# Patient Record
Sex: MALE | Race: WHITE | NOT HISPANIC OR LATINO | Employment: OTHER | URBAN - METROPOLITAN AREA
[De-identification: names, ages, dates, MRNs, and addresses within clinical notes are randomized per-mention and may not be internally consistent; named-entity substitution may affect disease eponyms.]

---

## 2019-01-01 ENCOUNTER — HOSPITAL ENCOUNTER (OUTPATIENT)
Dept: RADIATION ONCOLOGY | Facility: MEDICAL CENTER | Age: 74
End: 2019-12-31
Attending: RADIOLOGY
Payer: COMMERCIAL

## 2019-01-01 VITALS
HEART RATE: 64 BPM | DIASTOLIC BLOOD PRESSURE: 85 MMHG | TEMPERATURE: 97.4 F | SYSTOLIC BLOOD PRESSURE: 132 MMHG | OXYGEN SATURATION: 97 %

## 2019-01-01 PROCEDURE — 99212 OFFICE O/P EST SF 10 MIN: CPT | Performed by: RADIOLOGY

## 2019-01-01 ASSESSMENT — PAIN SCALES - GENERAL: PAINLEVEL: 6=MODERATE PAIN

## 2019-10-04 ENCOUNTER — HOSPITAL ENCOUNTER (INPATIENT)
Facility: MEDICAL CENTER | Age: 74
LOS: 7 days | DRG: 025 | End: 2019-10-11
Attending: EMERGENCY MEDICINE | Admitting: HOSPITALIST
Payer: COMMERCIAL

## 2019-10-04 ENCOUNTER — HOSPITAL ENCOUNTER (OUTPATIENT)
Dept: RADIOLOGY | Facility: MEDICAL CENTER | Age: 74
End: 2019-10-04

## 2019-10-04 DIAGNOSIS — C34.92 PRIMARY MALIGNANT NEOPLASM OF LEFT LUNG METASTATIC TO OTHER SITE (HCC): ICD-10-CM

## 2019-10-04 DIAGNOSIS — R90.0 INTRACRANIAL MASS: ICD-10-CM

## 2019-10-04 LAB
ALBUMIN SERPL BCP-MCNC: 3.9 G/DL (ref 3.2–4.9)
ALBUMIN/GLOB SERPL: 1.1 G/DL
ALP SERPL-CCNC: 77 U/L (ref 30–99)
ALT SERPL-CCNC: 15 U/L (ref 2–50)
ANION GAP SERPL CALC-SCNC: 11 MMOL/L (ref 0–11.9)
APTT PPP: 31.9 SEC (ref 24.7–36)
AST SERPL-CCNC: 23 U/L (ref 12–45)
BASOPHILS # BLD AUTO: 0.8 % (ref 0–1.8)
BASOPHILS # BLD: 0.06 K/UL (ref 0–0.12)
BILIRUB SERPL-MCNC: 0.8 MG/DL (ref 0.1–1.5)
BUN SERPL-MCNC: 35 MG/DL (ref 8–22)
CALCIUM SERPL-MCNC: 10.7 MG/DL (ref 8.5–10.5)
CHLORIDE SERPL-SCNC: 108 MMOL/L (ref 96–112)
CO2 SERPL-SCNC: 25 MMOL/L (ref 20–33)
CREAT SERPL-MCNC: 1.28 MG/DL (ref 0.5–1.4)
EOSINOPHIL # BLD AUTO: 0.05 K/UL (ref 0–0.51)
EOSINOPHIL NFR BLD: 0.7 % (ref 0–6.9)
ERYTHROCYTE [DISTWIDTH] IN BLOOD BY AUTOMATED COUNT: 50.4 FL (ref 35.9–50)
GLOBULIN SER CALC-MCNC: 3.7 G/DL (ref 1.9–3.5)
GLUCOSE SERPL-MCNC: 77 MG/DL (ref 65–99)
HCT VFR BLD AUTO: 44.9 % (ref 42–52)
HGB BLD-MCNC: 14.9 G/DL (ref 14–18)
IMM GRANULOCYTES # BLD AUTO: 0.02 K/UL (ref 0–0.11)
IMM GRANULOCYTES NFR BLD AUTO: 0.3 % (ref 0–0.9)
INR PPP: 0.98 (ref 0.87–1.13)
LYMPHOCYTES # BLD AUTO: 1.51 K/UL (ref 1–4.8)
LYMPHOCYTES NFR BLD: 20.8 % (ref 22–41)
MAGNESIUM SERPL-MCNC: 2.1 MG/DL (ref 1.5–2.5)
MCH RBC QN AUTO: 31.7 PG (ref 27–33)
MCHC RBC AUTO-ENTMCNC: 33.2 G/DL (ref 33.7–35.3)
MCV RBC AUTO: 95.5 FL (ref 81.4–97.8)
MONOCYTES # BLD AUTO: 0.39 K/UL (ref 0–0.85)
MONOCYTES NFR BLD AUTO: 5.4 % (ref 0–13.4)
NEUTROPHILS # BLD AUTO: 5.22 K/UL (ref 1.82–7.42)
NEUTROPHILS NFR BLD: 72 % (ref 44–72)
NRBC # BLD AUTO: 0 K/UL
NRBC BLD-RTO: 0 /100 WBC
PHOSPHATE SERPL-MCNC: 2.4 MG/DL (ref 2.5–4.5)
PLATELET # BLD AUTO: 321 K/UL (ref 164–446)
PMV BLD AUTO: 10.5 FL (ref 9–12.9)
POTASSIUM SERPL-SCNC: 3.1 MMOL/L (ref 3.6–5.5)
PROT SERPL-MCNC: 7.6 G/DL (ref 6–8.2)
PROTHROMBIN TIME: 13.1 SEC (ref 12–14.6)
RBC # BLD AUTO: 4.7 M/UL (ref 4.7–6.1)
SODIUM SERPL-SCNC: 144 MMOL/L (ref 135–145)
WBC # BLD AUTO: 7.3 K/UL (ref 4.8–10.8)

## 2019-10-04 PROCEDURE — 85610 PROTHROMBIN TIME: CPT

## 2019-10-04 PROCEDURE — 770001 HCHG ROOM/CARE - MED/SURG/GYN PRIV*

## 2019-10-04 PROCEDURE — 80053 COMPREHEN METABOLIC PANEL: CPT

## 2019-10-04 PROCEDURE — 83735 ASSAY OF MAGNESIUM: CPT

## 2019-10-04 PROCEDURE — 99406 BEHAV CHNG SMOKING 3-10 MIN: CPT | Performed by: HOSPITALIST

## 2019-10-04 PROCEDURE — 84100 ASSAY OF PHOSPHORUS: CPT

## 2019-10-04 PROCEDURE — 99223 1ST HOSP IP/OBS HIGH 75: CPT | Performed by: HOSPITALIST

## 2019-10-04 PROCEDURE — 96374 THER/PROPH/DIAG INJ IV PUSH: CPT

## 2019-10-04 PROCEDURE — 700111 HCHG RX REV CODE 636 W/ 250 OVERRIDE (IP): Performed by: EMERGENCY MEDICINE

## 2019-10-04 PROCEDURE — 85025 COMPLETE CBC W/AUTO DIFF WBC: CPT

## 2019-10-04 PROCEDURE — 99285 EMERGENCY DEPT VISIT HI MDM: CPT

## 2019-10-04 PROCEDURE — 85730 THROMBOPLASTIN TIME PARTIAL: CPT

## 2019-10-04 PROCEDURE — 99497 ADVNCD CARE PLAN 30 MIN: CPT | Performed by: HOSPITALIST

## 2019-10-04 RX ORDER — BISACODYL 10 MG
10 SUPPOSITORY, RECTAL RECTAL
Status: DISCONTINUED | OUTPATIENT
Start: 2019-10-04 | End: 2019-10-11 | Stop reason: HOSPADM

## 2019-10-04 RX ORDER — TRAMADOL HYDROCHLORIDE 50 MG/1
25 TABLET ORAL EVERY 6 HOURS PRN
Status: ON HOLD | COMMUNITY
End: 2019-10-10

## 2019-10-04 RX ORDER — AMOXICILLIN 250 MG
2 CAPSULE ORAL 2 TIMES DAILY
Status: DISCONTINUED | OUTPATIENT
Start: 2019-10-04 | End: 2019-10-11 | Stop reason: HOSPADM

## 2019-10-04 RX ORDER — DEXAMETHASONE SODIUM PHOSPHATE 4 MG/ML
10 INJECTION, SOLUTION INTRA-ARTICULAR; INTRALESIONAL; INTRAMUSCULAR; INTRAVENOUS; SOFT TISSUE ONCE
Status: COMPLETED | OUTPATIENT
Start: 2019-10-04 | End: 2019-10-04

## 2019-10-04 RX ORDER — ACETAMINOPHEN 325 MG/1
650 TABLET ORAL EVERY 6 HOURS PRN
Status: DISCONTINUED | OUTPATIENT
Start: 2019-10-04 | End: 2019-10-11 | Stop reason: HOSPADM

## 2019-10-04 RX ORDER — ONDANSETRON 2 MG/ML
4 INJECTION INTRAMUSCULAR; INTRAVENOUS EVERY 4 HOURS PRN
Status: DISCONTINUED | OUTPATIENT
Start: 2019-10-04 | End: 2019-10-06

## 2019-10-04 RX ORDER — ONDANSETRON 4 MG/1
4 TABLET, ORALLY DISINTEGRATING ORAL EVERY 4 HOURS PRN
Status: DISCONTINUED | OUTPATIENT
Start: 2019-10-04 | End: 2019-10-11 | Stop reason: HOSPADM

## 2019-10-04 RX ORDER — POLYETHYLENE GLYCOL 3350 17 G/17G
1 POWDER, FOR SOLUTION ORAL
Status: DISCONTINUED | OUTPATIENT
Start: 2019-10-04 | End: 2019-10-11 | Stop reason: HOSPADM

## 2019-10-04 RX ORDER — NICOTINE 21 MG/24HR
14 PATCH, TRANSDERMAL 24 HOURS TRANSDERMAL
Status: DISCONTINUED | OUTPATIENT
Start: 2019-10-05 | End: 2019-10-11 | Stop reason: HOSPADM

## 2019-10-04 RX ADMIN — DEXAMETHASONE SODIUM PHOSPHATE 10 MG: 4 INJECTION, SOLUTION INTRA-ARTICULAR; INTRALESIONAL; INTRAMUSCULAR; INTRAVENOUS; SOFT TISSUE at 17:00

## 2019-10-04 SDOH — HEALTH STABILITY: MENTAL HEALTH: HOW MANY STANDARD DRINKS CONTAINING ALCOHOL DO YOU HAVE ON A TYPICAL DAY?: 1 OR 2

## 2019-10-04 SDOH — HEALTH STABILITY: MENTAL HEALTH: HOW OFTEN DO YOU HAVE A DRINK CONTAINING ALCOHOL?: MONTHLY OR LESS

## 2019-10-04 ASSESSMENT — ENCOUNTER SYMPTOMS
CONSTIPATION: 0
WEAKNESS: 1
PALPITATIONS: 0
BLURRED VISION: 0
PND: 0
NAUSEA: 0
TREMORS: 1
WEIGHT LOSS: 1
SINUS PAIN: 0
FEVER: 0
DIARRHEA: 0
BRUISES/BLEEDS EASILY: 0
DOUBLE VISION: 0
ABDOMINAL PAIN: 0
HEADACHES: 1
DIZZINESS: 0
SENSORY CHANGE: 1
FALLS: 1
SORE THROAT: 0
POLYDIPSIA: 0
PHOTOPHOBIA: 0
VOMITING: 0
WHEEZING: 0
ORTHOPNEA: 0
TINGLING: 0
HEMOPTYSIS: 0
CHILLS: 0
BLOOD IN STOOL: 0
NECK PAIN: 0
SPUTUM PRODUCTION: 0
SHORTNESS OF BREATH: 0
DIAPHORESIS: 0
HALLUCINATIONS: 0
DEPRESSION: 0
HEARTBURN: 0
BACK PAIN: 1
FLANK PAIN: 0
EYE PAIN: 0
COUGH: 1
CLAUDICATION: 0
MYALGIAS: 0
STRIDOR: 0
FOCAL WEAKNESS: 1

## 2019-10-04 ASSESSMENT — LIFESTYLE VARIABLES
TOTAL SCORE: 0
HAVE PEOPLE ANNOYED YOU BY CRITICIZING YOUR DRINKING: NO
EVER HAD A DRINK FIRST THING IN THE MORNING TO STEADY YOUR NERVES TO GET RID OF A HANGOVER: NO
DOES PATIENT WANT TO STOP DRINKING: NO
CONSUMPTION TOTAL: INCOMPLETE
TOTAL SCORE: 0
DO YOU DRINK ALCOHOL: NO
TOTAL SCORE: 0
HAVE YOU EVER FELT YOU SHOULD CUT DOWN ON YOUR DRINKING: NO
SUBSTANCE_ABUSE: 0
EVER FELT BAD OR GUILTY ABOUT YOUR DRINKING: NO

## 2019-10-04 NOTE — ED NOTES
Med Rec Updated and Complete per Pt at bedside and Home Pharmacy  Allergies Reviewed  No PO ABX last 14 days.    Pt reports he does not remember the names of his medications. [facility] the [facility] not [facility] any medication other than Tramadol in the last 90 days.    Pt denies OTC medication at this time.

## 2019-10-04 NOTE — ED TRIAGE NOTES
.....  Chief Complaint   Patient presents with   • Weakness     BIB [facility] from [facility].  Pt has been weak and falling x 3 weeks.  [facility]   Has left sided weakness.  Falling frequently at home x 3 weeks.  Injured his ribs on the left and it is painful.

## 2019-10-04 NOTE — ED PROVIDER NOTES
ED Provider Note    CHIEF COMPLAINT  No chief complaint on file.  Left-sided weakness with recurrent falls    HPI  Milan Clark is a 74 y.o. male who presents with left-sided weakness.  Patient states over the last 3 weeks has been falling more frequently.  He states he has a history of chronic back pain he felt his weakness on the left side is due to the back pain.  The patient went into the [facility] today with the recurrent falls and had a CT scan that showed an intracranial mass and therefore he was transferred here for further evaluation.  He states he did have a history of lung cancer.  The patient does not have a headache.  He does have chronic back pain.  He has noticed some weakness to his left upper and lower extremity.  He is also had ataxia and falls described above.  The patient is unaware of any injuries from the fall.  He has not any chest pain or change in his breathing patterns.    REVIEW OF SYSTEMS  See HPI for further details. All other systems are negative.     PAST MEDICAL HISTORY  No past medical history on file.    [facility]  [email or web address]    SOCIAL HISTORY  Social History     Socioeconomic History   • Marital status: Not on file     Spouse name: Not on file   • Number of children: Not on file   • Years of education: Not on file   • Highest education level: Not on file   Occupational History   • Not on file   Social Needs   • Financial resource strain: Not on file   • Food insecurity:     Worry: Not on file     Inability: Not on file   • Transportation needs:     Medical: Not on file     Non-medical: Not on file   Tobacco Use   • Smoking status: Not on file   Substance and Sexual Activity   • Alcohol use: Not on file   • Drug use: Not on file   • Sexual activity: Not on file   Lifestyle   • Physical activity:     Days per week: Not on file     Minutes per session: Not on file   • Stress: Not on file   Relationships   • Social connections:     Talks on phone: Not on file     Gets  "together: Not on file     Attends Yazidism service: Not on file     Active member of club or organization: Not on file     Attends meetings of clubs or organizations: Not on file     Relationship status: Not on file   • Intimate partner violence:     Fear of current or ex partner: Not on file     Emotionally abused: Not on file     Physically abused: Not on file     Forced sexual activity: Not on file   Other Topics Concern   • Not on file   Social History Narrative   • Not on file       SURGICAL HISTORY  No past surgical history on file.    CURRENT MEDICATIONS  Home Medications    **Home medications have not yet been reviewed for this encounter**         ALLERGIES  Allergies not on file    PHYSICAL EXAM  VITAL SIGNS: /86   Pulse (!) 56   Temp 36.9 °C (98.4 °F) (Temporal)   Resp 16   Ht 1.803 m (5' 11\")   Wt 63.5 kg (140 lb)   BMI 19.53 kg/m²       Constitutional: Cachectic  HENT: Normocephalic, Atraumatic, Bilateral external ears normal, Oropharynx moist, No oral exudates, Nose normal.   Eyes: PERRLA, EOMI, Conjunctiva normal, No discharge.   Neck: Normal range of motion, No tenderness, Supple, No stridor.   Lymphatic: No lymphadenopathy noted.   Cardiovascular: Normal heart rate, Normal rhythm, No murmurs, No rubs, No gallops.   Thorax & Lungs: Symmetrically diminished throughout, No respiratory distress, No wheezing, No chest tenderness.   Abdomen: Bowel sounds normal, Soft, No tenderness, No masses, No pulsatile masses.   Skin: Warm, Dry, No erythema, No rash.   Back: No tenderness, No CVA tenderness.   Extremities: Intact distal pulses, No edema, No tenderness, No cyanosis, No clubbing.   Neurologic: The patient is alert and oriented x3, he has normal sensory exam, cranial nerves are intact, the patient does have a pronator drift as well as a drift with the left lower extremity.  He also has difficulty with finger-to-nose testing with the left upper extremity.  Otherwise the patient is " neurologically intact.  Psychiatric: Affect normal, Judgment normal, Mood normal.     COURSE & MEDICAL DECISION MAKING  Pertinent Labs & Imaging studies reviewed. (See chart for details)  This is a 74-year-old male who presents as a transfer for an intracranial mass.  A CT scan of the head was performed and it showed a large amount of vasogenic edema around the mass right frontal lobe which are suggestive of metastatic disease the patient also has a small amount of herniation as well as some mass-effect upon the lateral ventricle.    At approximately 3:25 PM I spoke with the neurosurgeon and he feels the patient will be more appropriate managed medically but will evaluate the patient.  He states there is more than one lesion therefore is most likely metastatic.  The patient does have vasogenic edema therefore he will be started on Decadron.  The patient be admitted to the hospitalist in guarded condition.    FINAL IMPRESSION  1.  Intracranial mass suspect metastatic disease  2.  Left-sided weakness secondary intracranial mass  3.  Critical care time 30 minutes    Disposition  The patient will be admitted in guarded condition         Electronically signed by: Matt Heredia, 10/4/2019 3:06 PM

## 2019-10-05 ENCOUNTER — APPOINTMENT (OUTPATIENT)
Dept: RADIOLOGY | Facility: MEDICAL CENTER | Age: 74
DRG: 025 | End: 2019-10-05
Attending: NEUROLOGICAL SURGERY
Payer: COMMERCIAL

## 2019-10-05 ENCOUNTER — ANESTHESIA EVENT (OUTPATIENT)
Dept: SURGERY | Facility: MEDICAL CENTER | Age: 74
DRG: 025 | End: 2019-10-05
Payer: COMMERCIAL

## 2019-10-05 ENCOUNTER — APPOINTMENT (OUTPATIENT)
Dept: RADIOLOGY | Facility: MEDICAL CENTER | Age: 74
DRG: 025 | End: 2019-10-05
Attending: FAMILY MEDICINE
Payer: COMMERCIAL

## 2019-10-05 PROBLEM — R91.8 LUNG MASS: Status: ACTIVE | Noted: 2019-10-05

## 2019-10-05 PROBLEM — Z71.89 ADVANCE CARE PLANNING: Status: ACTIVE | Noted: 2019-10-05

## 2019-10-05 PROBLEM — G93.89 BRAIN MASS: Status: ACTIVE | Noted: 2019-10-05

## 2019-10-05 PROBLEM — Z72.0 TOBACCO ABUSE: Status: ACTIVE | Noted: 2019-10-05

## 2019-10-05 PROBLEM — Z85.118 HISTORY OF LUNG CANCER: Status: ACTIVE | Noted: 2019-10-05

## 2019-10-05 PROBLEM — E87.6 HYPOKALEMIA: Status: ACTIVE | Noted: 2019-10-05

## 2019-10-05 PROBLEM — C34.11 MALIGNANT NEOPLASM OF UPPER LOBE OF RIGHT LUNG (HCC): Status: ACTIVE | Noted: 2019-10-05

## 2019-10-05 PROBLEM — N17.9 AKI (ACUTE KIDNEY INJURY) (HCC): Status: ACTIVE | Noted: 2019-10-05

## 2019-10-05 PROBLEM — E83.39 HYPOPHOSPHATEMIA: Status: ACTIVE | Noted: 2019-10-05

## 2019-10-05 LAB
ALBUMIN SERPL BCP-MCNC: 4 G/DL (ref 3.2–4.9)
ALBUMIN/GLOB SERPL: 1.1 G/DL
ALP SERPL-CCNC: 77 U/L (ref 30–99)
ALT SERPL-CCNC: 15 U/L (ref 2–50)
ANION GAP SERPL CALC-SCNC: 10 MMOL/L (ref 0–11.9)
APPEARANCE UR: CLEAR
AST SERPL-CCNC: 23 U/L (ref 12–45)
BACTERIA #/AREA URNS HPF: NEGATIVE /HPF
BASOPHILS # BLD AUTO: 0.3 % (ref 0–1.8)
BASOPHILS # BLD: 0.02 K/UL (ref 0–0.12)
BILIRUB SERPL-MCNC: 0.8 MG/DL (ref 0.1–1.5)
BILIRUB UR QL STRIP.AUTO: NEGATIVE
BUN SERPL-MCNC: 37 MG/DL (ref 8–22)
CALCIUM SERPL-MCNC: 9.7 MG/DL (ref 8.5–10.5)
CEA SERPL-MCNC: 5.3 NG/ML (ref 0–3)
CFT BLD TEG: 4.9 MIN (ref 5–10)
CHLORIDE SERPL-SCNC: 109 MMOL/L (ref 96–112)
CLOT ANGLE BLD TEG: 70.5 DEGREES (ref 53–72)
CLOT LYSIS 30M P MA LENFR BLD TEG: 0.5 % (ref 0–8)
CO2 SERPL-SCNC: 24 MMOL/L (ref 20–33)
COLOR UR: YELLOW
CREAT SERPL-MCNC: 1.06 MG/DL (ref 0.5–1.4)
CT.EXTRINSIC BLD ROTEM: 1.3 MIN (ref 1–3)
EKG IMPRESSION: NORMAL
EOSINOPHIL # BLD AUTO: 0 K/UL (ref 0–0.51)
EOSINOPHIL NFR BLD: 0 % (ref 0–6.9)
EPI CELLS #/AREA URNS HPF: NEGATIVE /HPF
ERYTHROCYTE [DISTWIDTH] IN BLOOD BY AUTOMATED COUNT: 48.3 FL (ref 35.9–50)
GLOBULIN SER CALC-MCNC: 3.7 G/DL (ref 1.9–3.5)
GLUCOSE SERPL-MCNC: 123 MG/DL (ref 65–99)
GLUCOSE UR STRIP.AUTO-MCNC: NEGATIVE MG/DL
HCT VFR BLD AUTO: 46.5 % (ref 42–52)
HGB BLD-MCNC: 15.2 G/DL (ref 14–18)
HYALINE CASTS #/AREA URNS LPF: ABNORMAL /LPF
IMM GRANULOCYTES # BLD AUTO: 0.02 K/UL (ref 0–0.11)
IMM GRANULOCYTES NFR BLD AUTO: 0.3 % (ref 0–0.9)
KETONES UR STRIP.AUTO-MCNC: 15 MG/DL
LEUKOCYTE ESTERASE UR QL STRIP.AUTO: NEGATIVE
LYMPHOCYTES # BLD AUTO: 0.82 K/UL (ref 1–4.8)
LYMPHOCYTES NFR BLD: 14.1 % (ref 22–41)
MCF BLD TEG: 72.8 MM (ref 50–70)
MCH RBC QN AUTO: 30.3 PG (ref 27–33)
MCHC RBC AUTO-ENTMCNC: 32.7 G/DL (ref 33.7–35.3)
MCV RBC AUTO: 92.8 FL (ref 81.4–97.8)
MICRO URNS: ABNORMAL
MONOCYTES # BLD AUTO: 0.09 K/UL (ref 0–0.85)
MONOCYTES NFR BLD AUTO: 1.5 % (ref 0–13.4)
NEUTROPHILS # BLD AUTO: 4.86 K/UL (ref 1.82–7.42)
NEUTROPHILS NFR BLD: 83.8 % (ref 44–72)
NITRITE UR QL STRIP.AUTO: NEGATIVE
NRBC # BLD AUTO: 0 K/UL
NRBC BLD-RTO: 0 /100 WBC
PA AA BLD-ACNC: 53.7 %
PA ADP BLD-ACNC: 36.6 %
PH UR STRIP.AUTO: 6.5 [PH] (ref 5–8)
PLATELET # BLD AUTO: 326 K/UL (ref 164–446)
PMV BLD AUTO: 10.1 FL (ref 9–12.9)
POTASSIUM SERPL-SCNC: 3.2 MMOL/L (ref 3.6–5.5)
PROT SERPL-MCNC: 7.7 G/DL (ref 6–8.2)
PROT UR QL STRIP: NEGATIVE MG/DL
RBC # BLD AUTO: 5.01 M/UL (ref 4.7–6.1)
RBC # URNS HPF: ABNORMAL /HPF
RBC UR QL AUTO: ABNORMAL
SODIUM SERPL-SCNC: 143 MMOL/L (ref 135–145)
SP GR UR STRIP.AUTO: 1.04
TEG ALGORITHM TGALG: ABNORMAL
UROBILINOGEN UR STRIP.AUTO-MCNC: 0.2 MG/DL
WBC # BLD AUTO: 5.8 K/UL (ref 4.8–10.8)
WBC #/AREA URNS HPF: ABNORMAL /HPF

## 2019-10-05 PROCEDURE — 85025 COMPLETE CBC W/AUTO DIFF WBC: CPT

## 2019-10-05 PROCEDURE — 700105 HCHG RX REV CODE 258: Performed by: HOSPITALIST

## 2019-10-05 PROCEDURE — 99232 SBSQ HOSP IP/OBS MODERATE 35: CPT | Performed by: FAMILY MEDICINE

## 2019-10-05 PROCEDURE — 700111 HCHG RX REV CODE 636 W/ 250 OVERRIDE (IP): Performed by: HOSPITALIST

## 2019-10-05 PROCEDURE — A9576 INJ PROHANCE MULTIPACK: HCPCS | Performed by: FAMILY MEDICINE

## 2019-10-05 PROCEDURE — 700117 HCHG RX CONTRAST REV CODE 255: Performed by: FAMILY MEDICINE

## 2019-10-05 PROCEDURE — 70553 MRI BRAIN STEM W/O & W/DYE: CPT

## 2019-10-05 PROCEDURE — 82378 CARCINOEMBRYONIC ANTIGEN: CPT

## 2019-10-05 PROCEDURE — A9270 NON-COVERED ITEM OR SERVICE: HCPCS | Performed by: HOSPITALIST

## 2019-10-05 PROCEDURE — 700102 HCHG RX REV CODE 250 W/ 637 OVERRIDE(OP): Performed by: INTERNAL MEDICINE

## 2019-10-05 PROCEDURE — 71260 CT THORAX DX C+: CPT

## 2019-10-05 PROCEDURE — 81001 URINALYSIS AUTO W/SCOPE: CPT

## 2019-10-05 PROCEDURE — 700117 HCHG RX CONTRAST REV CODE 255: Performed by: NEUROLOGICAL SURGERY

## 2019-10-05 PROCEDURE — 700101 HCHG RX REV CODE 250: Performed by: NEUROLOGICAL SURGERY

## 2019-10-05 PROCEDURE — 84153 ASSAY OF PSA TOTAL: CPT

## 2019-10-05 PROCEDURE — 51798 US URINE CAPACITY MEASURE: CPT

## 2019-10-05 PROCEDURE — 700105 HCHG RX REV CODE 258: Performed by: FAMILY MEDICINE

## 2019-10-05 PROCEDURE — 85384 FIBRINOGEN ACTIVITY: CPT

## 2019-10-05 PROCEDURE — 700102 HCHG RX REV CODE 250 W/ 637 OVERRIDE(OP): Performed by: FAMILY MEDICINE

## 2019-10-05 PROCEDURE — 93970 EXTREMITY STUDY: CPT | Mod: 26 | Performed by: INTERNAL MEDICINE

## 2019-10-05 PROCEDURE — 36415 COLL VENOUS BLD VENIPUNCTURE: CPT

## 2019-10-05 PROCEDURE — 700111 HCHG RX REV CODE 636 W/ 250 OVERRIDE (IP): Performed by: FAMILY MEDICINE

## 2019-10-05 PROCEDURE — 93010 ELECTROCARDIOGRAM REPORT: CPT | Performed by: INTERNAL MEDICINE

## 2019-10-05 PROCEDURE — 85347 COAGULATION TIME ACTIVATED: CPT

## 2019-10-05 PROCEDURE — A9270 NON-COVERED ITEM OR SERVICE: HCPCS | Performed by: FAMILY MEDICINE

## 2019-10-05 PROCEDURE — 93970 EXTREMITY STUDY: CPT

## 2019-10-05 PROCEDURE — 770001 HCHG ROOM/CARE - MED/SURG/GYN PRIV*

## 2019-10-05 PROCEDURE — 80053 COMPREHEN METABOLIC PANEL: CPT

## 2019-10-05 PROCEDURE — 85576 BLOOD PLATELET AGGREGATION: CPT | Mod: 91

## 2019-10-05 PROCEDURE — 93005 ELECTROCARDIOGRAM TRACING: CPT | Performed by: PHYSICIAN ASSISTANT

## 2019-10-05 PROCEDURE — A9270 NON-COVERED ITEM OR SERVICE: HCPCS | Performed by: INTERNAL MEDICINE

## 2019-10-05 PROCEDURE — 700102 HCHG RX REV CODE 250 W/ 637 OVERRIDE(OP): Performed by: HOSPITALIST

## 2019-10-05 PROCEDURE — 700101 HCHG RX REV CODE 250: Performed by: HOSPITALIST

## 2019-10-05 RX ORDER — SODIUM CHLORIDE AND POTASSIUM CHLORIDE 150; 900 MG/100ML; MG/100ML
INJECTION, SOLUTION INTRAVENOUS CONTINUOUS
Status: DISCONTINUED | OUTPATIENT
Start: 2019-10-05 | End: 2019-10-06

## 2019-10-05 RX ORDER — SODIUM CHLORIDE 9 MG/ML
INJECTION, SOLUTION INTRAVENOUS CONTINUOUS
Status: DISCONTINUED | OUTPATIENT
Start: 2019-10-05 | End: 2019-10-05

## 2019-10-05 RX ORDER — SIMVASTATIN 40 MG
40 TABLET ORAL EVERY EVENING
Status: ON HOLD | COMMUNITY
End: 2019-10-17 | Stop reason: SDUPTHER

## 2019-10-05 RX ORDER — LISINOPRIL 10 MG/1
10 TABLET ORAL DAILY
Status: ON HOLD | COMMUNITY
End: 2019-10-10

## 2019-10-05 RX ORDER — DEXAMETHASONE SODIUM PHOSPHATE 4 MG/ML
4 INJECTION, SOLUTION INTRA-ARTICULAR; INTRALESIONAL; INTRAMUSCULAR; INTRAVENOUS; SOFT TISSUE EVERY 6 HOURS
Status: DISCONTINUED | OUTPATIENT
Start: 2019-10-05 | End: 2019-10-06

## 2019-10-05 RX ORDER — HYDRALAZINE HYDROCHLORIDE 20 MG/ML
10-20 INJECTION INTRAMUSCULAR; INTRAVENOUS EVERY 6 HOURS PRN
Status: DISCONTINUED | OUTPATIENT
Start: 2019-10-05 | End: 2019-10-11 | Stop reason: HOSPADM

## 2019-10-05 RX ORDER — OXYCODONE HYDROCHLORIDE 5 MG/1
5 TABLET ORAL
Status: DISCONTINUED | OUTPATIENT
Start: 2019-10-05 | End: 2019-10-06

## 2019-10-05 RX ORDER — DICLOFENAC SODIUM 75 MG/1
75 TABLET, DELAYED RELEASE ORAL
Status: ON HOLD | COMMUNITY
End: 2019-10-10

## 2019-10-05 RX ORDER — LOSARTAN POTASSIUM 25 MG/1
12.5 TABLET ORAL DAILY
Status: ON HOLD | COMMUNITY
End: 2019-10-21

## 2019-10-05 RX ADMIN — DEXAMETHASONE SODIUM PHOSPHATE 4 MG: 4 INJECTION, SOLUTION INTRA-ARTICULAR; INTRALESIONAL; INTRAMUSCULAR; INTRAVENOUS; SOFT TISSUE at 05:52

## 2019-10-05 RX ADMIN — DIBASIC SODIUM PHOSPHATE, MONOBASIC POTASSIUM PHOSPHATE AND MONOBASIC SODIUM PHOSPHATE 1 TABLET: 852; 155; 130 TABLET ORAL at 17:34

## 2019-10-05 RX ADMIN — SODIUM CHLORIDE 1000 ML: 9 INJECTION, SOLUTION INTRAVENOUS at 05:52

## 2019-10-05 RX ADMIN — SODIUM CHLORIDE 500 MG: 9 INJECTION, SOLUTION INTRAVENOUS at 17:36

## 2019-10-05 RX ADMIN — DEXAMETHASONE SODIUM PHOSPHATE 4 MG: 4 INJECTION, SOLUTION INTRA-ARTICULAR; INTRALESIONAL; INTRAMUSCULAR; INTRAVENOUS; SOFT TISSUE at 17:36

## 2019-10-05 RX ADMIN — DIBASIC SODIUM PHOSPHATE, MONOBASIC POTASSIUM PHOSPHATE AND MONOBASIC SODIUM PHOSPHATE 1 TABLET: 852; 155; 130 TABLET ORAL at 08:15

## 2019-10-05 RX ADMIN — OXYCODONE HYDROCHLORIDE 5 MG: 5 TABLET ORAL at 05:51

## 2019-10-05 RX ADMIN — NICOTINE 14 MG: 14 PATCH TRANSDERMAL at 05:52

## 2019-10-05 RX ADMIN — GADOTERIDOL 10 ML: 279.3 INJECTION, SOLUTION INTRAVENOUS at 09:15

## 2019-10-05 RX ADMIN — ACETAMINOPHEN 650 MG: 325 TABLET, FILM COATED ORAL at 20:58

## 2019-10-05 RX ADMIN — SENNOSIDES, DOCUSATE SODIUM 2 TABLET: 50; 8.6 TABLET, FILM COATED ORAL at 17:36

## 2019-10-05 RX ADMIN — DEXAMETHASONE SODIUM PHOSPHATE 4 MG: 4 INJECTION, SOLUTION INTRA-ARTICULAR; INTRALESIONAL; INTRAMUSCULAR; INTRAVENOUS; SOFT TISSUE at 12:32

## 2019-10-05 RX ADMIN — IOHEXOL 100 ML: 350 INJECTION, SOLUTION INTRAVENOUS at 10:05

## 2019-10-05 RX ADMIN — POTASSIUM CHLORIDE AND SODIUM CHLORIDE: 900; 150 INJECTION, SOLUTION INTRAVENOUS at 23:15

## 2019-10-05 RX ADMIN — DEXAMETHASONE SODIUM PHOSPHATE 4 MG: 4 INJECTION, SOLUTION INTRA-ARTICULAR; INTRALESIONAL; INTRAMUSCULAR; INTRAVENOUS; SOFT TISSUE at 23:12

## 2019-10-05 RX ADMIN — MAGNESIUM HYDROXIDE 30 ML: 400 SUSPENSION ORAL at 17:34

## 2019-10-05 RX ADMIN — POTASSIUM CHLORIDE AND SODIUM CHLORIDE: 900; 150 INJECTION, SOLUTION INTRAVENOUS at 08:15

## 2019-10-05 ASSESSMENT — ENCOUNTER SYMPTOMS
NAUSEA: 0
SEIZURES: 0
COUGH: 0
FLANK PAIN: 0
SORE THROAT: 0
DIZZINESS: 0
PALPITATIONS: 0
DIAPHORESIS: 0
HEADACHES: 0
VOMITING: 0
BLURRED VISION: 0
FOCAL WEAKNESS: 1
HEARTBURN: 0
NERVOUS/ANXIOUS: 0
SHORTNESS OF BREATH: 0
NECK PAIN: 0
SPEECH CHANGE: 0
FEVER: 0
SENSORY CHANGE: 0
BACK PAIN: 0
WHEEZING: 0
ABDOMINAL PAIN: 0
CHILLS: 0
DIARRHEA: 0
WEAKNESS: 0

## 2019-10-05 ASSESSMENT — COGNITIVE AND FUNCTIONAL STATUS - GENERAL
DAILY ACTIVITIY SCORE: 18
CLIMB 3 TO 5 STEPS WITH RAILING: A LITTLE
EATING MEALS: A LITTLE
TOILETING: A LITTLE
MOVING TO AND FROM BED TO CHAIR: A LITTLE
DRESSING REGULAR LOWER BODY CLOTHING: A LITTLE
HELP NEEDED FOR BATHING: A LITTLE
SUGGESTED CMS G CODE MODIFIER DAILY ACTIVITY: CK
PERSONAL GROOMING: A LITTLE
SUGGESTED CMS G CODE MODIFIER MOBILITY: CK
DRESSING REGULAR UPPER BODY CLOTHING: A LITTLE
MOBILITY SCORE: 18
WALKING IN HOSPITAL ROOM: A LITTLE
MOVING FROM LYING ON BACK TO SITTING ON SIDE OF FLAT BED: A LITTLE
TURNING FROM BACK TO SIDE WHILE IN FLAT BAD: A LITTLE
STANDING UP FROM CHAIR USING ARMS: A LITTLE

## 2019-10-05 ASSESSMENT — LIFESTYLE VARIABLES
EVER_SMOKED: YES
TOTAL SCORE: 0
CONSUMPTION TOTAL: NEGATIVE
EVER HAD A DRINK FIRST THING IN THE MORNING TO STEADY YOUR NERVES TO GET RID OF A HANGOVER: NO
DOES PATIENT WANT TO STOP DRINKING: NO
AVERAGE NUMBER OF DAYS PER WEEK YOU HAVE A DRINK CONTAINING ALCOHOL: 0
ALCOHOL_USE: NO
HAVE PEOPLE ANNOYED YOU BY CRITICIZING YOUR DRINKING: NO
TOTAL SCORE: 0
TOTAL SCORE: 0
HOW MANY TIMES IN THE PAST YEAR HAVE YOU HAD 5 OR MORE DRINKS IN A DAY: 0
ON A TYPICAL DAY WHEN YOU DRINK ALCOHOL HOW MANY DRINKS DO YOU HAVE: 0
EVER FELT BAD OR GUILTY ABOUT YOUR DRINKING: NO
HAVE YOU EVER FELT YOU SHOULD CUT DOWN ON YOUR DRINKING: NO

## 2019-10-05 ASSESSMENT — COPD QUESTIONNAIRES
DURING THE PAST 4 WEEKS HOW MUCH DID YOU FEEL SHORT OF BREATH: NONE/LITTLE OF THE TIME
COPD SCREENING SCORE: 4
DO YOU EVER COUGH UP ANY MUCUS OR PHLEGM?: NO/ONLY WITH OCCASIONAL COLDS OR INFECTIONS
HAVE YOU SMOKED AT LEAST 100 CIGARETTES IN YOUR ENTIRE LIFE: YES

## 2019-10-05 NOTE — ASSESSMENT & PLAN NOTE
Status post resection  NSCLC on pathology: Med and Rad Onc consulted  Continue neurochecks, he is status cranial active, follow neuro exam every 4 hours  Continue Decadron and Keppra  Transfer to neurology floor  PT/OT recommends rehab  Rehab accepted

## 2019-10-05 NOTE — PROGRESS NOTES
This RN notified MD of high BP, Dr Smith called back, new orders received for oxycodone 5 mg PRN Q3 for pain, Pt asymptomatic, will continue to monitor

## 2019-10-05 NOTE — PROGRESS NOTES
Pt aaox4. COBURN, L sided weakness 4/5. Denies N/T. Up w/ x1 assist. Voiding via urinal. Reviewed poc with pt & family-verbalized understanding. MRI and CT completed. Surgery scheduled for tomorrow. Call light in reach.

## 2019-10-05 NOTE — ASSESSMENT & PLAN NOTE
History of lung cancer treated with lobectomy 8 years ago  Now has metastatic disease   Onc consulted

## 2019-10-05 NOTE — ED NOTES
Patient awake alert and oriented x 4, Glascow 15, bed in low position, call light within reach, on room air, attached to cardiac monitor, unlabored breathing noted, no cough noted, interacts with staff, interactions noted as appropriate, daughter at bedside, finished dinner, frequent rounding performed, will continue to assess patient.

## 2019-10-05 NOTE — PROGRESS NOTES
2 RN skin check done with RN Nicky, no breakdown noted on bony prominences, fragile skin with healing scars on bilateral upper extremities, generalized bruising, sacrum red and blanching, mepilex in use, extra pillows for repositioning, pt turns self, will continue to assess.

## 2019-10-05 NOTE — PROGRESS NOTES
[facility] Medicine Daily Progress Note    Date of Service  10/5/2019    Chief Complaint  74 y.o. male admitted 10/4/2019 with Brain Mass    Hospital Course  Admitted with brain mass, suspicious for metastases, history of lung cancer on the right, now noted to have left lung mass.    Interval Problem Update  Brain mass - left sided weakness  Lung mass - CT chest noted  ERNST - crea better  Low potassium and phosphorus    Lengthy discussion with patient and family, updates given, plan of care discussed.  Consultants/Specialty  Neurosurgery    Code Status  Full    Disposition  TBD    Review of Systems  Review of Systems   Constitutional: Negative for chills, diaphoresis, fever and malaise/fatigue.   HENT: Negative for hearing loss and sore throat.    Eyes: Negative for blurred vision.   Respiratory: Negative for cough, shortness of breath and wheezing.    Cardiovascular: Negative for chest pain, palpitations and leg swelling.   Gastrointestinal: Negative for abdominal pain, diarrhea, heartburn, nausea and vomiting.   Genitourinary: Negative for dysuria, flank pain and hematuria.   Musculoskeletal: Negative for back pain and neck pain.   Skin: Negative for rash.   Neurological: Positive for focal weakness. Negative for dizziness, sensory change, speech change, seizures, weakness and headaches.   Psychiatric/Behavioral: The patient is not nervous/anxious.         Physical Exam  Temp:  [36.6 °C (97.9 °F)-36.9 °C (98.4 °F)] 36.6 °C (97.9 °F)  Pulse:  [51-68] 55  Resp:  [16-17] 16  BP: (119-172)/(80-91) 172/85  SpO2:  [94 %-98 %] 96 %    Physical Exam   Constitutional: He is oriented to person, place, and time. He appears well-developed and well-nourished.   HENT:   Head: Normocephalic and atraumatic.   Eyes: Pupils are equal, round, and reactive to light. Conjunctivae and EOM are normal.   Neck: No tracheal deviation present. No thyromegaly present.   Cardiovascular: Normal rate and regular rhythm.   Pulmonary/Chest: Effort  normal and breath sounds normal. He exhibits tenderness.   Abdominal: Soft. Bowel sounds are normal. He exhibits no distension. There is no tenderness.   Musculoskeletal: He exhibits no edema.   Lymphadenopathy:     He has no cervical adenopathy.   Neurological: He is alert and oriented to person, place, and time. No cranial nerve deficit.   MMT RUE and RLE 5/5, LUE and LLE 4+/5   Skin: Skin is warm and dry.   Nursing note and vitals reviewed.      Fluids    Intake/Output Summary (Last 24 hours) at 10/5/2019 1240  Last data filed at 10/5/2019 0815  Gross per 24 hour   Intake 240 ml   Output 350 ml   Net -110 ml       Laboratory  Recent Labs     10/04/19  1525 10/05/19  0244   WBC 7.3 5.8   RBC 4.70 5.01   HEMOGLOBIN 14.9 15.2   HEMATOCRIT 44.9 46.5   MCV 95.5 92.8   MCH 31.7 30.3   MCHC 33.2* 32.7*   RDW 50.4* 48.3   PLATELETCT 321 326   MPV 10.5 10.1     Recent Labs     10/04/19  1525 10/05/19  0244   SODIUM 144 143   POTASSIUM 3.1* 3.2*   CHLORIDE 108 109   CO2 25 24   GLUCOSE 77 123*   BUN 35* 37*   CREATININE 1.28 1.06   CALCIUM 10.7* 9.7     Recent Labs     10/04/19  1525   APTT 31.9   INR 0.98               Imaging  MR-BRAIN-WITH & W/O   Final Result      1.  Large right frontal and left cerebellar heterogeneously enhancing intra-axial masses concerning for metastatic disease.   2.  Right to left midline shift measuring 1 cm.   3.  Effacement of the fourth ventricle secondary to the cerebellar lesion without evidence of hydrocephalus.   4.  Mild diffuse cerebral substance loss.   5.  Mild microangiopathic ischemic change versus the myelination gliosis.      CT-CHEST,ABDOMEN,PELVIS WITH   Final Result      1.  There is a spiculated 3.4 cm left upper lobe lung mass extending to the left suprahilar region and in casing the left upper lobe pulmonary arteries with questionable small amount of thrombus in one anterior branch. This finding is most consistent    with malignancy.   2.  There are several peripheral  groundglass nodules in left upper lobe which could be additional areas of malignancy versus post obstructive pneumonitis.   3.  There is otherwise no mediastinal or hilar lymphadenopathy.   4.  There are a few nonspecific subcentimeter hypodense hepatic lesions, too small to characterize.   5.  There is no adenopathy in the abdomen or pelvis.   6.  There is mild intrahepatic biliary prominence of uncertain etiology.   7.  Stomach is not well evaluated as described above.   8.  There is one small area of sclerosis in the left iliac bone which could be a bone island or conceivably metastases although no other bone metastases are evident.      OUTSIDE IMAGES-CT HEAD   Final Result      DX-CHEST-2 VIEWS    (Results Pending)   US-EXTREMITY VENOUS LOWER BILAT    (Results Pending)        Assessment/Plan  * Brain mass- (present on admission)  Assessment & Plan  IV Decadron  Start IV Keppra  Neurochecks  Plan for surgery tomorrow    History of lung cancer- (present on admission)  Assessment & Plan  Probable recurrence    Lung mass- (present on admission)  Assessment & Plan  CT chest results reviewed with Radiology - tumor encasing pulmonary artery more probable than thrombus  Check venous duplex    Hypophosphatemia- (present on admission)  Assessment & Plan  Start Kphos    Hypokalemia- (present on admission)  Assessment & Plan  IV KCl, follow bmp    ERNST (acute kidney injury) (HCC)- (present on admission)  Assessment & Plan  IVF NS, follow bmp  Check PTH and UA        Tobacco abuse- (present on admission)  Assessment & Plan  Nicotine replacement protocol       VTE prophylaxis: SCD

## 2019-10-05 NOTE — PROGRESS NOTES
MRI shows 2 large lesions:  1) Lt cerebellum and 2) Rt Frontal.  Both too large for RT.  Pt scheduled for surgical removal of both metastases tomorrow.

## 2019-10-05 NOTE — PROGRESS NOTES
Neurosurgery Progress Note    Subjective:  Pt not in room, per family went for CT    Exam:  NA    BP  Min: 119/80  Max: 172/85  Pulse  Av.9  Min: 51  Max: 68  Resp  Av.3  Min: 16  Max: 17  Temp  Av.8 °C (98.2 °F)  Min: 36.6 °C (97.9 °F)  Max: 36.9 °C (98.4 °F)  SpO2  Av.4 %  Min: 94 %  Max: 98 %    No data recorded    Recent Labs     10/04/19  1525 10/05/19  0244   WBC 7.3 5.8   RBC 4.70 5.01   HEMOGLOBIN 14.9 15.2   HEMATOCRIT 44.9 46.5   MCV 95.5 92.8   MCH 31.7 30.3   MCHC 33.2* 32.7*   RDW 50.4* 48.3   PLATELETCT 321 326   MPV 10.5 10.1     Recent Labs     10/04/19  1525 10/05/19  0244   SODIUM 144 143   POTASSIUM 3.1* 3.2*   CHLORIDE 108 109   CO2 25 24   GLUCOSE 77 123*   BUN 35* 37*   CREATININE 1.28 1.06   CALCIUM 10.7* 9.7     Recent Labs     10/04/19  1525   APTT 31.9   INR 0.98     Recent Labs     10/05/19  0244   REACTMIN 4.9*   CLOTKINET 1.3   CLOTANGL 70.5   MAXCLOTS 72.8*   KSU13DCA 0.5   PRCINADP 36.6   PRCINAA 53.7       Intake/Output       10/04/19 0700 - 10/05/19 0659 10/05/19 0700 - 10/06/19 0659       Total  Total       Intake    Total Intake -- -- -- -- -- --       Output    Urine  --  350 350  --  -- --    Number of Times Voided -- 1 x 1 x -- -- --    Urine Void (mL) -- 350 350 -- -- --    Total Output -- 350 350 -- -- --       Net I/O     -- -350 -350 -- -- --            Intake/Output Summary (Last 24 hours) at 10/5/2019 1009  Last data filed at 10/5/2019 0420  Gross per 24 hour   Intake --   Output 350 ml   Net -350 ml            • dexamethasone  4 mg Q6HRS   • oxyCODONE immediate-release  5 mg Q3HRS PRN   • hydrALAZINE  10-20 mg Q6HRS PRN   • phosphorus  1 Tab BID   • 0.9 % NaCl with KCl 20 mEq 1,000 mL   Continuous   • senna-docusate  2 Tab BID    And   • polyethylene glycol/lytes  1 Packet QDAY PRN    And   • magnesium hydroxide  30 mL QDAY PRN    And   • bisacodyl  10 mg QDAY PRN   • Respiratory Care per Protocol   Continuous RT    • acetaminophen  650 mg Q6HRS PRN   • ondansetron  4 mg Q4HRS PRN   • ondansetron  4 mg Q4HRS PRN   • nicotine  14 mg Daily-0600    And   • nicotine polacrilex  2 mg Q HOUR PRN       Assessment and Plan:  Hospital day #2  POD #NA  Prophylactic anticoagulation: no         Start date/time: TBD, hold for surgery   Craniotomy for mass resection tentatively scheduled for Monday with Dr Lacey, pending clearance from IM team  NPO Sunday at [facility]   Labs reviewed  Ordered CXR, EKG

## 2019-10-05 NOTE — THERAPY
consult received; pt tentatively on surgery schedule for craniotomy Monday am if ok with IM per chart review; will hold PT eval until post to assist with dc planing; pt is mobilizing with nursing per chart

## 2019-10-05 NOTE — H&P
Hospital Medicine History & Physical Note    Date of Service  10/4/2019    Primary Care Physician  No primary care provider on file.    Consultants  Neurosurgery    Code Status  Full    Chief Complaint  Left-sided weakness    History of Presenting Illness  74 y.o. male who presented 10/4/2019 with history of lung cancer status post lobectomy and radiation 5 years ago comes in complaining of confusion, blank stares, left sided weakness, left-sided numbness, unstable gait, left-sided facial droop.  Patient states for the past 2 months the symptoms started to occur and have been progressive.  Before this he was his normal self and strength.  He started to notice tremors back in September.  He started having low appetite and nausea with weight loss.  Patient felt twice at home and one time in his head which prompted come to the emergency room.  He went to the [facility] he had a CT scan that found a mass in his brain.  Upon arrival to the emergency room patient vital signs were within normal limits.    Review of Systems  Review of Systems   Constitutional: Positive for malaise/fatigue and weight loss. Negative for chills, diaphoresis and fever.   HENT: Negative for congestion, ear discharge, ear pain, hearing loss, nosebleeds, sinus pain, sore throat and tinnitus.    Eyes: Negative for blurred vision, double vision, photophobia and pain.   Respiratory: Positive for cough. Negative for hemoptysis, sputum production, shortness of breath, wheezing and stridor.    Cardiovascular: Negative for chest pain, palpitations, orthopnea, claudication, leg swelling and PND.   Gastrointestinal: Negative for abdominal pain, blood in stool, constipation, diarrhea, heartburn, melena, nausea and vomiting.   Genitourinary: Negative for dysuria, flank pain, frequency, hematuria and urgency.   Musculoskeletal: Positive for back pain and falls. Negative for joint pain, myalgias and neck pain.   Skin: Negative for itching and rash.    Neurological: Positive for tremors, sensory change, focal weakness, weakness and headaches. Negative for dizziness and tingling.   Endo/Heme/Allergies: Negative for environmental allergies and polydipsia. Does not bruise/bleed easily.   Psychiatric/Behavioral: Negative for depression, hallucinations, substance abuse and suicidal ideas.       Past Medical History  History of lung cancer    Surgical History   has no past surgical history on file.     Family History  Family history reviewed and not pertinent    Social History   reports that he has been smoking. He has been smoking about 0.10 packs per day. He has never used smokeless tobacco. He reports that he drinks alcohol. He reports that he does not use drugs.    Allergies  No Known Allergies    Medications  Prior to Admission Medications   Prescriptions Last Dose Informant Patient Reported? Taking?   tramadol (ULTRAM) 50 MG Tab 10/3/2019 at PM Patient's Home Pharmacy Yes Yes   Sig: Take 25 mg by mouth every 6 hours as needed for Moderate Pain.      Facility-Administered Medications: None       Physical Exam  Temp:  [36.7 °C (98.1 °F)-36.9 °C (98.4 °F)] 36.7 °C (98.1 °F)  Pulse:  [51-68] 68  Resp:  [16-17] 17  BP: (119-151)/(80-91) 151/91  SpO2:  [94 %-98 %] 97 %    Physical Exam   Constitutional: He is oriented to person, place, and time. He appears well-developed and well-nourished.   HENT:   Head: Normocephalic and atraumatic.   Mouth/Throat: No oropharyngeal exudate.   Eyes: Conjunctivae are normal. No scleral icterus.   Neck: Normal range of motion. Neck supple. No JVD present. No tracheal deviation present. No thyromegaly present.   Cardiovascular: Normal rate, regular rhythm and intact distal pulses. Exam reveals no gallop and no friction rub.   No murmur heard.  Pulmonary/Chest: Effort normal and breath sounds normal. No stridor. No respiratory distress. He has no wheezes. He has no rales. He exhibits no tenderness.   Abdominal: Soft. Bowel sounds are  normal. He exhibits no distension and no mass. There is no tenderness. There is no rebound and no guarding.   Musculoskeletal: Normal range of motion. He exhibits no edema.   Lymphadenopathy:     He has no cervical adenopathy.   Neurological: He is alert and oriented to person, place, and time. He has normal reflexes.   Left-sided weakness with 4 x 5 strength  Left-sided numbness  Left-sided facial droop   Nursing note and vitals reviewed.      Laboratory:  Recent Labs     10/04/19  1525   WBC 7.3   RBC 4.70   HEMOGLOBIN 14.9   HEMATOCRIT 44.9   MCV 95.5   MCH 31.7   MCHC 33.2*   RDW 50.4*   PLATELETCT 321   MPV 10.5     Recent Labs     10/04/19  1525   SODIUM 144   POTASSIUM 3.1*   CHLORIDE 108   CO2 25   GLUCOSE 77   BUN 35*   CREATININE 1.28   CALCIUM 10.7*     Recent Labs     10/04/19  1525   ALTSGPT 15   ASTSGOT 23   ALKPHOSPHAT 77   TBILIRUBIN 0.8   GLUCOSE 77     Recent Labs     10/04/19  1525   APTT 31.9   INR 0.98     No results for input(s): NTPROBNP in the last 72 hours.      No results for input(s): TROPONINT in the last 72 hours.    Urinalysis:    No results found     Imaging:  OUTSIDE IMAGES-CT HEAD   Final Result      MR-BRAIN-WITH & W/O    (Results Pending)   CT-CHEST,ABDOMEN,PELVIS WITH    (Results Pending)         Assessment/Plan:  I anticipate this patient will require at least two midnights for appropriate medical management, necessitating inpatient admission.    * Brain mass  Assessment & Plan  Multiple metastatic lesions one large one in the frontal and one in parietal  Neurosurgery consulted  Neurochecks every 4 hours  IV Decadron ordered for vasogenic edema  PT OT eval  MRI of the brain has been ordered    ERNST (acute kidney injury) (HCC)  Assessment & Plan  Likely prerenal  IV fluid hydration with normal saline  Monitor BMP and assess response  Avoid IV contrast/nephrotoxins/NSAIDs  Dose adjust meds for decreased GFR        Advance care planning  Assessment & Plan  I discussed advance care  planning with the patient for at least 20 minutes with the patient, including diagnosis, prognosis, plan of care, risks and benefits of any therapies that could be offered, as well as alternatives including palliation and hospice, as appropriate. The patient has opted full code because he needs more time to think about it.  The patient states that he will let me know tomorrow.  Time spent is exclusive of evaluation and management or other separately billable procedures. Start time: 6:00 end time:6:20  Consulted pallative    Tobacco abuse  Assessment & Plan  Tobacco cessation education provided for more than 4 minutes, discussed options of nicotine patch, medical treatment with wellbutrin and chantix. Discussed the risks of smoking including increased risk of heart disease, stroke, cancer and COPD. Discussed the benefits of quitting smoking. Nicotine replacement protocol will be provided to the patient.  I discussed with the patient that he has a cancer likely caused by smoking and I suggested that he quit     Malignant neoplasm of upper lobe of right lung (HCC)  Assessment & Plan  History of lung cancer status post lumpectomy and radiation  Patient states that he never got chemotherapy  CT chest abdomen pelvis ordered      VTE prophylaxis: SCD

## 2019-10-05 NOTE — ASSESSMENT & PLAN NOTE
I discussed advance care planning with the patient for at least 20 minutes with the patient, including diagnosis, prognosis, plan of care, risks and benefits of any therapies that could be offered, as well as alternatives including palliation and hospice, as appropriate. The patient has opted full code because he needs more time to think about it.  The patient states that he will let me know tomorrow.  Time spent is exclusive of evaluation and management or other separately billable procedures. Start time: 6:00 end time:6:20  Consulted pallative

## 2019-10-05 NOTE — CONSULTS
DATE OF SERVICE:  10/04/2019    NEUROSURGICAL CONSULTATION    REFERRING PHYSICIAN:  [facility] hospitalist.    HISTORY OF PRESENT ILLNESS:  This 74-year-old male [facility] with [facility] of left-sided weakness and confusion, had a CT of the head,   which shows at least 2 separate metastatic lesions, small one in the   posterior fossa and a large right frontal.  Patient reports being right   handed.  Currently, he is denying any significant headache.    REVIEW OF SYSTEMS:  No history of bleeding disorder or DVT.  Does have a   history of lung cancer, which was treated with pneumonectomy 6-7 years ago.    He states he was followed with regular exams for 5 years and then he stopped   checking him.  There is no other history of any other malignancy.    PHYSICAL EXAMINATION:  VITAL SIGNS:  Recorded.  GENERAL:  Patient is awake, alert and conversant.  Speech is fluent.  HEENT:  Pupils are equally round and reactive to light.  Extraocular movements   are full.  Left facial weakness.  NEUROLOGIC:  He has got left hemiparesis grade IV/V, arm affected more than   the leg.  Increased tone in the left arm.    IMPRESSION:  Metastatic disease of the brain, history of remote lung cancer.    RECOMMENDATIONS:  Patient needs MRI of the brain.  I have ordered a tech for   tomorrow.  I would consider repeating a CT of the chest, abdomen and pelvis   with IV contrast.       ____________________________________     MD KELSEY DE DIOS / JOHN    DD:  10/04/2019 20:19:34  DT:  10/04/2019 22:43:56    D#:  [id number]  Job#:  [id number]

## 2019-10-06 ENCOUNTER — ANESTHESIA (OUTPATIENT)
Dept: SURGERY | Facility: MEDICAL CENTER | Age: 74
DRG: 025 | End: 2019-10-06
Payer: COMMERCIAL

## 2019-10-06 PROBLEM — R33.9 URINARY RETENTION: Status: ACTIVE | Noted: 2019-10-06

## 2019-10-06 PROBLEM — J44.9 COPD (CHRONIC OBSTRUCTIVE PULMONARY DISEASE) (HCC): Status: ACTIVE | Noted: 2019-10-06

## 2019-10-06 PROBLEM — I10 ESSENTIAL HYPERTENSION: Status: ACTIVE | Noted: 2019-10-06

## 2019-10-06 LAB
ANION GAP SERPL CALC-SCNC: 7 MMOL/L (ref 0–11.9)
APPEARANCE UR: CLEAR
BACTERIA #/AREA URNS HPF: NEGATIVE /HPF
BASOPHILS # BLD AUTO: 0.1 % (ref 0–1.8)
BASOPHILS # BLD: 0.01 K/UL (ref 0–0.12)
BILIRUB UR QL STRIP.AUTO: NEGATIVE
BUN SERPL-MCNC: 33 MG/DL (ref 8–22)
CALCIUM SERPL-MCNC: 9.2 MG/DL (ref 8.5–10.5)
CHLORIDE SERPL-SCNC: 110 MMOL/L (ref 96–112)
CO2 SERPL-SCNC: 23 MMOL/L (ref 20–33)
COLOR UR: YELLOW
CREAT SERPL-MCNC: 0.97 MG/DL (ref 0.5–1.4)
EOSINOPHIL # BLD AUTO: 0 K/UL (ref 0–0.51)
EOSINOPHIL NFR BLD: 0 % (ref 0–6.9)
EPI CELLS #/AREA URNS HPF: NEGATIVE /HPF
ERYTHROCYTE [DISTWIDTH] IN BLOOD BY AUTOMATED COUNT: 47.6 FL (ref 35.9–50)
GLUCOSE SERPL-MCNC: 134 MG/DL (ref 65–99)
GLUCOSE UR STRIP.AUTO-MCNC: NEGATIVE MG/DL
HCT VFR BLD AUTO: 41.8 % (ref 42–52)
HGB BLD-MCNC: 14.1 G/DL (ref 14–18)
HYALINE CASTS #/AREA URNS LPF: ABNORMAL /LPF
IMM GRANULOCYTES # BLD AUTO: 0.04 K/UL (ref 0–0.11)
IMM GRANULOCYTES NFR BLD AUTO: 0.4 % (ref 0–0.9)
KETONES UR STRIP.AUTO-MCNC: NEGATIVE MG/DL
LEUKOCYTE ESTERASE UR QL STRIP.AUTO: ABNORMAL
LYMPHOCYTES # BLD AUTO: 0.81 K/UL (ref 1–4.8)
LYMPHOCYTES NFR BLD: 8.7 % (ref 22–41)
MCH RBC QN AUTO: 31.5 PG (ref 27–33)
MCHC RBC AUTO-ENTMCNC: 33.7 G/DL (ref 33.7–35.3)
MCV RBC AUTO: 93.3 FL (ref 81.4–97.8)
MICRO URNS: ABNORMAL
MONOCYTES # BLD AUTO: 0.26 K/UL (ref 0–0.85)
MONOCYTES NFR BLD AUTO: 2.8 % (ref 0–13.4)
NEUTROPHILS # BLD AUTO: 8.21 K/UL (ref 1.82–7.42)
NEUTROPHILS NFR BLD: 88 % (ref 44–72)
NITRITE UR QL STRIP.AUTO: NEGATIVE
NRBC # BLD AUTO: 0 K/UL
NRBC BLD-RTO: 0 /100 WBC
PH UR STRIP.AUTO: 7.5 [PH] (ref 5–8)
PLATELET # BLD AUTO: 334 K/UL (ref 164–446)
PMV BLD AUTO: 10.7 FL (ref 9–12.9)
POTASSIUM SERPL-SCNC: 3.3 MMOL/L (ref 3.6–5.5)
PROT UR QL STRIP: NEGATIVE MG/DL
RBC # BLD AUTO: 4.48 M/UL (ref 4.7–6.1)
RBC # URNS HPF: ABNORMAL /HPF
RBC UR QL AUTO: ABNORMAL
SODIUM SERPL-SCNC: 140 MMOL/L (ref 135–145)
SP GR UR STRIP.AUTO: 1.01
UROBILINOGEN UR STRIP.AUTO-MCNC: 0.2 MG/DL
WBC # BLD AUTO: 9.3 K/UL (ref 4.8–10.8)
WBC #/AREA URNS HPF: ABNORMAL /HPF

## 2019-10-06 PROCEDURE — 88342 IMHCHEM/IMCYTCHM 1ST ANTB: CPT

## 2019-10-06 PROCEDURE — 500367 HCHG DRAIN KIT, HEMOVAC: Performed by: NEUROLOGICAL SURGERY

## 2019-10-06 PROCEDURE — 160029 HCHG SURGERY MINUTES - 1ST 30 MINS LEVEL 4: Performed by: NEUROLOGICAL SURGERY

## 2019-10-06 PROCEDURE — 700105 HCHG RX REV CODE 258: Performed by: ANESTHESIOLOGY

## 2019-10-06 PROCEDURE — 700101 HCHG RX REV CODE 250: Performed by: ANESTHESIOLOGY

## 2019-10-06 PROCEDURE — A9270 NON-COVERED ITEM OR SERVICE: HCPCS | Performed by: FAMILY MEDICINE

## 2019-10-06 PROCEDURE — 99291 CRITICAL CARE FIRST HOUR: CPT | Performed by: INTERNAL MEDICINE

## 2019-10-06 PROCEDURE — 500445 HCHG HEMOSTAT, SURGICEL 4X8: Performed by: NEUROLOGICAL SURGERY

## 2019-10-06 PROCEDURE — 160048 HCHG OR STATISTICAL LEVEL 1-5: Performed by: NEUROLOGICAL SURGERY

## 2019-10-06 PROCEDURE — C1713 ANCHOR/SCREW BN/BN,TIS/BN: HCPCS | Performed by: NEUROLOGICAL SURGERY

## 2019-10-06 PROCEDURE — 700105 HCHG RX REV CODE 258: Performed by: FAMILY MEDICINE

## 2019-10-06 PROCEDURE — 500331 HCHG COTTONOID, SURG PATTIE: Performed by: NEUROLOGICAL SURGERY

## 2019-10-06 PROCEDURE — 160009 HCHG ANES TIME/MIN: Performed by: NEUROLOGICAL SURGERY

## 2019-10-06 PROCEDURE — 00B70ZZ EXCISION OF CEREBRAL HEMISPHERE, OPEN APPROACH: ICD-10-PCS | Performed by: NEUROLOGICAL SURGERY

## 2019-10-06 PROCEDURE — 85025 COMPLETE CBC W/AUTO DIFF WBC: CPT

## 2019-10-06 PROCEDURE — 700101 HCHG RX REV CODE 250: Performed by: NEUROLOGICAL SURGERY

## 2019-10-06 PROCEDURE — 501838 HCHG SUTURE GENERAL: Performed by: NEUROLOGICAL SURGERY

## 2019-10-06 PROCEDURE — 160035 HCHG PACU - 1ST 60 MINS PHASE I: Performed by: NEUROLOGICAL SURGERY

## 2019-10-06 PROCEDURE — A6223 GAUZE >16<=48 NO W/SAL W/O B: HCPCS | Performed by: NEUROLOGICAL SURGERY

## 2019-10-06 PROCEDURE — 88307 TISSUE EXAM BY PATHOLOGIST: CPT

## 2019-10-06 PROCEDURE — 700101 HCHG RX REV CODE 250: Performed by: NURSE PRACTITIONER

## 2019-10-06 PROCEDURE — 700111 HCHG RX REV CODE 636 W/ 250 OVERRIDE (IP): Performed by: HOSPITALIST

## 2019-10-06 PROCEDURE — A9270 NON-COVERED ITEM OR SERVICE: HCPCS | Performed by: NURSE PRACTITIONER

## 2019-10-06 PROCEDURE — 700111 HCHG RX REV CODE 636 W/ 250 OVERRIDE (IP): Performed by: ANESTHESIOLOGY

## 2019-10-06 PROCEDURE — 700111 HCHG RX REV CODE 636 W/ 250 OVERRIDE (IP): Performed by: NURSE PRACTITIONER

## 2019-10-06 PROCEDURE — 88341 IMHCHEM/IMCYTCHM EA ADD ANTB: CPT

## 2019-10-06 PROCEDURE — 700111 HCHG RX REV CODE 636 W/ 250 OVERRIDE (IP): Performed by: FAMILY MEDICINE

## 2019-10-06 PROCEDURE — 110371 HCHG SHELL REV 272: Performed by: NEUROLOGICAL SURGERY

## 2019-10-06 PROCEDURE — 501445 HCHG STAPLER, SKIN DISP: Performed by: NEUROLOGICAL SURGERY

## 2019-10-06 PROCEDURE — 99232 SBSQ HOSP IP/OBS MODERATE 35: CPT | Performed by: FAMILY MEDICINE

## 2019-10-06 PROCEDURE — A9270 NON-COVERED ITEM OR SERVICE: HCPCS | Performed by: HOSPITALIST

## 2019-10-06 PROCEDURE — 81001 URINALYSIS AUTO W/SCOPE: CPT

## 2019-10-06 PROCEDURE — 160002 HCHG RECOVERY MINUTES (STAT): Performed by: NEUROLOGICAL SURGERY

## 2019-10-06 PROCEDURE — 500889 HCHG PACK, NEURO: Performed by: NEUROLOGICAL SURGERY

## 2019-10-06 PROCEDURE — 80048 BASIC METABOLIC PNL TOTAL CA: CPT

## 2019-10-06 PROCEDURE — 110454 HCHG SHELL REV 250: Performed by: NEUROLOGICAL SURGERY

## 2019-10-06 PROCEDURE — 700112 HCHG RX REV CODE 229: Performed by: NURSE PRACTITIONER

## 2019-10-06 PROCEDURE — 700102 HCHG RX REV CODE 250 W/ 637 OVERRIDE(OP): Performed by: NURSE PRACTITIONER

## 2019-10-06 PROCEDURE — 700102 HCHG RX REV CODE 250 W/ 637 OVERRIDE(OP): Performed by: FAMILY MEDICINE

## 2019-10-06 PROCEDURE — 700102 HCHG RX REV CODE 250 W/ 637 OVERRIDE(OP): Performed by: HOSPITALIST

## 2019-10-06 PROCEDURE — 00BC0ZZ EXCISION OF CEREBELLUM, OPEN APPROACH: ICD-10-PCS | Performed by: NEUROLOGICAL SURGERY

## 2019-10-06 PROCEDURE — 160041 HCHG SURGERY MINUTES - EA ADDL 1 MIN LEVEL 4: Performed by: NEUROLOGICAL SURGERY

## 2019-10-06 PROCEDURE — 770022 HCHG ROOM/CARE - ICU (200)

## 2019-10-06 DEVICE — PLATE NC DOGBONE 2-HOLE W/O TAB (6NCX4=24): Type: IMPLANTABLE DEVICE | Status: FUNCTIONAL

## 2019-10-06 DEVICE — GRAFT DURAMATRIX ONLAY PLUS 3IN X 3IN OR 7.5CM X 7.5CM: Type: IMPLANTABLE DEVICE | Status: FUNCTIONAL

## 2019-10-06 DEVICE — SCREW STRYK NC 1.5X5MM (6NCX40=240) (80EA/PK) CONSIGNED QTY 240 PRE-LOAD: Type: IMPLANTABLE DEVICE | Status: FUNCTIONAL

## 2019-10-06 DEVICE — PLATE NC BURR HOLE COVER FOR SHUNT 14MM (6NCX6=36): Type: IMPLANTABLE DEVICE | Status: FUNCTIONAL

## 2019-10-06 DEVICE — PLATE NC SKULL BASE TEMPORAL MAL (6NCX1=6): Type: IMPLANTABLE DEVICE | Status: FUNCTIONAL

## 2019-10-06 DEVICE — DURASEAL SEALANT SYSTEM 5ML - (5/BX): Type: IMPLANTABLE DEVICE | Status: FUNCTIONAL

## 2019-10-06 DEVICE — GRAFT DURAMATRIX ONLAY PLUS 1IN X 1IN OR 2.7CM X 2.75CM: Type: IMPLANTABLE DEVICE | Status: FUNCTIONAL

## 2019-10-06 RX ORDER — OXYCODONE HCL 5 MG/5 ML
5 SOLUTION, ORAL ORAL
Status: DISCONTINUED | OUTPATIENT
Start: 2019-10-06 | End: 2019-10-06 | Stop reason: HOSPADM

## 2019-10-06 RX ORDER — IPRATROPIUM BROMIDE AND ALBUTEROL SULFATE 2.5; .5 MG/3ML; MG/3ML
3 SOLUTION RESPIRATORY (INHALATION)
Status: DISCONTINUED | OUTPATIENT
Start: 2019-10-06 | End: 2019-10-11 | Stop reason: HOSPADM

## 2019-10-06 RX ORDER — SODIUM CHLORIDE, SODIUM GLUCONATE, SODIUM ACETATE, POTASSIUM CHLORIDE AND MAGNESIUM CHLORIDE 526; 502; 368; 37; 30 MG/100ML; MG/100ML; MG/100ML; MG/100ML; MG/100ML
INJECTION, SOLUTION INTRAVENOUS
Status: DISCONTINUED | OUTPATIENT
Start: 2019-10-06 | End: 2019-10-06 | Stop reason: SURG

## 2019-10-06 RX ORDER — AMOXICILLIN 250 MG
1 CAPSULE ORAL NIGHTLY
Status: DISCONTINUED | OUTPATIENT
Start: 2019-10-06 | End: 2019-10-11 | Stop reason: HOSPADM

## 2019-10-06 RX ORDER — HYDRALAZINE HYDROCHLORIDE 20 MG/ML
10 INJECTION INTRAMUSCULAR; INTRAVENOUS
Status: DISCONTINUED | OUTPATIENT
Start: 2019-10-06 | End: 2019-10-08

## 2019-10-06 RX ORDER — DOCUSATE SODIUM 100 MG/1
100 CAPSULE, LIQUID FILLED ORAL 2 TIMES DAILY
Status: DISCONTINUED | OUTPATIENT
Start: 2019-10-06 | End: 2019-10-11 | Stop reason: HOSPADM

## 2019-10-06 RX ORDER — MIDAZOLAM HYDROCHLORIDE 1 MG/ML
1 INJECTION INTRAMUSCULAR; INTRAVENOUS
Status: DISCONTINUED | OUTPATIENT
Start: 2019-10-06 | End: 2019-10-06 | Stop reason: HOSPADM

## 2019-10-06 RX ORDER — LABETALOL HYDROCHLORIDE 5 MG/ML
5 INJECTION, SOLUTION INTRAVENOUS
Status: DISCONTINUED | OUTPATIENT
Start: 2019-10-06 | End: 2019-10-06 | Stop reason: HOSPADM

## 2019-10-06 RX ORDER — LABETALOL HYDROCHLORIDE 5 MG/ML
10 INJECTION, SOLUTION INTRAVENOUS
Status: DISCONTINUED | OUTPATIENT
Start: 2019-10-06 | End: 2019-10-11 | Stop reason: HOSPADM

## 2019-10-06 RX ORDER — ONDANSETRON 2 MG/ML
4 INJECTION INTRAMUSCULAR; INTRAVENOUS
Status: DISCONTINUED | OUTPATIENT
Start: 2019-10-06 | End: 2019-10-06 | Stop reason: HOSPADM

## 2019-10-06 RX ORDER — HYDROMORPHONE HYDROCHLORIDE 1 MG/ML
0.1 INJECTION, SOLUTION INTRAMUSCULAR; INTRAVENOUS; SUBCUTANEOUS
Status: DISCONTINUED | OUTPATIENT
Start: 2019-10-06 | End: 2019-10-06 | Stop reason: HOSPADM

## 2019-10-06 RX ORDER — BACITRACIN 50000 [IU]/1
INJECTION, POWDER, FOR SOLUTION INTRAMUSCULAR
Status: DISCONTINUED | OUTPATIENT
Start: 2019-10-06 | End: 2019-10-06 | Stop reason: HOSPADM

## 2019-10-06 RX ORDER — HALOPERIDOL 5 MG/ML
1 INJECTION INTRAMUSCULAR
Status: DISCONTINUED | OUTPATIENT
Start: 2019-10-06 | End: 2019-10-06 | Stop reason: HOSPADM

## 2019-10-06 RX ORDER — DEXAMETHASONE 4 MG/1
4 TABLET ORAL EVERY 6 HOURS
Status: DISCONTINUED | OUTPATIENT
Start: 2019-10-06 | End: 2019-10-08

## 2019-10-06 RX ORDER — SIMVASTATIN 20 MG
40 TABLET ORAL EVERY EVENING
Status: DISCONTINUED | OUTPATIENT
Start: 2019-10-06 | End: 2019-10-11 | Stop reason: HOSPADM

## 2019-10-06 RX ORDER — BUPIVACAINE HYDROCHLORIDE AND EPINEPHRINE 5; 5 MG/ML; UG/ML
INJECTION, SOLUTION PERINEURAL
Status: DISCONTINUED | OUTPATIENT
Start: 2019-10-06 | End: 2019-10-06 | Stop reason: HOSPADM

## 2019-10-06 RX ORDER — SCOLOPAMINE TRANSDERMAL SYSTEM 1 MG/1
1 PATCH, EXTENDED RELEASE TRANSDERMAL
Status: DISCONTINUED | OUTPATIENT
Start: 2019-10-06 | End: 2019-10-11 | Stop reason: HOSPADM

## 2019-10-06 RX ORDER — SODIUM CHLORIDE, SODIUM LACTATE, POTASSIUM CHLORIDE, CALCIUM CHLORIDE 600; 310; 30; 20 MG/100ML; MG/100ML; MG/100ML; MG/100ML
INJECTION, SOLUTION INTRAVENOUS CONTINUOUS
Status: DISCONTINUED | OUTPATIENT
Start: 2019-10-06 | End: 2019-10-06 | Stop reason: HOSPADM

## 2019-10-06 RX ORDER — CEFAZOLIN SODIUM 1 G/3ML
INJECTION, POWDER, FOR SOLUTION INTRAMUSCULAR; INTRAVENOUS PRN
Status: DISCONTINUED | OUTPATIENT
Start: 2019-10-06 | End: 2019-10-06 | Stop reason: SURG

## 2019-10-06 RX ORDER — POLYETHYLENE GLYCOL 3350 17 G/17G
1 POWDER, FOR SOLUTION ORAL 2 TIMES DAILY PRN
Status: DISCONTINUED | OUTPATIENT
Start: 2019-10-06 | End: 2019-10-11 | Stop reason: HOSPADM

## 2019-10-06 RX ORDER — HYDROMORPHONE HYDROCHLORIDE 1 MG/ML
0.4 INJECTION, SOLUTION INTRAMUSCULAR; INTRAVENOUS; SUBCUTANEOUS
Status: DISCONTINUED | OUTPATIENT
Start: 2019-10-06 | End: 2019-10-06 | Stop reason: HOSPADM

## 2019-10-06 RX ORDER — AMOXICILLIN 250 MG
1 CAPSULE ORAL
Status: DISCONTINUED | OUTPATIENT
Start: 2019-10-06 | End: 2019-10-11 | Stop reason: HOSPADM

## 2019-10-06 RX ORDER — SODIUM CHLORIDE 9 MG/ML
INJECTION, SOLUTION INTRAVENOUS
Status: DISCONTINUED | OUTPATIENT
Start: 2019-10-06 | End: 2019-10-06 | Stop reason: SURG

## 2019-10-06 RX ORDER — SODIUM CHLORIDE AND POTASSIUM CHLORIDE 150; 900 MG/100ML; MG/100ML
INJECTION, SOLUTION INTRAVENOUS CONTINUOUS
Status: DISCONTINUED | OUTPATIENT
Start: 2019-10-06 | End: 2019-10-07

## 2019-10-06 RX ORDER — ROCURONIUM BROMIDE 10 MG/ML
INJECTION, SOLUTION INTRAVENOUS PRN
Status: DISCONTINUED | OUTPATIENT
Start: 2019-10-06 | End: 2019-10-06 | Stop reason: SURG

## 2019-10-06 RX ORDER — DIPHENHYDRAMINE HYDROCHLORIDE 50 MG/ML
25 INJECTION INTRAMUSCULAR; INTRAVENOUS EVERY 6 HOURS PRN
Status: DISCONTINUED | OUTPATIENT
Start: 2019-10-06 | End: 2019-10-09

## 2019-10-06 RX ORDER — HYDROMORPHONE HYDROCHLORIDE 1 MG/ML
0.2 INJECTION, SOLUTION INTRAMUSCULAR; INTRAVENOUS; SUBCUTANEOUS
Status: DISCONTINUED | OUTPATIENT
Start: 2019-10-06 | End: 2019-10-06 | Stop reason: HOSPADM

## 2019-10-06 RX ORDER — HYDRALAZINE HYDROCHLORIDE 20 MG/ML
5 INJECTION INTRAMUSCULAR; INTRAVENOUS
Status: DISCONTINUED | OUTPATIENT
Start: 2019-10-06 | End: 2019-10-06 | Stop reason: HOSPADM

## 2019-10-06 RX ORDER — OXYCODONE HCL 5 MG/5 ML
10 SOLUTION, ORAL ORAL
Status: DISCONTINUED | OUTPATIENT
Start: 2019-10-06 | End: 2019-10-06 | Stop reason: HOSPADM

## 2019-10-06 RX ORDER — LISINOPRIL 20 MG/1
10 TABLET ORAL DAILY
Status: DISCONTINUED | OUTPATIENT
Start: 2019-10-06 | End: 2019-10-09

## 2019-10-06 RX ORDER — CEFAZOLIN SODIUM 2 G/100ML
2 INJECTION, SOLUTION INTRAVENOUS EVERY 8 HOURS
Status: COMPLETED | OUTPATIENT
Start: 2019-10-06 | End: 2019-10-07

## 2019-10-06 RX ORDER — ONDANSETRON 2 MG/ML
4 INJECTION INTRAMUSCULAR; INTRAVENOUS EVERY 4 HOURS PRN
Status: DISCONTINUED | OUTPATIENT
Start: 2019-10-06 | End: 2019-10-11 | Stop reason: HOSPADM

## 2019-10-06 RX ORDER — POTASSIUM CHLORIDE 7.45 MG/ML
10 INJECTION INTRAVENOUS
Status: COMPLETED | OUTPATIENT
Start: 2019-10-06 | End: 2019-10-06

## 2019-10-06 RX ORDER — ENEMA 19; 7 G/133ML; G/133ML
1 ENEMA RECTAL
Status: COMPLETED | OUTPATIENT
Start: 2019-10-06 | End: 2019-10-06

## 2019-10-06 RX ORDER — PHENYLEPHRINE HCL IN 0.9% NACL 0.5 MG/5ML
SYRINGE (ML) INTRAVENOUS PRN
Status: DISCONTINUED | OUTPATIENT
Start: 2019-10-06 | End: 2019-10-06 | Stop reason: SURG

## 2019-10-06 RX ORDER — IPRATROPIUM BROMIDE AND ALBUTEROL SULFATE 2.5; .5 MG/3ML; MG/3ML
3 SOLUTION RESPIRATORY (INHALATION)
Status: DISCONTINUED | OUTPATIENT
Start: 2019-10-06 | End: 2019-10-06 | Stop reason: HOSPADM

## 2019-10-06 RX ORDER — LIDOCAINE HYDROCHLORIDE 20 MG/ML
INJECTION, SOLUTION EPIDURAL; INFILTRATION; INTRACAUDAL; PERINEURAL PRN
Status: DISCONTINUED | OUTPATIENT
Start: 2019-10-06 | End: 2019-10-06 | Stop reason: SURG

## 2019-10-06 RX ORDER — CLONIDINE HYDROCHLORIDE 0.1 MG/1
0.1 TABLET ORAL EVERY 4 HOURS PRN
Status: DISCONTINUED | OUTPATIENT
Start: 2019-10-06 | End: 2019-10-11 | Stop reason: HOSPADM

## 2019-10-06 RX ORDER — DIPHENHYDRAMINE HYDROCHLORIDE 50 MG/ML
12.5 INJECTION INTRAMUSCULAR; INTRAVENOUS
Status: DISCONTINUED | OUTPATIENT
Start: 2019-10-06 | End: 2019-10-06 | Stop reason: HOSPADM

## 2019-10-06 RX ORDER — MEPERIDINE HYDROCHLORIDE 25 MG/ML
12.5 INJECTION INTRAMUSCULAR; INTRAVENOUS; SUBCUTANEOUS
Status: DISCONTINUED | OUTPATIENT
Start: 2019-10-06 | End: 2019-10-06 | Stop reason: HOSPADM

## 2019-10-06 RX ORDER — DEXAMETHASONE SODIUM PHOSPHATE 4 MG/ML
4 INJECTION, SOLUTION INTRA-ARTICULAR; INTRALESIONAL; INTRAMUSCULAR; INTRAVENOUS; SOFT TISSUE EVERY 6 HOURS
Status: DISCONTINUED | OUTPATIENT
Start: 2019-10-06 | End: 2019-10-08

## 2019-10-06 RX ORDER — BISACODYL 10 MG
10 SUPPOSITORY, RECTAL RECTAL
Status: DISCONTINUED | OUTPATIENT
Start: 2019-10-06 | End: 2019-10-11 | Stop reason: HOSPADM

## 2019-10-06 RX ADMIN — SODIUM CHLORIDE 500 MG: 9 INJECTION, SOLUTION INTRAVENOUS at 20:56

## 2019-10-06 RX ADMIN — EPHEDRINE SULFATE 10 MG: 50 INJECTION, SOLUTION INTRAVENOUS at 13:16

## 2019-10-06 RX ADMIN — SUGAMMADEX 200 MG: 100 INJECTION, SOLUTION INTRAVENOUS at 15:49

## 2019-10-06 RX ADMIN — EPHEDRINE SULFATE 10 MG: 50 INJECTION, SOLUTION INTRAVENOUS at 14:46

## 2019-10-06 RX ADMIN — SODIUM CHLORIDE, SODIUM GLUCONATE, SODIUM ACETATE, POTASSIUM CHLORIDE AND MAGNESIUM CHLORIDE: 526; 502; 368; 37; 30 INJECTION, SOLUTION INTRAVENOUS at 15:52

## 2019-10-06 RX ADMIN — MINERAL OIL, PETROLATUM 1 APPLICATION: 425; 573 OINTMENT OPHTHALMIC at 21:46

## 2019-10-06 RX ADMIN — NICOTINE 14 MG: 14 PATCH TRANSDERMAL at 05:28

## 2019-10-06 RX ADMIN — SENNOSIDES, DOCUSATE SODIUM 2 TABLET: 50; 8.6 TABLET, FILM COATED ORAL at 05:28

## 2019-10-06 RX ADMIN — ACETAMINOPHEN 650 MG: 325 TABLET, FILM COATED ORAL at 18:40

## 2019-10-06 RX ADMIN — SIMVASTATIN 40 MG: 20 TABLET, FILM COATED ORAL at 19:51

## 2019-10-06 RX ADMIN — DOCUSATE SODIUM 100 MG: 100 CAPSULE, LIQUID FILLED ORAL at 19:51

## 2019-10-06 RX ADMIN — DEXAMETHASONE 4 MG: 4 TABLET ORAL at 19:50

## 2019-10-06 RX ADMIN — DIBASIC SODIUM PHOSPHATE, MONOBASIC POTASSIUM PHOSPHATE AND MONOBASIC SODIUM PHOSPHATE 1 TABLET: 852; 155; 130 TABLET ORAL at 05:28

## 2019-10-06 RX ADMIN — EPHEDRINE SULFATE 10 MG: 50 INJECTION, SOLUTION INTRAVENOUS at 14:53

## 2019-10-06 RX ADMIN — SODIUM PHOSPHATE 133 ML: 7; 19 ENEMA RECTAL at 22:15

## 2019-10-06 RX ADMIN — POTASSIUM CHLORIDE AND SODIUM CHLORIDE: 900; 150 INJECTION, SOLUTION INTRAVENOUS at 19:33

## 2019-10-06 RX ADMIN — FENTANYL CITRATE 100 MCG: 50 INJECTION, SOLUTION INTRAMUSCULAR; INTRAVENOUS at 13:25

## 2019-10-06 RX ADMIN — LISINOPRIL 10 MG: 20 TABLET ORAL at 09:24

## 2019-10-06 RX ADMIN — EPHEDRINE SULFATE 10 MG: 50 INJECTION, SOLUTION INTRAVENOUS at 13:55

## 2019-10-06 RX ADMIN — HYDRALAZINE HYDROCHLORIDE 10 MG: 20 INJECTION INTRAMUSCULAR; INTRAVENOUS at 18:46

## 2019-10-06 RX ADMIN — SENNOSIDES, DOCUSATE SODIUM 1 TABLET: 50; 8.6 TABLET, FILM COATED ORAL at 19:51

## 2019-10-06 RX ADMIN — PROPOFOL 150 MG: 10 INJECTION, EMULSION INTRAVENOUS at 12:38

## 2019-10-06 RX ADMIN — POTASSIUM CHLORIDE 10 MEQ: 10 INJECTION, SOLUTION INTRAVENOUS at 10:20

## 2019-10-06 RX ADMIN — SODIUM CHLORIDE: 9 INJECTION, SOLUTION INTRAVENOUS at 12:30

## 2019-10-06 RX ADMIN — CEFAZOLIN SODIUM 2 G: 2 INJECTION, SOLUTION INTRAVENOUS at 19:50

## 2019-10-06 RX ADMIN — DEXAMETHASONE SODIUM PHOSPHATE 4 MG: 4 INJECTION, SOLUTION INTRA-ARTICULAR; INTRALESIONAL; INTRAMUSCULAR; INTRAVENOUS; SOFT TISSUE at 05:26

## 2019-10-06 RX ADMIN — POTASSIUM CHLORIDE 10 MEQ: 10 INJECTION, SOLUTION INTRAVENOUS at 09:25

## 2019-10-06 RX ADMIN — BISACODYL 10 MG: 10 SUPPOSITORY RECTAL at 20:18

## 2019-10-06 RX ADMIN — CEFAZOLIN 2 G: 330 INJECTION, POWDER, FOR SOLUTION INTRAMUSCULAR; INTRAVENOUS at 12:43

## 2019-10-06 RX ADMIN — Medication 100 MCG: at 15:22

## 2019-10-06 RX ADMIN — SODIUM CHLORIDE, SODIUM GLUCONATE, SODIUM ACETATE, POTASSIUM CHLORIDE AND MAGNESIUM CHLORIDE: 526; 502; 368; 37; 30 INJECTION, SOLUTION INTRAVENOUS at 13:59

## 2019-10-06 RX ADMIN — CLONIDINE HYDROCHLORIDE 0.1 MG: 0.1 TABLET ORAL at 20:19

## 2019-10-06 RX ADMIN — Medication 100 MCG: at 15:15

## 2019-10-06 RX ADMIN — HYDRALAZINE HYDROCHLORIDE 10 MG: 20 INJECTION INTRAMUSCULAR; INTRAVENOUS at 21:46

## 2019-10-06 RX ADMIN — EPHEDRINE SULFATE 10 MG: 50 INJECTION, SOLUTION INTRAVENOUS at 15:01

## 2019-10-06 RX ADMIN — LIDOCAINE HYDROCHLORIDE 75 MG: 20 INJECTION, SOLUTION EPIDURAL; INFILTRATION; INTRACAUDAL at 12:38

## 2019-10-06 RX ADMIN — LABETALOL HYDROCHLORIDE 10 MG: 5 INJECTION INTRAVENOUS at 19:08

## 2019-10-06 RX ADMIN — Medication 100 MCG: at 15:32

## 2019-10-06 RX ADMIN — FENTANYL CITRATE 100 MCG: 50 INJECTION, SOLUTION INTRAMUSCULAR; INTRAVENOUS at 13:05

## 2019-10-06 RX ADMIN — DIBASIC SODIUM PHOSPHATE, MONOBASIC POTASSIUM PHOSPHATE AND MONOBASIC SODIUM PHOSPHATE 1 TABLET: 852; 155; 130 TABLET ORAL at 19:50

## 2019-10-06 RX ADMIN — ROCURONIUM BROMIDE 20 MG: 10 INJECTION, SOLUTION INTRAVENOUS at 14:13

## 2019-10-06 RX ADMIN — ROCURONIUM BROMIDE 50 MG: 10 INJECTION, SOLUTION INTRAVENOUS at 12:38

## 2019-10-06 RX ADMIN — SODIUM CHLORIDE 500 MG: 9 INJECTION, SOLUTION INTRAVENOUS at 05:28

## 2019-10-06 ASSESSMENT — ENCOUNTER SYMPTOMS
DIARRHEA: 0
VOMITING: 0
WEAKNESS: 0
BRUISES/BLEEDS EASILY: 0
POLYDIPSIA: 0
PALPITATIONS: 0
SPEECH CHANGE: 0
FOCAL WEAKNESS: 1
NERVOUS/ANXIOUS: 0
SHORTNESS OF BREATH: 0
NAUSEA: 0
SPUTUM PRODUCTION: 0
DEPRESSION: 0
SENSORY CHANGE: 0
HEMOPTYSIS: 0
PHOTOPHOBIA: 0
SEIZURES: 0
WEIGHT LOSS: 0
DIZZINESS: 0
TINGLING: 0
DIAPHORESIS: 0
STRIDOR: 0
CHILLS: 0
HEADACHES: 0
DOUBLE VISION: 0
HEARTBURN: 0
WHEEZING: 0
MYALGIAS: 0
SINUS PAIN: 0
SORE THROAT: 0
BLURRED VISION: 1
COUGH: 0
BLOOD IN STOOL: 0
NECK PAIN: 0
FLANK PAIN: 0
ABDOMINAL PAIN: 0
BLURRED VISION: 0
BACK PAIN: 0
FEVER: 0

## 2019-10-06 NOTE — ANESTHESIA PREPROCEDURE EVALUATION
Relevant Problems   PULMONARY   (+) COPD (chronic obstructive pulmonary disease) (HCC)      NEURO   (+) History of lung cancer      CARDIAC   (+) Essential hypertension         (+) ERNST (acute kidney injury) (HCC)      Other   (+) Brain mass   (+) Lung mass   (+) Tobacco abuse       Physical Exam    Airway   Mallampati: II  TM distance: >3 FB  Neck ROM: full       Cardiovascular - normal exam  Rhythm: regular  Rate: normal  (-) murmur     Dental - normal exam  (+) upper dentures         Pulmonary - normal exam  Breath sounds clear to auscultation     Abdominal    Neurological - normal exam                 Anesthesia Plan    ASA 3   ASA physical status 3 criteria: COPD    Plan - general       Airway plan will be ETT        Induction: intravenous    Postoperative Plan: Postoperative administration of opioids is intended.    Pertinent diagnostic labs and testing reviewed    Informed Consent:    Anesthetic plan and risks discussed with patient.    Use of blood products discussed with: patient whom consented to blood products.

## 2019-10-06 NOTE — ANESTHESIA PROCEDURE NOTES
Airway  Date/Time: 10/6/2019 12:39 PM  Performed by: Etienne Jackson M.D.  Authorized by: Etienne Jackson M.D.     Location:  OR  Urgency:  Elective  Difficult Airway: No    Indications for Airway Management:  Anesthesia  Spontaneous Ventilation: absent    Sedation Level:  Deep  Preoxygenated: Yes    Patient Position:  Sniffing  Mask Difficulty Assessment:  1 - vent by mask  Final Airway Type:  Endotracheal airway  Final Endotracheal Airway:  ETT  Cuffed: Yes    Technique Used for Successful ETT Placement:  Direct laryngoscopy  Insertion Site:  Oral  Blade Type:  Hicks  Laryngoscope Blade/Videolaryngoscope Blade Size:  3  ETT Size (mm):  8.0  Measured from:  Teeth  ETT to Teeth (cm):  24  Placement Verified by: auscultation and capnometry    Cormack-Lehane Classification:  Grade I - full view of glottis  Number of Attempts at Approach:  1

## 2019-10-06 NOTE — ANESTHESIA PROCEDURE NOTES
Arterial Line  Performed by: Etienne Jackson M.D.  Authorized by: Etienne Jackson M.D.     Start Time:  10/6/2019 12:58 PM  End Time:  10/6/2019 1:01 PM  Localization: surface landmarks    Patient Location:  OR  Indication: continuous blood pressure monitoring    Catheter Size:  20 G  Seldinger Technique?: Yes    Laterality:  Right  Site:  Radial artery  Line Secured:  Antimicrobial disc, tape and transparent dressing  Events: patient tolerated procedure well with no complications

## 2019-10-06 NOTE — PROGRESS NOTES
Earlier pt stated he was voiding small amounts frequently. Now pt states he has not voided at all. Lower abdomen area distended. Family also brought in pts home meds. Updated Med Rec. Notified MD- stated to bladder scan and place alejandra if >350mL. Bladder scan results = >999mL. MD will review med rec and order home meds. Alejandra placed. UA sent to lab.

## 2019-10-06 NOTE — ANESTHESIA QCDR
2019 [facility] Clinical Data Registry (for Quality Improvement)     Postoperative nausea/vomiting risk protocol (Adult = 18 yrs and Pediatric 3-17 yrs)- (430 and 463)  General inhalation anesthetic (NOT TIVA) with PONV risk factors: Yes  Provision of anti-emetic therapy with at least 2 different classes of agents: Yes   Patient DID NOT receive anti-emetic therapy and reason is documented in Medical Record:  N/A    Multimodal Pain Management- (AQI59)  Patient undergoing Elective Surgery (i.e. Outpatient, or ASC, or Prescheduled Surgery prior to Hospital Admission): No  Use of Multimodal Pain Management, two or more drugs and/or interventions, NOT including systemic opioids: N/A  Exception: Documented allergy to multiple classes of analgesics: N/A    PACU assessment of acute postoperative pain prior to Anesthesia Care End- Applies to Patients Age = 18- (ABG7)  Initial PACU pain score is which of the following: Pain not assessed  Patient unable to report pain score: Yes (Patient Unable to Report)    Post-anesthetic transfer of care checklist/protocol to PACU/ICU- (426 and 427)  Upon conclusion of case, patient transferred to which of the following locations: PACU/Non-ICU  Use of transfer checklist/protocol: Yes  Exclusion: Service Performed in Patient Hospital Room (and thus did not require transfer): N/A    PACU Reintubation- (AQI31)  General anesthesia requiring endotracheal intubation (ETT) along with subsequent extubation in OR or PACU: Yes  Required reintubation in the PACU: No   Extubation was a planned trial documented in the medical record prior to removal of the original airway device:  N/A    Unplanned admission to ICU related to anesthesia service up through end of PACU care- (MD51)  Unplanned admission to ICU (not initially anticipated at anesthesia start time): No

## 2019-10-06 NOTE — OR NURSING
Vss. Pt denies nausea. Tolerating juice and pudding. Denies pain but states his head feels tight. Declines pain meds. Left hand is weaker than Right. Strength to feet intact. Pt now alert to person place, time, and event. initially was confused to event.   Daughter supportive at bedside.     Art line to R wrist. Urinary cath in place. LR infusing into IV.  Will continue to wait for an ICU bed.

## 2019-10-06 NOTE — ANESTHESIA TIME REPORT
Anesthesia Start and Stop Event Times     Date Time Event    10/6/2019 1227 Ready for Procedure     1230 Anesthesia Start     1601 Anesthesia Stop        Responsible Staff  10/06/19    Name Role Begin End    Etienne Jackson M.D. Anesth 1230 1601        Preop Diagnosis (Free Text):  Pre-op Diagnosis     Right frontal and left cerebellar brain masses        Preop Diagnosis (Codes):    Post op Diagnosis  Neoplasm of brain causing mass effect on adjacent structures (HCC)  Right frontal and left cerebellar brain masses    Premium Reason  C. Post-1st,2nd,3rd,OB,RTC    Comments:

## 2019-10-06 NOTE — PROGRESS NOTES
Pt aaox4. COBURN, L sided weakness 4/5. Denies N/T. Burns in place, patent. NPO for surgery. IV fluids running. Reviewed poc with pt & family-verbalized understanding. Family and pt had questions before surgery. Family went with pt to pre-op to speak with Dr. Lacey.

## 2019-10-06 NOTE — PALLIATIVE CARE
Palliative Care follow-up  PC RN discussed pt with BS RN, per RN pt will be going to surgery soon. PC RN will round back post-op to determine when GOC discussion is appropriate.       Thank you for allowing Palliative Care to participate in this patient's care. Please feel free to call [phone] with any questions or concerns.

## 2019-10-06 NOTE — PROGRESS NOTES
[facility] Medicine Daily Progress Note    Date of Service  10/6/2019    Chief Complaint  74 y.o. male admitted 10/4/2019 with Brain Mass    Hospital Course  Admitted with brain mass, suspicious for metastases, history of lung cancer on the right, now noted to have left lung mass.    Interval Problem Update  Brain mass - for surgery today  Lung mass - CT chest noted  ERNST - crea better  HTN - sbp 130-160  Low potassium     Lengthy discussion with patient and family, updates given, plan of care discussed.    Consultants/Specialty  Neurosurgery    Code Status  Full    Disposition  TBD    Review of Systems  Review of Systems   Constitutional: Negative for chills, diaphoresis, fever and malaise/fatigue.   HENT: Negative for hearing loss and sore throat.    Eyes: Negative for blurred vision.   Respiratory: Negative for cough, shortness of breath and wheezing.    Cardiovascular: Negative for chest pain, palpitations and leg swelling.   Gastrointestinal: Negative for abdominal pain, diarrhea, heartburn, nausea and vomiting.   Genitourinary: Negative for dysuria, flank pain and hematuria.   Musculoskeletal: Negative for back pain and neck pain.   Skin: Negative for rash.   Neurological: Positive for focal weakness. Negative for dizziness, sensory change, speech change, seizures, weakness and headaches.   Psychiatric/Behavioral: The patient is not nervous/anxious.         Physical Exam  Temp:  [36.5 °C (97.7 °F)-36.9 °C (98.5 °F)] 36.8 °C (98.2 °F)  Pulse:  [53-67] 53  Resp:  [16-17] 17  BP: (129-161)/(79-96) 160/84  SpO2:  [92 %-96 %] 93 %    Physical Exam   Constitutional: He is oriented to person, place, and time. He appears well-developed and well-nourished.   HENT:   Head: Normocephalic and atraumatic.   Eyes: Pupils are equal, round, and reactive to light. Conjunctivae and EOM are normal.   Neck: No tracheal deviation present. No thyromegaly present.   Cardiovascular: Normal rate and regular rhythm.   Pulmonary/Chest:  Effort normal and breath sounds normal. He exhibits tenderness.   Abdominal: Soft. Bowel sounds are normal. He exhibits no distension. There is no tenderness.   Musculoskeletal: He exhibits no edema.   Lymphadenopathy:     He has no cervical adenopathy.   Neurological: He is alert and oriented to person, place, and time. No cranial nerve deficit.   MMT RUE and RLE 5/5, LUE and LLE 4+/5   Skin: Skin is warm and dry.   Nursing note and vitals reviewed.      Fluids    Intake/Output Summary (Last 24 hours) at 10/6/2019 1115  Last data filed at 10/6/2019 0635  Gross per 24 hour   Intake 2255 ml   Output 1000 ml   Net 1255 ml       Laboratory  Recent Labs     10/04/19  1525 10/05/19  0244 10/06/19  0324   WBC 7.3 5.8 9.3   RBC 4.70 5.01 4.48*   HEMOGLOBIN 14.9 15.2 14.1   HEMATOCRIT 44.9 46.5 41.8*   MCV 95.5 92.8 93.3   MCH 31.7 30.3 31.5   MCHC 33.2* 32.7* 33.7   RDW 50.4* 48.3 47.6   PLATELETCT 321 326 334   MPV 10.5 10.1 10.7     Recent Labs     10/04/19  1525 10/05/19  0244 10/06/19  0324   SODIUM 144 143 140   POTASSIUM 3.1* 3.2* 3.3*   CHLORIDE 108 109 110   CO2 25 24 23   GLUCOSE 77 123* 134*   BUN 35* 37* 33*   CREATININE 1.28 1.06 0.97   CALCIUM 10.7* 9.7 9.2     Recent Labs     10/04/19  1525   APTT 31.9   INR 0.98               Imaging  US-EXTREMITY VENOUS LOWER BILAT   Final Result      MR-BRAIN-WITH & W/O   Final Result      1.  Large right frontal and left cerebellar heterogeneously enhancing intra-axial masses concerning for metastatic disease.   2.  Right to left midline shift measuring 1 cm.   3.  Effacement of the fourth ventricle secondary to the cerebellar lesion without evidence of hydrocephalus.   4.  Mild diffuse cerebral substance loss.   5.  Mild microangiopathic ischemic change versus the myelination gliosis.      CT-CHEST,ABDOMEN,PELVIS WITH   Final Result      1.  There is a spiculated 3.4 cm left upper lobe lung mass extending to the left suprahilar region and in casing the left upper lobe  pulmonary arteries with questionable small amount of thrombus in one anterior branch. This finding is most consistent    with malignancy.   2.  There are several peripheral groundglass nodules in left upper lobe which could be additional areas of malignancy versus post obstructive pneumonitis.   3.  There is otherwise no mediastinal or hilar lymphadenopathy.   4.  There are a few nonspecific subcentimeter hypodense hepatic lesions, too small to characterize.   5.  There is no adenopathy in the abdomen or pelvis.   6.  There is mild intrahepatic biliary prominence of uncertain etiology.   7.  Stomach is not well evaluated as described above.   8.  There is one small area of sclerosis in the left iliac bone which could be a bone island or conceivably metastases although no other bone metastases are evident.      OUTSIDE IMAGES-CT HEAD   Final Result           Assessment/Plan  * Brain mass- (present on admission)  Assessment & Plan  IV Decadron, Keppra  Neurochecks  Plan for surgery today    History of lung cancer- (present on admission)  Assessment & Plan  Probable recurrence    Lung mass- (present on admission)  Assessment & Plan  CT chest results reviewed with Radiology - tumor encasing pulmonary artery more probable than thrombus  Venous duplex negative    Essential hypertension- (present on admission)  Assessment & Plan  Resume Lisinopril  IV Hydralazine prn with parameters    Urinary retention- (present on admission)  Assessment & Plan  Burns    Hypophosphatemia- (present on admission)  Assessment & Plan  Kphos    Hypokalemia- (present on admission)  Assessment & Plan  IV KCl, follow bmp    ERNST (acute kidney injury) (HCC)- (present on admission)  Assessment & Plan  IVF NS, follow bmp  Check PTH and UA        Tobacco abuse- (present on admission)  Assessment & Plan  Nicotine replacement protocol       VTE prophylaxis: SCD

## 2019-10-07 ENCOUNTER — APPOINTMENT (OUTPATIENT)
Dept: RADIOLOGY | Facility: MEDICAL CENTER | Age: 74
DRG: 025 | End: 2019-10-07
Attending: NEUROLOGICAL SURGERY
Payer: COMMERCIAL

## 2019-10-07 LAB
ANION GAP SERPL CALC-SCNC: 7 MMOL/L (ref 0–11.9)
BUN SERPL-MCNC: 24 MG/DL (ref 8–22)
CALCIUM SERPL-MCNC: 8.1 MG/DL (ref 8.5–10.5)
CHLORIDE SERPL-SCNC: 108 MMOL/L (ref 96–112)
CO2 SERPL-SCNC: 21 MMOL/L (ref 20–33)
CREAT SERPL-MCNC: 0.93 MG/DL (ref 0.5–1.4)
ERYTHROCYTE [DISTWIDTH] IN BLOOD BY AUTOMATED COUNT: 50.3 FL (ref 35.9–50)
GLUCOSE SERPL-MCNC: 160 MG/DL (ref 65–99)
HCT VFR BLD AUTO: 37.5 % (ref 42–52)
HGB BLD-MCNC: 12.4 G/DL (ref 14–18)
MCH RBC QN AUTO: 32 PG (ref 27–33)
MCHC RBC AUTO-ENTMCNC: 33.1 G/DL (ref 33.7–35.3)
MCV RBC AUTO: 96.9 FL (ref 81.4–97.8)
PATHOLOGY CONSULT NOTE: NORMAL
PLATELET # BLD AUTO: 267 K/UL (ref 164–446)
PMV BLD AUTO: 9.9 FL (ref 9–12.9)
POTASSIUM SERPL-SCNC: 3.3 MMOL/L (ref 3.6–5.5)
PTH-INTACT SERPL-MCNC: 56.4 PG/ML (ref 14–72)
RBC # BLD AUTO: 3.87 M/UL (ref 4.7–6.1)
SODIUM SERPL-SCNC: 136 MMOL/L (ref 135–145)
WBC # BLD AUTO: 9.5 K/UL (ref 4.8–10.8)

## 2019-10-07 PROCEDURE — 700102 HCHG RX REV CODE 250 W/ 637 OVERRIDE(OP): Performed by: PSYCHIATRY & NEUROLOGY

## 2019-10-07 PROCEDURE — 700112 HCHG RX REV CODE 229: Performed by: NURSE PRACTITIONER

## 2019-10-07 PROCEDURE — A9270 NON-COVERED ITEM OR SERVICE: HCPCS | Performed by: HOSPITALIST

## 2019-10-07 PROCEDURE — A9270 NON-COVERED ITEM OR SERVICE: HCPCS | Performed by: FAMILY MEDICINE

## 2019-10-07 PROCEDURE — 80048 BASIC METABOLIC PNL TOTAL CA: CPT

## 2019-10-07 PROCEDURE — 99233 SBSQ HOSP IP/OBS HIGH 50: CPT | Performed by: PSYCHIATRY & NEUROLOGY

## 2019-10-07 PROCEDURE — 700111 HCHG RX REV CODE 636 W/ 250 OVERRIDE (IP): Performed by: FAMILY MEDICINE

## 2019-10-07 PROCEDURE — 700105 HCHG RX REV CODE 258: Performed by: FAMILY MEDICINE

## 2019-10-07 PROCEDURE — 700102 HCHG RX REV CODE 250 W/ 637 OVERRIDE(OP): Performed by: HOSPITALIST

## 2019-10-07 PROCEDURE — 70450 CT HEAD/BRAIN W/O DYE: CPT

## 2019-10-07 PROCEDURE — 83970 ASSAY OF PARATHORMONE: CPT

## 2019-10-07 PROCEDURE — 700101 HCHG RX REV CODE 250: Performed by: NURSE PRACTITIONER

## 2019-10-07 PROCEDURE — A9270 NON-COVERED ITEM OR SERVICE: HCPCS | Performed by: NURSE PRACTITIONER

## 2019-10-07 PROCEDURE — 99232 SBSQ HOSP IP/OBS MODERATE 35: CPT | Performed by: HOSPITALIST

## 2019-10-07 PROCEDURE — 700102 HCHG RX REV CODE 250 W/ 637 OVERRIDE(OP): Performed by: FAMILY MEDICINE

## 2019-10-07 PROCEDURE — 97162 PT EVAL MOD COMPLEX 30 MIN: CPT

## 2019-10-07 PROCEDURE — 700111 HCHG RX REV CODE 636 W/ 250 OVERRIDE (IP): Performed by: NURSE PRACTITIONER

## 2019-10-07 PROCEDURE — 97165 OT EVAL LOW COMPLEX 30 MIN: CPT

## 2019-10-07 PROCEDURE — 85027 COMPLETE CBC AUTOMATED: CPT

## 2019-10-07 PROCEDURE — 99223 1ST HOSP IP/OBS HIGH 75: CPT | Performed by: RADIOLOGY

## 2019-10-07 PROCEDURE — 700102 HCHG RX REV CODE 250 W/ 637 OVERRIDE(OP): Performed by: NURSE PRACTITIONER

## 2019-10-07 PROCEDURE — A9270 NON-COVERED ITEM OR SERVICE: HCPCS | Performed by: PSYCHIATRY & NEUROLOGY

## 2019-10-07 PROCEDURE — 770001 HCHG ROOM/CARE - MED/SURG/GYN PRIV*

## 2019-10-07 RX ORDER — LEVETIRACETAM 250 MG/1
500 TABLET ORAL 2 TIMES DAILY
Status: DISCONTINUED | OUTPATIENT
Start: 2019-10-07 | End: 2019-10-11 | Stop reason: HOSPADM

## 2019-10-07 RX ORDER — POTASSIUM CHLORIDE 20 MEQ/1
20 TABLET, EXTENDED RELEASE ORAL DAILY
Status: DISCONTINUED | OUTPATIENT
Start: 2019-10-07 | End: 2019-10-11 | Stop reason: HOSPADM

## 2019-10-07 RX ORDER — FAMOTIDINE 20 MG/1
20 TABLET, FILM COATED ORAL 2 TIMES DAILY
Status: DISCONTINUED | OUTPATIENT
Start: 2019-10-07 | End: 2019-10-11 | Stop reason: HOSPADM

## 2019-10-07 RX ADMIN — DEXAMETHASONE 4 MG: 4 TABLET ORAL at 19:24

## 2019-10-07 RX ADMIN — SENNOSIDES, DOCUSATE SODIUM 2 TABLET: 50; 8.6 TABLET, FILM COATED ORAL at 17:58

## 2019-10-07 RX ADMIN — MINERAL OIL, PETROLATUM 1 APPLICATION: 425; 573 OINTMENT OPHTHALMIC at 05:16

## 2019-10-07 RX ADMIN — DOCUSATE SODIUM 100 MG: 100 CAPSULE, LIQUID FILLED ORAL at 17:59

## 2019-10-07 RX ADMIN — LEVETIRACETAM 500 MG: 500 TABLET ORAL at 17:58

## 2019-10-07 RX ADMIN — NICOTINE 14 MG: 14 PATCH TRANSDERMAL at 05:15

## 2019-10-07 RX ADMIN — DEXAMETHASONE 4 MG: 4 TABLET ORAL at 11:37

## 2019-10-07 RX ADMIN — FAMOTIDINE 20 MG: 20 TABLET ORAL at 17:58

## 2019-10-07 RX ADMIN — ACETAMINOPHEN 650 MG: 325 TABLET, FILM COATED ORAL at 11:37

## 2019-10-07 RX ADMIN — DEXAMETHASONE 4 MG: 4 TABLET ORAL at 05:15

## 2019-10-07 RX ADMIN — SODIUM CHLORIDE 500 MG: 9 INJECTION, SOLUTION INTRAVENOUS at 05:15

## 2019-10-07 RX ADMIN — SENNOSIDES, DOCUSATE SODIUM 2 TABLET: 50; 8.6 TABLET, FILM COATED ORAL at 05:15

## 2019-10-07 RX ADMIN — CEFAZOLIN SODIUM 2 G: 2 INJECTION, SOLUTION INTRAVENOUS at 03:45

## 2019-10-07 RX ADMIN — DIBASIC SODIUM PHOSPHATE, MONOBASIC POTASSIUM PHOSPHATE AND MONOBASIC SODIUM PHOSPHATE 1 TABLET: 852; 155; 130 TABLET ORAL at 17:58

## 2019-10-07 RX ADMIN — DIBASIC SODIUM PHOSPHATE, MONOBASIC POTASSIUM PHOSPHATE AND MONOBASIC SODIUM PHOSPHATE 1 TABLET: 852; 155; 130 TABLET ORAL at 05:16

## 2019-10-07 RX ADMIN — ACETAMINOPHEN 650 MG: 325 TABLET, FILM COATED ORAL at 06:23

## 2019-10-07 RX ADMIN — DOCUSATE SODIUM 100 MG: 100 CAPSULE, LIQUID FILLED ORAL at 05:15

## 2019-10-07 RX ADMIN — POTASSIUM CHLORIDE AND SODIUM CHLORIDE: 900; 150 INJECTION, SOLUTION INTRAVENOUS at 09:32

## 2019-10-07 RX ADMIN — DEXAMETHASONE 4 MG: 4 TABLET ORAL at 00:45

## 2019-10-07 RX ADMIN — POTASSIUM CHLORIDE 20 MEQ: 1500 TABLET, EXTENDED RELEASE ORAL at 14:11

## 2019-10-07 RX ADMIN — SIMVASTATIN 40 MG: 20 TABLET, FILM COATED ORAL at 17:59

## 2019-10-07 ASSESSMENT — COGNITIVE AND FUNCTIONAL STATUS - GENERAL
MOVING FROM LYING ON BACK TO SITTING ON SIDE OF FLAT BED: A LITTLE
CLIMB 3 TO 5 STEPS WITH RAILING: A LOT
SUGGESTED CMS G CODE MODIFIER DAILY ACTIVITY: CK
DRESSING REGULAR LOWER BODY CLOTHING: A LITTLE
WALKING IN HOSPITAL ROOM: A LITTLE
DAILY ACTIVITIY SCORE: 18
TOILETING: A LITTLE
MOBILITY SCORE: 17
SUGGESTED CMS G CODE MODIFIER MOBILITY: CK
DRESSING REGULAR UPPER BODY CLOTHING: A LITTLE
HELP NEEDED FOR BATHING: A LITTLE
STANDING UP FROM CHAIR USING ARMS: A LITTLE
PERSONAL GROOMING: A LITTLE
EATING MEALS: A LITTLE
TURNING FROM BACK TO SIDE WHILE IN FLAT BAD: A LITTLE
MOVING TO AND FROM BED TO CHAIR: A LITTLE

## 2019-10-07 ASSESSMENT — GAIT ASSESSMENTS
ASSISTIVE DEVICE: FRONT WHEEL WALKER
GAIT LEVEL OF ASSIST: MINIMAL ASSIST
DEVIATION: DECREASED BASE OF SUPPORT;OTHER (COMMENT)
DISTANCE (FEET): 250

## 2019-10-07 ASSESSMENT — ENCOUNTER SYMPTOMS
SEIZURES: 0
SPUTUM PRODUCTION: 0
SPEECH CHANGE: 1
DOUBLE VISION: 0
DEPRESSION: 1
HEMOPTYSIS: 0
NECK PAIN: 1
DIZZINESS: 0
HEADACHES: 0
BLURRED VISION: 1
SHORTNESS OF BREATH: 0
CHILLS: 0
SENSORY CHANGE: 1
FEVER: 0
FOCAL WEAKNESS: 1
COUGH: 0
PALPITATIONS: 0
ORTHOPNEA: 0
NAUSEA: 0
SORE THROAT: 0
VOMITING: 0

## 2019-10-07 ASSESSMENT — ACTIVITIES OF DAILY LIVING (ADL): TOILETING: INDEPENDENT

## 2019-10-07 NOTE — DIETARY
"Nutrition services: Day 3 of admit.  Milan Clark is a 74 y.o. male with admitting DX of brain mass.   Pt noted with a low BMI (18.45).  Pt appears thin with some signs of muscle loss.  RD was able to visit pt at bedside to discuss appetite/intake and wt hx.  Pt reports he is feeling much better s/p craniectomy.  He reports his appetite has been \"on and off\" since September, stating he thinks it may have been a new medication he started taking which altered his appetite.  He is feeling hungry currently and just finished half of a breakfast burrito - typically eats 5-6 small meals per day.  He denies n/v, diarrhea, or constipation - \"They took care of my constipation last night - I heard pain medications can back you up.\"  We discussed the importance of adequate hydration.  He notes some mild abdominal pain.  In regards to his wt, pt reports his UBW is 150 lbs, [facility]  Current wt is 132 lbs.  Pt attributes this wt loss to low appetite and some nausea PTA.  Pt drinks 1-2 Ensure at home - he would like to receive Boost Plus while he is inpatient and try a Magic Cup.  Pt with no further needs or questions - friends/family aware they can bring pt food/snacks he likes.  RD continues to monitor.     Assessment:  Height: 180.3 cm (5' 11\")  Weight: 60 kg (132 lb 4.4 oz) - via bed scale.   Body mass index is 18.45 kg/m²., BMI classification: Underweight.   Diet/Intake: Diet advanced to regular.    Evaluation:   1. Pt noted with lung mass (hx of lung cancer), urinary retention, essential HTN, ERNST, COPD, and tobacco abuse.  2. Pt has a hx of lobectomy and radiation treatment 5 years ago.    3. POD#1 cranix2; excision of L cerebellar mastasis.  CT stable per progress notes.  Path-pending.   4. Pt with a 12% wt loss over the past month, which is considered severe.    5. Pt noted with depressed temples, prominent zygomatic arch, and some protrusion at clavicle region - signs of severe fat " loss, severe muscle loss, and moderate muscle loss.   6. Labs: Potassium: 3.3, Glucose: 160, BUN: 24.  7. Meds: Decadron, Pepcid, Prinivil, K-phos, Bowel regimen.   8. LBM: 10/6/19.    Malnutrition Risk: Pt with severe malnutrition in the context of chronic disease related malnutrition related to hx of lung cancer and brain mass as evidenced by a severe 12% wt loss over the past month and signs of severe muscle loss seen at the zygomatic arch and severe fat loss seen at the temple region.     Recommendations/Plan:  1. Vanilla Boost Plus BID, Magic Cup 1x/day to try.  2. Encourage intake of meals and supplements.  3. Document intake of all meals and supplements as % taken in ADL's to provide interdisciplinary communication across all shifts.   4. Monitor weight.  5. Nutrition rep will continue to see patient for ongoing meal and snack preferences.   6. Encourage adequate hydration.    RD following.

## 2019-10-07 NOTE — ASSESSMENT & PLAN NOTE
Likely from lung cancer  Pending pathology  Resected  Ok for q4 neurochecks  Keep sbp < 160  Goal normal sodium  Keppra PPX per NSG  Decadron dosing per NSG

## 2019-10-07 NOTE — CARE PLAN
Problem: Communication  Goal: The ability to communicate needs accurately and effectively will improve  Outcome: PROGRESSING AS EXPECTED     Problem: Safety  Goal: Will remain free from injury  Outcome: PROGRESSING AS EXPECTED  Goal: Will remain free from falls  Outcome: PROGRESSING AS EXPECTED     Problem: Infection  Goal: Will remain free from infection  Outcome: PROGRESSING AS EXPECTED     Problem: Venous Thromboembolism (VTW)/Deep Vein Thrombosis (DVT) Prevention:  Goal: Patient will participate in Venous Thrombosis (VTE)/Deep Vein Thrombosis (DVT)Prevention Measures  Outcome: PROGRESSING AS EXPECTED     Problem: Bowel/Gastric:  Goal: Normal bowel function is maintained or improved  Outcome: PROGRESSING AS EXPECTED  Goal: Will not experience complications related to bowel motility  Outcome: PROGRESSING AS EXPECTED     Problem: Knowledge Deficit  Goal: Knowledge of disease process/condition, treatment plan, diagnostic tests, and medications will improve  Outcome: PROGRESSING AS EXPECTED  Goal: Knowledge of the prescribed therapeutic regimen will improve  Outcome: PROGRESSING AS EXPECTED     Problem: Discharge Barriers/Planning  Goal: Patient's continuum of care needs will be met  Outcome: PROGRESSING AS EXPECTED     Problem: Fluid Volume:  Goal: Will maintain balanced intake and output  Outcome: PROGRESSING AS EXPECTED     Problem: Pain Management  Goal: Pain level will decrease to patient's comfort goal  Outcome: PROGRESSING AS EXPECTED     Problem: Mobility  Goal: Risk for activity intolerance will decrease  Outcome: PROGRESSING AS EXPECTED     Problem: Urinary Elimination:  Goal: Ability to reestablish a normal urinary elimination pattern will improve  Outcome: PROGRESSING AS EXPECTED     Problem: Skin Integrity  Goal: Risk for impaired skin integrity will decrease  Outcome: PROGRESSING AS EXPECTED     Problem: Respiratory:  Goal: Respiratory status will improve  Outcome: PROGRESSING AS EXPECTED

## 2019-10-07 NOTE — PROGRESS NOTES
Critical Care Progress Note    Date of admission  10/4/2019    Chief Complaint  74 y.o. male admitted 10/4/2019 with Large right frontal and left cerebellar brain lesions.    Hospital Course    Mr Clakr has a history of lung cancer status post lobectomy and radiation.  [facility] with complaints of left-sided numbness, unstable gait, and facial droop.  CT scan showed intracranial mass, he was transferred to [facility] for higher level of care and neurosurgery coverage.  Neurosurgery was consulted, on 10/6/2019 he had left suboccipital craniectomy with excision of left cerebellar mastasis. Pathology is pending.       Interval Problem Update  Reviewed last 24 hour events:  - Repeat CT head stable, Keppra PPX  days   - Neuro: GCS 15 A&O x 4   - HR:50-60s   - SBP: 110-160s   - GI: full liquis   - UOP: Good urine output   - Burns: yes   - Tm: 37.1   - Lines: PIVs   - PPx: GI Famotidine, DVT SCDs              Review of Systems  Review of Systems   Constitutional: Negative for chills and fever.   HENT: Negative for sore throat.    Eyes: Positive for blurred vision. Negative for double vision.   Respiratory: Negative for shortness of breath.    Cardiovascular: Negative for chest pain.   Gastrointestinal: Negative for nausea and vomiting.   Musculoskeletal: Positive for neck pain.   Skin: Negative for itching.   Neurological: Positive for speech change and focal weakness. Negative for seizures and headaches.   Psychiatric/Behavioral: Positive for depression.        Vital Signs for last 24 hours   Temp:  [36.5 °C (97.7 °F)-37.1 °C (98.8 °F)] 37.1 °C (98.8 °F)  Pulse:  [47-88] 58  Resp:  [10-21] 20  BP: (108-161)/(62-90) 117/71  SpO2:  [93 %-100 %] 95 %    Hemodynamic parameters for last 24 hours       Respiratory Information for the last 24 hours       Physical Exam   Physical Exam   Constitutional: He is oriented to person, place, and time. He appears well-developed and  well-nourished.   HENT:   Mouth/Throat: Oropharynx is clear and moist.   Eyes: Pupils are equal, round, and reactive to light. Conjunctivae are normal.   Cardiovascular: Normal rate, regular rhythm and intact distal pulses.   Pulmonary/Chest: Effort normal and breath sounds normal. No stridor. No respiratory distress.   Abdominal: Soft. Bowel sounds are normal.   Neurological: He is alert and oriented to person, place, and time.   GCS 15.  UMN pattern of weakness on the left. VFF. Speech is mild dysarthric   Skin: Skin is warm and dry.   Psychiatric: He has a normal mood and affect.       Medications  Current Facility-Administered Medications   Medication Dose Route Frequency Provider Last Rate Last Dose   • lisinopril (PRINIVIL) 10 MG tablet 10 mg  10 mg Oral DAILY Arthur Galvan M.D.   Stopped at 10/07/19 0600   • simvastatin (ZOCOR) tablet 40 mg  40 mg Oral Q EVENING Arthur Galvan M.D.   40 mg at 10/06/19 1951   • Pharmacy Consult Request ...Pain Management Review 1 Each  1 Each Other PHARMACY TO DOSE ALYSIA Dominguez.P.N.       • MD ALERT...DO NOT ADMINISTER NSAIDS or ASPIRIN unless ORDERED By Neurosurgery 1 Each  1 Each Other PRN ALYSIA Dominguez.P.N.       • ondansetron (ZOFRAN) syringe/vial injection 4 mg  4 mg Intravenous Q4HRS PRN Keiry Lema, A.P.N.       • diphenhydrAMINE (BENADRYL) injection 25 mg  25 mg Intravenous Q6HRS PRN Keiry Lema A.P.N.       • scopolamine (TRANSDERM-SCOP) patch 1 Patch  1 Patch Transdermal Q72HRS PRN Keiyr Lema, A.P.N.       • labetalol (NORMODYNE,TRANDATE) injection 10 mg  10 mg Intravenous Q HOUR PRN Keiry Lema, A.P.N.   10 mg at 10/06/19 1908   • hydrALAZINE (APRESOLINE) injection 10 mg  10 mg Intravenous Q HOUR PRN Keiry Lema A.P.N.   10 mg at 10/06/19 2146   • cloNIDine (CATAPRES) tablet 0.1 mg  0.1 mg Oral Q4HRS PRN Keiry Lema, A.P.N.   0.1 mg at 10/06/19 2019   • niCARdipine (CARDENE) 25 mg in  mL Infusion   0-15 mg/hr Intravenous Continuous Keiry Lema, A.P.N.   Stopped at 10/06/19 1900   • docusate sodium (COLACE) capsule 100 mg  100 mg Oral BID Keiry Lema, A.P.N.   100 mg at 10/07/19 0515   • senna-docusate (PERICOLACE or SENOKOT S) 8.6-50 MG per tablet 1 Tab  1 Tab Oral Nightly Keiry Lema A.P.N.   1 Tab at 10/06/19 1951   • senna-docusate (PERICOLACE or SENOKOT S) 8.6-50 MG per tablet 1 Tab  1 Tab Oral Q24HRS PRN Keiry Lema A.P.N.       • polyethylene glycol/lytes (MIRALAX) PACKET 1 Packet  1 Packet Oral BID PRN Keiry Lema, A.P.N.       • magnesium hydroxide (MILK OF MAGNESIA) suspension 30 mL  30 mL Oral QDAY PRN Keiry Lema, A.P.N.       • bisacodyl (DULCOLAX) suppository 10 mg  10 mg Rectal Q24HRS PRN Keiry Lema, A.P.N.       • artificial tears (EYE LUBRICANT) ophth ointment 1 Application  1 Application Both Eyes Q8HRS Keiry Lema, A.P.N.   1 Application at 10/07/19 0516   • 0.9 % NaCl with KCl 20 mEq infusion   Intravenous Continuous Keiry Lema A.P.N. 100 mL/hr at 10/06/19 1933     • morphine 1 mg/mL in 50 mL (PCA)   Intravenous Continuous Keiry Lema, A.P.N.       • dexamethasone (DECADRON) tablet 4 mg  4 mg Oral Q6HRS Keiry Lema, A.P.N.   4 mg at 10/07/19 0515    Or   • dexamethasone (DECADRON) injection 4 mg  4 mg Intravenous Q6HRS Keiry Lema, A.P.N.       • ipratropium-albuterol (DUONEB) nebulizer solution  3 mL Nebulization Q4H PRN (RT) Yuri Black M.D.       • hydrALAZINE (APRESOLINE) injection 10-20 mg  10-20 mg Intravenous Q6HRS PRN Garry Smith M.D.       • phosphorus (K-PHOS-NEUTRAL, PHOSPHA 250 NEUTRAL) per tablet 1 Tab  1 Tab Oral BID Arthur Galvan M.D.   1 Tab at 10/07/19 0516   • levETIRAcetam (KEPPRA) 500 mg in  mL IVPB  500 mg Intravenous Q12HRS Arthur Galvan M.D.   Stopped at 10/07/19 0530   • senna-docusate (PERICOLACE or SENOKOT S) 8.6-50 MG per tablet 2 Tab  2 Tab Oral BID Nasima Smith M.D.    2 Tab at 10/07/19 0515    And   • polyethylene glycol/lytes (MIRALAX) PACKET 1 Packet  1 Packet Oral QDAY PRN Nasima Smith M.D.        And   • magnesium hydroxide (MILK OF MAGNESIA) suspension 30 mL  30 mL Oral QDAY PRN Nasima Smith M.D.   30 mL at 10/05/19 1734    And   • bisacodyl (DULCOLAX) suppository 10 mg  10 mg Rectal QDAY PRN Nasima Smith M.D.   10 mg at 10/06/19 2018   • Respiratory Care per Protocol   Nebulization Continuous RT Nasima Smith M.D.       • acetaminophen (TYLENOL) tablet 650 mg  650 mg Oral Q6HRS PRN Nasima Smith M.D.   650 mg at 10/07/19 0623   • ondansetron (ZOFRAN ODT) dispertab 4 mg  4 mg Oral Q4HRS PRN Nasima Smith M.D.       • nicotine (NICODERM) 14 MG/24HR 14 mg  14 mg Transdermal Daily-0600 Nasima Smith M.D.   14 mg at 10/07/19 0515    And   • nicotine polacrilex (NICORETTE) 2 MG piece 2 mg  2 mg Oral Q HOUR PRN Nasima Smith M.D.           Fluids    Intake/Output Summary (Last 24 hours) at 10/7/2019 0740  Last data filed at 10/7/2019 0600  Gross per 24 hour   Intake 3663 ml   Output 1950 ml   Net 1713 ml       Laboratory          Recent Labs     10/04/19  1525 10/05/19  0244 10/06/19  0324 10/07/19  0415   SODIUM 144 143 140 136   POTASSIUM 3.1* 3.2* 3.3* 3.3*   CHLORIDE 108 109 110 108   CO2 25 24 23 21   BUN 35* 37* 33* 24*   CREATININE 1.28 1.06 0.97 0.93   MAGNESIUM 2.1  --   --   --    PHOSPHORUS 2.4*  --   --   --    CALCIUM 10.7* 9.7 9.2 8.1*     Recent Labs     10/04/19  1525 10/05/19  0244 10/06/19  0324 10/07/19  0415   ALTSGPT 15 15  --   --    ASTSGOT 23 23  --   --    ALKPHOSPHAT 77 77  --   --    TBILIRUBIN 0.8 0.8  --   --    GLUCOSE 77 123* 134* 160*     Recent Labs     10/04/19  1525 10/05/19  0244 10/06/19  0324 10/07/19  0415   WBC 7.3 5.8 9.3 9.5   NEUTSPOLYS 72.00 83.80* 88.00*  --    LYMPHOCYTES 20.80* 14.10* 8.70*  --    MONOCYTES 5.40 1.50 2.80  --    EOSINOPHILS 0.70 0.00 0.00  --    BASOPHILS 0.80 0.30 0.10  --    ASTSGOT 23 23  --   --    ALTSGPT 15  15  --   --    ALKPHOSPHAT 77 77  --   --    TBILIRUBIN 0.8 0.8  --   --      Recent Labs     10/04/19  1525 10/05/19  0244 10/06/19  0324 10/07/19  0415   RBC 4.70 5.01 4.48* 3.87*   HEMOGLOBIN 14.9 15.2 14.1 12.4*   HEMATOCRIT 44.9 46.5 41.8* 37.5*   PLATELETCT 321 326 334 267   PROTHROMBTM 13.1  --   --   --    APTT 31.9  --   --   --    INR 0.98  --   --   --        Imaging  MRI:   Reviewed Personally reviewed. Large Right Frontal and left cerebellar brain masses. Mild R to left midline shift.    Assessment/Plan  * Brain mass- (present on admission)  Assessment & Plan  Likely from lung cancer  Pending pathology  Resected  Ok for q4 neurochecks  Keep sbp < 160  Goal normal sodium  Keppra PPX per NSG  Decadron dosing per NSG      History of lung cancer- (present on admission)  Assessment & Plan  History right side lung cancer resected  Left upper lobe mass on ct imaging  Metastatic to brain    Lung mass- (present on admission)  Assessment & Plan  Left upper lobe  [facility]  Metastatic lung cancer to brain    Essential hypertension- (present on admission)  Assessment & Plan  Keep sbp < 160  On lisinopril  Prn medications      Urinary retention- (present on admission)  Assessment & Plan  Burns in place    Hypophosphatemia- (present on admission)  Assessment & Plan  Target > 2.5    Hypokalemia- (present on admission)  Assessment & Plan  Target > 4    ERNST (acute kidney injury) (HCC)- (present on admission)  Assessment & Plan  Improved  Daily bmp  Encourage PO fluid intake      COPD (chronic obstructive pulmonary disease) (HCC)  Assessment & Plan  duonebs prn  On dexamethasone  Nicotine patch/gum      Tobacco abuse- (present on admission)  Assessment & Plan  Nicotine prn       VTE:  Contraindicated  Ulcer: Not Indicated  Lines: None    I have performed a physical exam and reviewed and updated ROS and Plan today (10/7/2019). In review of yesterday's note (10/6/2019),  there are no changes except as documented above.     Discussed patient condition and risk of morbidity and/or mortality with Hospitalist, RN, RT, Pharmacy, Patient and neurosurgery     Ok to tx to floor

## 2019-10-07 NOTE — PROGRESS NOTES
0300 Pt to CT with ACLS RN & CCT on tx monitor.     0320 Pt back from CT. No significant events during procedure or en route.

## 2019-10-07 NOTE — CONSULTS
Critical Care Consultation    Date of consult: 10/6/2019    Referring Physician  Arthur Galvan M.D.    Reason for Consultation  Brain mass resection    History of Presenting Illness  74 y.o. male who presented 10/4/2019 with confusion, altered mental status, left side weakness/sensory changes, unstable gait and facial droop.  Ongoing for 2 months and worsening.  He has hx of lung cancer with RLL resection per patient.  Says no chemo or radiation.  [facility]  He says no left side cancer but on ct imaging this hospitalization has left upper lobe mass.  He fell twice at home.  [facility] showed a left side mass.  He is now post resection of mass.  Extubated post procedure.  Some left eye vision blurriness post procedure.  NO facial droop currently and strength similar bilateral.  NO chest pain or shortness of breath.      Code Status  Full Code    Review of Systems  Review of Systems   Constitutional: Positive for malaise/fatigue. Negative for chills, diaphoresis, fever and weight loss.   HENT: Negative for congestion and sinus pain.    Eyes: Positive for blurred vision. Negative for double vision and photophobia.   Respiratory: Negative for cough, hemoptysis, sputum production, shortness of breath, wheezing and stridor.    Cardiovascular: Negative for chest pain, palpitations and leg swelling.   Gastrointestinal: Negative for blood in stool, heartburn, melena and vomiting.   Genitourinary: Negative for dysuria and urgency.   Musculoskeletal: Negative for back pain, myalgias and neck pain.   Skin: Negative for itching and rash.   Neurological: Positive for focal weakness. Negative for dizziness, tingling, sensory change, speech change and headaches.   Endo/Heme/Allergies: Negative for polydipsia. Does not bruise/bleed easily.   Psychiatric/Behavioral: Negative for depression. The patient is not nervous/anxious.        Past Medical History   has no past medical history on file.    Surgical History    has no past surgical history on file.    Family History  family history is not on file.    Social History   reports that he has been smoking. He has been smoking about 0.10 packs per day. He has never used smokeless tobacco. He reports that he drinks alcohol. He reports that he does not use drugs.    Medications  Home Medications     Reviewed by Anne Levine R.N. (Registered Nurse) on 10/06/19 at 1210  Med List Status: Complete   Medication Last Dose Status   0.9 % NaCl with KCl 20 mEq infusion  Active   acetaminophen (TYLENOL) tablet 650 mg  Active   bisacodyl (DULCOLAX) suppository 10 mg  Active   dexamethasone (DECADRON) injection 4 mg  Active   diclofenac EC (VOLTAREN) 75 MG Tablet Delayed Response  Active   hydrALAZINE (APRESOLINE) injection 10-20 mg  Active   levETIRAcetam (KEPPRA) 500 mg in  mL IVPB  Active   lisinopril (PRINIVIL) 10 MG Tab  Active   lisinopril (PRINIVIL) 10 MG tablet 10 mg  Active   losartan (COZAAR) 25 MG Tab  Active   magnesium hydroxide (MILK OF MAGNESIA) suspension 30 mL  Active   nicotine (NICODERM) 14 MG/24HR 14 mg  Active   nicotine polacrilex (NICORETTE) 2 MG piece 2 mg  Active   ondansetron (ZOFRAN ODT) dispertab 4 mg  Active   ondansetron (ZOFRAN) syringe/vial injection 4 mg  Active   oxyCODONE immediate-release (ROXICODONE) tablet 5 mg  Active   phosphorus (K-PHOS-NEUTRAL, PHOSPHA 250 NEUTRAL) per tablet 1 Tab  Active   polyethylene glycol/lytes (MIRALAX) PACKET 1 Packet  Active   Respiratory Care per Protocol  Active   senna-docusate (PERICOLACE or SENOKOT S) 8.6-50 MG per tablet 2 Tab  Active   simvastatin (ZOCOR) 40 MG Tab  Active   simvastatin (ZOCOR) tablet 40 mg  Active   tramadol (ULTRAM) 50 MG Tab 10/3/2019 Active              Current Facility-Administered Medications   Medication Dose Route Frequency Provider Last Rate Last Dose   • lisinopril (PRINIVIL) 10 MG tablet 10 mg  10 mg Oral DAILY Arthur Galvan M.D.   10 mg at 10/06/19 0924   • simvastatin (ZOCOR)  tablet 40 mg  40 mg Oral Q EVENING Arthur Galvan M.D.       • Pharmacy Consult Request ...Pain Management Review 1 Each  1 Each Other PHARMACY TO DOSE WADE DominguezPMIGUEL.       • MD ALERT...DO NOT ADMINISTER NSAIDS or ASPIRIN unless ORDERED By Neurosurgery 1 Each  1 Each Other PRN ALYSIA Dominguez.P.N.       • ondansetron (ZOFRAN) syringe/vial injection 4 mg  4 mg Intravenous Q4HRS PRN ALYSIA Dominguez.P.N.       • diphenhydrAMINE (BENADRYL) injection 25 mg  25 mg Intravenous Q6HRS PRN Keiry Lema A.P.N.       • scopolamine (TRANSDERM-SCOP) patch 1 Patch  1 Patch Transdermal Q72HRS PRN ALYSIA Dominguez.P.N.       • labetalol (NORMODYNE,TRANDATE) injection 10 mg  10 mg Intravenous Q HOUR PRN ALYSIA Dominguez.P.N.   10 mg at 10/06/19 1908   • hydrALAZINE (APRESOLINE) injection 10 mg  10 mg Intravenous Q HOUR PRN ALYSIA Dominguez.P.N.   10 mg at 10/06/19 1846   • cloNIDine (CATAPRES) tablet 0.1 mg  0.1 mg Oral Q4HRS PRN Keiry Lema A.P.N.       • niCARdipine (CARDENE) 25 mg in  mL Infusion  0-15 mg/hr Intravenous Continuous ALYSIA Dominguez.P.N.       • docusate sodium (COLACE) capsule 100 mg  100 mg Oral BID ALYSIA Dominguez.P.N.       • senna-docusate (PERICOLACE or SENOKOT S) 8.6-50 MG per tablet 1 Tab  1 Tab Oral Nightly ALYSIA Dominguez.P.N.       • senna-docusate (PERICOLACE or SENOKOT S) 8.6-50 MG per tablet 1 Tab  1 Tab Oral Q24HRS PRN ALYSIA Dominguez.P.N.       • polyethylene glycol/lytes (MIRALAX) PACKET 1 Packet  1 Packet Oral BID PRN Keiry M Lema, A.P.N.       • magnesium hydroxide (MILK OF MAGNESIA) suspension 30 mL  30 mL Oral QDAY PRN Keiry Lema, A.P.N.       • bisacodyl (DULCOLAX) suppository 10 mg  10 mg Rectal Q24HRS PRN Keiry Lema, A.P.N.       • fleet enema 133 mL  1 Each Rectal Once PRN Keiry Lema, A.P.N.       • artificial tears (EYE LUBRICANT) ophth ointment 1 Application  1 Application Both Eyes Q8HRS  Keiry Lema, A.P.N.       • 0.9 % NaCl with KCl 20 mEq infusion   Intravenous Continuous Keiry Lema A.P.N.       • morphine 1 mg/mL in 50 mL (PCA)   Intravenous Continuous Keiry Lema, A.P.N.       • ceFAZolin in dextrose (ANCEF) IVPB premix 2 g  2 g Intravenous Q8HR Keiry Lema, A.P.N.       • dexamethasone (DECADRON) tablet 4 mg  4 mg Oral Q6HRS Keiry Lema, A.P.N.        Or   • dexamethasone (DECADRON) injection 4 mg  4 mg Intravenous Q6HRS Keiry Lema, A.P.N.       • hydrALAZINE (APRESOLINE) injection 10-20 mg  10-20 mg Intravenous Q6HRS PRN Garry Smith M.D.       • phosphorus (K-PHOS-NEUTRAL, PHOSPHA 250 NEUTRAL) per tablet 1 Tab  1 Tab Oral BID Arthur Galvan M.D.   1 Tab at 10/06/19 0528   • levETIRAcetam (KEPPRA) 500 mg in  mL IVPB  500 mg Intravenous Q12HRS Arthur Galvan M.D.   Stopped at 10/06/19 0543   • senna-docusate (PERICOLACE or SENOKOT S) 8.6-50 MG per tablet 2 Tab  2 Tab Oral BID Nasima Smith M.D.   2 Tab at 10/06/19 0528    And   • polyethylene glycol/lytes (MIRALAX) PACKET 1 Packet  1 Packet Oral QDAY PRN Nasima Smith M.D.        And   • magnesium hydroxide (MILK OF MAGNESIA) suspension 30 mL  30 mL Oral QDAY PRN Nasima Smith M.D.   30 mL at 10/05/19 1734    And   • bisacodyl (DULCOLAX) suppository 10 mg  10 mg Rectal QDAY PRN Nasima Smith M.D.       • Respiratory Care per Protocol   Nebulization Continuous RT Nasima Smith M.D.       • acetaminophen (TYLENOL) tablet 650 mg  650 mg Oral Q6HRS PRN Nasima Smith M.D.   650 mg at 10/06/19 1840   • ondansetron (ZOFRAN ODT) dispertab 4 mg  4 mg Oral Q4HRS PRN Nasima Smith M.D.       • nicotine (NICODERM) 14 MG/24HR 14 mg  14 mg Transdermal Daily-0600 Nasima Smith M.D.   14 mg at 10/06/19 0528    And   • nicotine polacrilex (NICORETTE) 2 MG piece 2 mg  2 mg Oral Q HOUR PRN Nasima Smith M.D.           Allergies  No Known Allergies    Vital Signs last 24 hours  Temp:  [36.5 °C (97.7 °F)-37.1 °C  (98.7 °F)] 36.7 °C (98.1 °F)  Pulse:  [53-88] 78  Resp:  [13-20] 20  BP: (108-160)/(62-90) 135/90  SpO2:  [93 %-98 %] 97 %    Physical Exam  Physical Exam   Constitutional: He is oriented to person, place, and time. He appears well-developed and well-nourished. No distress.   HENT:   Head: Normocephalic and atraumatic.   Right Ear: External ear normal.   Left Ear: External ear normal.   Mouth/Throat: No oropharyngeal exudate.   Eyes: Pupils are equal, round, and reactive to light. Conjunctivae and EOM are normal. Right eye exhibits no discharge. Left eye exhibits no discharge.   Neck: No JVD present. No tracheal deviation present.   Cardiovascular: Normal rate, regular rhythm and normal heart sounds.   No murmur heard.  Pulmonary/Chest: Effort normal and breath sounds normal. No stridor. No respiratory distress. He has no wheezes. He has no rales. He exhibits no tenderness.   Abdominal: He exhibits no mass. There is no tenderness. There is no rebound and no guarding.   Musculoskeletal: He exhibits no edema, tenderness or deformity.   Neurological: He is alert and oriented to person, place, and time. He displays normal reflexes. No cranial nerve deficit. Coordination normal.   Bilateral weakness, slightly more on left.  No significant facial droop.  Peripheral vision right eye adequate and can see number of fingers shown despite feeling blurry   Skin: Skin is warm and dry. No rash noted. He is not diaphoretic. No erythema.   Psychiatric: He has a normal mood and affect. His behavior is normal.   Nursing note and vitals reviewed.      Fluids    Intake/Output Summary (Last 24 hours) at 10/6/2019 1928  Last data filed at 10/6/2019 1600  Gross per 24 hour   Intake 3036.75 ml   Output 300 ml   Net 2736.75 ml       Laboratory  Recent Results (from the past 48 hour(s))   CBC with Differential    Collection Time: 10/05/19  2:44 AM   Result Value Ref Range    WBC 5.8 4.8 - 10.8 K/uL    RBC 5.01 4.70 - 6.10 M/uL    Hemoglobin  15.2 14.0 - 18.0 g/dL    Hematocrit 46.5 42.0 - 52.0 %    MCV 92.8 81.4 - 97.8 fL    MCH 30.3 27.0 - 33.0 pg    MCHC 32.7 (L) 33.7 - 35.3 g/dL    RDW 48.3 35.9 - 50.0 fL    Platelet Count 326 164 - 446 K/uL    MPV 10.1 9.0 - 12.9 fL    Neutrophils-Polys 83.80 (H) 44.00 - 72.00 %    Lymphocytes 14.10 (L) 22.00 - 41.00 %    Monocytes 1.50 0.00 - 13.40 %    Eosinophils 0.00 0.00 - 6.90 %    Basophils 0.30 0.00 - 1.80 %    Immature Granulocytes 0.30 0.00 - 0.90 %    Nucleated RBC 0.00 /100 WBC    Neutrophils (Absolute) 4.86 1.82 - 7.42 K/uL    Lymphs (Absolute) 0.82 (L) 1.00 - 4.80 K/uL    Monos (Absolute) 0.09 0.00 - 0.85 K/uL    Eos (Absolute) 0.00 0.00 - 0.51 K/uL    Baso (Absolute) 0.02 0.00 - 0.12 K/uL    Immature Granulocytes (abs) 0.02 0.00 - 0.11 K/uL    NRBC (Absolute) 0.00 K/uL   Comp Metabolic Panel (CMP)    Collection Time: 10/05/19  2:44 AM   Result Value Ref Range    Sodium 143 135 - 145 mmol/L    Potassium 3.2 (L) 3.6 - 5.5 mmol/L    Chloride 109 96 - 112 mmol/L    Co2 24 20 - 33 mmol/L    Anion Gap 10.0 0.0 - 11.9    Glucose 123 (H) 65 - 99 mg/dL    Bun 37 (H) 8 - 22 mg/dL    Creatinine 1.06 0.50 - 1.40 mg/dL    Calcium 9.7 8.5 - 10.5 mg/dL    AST(SGOT) 23 12 - 45 U/L    ALT(SGPT) 15 2 - 50 U/L    Alkaline Phosphatase 77 30 - 99 U/L    Total Bilirubin 0.8 0.1 - 1.5 mg/dL    Albumin 4.0 3.2 - 4.9 g/dL    Total Protein 7.7 6.0 - 8.2 g/dL    Globulin 3.7 (H) 1.9 - 3.5 g/dL    A-G Ratio 1.1 g/dL   PLATELET MAPPING WITH BASIC TEG    Collection Time: 10/05/19  2:44 AM   Result Value Ref Range    Reaction Time Initial-R 4.9 (L) 5.0 - 10.0 min    Clot Kinetics-K 1.3 1.0 - 3.0 min    Clot Angle-Angle 70.5 53.0 - 72.0 degrees    Maximum Clot Strength-MA 72.8 (H) 50.0 - 70.0 mm    Lysis 30 minutes-LY30 0.5 0.0 - 8.0 %    % Inhibition ADP 36.6 %    % Inhibition AA 53.7 %    TEG Algorithm Link Algorithm    ESTIMATED GFR    Collection Time: 10/05/19  2:44 AM   Result Value Ref Range    GFR If  >60 >60  mL/min/1.73 m 2    GFR If Non African American >60 >60 mL/min/1.73 m 2   CEA    Collection Time: 10/05/19  2:55 AM   Result Value Ref Range    Carcinoembryonic Antigen 5.3 (H) 0.0 - 3.0 ng/mL   EKG    Collection Time: 10/05/19 10:16 AM   Result Value Ref Range    Report       [facility]    Test Date:  2019-10-05  Pt Name:    BRAULIO PRINCE              Department: [facility]  MRN:        [id number]                      Room:       [facility]  Gender:     Male                         Technician: MEGAN  :        1945                   Requested By:MILLICENT MARIE  Order #:    [id number]                    Reading MD: Guerrero Fuentes MD    Measurements  Intervals                                Axis  Rate:       75                           P:          69  LA:         156                          QRS:        -72  QRSD:       102                          T:          64  QT:         424  QTc:        474    Interpretive Statements  SINUS RHYTHM  PROBABLE LEFT ATRIAL ABNORMALITY  LEFT ANTERIOR FASCICULAR BLOCK  CONSIDER ANTERIOR INFARCT    No previous ECG available for comparison    Electronically Signed On 10-5-2019 18:00:25 PDT by Guerrero Fuentes MD     URINALYSIS    Collection Time: 10/05/19  5:59 PM   Result Value Ref Range    Color Yellow     Character Clear     Specific Gravity 1.036 <1.035    Ph 6.5 5.0 - 8.0    Glucose Negative Negative mg/dL    Ketones 15 (A) Negative mg/dL    Protein Negative Negative mg/dL    Bilirubin Negative Negative    Urobilinogen, Urine 0.2 Negative    Nitrite Negative Negative    Leukocyte Esterase Negative Negative    Occult Blood Trace (A) Negative    Micro Urine Req Microscopic    URINE MICROSCOPIC (W/UA)    Collection Time: 10/05/19  5:59 PM   Result Value Ref Range    WBC 0-2 (A) /hpf    RBC 5-10 (A) /hpf    Bacteria Negative None /hpf    Epithelial Cells Negative /hpf    Hyaline Cast 0-2 /lpf   CBC WITH DIFFERENTIAL    Collection Time: 10/06/19  3:24 AM   Result Value Ref  Range    WBC 9.3 4.8 - 10.8 K/uL    RBC 4.48 (L) 4.70 - 6.10 M/uL    Hemoglobin 14.1 14.0 - 18.0 g/dL    Hematocrit 41.8 (L) 42.0 - 52.0 %    MCV 93.3 81.4 - 97.8 fL    MCH 31.5 27.0 - 33.0 pg    MCHC 33.7 33.7 - 35.3 g/dL    RDW 47.6 35.9 - 50.0 fL    Platelet Count 334 164 - 446 K/uL    MPV 10.7 9.0 - 12.9 fL    Neutrophils-Polys 88.00 (H) 44.00 - 72.00 %    Lymphocytes 8.70 (L) 22.00 - 41.00 %    Monocytes 2.80 0.00 - 13.40 %    Eosinophils 0.00 0.00 - 6.90 %    Basophils 0.10 0.00 - 1.80 %    Immature Granulocytes 0.40 0.00 - 0.90 %    Nucleated RBC 0.00 /100 WBC    Neutrophils (Absolute) 8.21 (H) 1.82 - 7.42 K/uL    Lymphs (Absolute) 0.81 (L) 1.00 - 4.80 K/uL    Monos (Absolute) 0.26 0.00 - 0.85 K/uL    Eos (Absolute) 0.00 0.00 - 0.51 K/uL    Baso (Absolute) 0.01 0.00 - 0.12 K/uL    Immature Granulocytes (abs) 0.04 0.00 - 0.11 K/uL    NRBC (Absolute) 0.00 K/uL   Basic Metabolic Panel    Collection Time: 10/06/19  3:24 AM   Result Value Ref Range    Sodium 140 135 - 145 mmol/L    Potassium 3.3 (L) 3.6 - 5.5 mmol/L    Chloride 110 96 - 112 mmol/L    Co2 23 20 - 33 mmol/L    Glucose 134 (H) 65 - 99 mg/dL    Bun 33 (H) 8 - 22 mg/dL    Creatinine 0.97 0.50 - 1.40 mg/dL    Calcium 9.2 8.5 - 10.5 mg/dL    Anion Gap 7.0 0.0 - 11.9   ESTIMATED GFR    Collection Time: 10/06/19  3:24 AM   Result Value Ref Range    GFR If African American >60 >60 mL/min/1.73 m 2    GFR If Non African American >60 >60 mL/min/1.73 m 2       Imaging  US-EXTREMITY VENOUS LOWER BILAT   Final Result      MR-BRAIN-WITH & W/O   Final Result      1.  Large right frontal and left cerebellar heterogeneously enhancing intra-axial masses concerning for metastatic disease.   2.  Right to left midline shift measuring 1 cm.   3.  Effacement of the fourth ventricle secondary to the cerebellar lesion without evidence of hydrocephalus.   4.  Mild diffuse cerebral substance loss.   5.  Mild microangiopathic ischemic change versus the myelination gliosis.       CT-CHEST,ABDOMEN,PELVIS WITH   Final Result      1.  There is a spiculated 3.4 cm left upper lobe lung mass extending to the left suprahilar region and in casing the left upper lobe pulmonary arteries with questionable small amount of thrombus in one anterior branch. This finding is most consistent    with malignancy.   2.  There are several peripheral groundglass nodules in left upper lobe which could be additional areas of malignancy versus post obstructive pneumonitis.   3.  There is otherwise no mediastinal or hilar lymphadenopathy.   4.  There are a few nonspecific subcentimeter hypodense hepatic lesions, too small to characterize.   5.  There is no adenopathy in the abdomen or pelvis.   6.  There is mild intrahepatic biliary prominence of uncertain etiology.   7.  Stomach is not well evaluated as described above.   8.  There is one small area of sclerosis in the left iliac bone which could be a bone island or conceivably metastases although no other bone metastases are evident.      OUTSIDE IMAGES-CT HEAD   Final Result      CT-HEAD W/O    (Results Pending)       Assessment/Plan  * Brain mass- (present on admission)  Assessment & Plan  Likely from lung cancer  Pending pathology  Resected  q 1 hour neuro checks  Keep sbp < 160  NOrmal sodium goal      History of lung cancer- (present on admission)  Assessment & Plan  History right side lung cancer resected  Left upper lobe mass on ct imaging  Metastatic to brain    Lung mass- (present on admission)  Assessment & Plan  Left upper lobe  Patient seems unaware  Discuss tomorrow with patient when more cognizant off sedatives  [facility]  Metastatic lung cancer to brain    Essential hypertension- (present on admission)  Assessment & Plan  Keep sbp < 160  On lisinopril  Prn medications  Nicardipine drip    Urinary retention- (present on admission)  Assessment & Plan  Burns in place    Hypophosphatemia- (present on  admission)  Assessment & Plan  Target > 2.5    Hypokalemia- (present on admission)  Assessment & Plan  Target > 4    ERNST (acute kidney injury) (HCC)- (present on admission)  Assessment & Plan  Daily bmp      COPD (chronic obstructive pulmonary disease) ([facility])  Assessment & Plan  duonebs prn  On dexamethasone  Nicotine patch/gum      Tobacco abuse- (present on admission)  Assessment & Plan  Nicotine prn      Discussed patient condition and risk of morbidity and/or mortality with Hospitalist, RN, Pharmacy, Patient and neurosurgery.      The patient remains critically ill.  He is now in the ICU post craniotomy for right sided large intracranial mass.  He requires very close neurological observation with q 1 hour neuro checks and strict blood pressure control for systolic less than 160, nicardipine drip as needed.  I have assessed and reassessed his blood pressure, hemodynamics, cardiovascular status and neurologic status.  He is at high risk for worsening CNS system dysfunction.  There is high risk of deterioration and worsening vital organ dysfunction and death without the above critical care interventions.  Critical care time = 32 minutes in directly providing and coordinating critical care and extensive data review.  No time overlap and excludes procedures.

## 2019-10-07 NOTE — PROGRESS NOTES
Neurosurgery Progress Note    Subjective:  States min Elaine, some neck pain  States L side str improved  Denies new symptoms  Burns    Exam:  AAOx4. Tongue ML, no drift. Min/mod dysmetria on left.  FCx4, only tace weakness to L side noted. CDI x2    BP  Min: 108/62  Max: 161/88  Pulse  Av.1  Min: 47  Max: 88  Resp  Av.7  Min: 10  Max: 27  Temp  Av.8 °C (98.3 °F)  Min: 36.5 °C (97.7 °F)  Max: 37.4 °C (99.3 °F)  SpO2  Av.6 %  Min: 93 %  Max: 100 %    No data recorded    Recent Labs     10/05/19  0244 10/06/19  0324 10/07/19  0415   WBC 5.8 9.3 9.5   RBC 5.01 4.48* 3.87*   HEMOGLOBIN 15.2 14.1 12.4*   HEMATOCRIT 46.5 41.8* 37.5*   MCV 92.8 93.3 96.9   MCH 30.3 31.5 32.0   MCHC 32.7* 33.7 33.1*   RDW 48.3 47.6 50.3*   PLATELETCT 326 334 267   MPV 10.1 10.7 9.9     Recent Labs     10/05/19  0244 10/06/19  0324 10/07/19  0415   SODIUM 143 140 136   POTASSIUM 3.2* 3.3* 3.3*   CHLORIDE 109 110 108   CO2 24 23 21   GLUCOSE 123* 134* 160*   BUN 37* 33* 24*   CREATININE 1.06 0.97 0.93   CALCIUM 9.7 9.2 8.1*     Recent Labs     10/04/19  1525   APTT 31.9   INR 0.98     Recent Labs     10/05/19  0244   REACTMIN 4.9*   CLOTKINET 1.3   CLOTANGL 70.5   MAXCLOTS 72.8*   [id number] 0.5   PRCINADP 36.6   PRCINAA 53.7       Intake/Output       10/06/19 0700 - 10/07/19 0659 10/07/19 0700 - 10/08/19 0659      [id number] 3835-5393 Total  Total       Intake    P.O.  --  200 200  --  -- --    P.O. -- 200 200 -- -- --    I.V.  2018 3063  200  -- 200    Phenylephrine Volume 3 -- 3 -- -- --    Propofol Volume 15 -- 15 -- -- --    Volume (mL) (NS infusion) 1000 -- 1000 -- -- --    Volume (mL) (electrolyte-A (PLASMALYTE-A) infusion) 1000 -- 1000 -- -- --    Volume (mL) (0.9 % NaCl with KCl 20 mEq infusion) --  1045 200 -- 200    IV Piggyback  --  400 400  --  -- --    Volume (mL) (levETIRAcetam (KEPPRA) 500 mg in  mL IVPB) -- 200 200 -- -- --    Volume (mL) (ceFAZolin in dextrose (ANCEF) IVPB  premix 2 g) -- 200 200 -- -- --    Total Intake 2018 1645 3663 200 -- 200       Output    Urine  200  1650 1850  100  -- 100    Urine 200 -- 200 -- -- --    Output (mL) (Urethral Catheter Latex) -- 1650 1650 100 -- 100    Blood  100  -- 100  --  -- --    Est. Blood Loss 100 -- 100 -- -- --    Total Output 300 1650 1950 100 -- 100       Net I/O     1718 -5 1713 100 -- 100            Intake/Output Summary (Last 24 hours) at 10/7/2019 0931  Last data filed at 10/7/2019 0800  Gross per 24 hour   Intake 3863 ml   Output 2050 ml   Net 1813 ml            • lisinopril  10 mg DAILY   • simvastatin  40 mg Q EVENING   • Pharmacy Consult Request  1 Each PHARMACY TO DOSE   • MD ALERT...DO NOT ADMINISTER NSAIDS or ASPIRIN unless ORDERED By Neurosurgery  1 Each PRN   • ondansetron  4 mg Q4HRS PRN   • diphenhydrAMINE  25 mg Q6HRS PRN   • scopolamine  1 Patch Q72HRS PRN   • labetalol  10 mg Q HOUR PRN   • hydrALAZINE  10 mg Q HOUR PRN   • cloNIDine  0.1 mg Q4HRS PRN   • niCARdipine infusion  0-15 mg/hr Continuous   • docusate sodium  100 mg BID   • senna-docusate  1 Tab Nightly   • senna-docusate  1 Tab Q24HRS PRN   • polyethylene glycol/lytes  1 Packet BID PRN   • magnesium hydroxide  30 mL QDAY PRN   • bisacodyl  10 mg Q24HRS PRN   • artificial tears  1 Application Q8HRS   • 0.9 % NaCl with KCl 20 mEq 1,000 mL   Continuous   • morphine   Continuous   • dexamethasone  4 mg Q6HRS    Or   • dexamethasone  4 mg Q6HRS   • ipratropium-albuterol  3 mL Q4H PRN (RT)   • hydrALAZINE  10-20 mg Q6HRS PRN   • phosphorus  1 Tab BID   • levETIRAcetam (KEPPRA) IV  500 mg Q12HRS   • senna-docusate  2 Tab BID    And   • polyethylene glycol/lytes  1 Packet QDAY PRN    And   • magnesium hydroxide  30 mL QDAY PRN    And   • bisacodyl  10 mg QDAY PRN   • Respiratory Care per Protocol   Continuous RT   • acetaminophen  650 mg Q6HRS PRN   • ondansetron  4 mg Q4HRS PRN   • nicotine  14 mg Daily-0600    And   • nicotine polacrilex  2 mg Q HOUR PRN        Assessment and Plan:  POD #1 Crani x2 Hx Lung CA  Prophylactic anticoagulation: no         Start date/time: tbd    Plan:  CT- stable reviewed by emelyn Graf DC  PTOT  Dex 4q6- will start taper jeannie  Path- pending  [facility] will consult XRT-message left for breonna  Ok to floor- neuro checks q4

## 2019-10-07 NOTE — PROGRESS NOTES
Pt arrived to S110 via bed from PACU. Pt connected to ICU monitor. VSS. Neuro check completed, see flowsheet.    2 RN skin check complete with YUVAL Diego.  Devices in place: alejandra catheter, SCD's   Skin assessed under the following devices: alejandra catheter, SCD's.   Preventative measures in place including: sacral mepilex.  Following areas of concern:   · Sacrum red, nonblanching with small area of breakdown noted   The following interventions in place mepilex and Q2hr turns

## 2019-10-07 NOTE — CONSULTS
RADIATION ONCOLOGY CONSULT    DATE OF SERVICE: 10/7/2019    IDENTIFICATION: A 74 y.o. 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  He is here at the kind request of Dr. Gagan Lacey.      HISTORY OF PRESENT ILLNESS: Patient has a history of a right lung cancer status post resection 5 years ago he is been cleared.  More recently he developed confusion and blank stares left-sided weakness left-sided numbness unstable gait and subsequent left sided facial droop.  He also had a 10 pound weight loss in the last few months that was unplanned.  He ultimately went to the [facility] where a CT scan was done which showed 2 masses in the brain.  He was subsequently referred [facility] for further work-up and treatment.  MRI scan of the brain 10/4/2019 showed a large right frontal and left cerebellar heterogeneously enhancing intra-axial masses concerning for metastatic disease.  There was a right to left midline shift of 1 cm and effacement of the fourth ventricle secondary to the cerebellar lesion without evidence of hydrocephalus.  CT scan of the chest abdomen and pelvis was done the same day revealing a 3.4 cm left upper lobe lung mass extending into the left suprahilar region and encasing the left upper lobe pulmonary arteries with questionable small amount of thrombus on the anterior branch consistent with malignancy.  There were several peripheral gland glass nodules in the left upper lobe which could be additional areas of malignancy versus obstructive pneumonitis.  There was otherwise no mediastinal or hilar lymphadenopathy.  There were a few nonspecific subcentimeter hypodense hepatic lesions too small to characterize no adenopathy in the abdomen or pelvis.  There was one area of sclerosis in the left iliac bone which could be a bone island or metastasis.  He then underwent resection of both brain lesions on  10/6/2019 and pathology is pending.  Postoperatively he has been doing well he was able to walk around the unit with physical therapy and he is without any major complaints.  He is feeling much better than he was prior to surgery and he is able to move his left side much better.  He seen in consultation today about the role of radiation therapy and his overall treatment plan.    PAST MEDICAL HISTORY:   Recent diagnosis of hypertension    PAST SURGICAL HISTORY:  Past Surgical History:   Procedure Laterality Date   • CRANIOTOMY Right 10/6/2019    Procedure: Stage 2: Right frontal craniotomy and removal o right frontal tumor;  Surgeon: Gagan Lacey M.D.;  Location: SURGERY [facility];  Service: Neurosurgery   • CRANIECTOMY Left 10/6/2019    Procedure: Stage 1: Left suboccipital craniectomy and removal of left cerebellar tumor;  Surgeon: Gagan Lacey M.D.;  Location: SURGERY [facility];  Service: Neurosurgery   History of a right lung cancer status post right upper lobectomy 5 years ago.        CURRENT MEDICATIONS:  Current Facility-Administered Medications   Medication Dose Route Frequency Provider Last Rate Last Dose   • levETIRAcetam (KEPPRA) tablet 500 mg  500 mg Oral BID Emigdio Bower M.D.       • famotidine (PEPCID) tablet 20 mg  20 mg Oral BID Emigdio Bower M.D.       • lisinopril (PRINIVIL) 10 MG tablet 10 mg  10 mg Oral DAILY Arthur Galvan M.D.   Stopped at 10/07/19 0600   • simvastatin (ZOCOR) tablet 40 mg  40 mg Oral Q EVENING Arthur Galvan M.D.   40 mg at 10/06/19 1951   • Pharmacy Consult Request ...Pain Management Review 1 Each  1 Each Other PHARMACY TO DOSE NEIDA Dominguez.       • MD ALERT...DO NOT ADMINISTER NSAIDS or ASPIRIN unless ORDERED By Neurosurgery 1 Each  1 Each Other PRN WADE DominguezPMIGUEL.       • ondansetron (ZOFRAN) syringe/vial injection 4 mg  4 mg Intravenous Q4HRS PRN WADE DominguezP.N.       • diphenhydrAMINE (BENADRYL) injection 25 mg  25  mg Intravenous Q6HRS PRN Keiry Lmea, A.P.N.       • scopolamine (TRANSDERM-SCOP) patch 1 Patch  1 Patch Transdermal Q72HRS PRN Keiry Lema, A.P.N.       • labetalol (NORMODYNE,TRANDATE) injection 10 mg  10 mg Intravenous Q HOUR PRN Keiry Lema, A.P.N.   10 mg at 10/06/19 1908   • hydrALAZINE (APRESOLINE) injection 10 mg  10 mg Intravenous Q HOUR PRN Keiry Lema, A.P.N.   10 mg at 10/06/19 2146   • cloNIDine (CATAPRES) tablet 0.1 mg  0.1 mg Oral Q4HRS PRN Keiry Lema, A.P.N.   0.1 mg at 10/06/19 2019   • niCARdipine (CARDENE) 25 mg in  mL Infusion  0-15 mg/hr Intravenous Continuous Keiry Lema A.P.N.   Stopped at 10/06/19 1900   • docusate sodium (COLACE) capsule 100 mg  100 mg Oral BID Keiry Lema, A.P.N.   100 mg at 10/07/19 0515   • senna-docusate (PERICOLACE or SENOKOT S) 8.6-50 MG per tablet 1 Tab  1 Tab Oral Nightly Keiry Lema, A.P.N.   1 Tab at 10/06/19 1951   • senna-docusate (PERICOLACE or SENOKOT S) 8.6-50 MG per tablet 1 Tab  1 Tab Oral Q24HRS PRN Keiry Lema, A.P.N.       • polyethylene glycol/lytes (MIRALAX) PACKET 1 Packet  1 Packet Oral BID PRN Keiry Lema, A.P.N.       • magnesium hydroxide (MILK OF MAGNESIA) suspension 30 mL  30 mL Oral QDAY PRN Keiry Lema, A.P.N.       • bisacodyl (DULCOLAX) suppository 10 mg  10 mg Rectal Q24HRS PRN Keiry Lema, A.P.N.       • artificial tears (EYE LUBRICANT) ophth ointment 1 Application  1 Application Both Eyes Q8HRS Keiry Lema, A.P.N.   1 Application at 10/07/19 0516   • 0.9 % NaCl with KCl 20 mEq infusion   Intravenous Continuous Keiry Lema, A.P.N. 100 mL/hr at 10/07/19 0932     • morphine 1 mg/mL in 50 mL (PCA)   Intravenous Continuous Keiry Lema, A.P.N.       • dexamethasone (DECADRON) tablet 4 mg  4 mg Oral Q6HRS Keiry Lema, A.P.N.   4 mg at 10/07/19 1137    Or   • dexamethasone (DECADRON) injection 4 mg  4 mg Intravenous Q6HRS Keiry Lema,  A.P.N.       • ipratropium-albuterol (DUONEB) nebulizer solution  3 mL Nebulization Q4H PRN (RT) Yuri Black M.D.       • hydrALAZINE (APRESOLINE) injection 10-20 mg  10-20 mg Intravenous Q6HRS PRN Garry Smith M.D.       • phosphorus (K-PHOS-NEUTRAL, PHOSPHA 250 NEUTRAL) per tablet 1 Tab  1 Tab Oral BID Arthur Galvan M.D.   1 Tab at 10/07/19 0516   • senna-docusate (PERICOLACE or SENOKOT S) 8.6-50 MG per tablet 2 Tab  2 Tab Oral BID Nasima Smith M.D.   2 Tab at 10/07/19 0515    And   • polyethylene glycol/lytes (MIRALAX) PACKET 1 Packet  1 Packet Oral QDAY PRN Nasima Smith M.D.        And   • magnesium hydroxide (MILK OF MAGNESIA) suspension 30 mL  30 mL Oral QDAY PRN Nasima Smith M.D.   30 mL at 10/05/19 1734    And   • bisacodyl (DULCOLAX) suppository 10 mg  10 mg Rectal QDAY PRN Nasima Smith M.D.   10 mg at 10/06/19 2018   • Respiratory Care per Protocol   Nebulization Continuous RT Nasima Smith M.D.       • acetaminophen (TYLENOL) tablet 650 mg  650 mg Oral Q6HRS PRN Nasima Smith M.D.   650 mg at 10/07/19 1137   • ondansetron (ZOFRAN ODT) dispertab 4 mg  4 mg Oral Q4HRS PRN Nasima Smith M.D.       • nicotine (NICODERM) 14 MG/24HR 14 mg  14 mg Transdermal Daily-0600 Nasima Smith M.D.   14 mg at 10/07/19 0515    And   • nicotine polacrilex (NICORETTE) 2 MG piece 2 mg  2 mg Oral Q HOUR PRN Nasima Smith M.D.           ALLERGIES:    Patient has no known allergies.    FAMILY HISTORY:    No family history of cancer        SOCIAL HISTORY:     reports that he has been smoking. He has been smoking about 0.10 packs per day. He has never used smokeless tobacco. He reports that he drinks alcohol. He reports that he does not use drugs.  He lives with his daughter and son-in-law and grandkids in [place].  He has a close family.    Review of Systems:   Constitutional: Denies fever, chills, sweats, but has had a 10 pound weight loss in the last 3 months weight loss  HENT: Denies neck pain, headache or  hearing loss.  He did have some dizziness and balance issues worsening over the last month  Eyes: Denies vision changes  Respiratory: Denies cough, congestion, SOB, hemoptysis  Cardiovascular: Denies palpations, chest pain or easy fatiguability   Gastrointestinal: He denies diarrhea or abdominal pain or constipation but did have some issues with having a sensation to go and not being able to have a bowel movement.  This was over the last few days prior to surgery..  No blood in stool.  Genitourinal: Denies hematuria, dysuria, incontinence  Musculoskeletal: Has had low back pain worsening over the last few months and [facility]  Skin: Denies itching or rash, or new lesions  Neurological: Has had progressive weakness in the left side worsening over the last few days.  He denies seizures  Endocrine: Denies thyroid problems or diabetes  Psyche: Denies depression, anxiety, mood changes      PHYSICAL EXAM:    3 = Capable of only limited self care, confined to bed or chair more than 50% of waking hours  Vitals:    10/07/19 0800 10/07/19 0900 10/07/19 1000 10/07/19 1100   BP: 121/75 139/80 120/72    Pulse: 64 (!) 59 (!) 59 68   Resp: 18 16 (!) 22 16   Temp: 37.4 °C (99.3 °F)      TempSrc: Temporal      SpO2: 94% 97% 97% 96%   Weight:       Height:       Location: Head  Description: Pressure        GENERAL: Well-appearing alert and oriented x3 in no apparent distress lying comfortably in the ICU bed with head bandage from prior surgery.  Cachectic.  HEENT:  Pupils are equal, round, and reactive to light.  Extraocular muscles   are intact. Sclerae nonicteric.  Conjunctivae pink.  Oral cavity, tongue   protrudes midline.   NECK:   No peripheral adenopathy of the neck, supraclavicular fossa or axillae   bilaterally.  LUNGS:  Clear to ascultation   HEART:  Regular rate and rhythm.  No murmur appreciated  ABDOMEN:  Soft. No evidence of hepatosplenomegaly.     EXTREMITIES:  Without Edema.  NEUROLOGIC:  Cranial nerves II through XII were intact. Normal stance and gait not tested as he is lying in bed but motor and sensory is grossly within normal limits              IMPRESSION:    A 74 y.o. 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      RECOMMENDATIONS:   I did discussion with the patient and subsequently his daughter regarding diagnosis prognosis and treatment.  They understand that we need to treat this so that we prevent any further progression in the brain.  I also explained that we cannot use stereotactic radiotherapy in the situation because the lesions are too large and will have to deliver whole brain radiation therapy over a period of 10 treatments.  I've described the details of radiation along with the side effects both acute and chronic, including but not exclusive to fatigue, skin reaction, local soreness, swelling, delayed healing, scarring fibrosis discoloration.  He can also have short-term memory loss and neurologic sequelae.  Ample time was allowed for questions, and patient understands.    We will schedule patient for a simulation in a couple weeks to get started soon thereafter.    Thank you for the opportunity to participate in his care.  If any questions or comments, please do not hesitate in calling.    Please note that this dictation was created using voice recognition software. I have made every reasonable attempt to correct obvious errors, but I expect that there are errors of grammar and possibly content that I did not discover before finalizing the note.

## 2019-10-07 NOTE — ASSESSMENT & PLAN NOTE
Left upper lobe  [facility]  Metastatic lung cancer to brain

## 2019-10-07 NOTE — THERAPY
"  Physical Therapy Evaluation completed.   Bed Mobility:  Supine to Sit: (up in chair on arrival)  Transfers: Sit to Stand: Minimal Assist  Gait: Level Of Assist: Minimal Assist with Front-Wheel Walker     X250'  Plan of Care: Will benefit from Physical Therapy 3 times per week  Discharge Recommendations: Equipment: Will Continue to Assess for Equipment Needs. Post-acute therapy Discharge to a transitional care facility for continued skilled therapy services.    See \"Rehab Therapy-Acute\" Patient Summary Report for complete documentation.     Pt is a 75 y/o male presenting to acute setting with ALOC/confusion and difficulty mobilizing at the left side.  He was found to have a Left superior cerebellar metastasis and Large right frontal metastasis and underwent surgical intervention.  Pt was eager to mobilize with therapy and ambulated well with a FWW.  Pt did demonstrate a minor listing to the left but was able to correct with verbal cuing.  PT to continue working with Pt in acute setting and currently recommend DC to acute transitional care setting for therapy 5X/week which Pt could tolerate well in an effort to get Pt back to independent status.  "

## 2019-10-07 NOTE — CONSULTS
"Reason for PC Consult: Advance Care Planning    Consulted by: Dr. Smith    Assessment:  General:   74 y.o. male who presented 10/4/2019 with confusion, altered mental status, left side weakness/sensory changes, unstable gait and facial droop. Ongoing for 2 months and worsening.  History of lung cancer with RLL resection per patient[facility] this hospitalization has left upper lobe mass.  He fell twice at home. Per Dr. Lacey note, large right frontal and a large left cerebellar metastases, both too large to be treated with radiation. Abhijit performed excision of metastases. Extubated post procedure. Past medical history of lung cancer.     Dyspnea: No  Last BM: 10/06/19  Pain: No  Depression: No  Dementia: Unable to Determine    Spiritual:  Is Jain or spirituality important for coping with this illness? Unable to determine    Has a  or spiritual provider visit been requested?      Palliative Performance Scale: 60%    Advance Directive: None-[facility] to [facility]  DPOA: No-NOK are pt's two children Aditi Clark and Milan Clark   POLST: No-pt is full code    Code Status: Full    Social: Pt lives in [place] with his daughter Aditi, Aditi's , and his grandchildren (a 10 year old girl and a 12 year old boy). Pt is . Pt's son Milan and his wife Nara live in [place], they plan on moving to [facility] in the near future.     Outcome:  Introduced self and role of Palliative Care to pt, his son Milan, and daughter in law Nara.  Assessed pt's understanding of his current medical status, overall health picture, and options for future care. Pt explains that he was having back pain with associated neurological symptoms like weakness. [facility], but [facility] stated [facility] would need head CT due to severity of neuro symptoms. Pt stated that the head CT showed the \"masses\" and the pt required " transfer to [facility]. Pt states that the neurosurgeon removed the tumors in his brain, but they also discovered a mass in his lung. Pt states he thinks he will need radiation on both his lung and brain. Pt and family state that prior to this event pt was completely independent. Pt loves to golf and be active, per pt's son he still does sit ups and push ups everyday. Pt states that for his lung cancer they did a lobe resection, but he never needed chemo or radiation.     Explored pt's values, beliefs, and preferences in order to identify GOC. Pt states he is interested treatment and would like to find out what options are available. PC RN encourages pt to always weigh the benefit and burden of treatment when he undergoes chemo and radiation. Pt states he is greatly supported by his local family and daughter who lives with him. They will be able to drive him to and from appts if he is unable etc.     Advance directive discussed. Pt believes [facility]. PC RN will fax request of AD records and return to complete AD with pt if there isn't one on file. Code status discussed including mechanical ventilation and what resuscitation looks like. Pt elected to be full code.     Spiritual visit offered. Pt is Protestant, it is an important part of his life, however he declines visit from [facility].     Active listening, reflection, reminiscing, validation & normalization, and empathic support utilized throughout this encounter.  All questions answered.  PC contact information given.         Updated: Dr. Wright and BS RN     Plan: Full Code. Pt wants to pursue treatment. Will locate AD, if no AD complete PC RN will return to do AD with pt.         Thank you for allowing Palliative Care to participate in this patient's care. Please feel free to call [phone] with any questions or concerns.

## 2019-10-07 NOTE — OP REPORT
DATE OF SERVICE:  10/06/2019    PREOPERATIVE DIAGNOSES:  1. Left superior cerebellar metastasis.  2. Large right frontal metastasis.    POSTOPERATIVE DIAGNOSES:  1. Left superior cerebellar metastasis.  2. Large right frontal metastasis.    OPERATIONS:  Stage I:  1. 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  2. A 5 cm titanium cranioplasty.    Stage II:  Right frontotemporal craniotomy with gross total excision of large   right frontal metastasis.    SURGEON:  Gagan Lacey MD    ASSISTANT:  ANGELA Flores    ANESTHESIA:  General endotracheal.    ANESTHESIOLOGIST:  Etienne Jackson MD    PREPARATION:  ChloraPrep.    MEDICATIONS:  Patient given Ancef prior to incision.    INDICATIONS:  Patient with symptomatic metastatic disease of the brain, had a   large right frontal and a large left cerebellar metastases, both too large to   be treated with radiation.  Patient felt to be a candidate for surgery for   excision of the lesions to prevent further loss of neurologic function and   optimize the potential for further return of loss of function.  It is felt   without surgery the patient will have a very limited life expectancy.  With a   combination of surgery plus radiation, intracranial disease can be controlled   roughly 80% of the time.  Major risks and complications felt to be about 2-4%   including stroke, paralysis, death, brain hemorrhage, persistent recurrent   disease, need for further surgery, infection.  Patient is understanding along   with his family, signed consent to proceed.    DESCRIPTION OF PROCEDURE:  Patient was brought to the operating room,   peripheral venous lines in place.  General anesthesia was induced.  Burns   catheter was already placed.  Dr. Jackson placed radial arterial line.  We   started with the cerebellar lesion first.  We placed the patient in the   lateral decubitus position, right side down.  Head was  maintained in 3-point   fixation with a Daniel head welch.  The head was turned towards the floor,   so the left suboccipital region would be most superior in the field.  The back   of the head and neck was then shaved, sterilely prepped and draped by myself   using ChloraPrep.  Linear incision was made midway between the mastoid and the   inion.  This planned incision was infiltrated with local and incised with   scalpel.  Tania clips applied to scalp margins for hemostasis.  Dissection was   carried down through the suboccipital muscles to the bone.  Self-retaining   retractor was placed.  Bleeders controlled with bipolar electrocautery.  Bur   holes were placed with the , and then using the XYZEas Armand drill with   M2 bit and AM-8, we did a craniectomy to the inferior aspect of the   transverse sinus, roughly 4 cm wide, 3 cm down.  Dura was opened in cruciate   fashion.  Cortical incision was made in the superior aspect of the cerebellum.    Dissection was carried down to the tumor, which was fairly firm.  Tumor was   then removed with ultrasonic aspirator and with the large biopsy forceps   operating.  A gross total excision was obtained under loupe magnification.    Then, the operating microscope was brought in under high-power magnification.    The resection cavity inspected.  Any questionable areas were removed with   suction and irrigating bipolar.  Once the tumor was resected, the posterior   fossa contents were very soft, sunken away, pulsatile.  Hemostasis was   achieved.  The area irrigated with saline, return was crystal clear.    Resection cavity was then lined with postage stamp Surgicel.  The dura was   closed with 4-0 Nurolon plus a muscle patch 1x1 Duragen and fibrin glue.    Next, a 5 cm cranioplasty was secured in place with 5 mm screws.  This was a   titanium plate.  Wound was irrigated with antibiotic irrigation.  The muscle   layer was closed with 2-0 Vicryl.  Galea was closed with  2-0 Vicryl.    Subcutaneous fascia was closed with 2-0 Vicryls.  Scalp skin closed with   running staples.  Sterile dressing was then placed.  Patient was then   positioned supine on the table, taken out of the head welch.  Head was   maintained in Fox headrest, turned to left 45 degrees.  The right   frontotemporal area was then again shaved, sterilely prepped and draped.    Curvilinear incision was begun at the level of zygoma superiorly to just off   the midline and to just above the hairline.  This planned incision was   infiltrated with local and incised with scalpel.  Temporalis muscle and fascia   opened in line with incision.  Tania clips applied to scalp margins for   hemostasis.  I placed a bur hole at the keyhole, one just off the midline and   one posteriorly.  Then, with the B1 footplate, a free flap was turned.  Dura   was opened in line with the incision, reflected anteriorly.  Tumor presented   to the cortical surface.  We worked inferiorly, anteriorly and superiorly,   dissecting a plane between the tumor, which was fairly firm and the cavity of   the brain, applied 3 cottonoids.  Cottonoids were advanced as this dissection   plane was carried out.  Posterior aspect of the tumor was necrotic, cystic and   the majority of the tumor was removed in a single piece.  The remaining   resection cavity thin wall posteriorly was resected in a gross total excision   with suction and irrigating bipolar.  The walls of the resection cavity were   inspected and again taken to a normal-appearing white matter.  With resection   of this mass, the brain, which was full was again sunken and nicely pulsatile.    The wound again irrigated with saline, return was crystal clear.  Resection   cavity was lined with postage stamp Surgicel.  Dura was closed with 4-0   Nurolon and a 3x3 Duragen.  Bone flap secured in place with a medium bur hole   cover and two 2-hole plates with 5 mm screws.  Temporalis muscle and  fascia   was closed with 2-0 Vicryl.  Galea closed with 2-0 Vicryl.  Skin edges   approximated with staples.  Sterile dressing was placed.  Patient was   extubated, taken to recovery room in satisfactory condition.  Final sponge,   needle counted, counts correct.  Estimated blood loss for both cases was   200-250 mL.  In the recovery room, patient is awake, following commands,   moving all extremities with same degree of power as he was preoperatively.             ____________________________________     MD HITESH DE DIOSV / NTS    DD:  10/06/2019 16:58:41  DT:  10/06/2019 18:02:36    D#:  [id number]  Job#:  [id number]    cc: ILEANA STEWART MD

## 2019-10-07 NOTE — ANESTHESIA POSTPROCEDURE EVALUATION
Patient: Milan Clark    Procedure Summary     Date:  10/06/19 Room / Location:  [facility] OR 05 / SURGERY [facility]    Anesthesia Start:  1230 Anesthesia Stop:  1601    Procedures:       Stage 2: Right frontal craniotomy and removal o right frontal tumor (Right Head)      Stage 1: Left suboccipital craniectomy and removal of left cerebellar tumor (Left Head) Diagnosis:  (Right frontal and left cerebellar brain masses)    Surgeon:  Gagan Lacey M.D. Responsible Provider:  Etienne Jackson M.D.    Anesthesia Type:  general ASA Status:  3          Final Anesthesia Type: general  Last vitals  BP   Blood Pressure : 135/90, Arterial BP: 146/84    Temp   36.7 °C (98.1 °F)    Pulse   Pulse: 78   Resp   20    SpO2   97 %      Anesthesia Post Evaluation    Patient location during evaluation: PACU  Patient participation: complete - patient participated  Level of consciousness: awake and alert    Airway patency: patent  Anesthetic complications: no  Cardiovascular status: hemodynamically stable  Respiratory status: acceptable  Hydration status: euvolemic    PONV: none           Nurse Pain Score: 0 (NPRS)

## 2019-10-07 NOTE — THERAPY
"Occupational Therapy Evaluation completed.   Functional Status:  Pt very pleasant sitting up in chair with family present.  Pt able to don hospital pants with Min A.  Pt stood with Min A & was able to amb with FWW & Min A.  Pt does tend to lean to his left.  Pt has mild weakness in his left UE but reports this has improved since sx.  Pt was able to self feed with supervision & extra time.  Pt has a very supportive family who will be able to assist as needed upon D/C.  Pt returned to supine in bed with Min A.  Plan of Care: Will benefit from Occupational Therapy 3 times per week  Discharge Recommendations:  Equipment: Will Continue to Assess for Equipment Needs. Post-acute therapy Discharge to a transitional care facility for continued skilled therapy services.    Depending on pt's progress in Acute setting pt may need Post Acute OT services prior to D/C home.    See \"Rehab Therapy-Acute\" Patient Summary Report for complete documentation.    "

## 2019-10-07 NOTE — CARE PLAN
Problem: Infection  Goal: Will remain free from infection  Intervention: Assess signs and symptoms of infection  Note:   RN monitors Pt VS and lab values to observe for S/S of infection.  Hand hygiene implemented before and after Pt care.       Problem: Pain Management  Goal: Pain level will decrease to patient's comfort goal  Intervention: Follow pain managment plan developed in collaboration with patient and Interdisciplinary Team  Note:   Assessing pain level frequently using 0 to 10 scale.  Providing medication per MAR.  Providing non-pharmacological intervention, therapeutic communication.  Pt pain currently controlled with PRN tylenol.

## 2019-10-07 NOTE — PROGRESS NOTES
[facility] Medicine Daily Progress Note    Date of Service  10/7/2019    Chief Complaint  74 y.o. male admitted 10/4/2019 with left sided weakness.    Hospital Course    Mr Clark has a history of lung cancer status post lobectomy and radiation.  [facility] of left-sided numbness, unstable gait, and facial droop.  CT scan showed intracranial mass, he was transferred to [facility] for higher level of care and neurosurgery coverage.  Neurosurgery was consulted, on 10/6/2019 he had left suboccipital craniectomy with excision of left cerebellar mastasis. ,      Interval Problem Update  Alert and oriented x 4  Complains of mild incisional pain and neck pain, better with tylenol  Denies blurry vision, no nausea or vomiting  Left-sided numbness is improved    Consultants/Specialty  Critical Care, I discussed the patient's condition with Dr. Bower today on ICU Rounds  Neurosurgery    Code Status  Full Code    Disposition  OK to neurosurgery floor  Palliative care consult, prognosis is guarded    Review of Systems  Review of Systems   Constitutional: Negative for chills and malaise/fatigue.   Respiratory: Negative for cough, hemoptysis and sputum production.    Cardiovascular: Negative for chest pain, palpitations and orthopnea.   Gastrointestinal: Negative for nausea and vomiting.   Skin: Negative for itching and rash.   Neurological: Positive for sensory change and focal weakness. Negative for dizziness.   All other systems reviewed and are negative.       Physical Exam  Temp:  [36.5 °C (97.7 °F)-37.1 °C (98.8 °F)] 37.1 °C (98.8 °F)  Pulse:  [47-88] 58  Resp:  [10-21] 20  BP: (108-161)/(62-90) 117/71  SpO2:  [93 %-100 %] 95 %    Physical Exam   Constitutional: He is oriented to person, place, and time. He appears well-developed and well-nourished.   HENT:   Head: Normocephalic and atraumatic.   Dressing C/D/I   Eyes: Conjunctivae and EOM are normal. Right eye  exhibits no discharge. Left eye exhibits no discharge.   Cardiovascular: Normal rate, regular rhythm and intact distal pulses.   No murmur heard.  2+ Radial Pulses  Brisk Capillary Refill   Pulmonary/Chest: Effort normal and breath sounds normal. No respiratory distress. He has no wheezes.   Abdominal: Soft. Bowel sounds are normal. He exhibits no distension. There is no tenderness. There is no rebound.   Musculoskeletal: Normal range of motion. He exhibits no edema.   Neurological: He is alert and oriented to person, place, and time. No cranial nerve deficit.   Skin: Skin is warm and dry. He is not diaphoretic. No erythema.   Skin is warm and well perfused   Psychiatric: He has a normal mood and affect.       Fluids    Intake/Output Summary (Last 24 hours) at 10/7/2019 0742  Last data filed at 10/7/2019 0600  Gross per 24 hour   Intake 3663 ml   Output 1950 ml   Net 1713 ml       Laboratory  Recent Labs     10/05/19  0244 10/06/19  0324 10/07/19  0415   WBC 5.8 9.3 9.5   RBC 5.01 4.48* 3.87*   HEMOGLOBIN 15.2 14.1 12.4*   HEMATOCRIT 46.5 41.8* 37.5*   MCV 92.8 93.3 96.9   MCH 30.3 31.5 32.0   MCHC 32.7* 33.7 33.1*   RDW 48.3 47.6 50.3*   PLATELETCT 326 334 267   MPV 10.1 10.7 9.9     Recent Labs     10/05/19  0244 10/06/19  0324 10/07/19  0415   SODIUM 143 140 136   POTASSIUM 3.2* 3.3* 3.3*   CHLORIDE 109 110 108   CO2 24 23 21   GLUCOSE 123* 134* 160*   BUN 37* 33* 24*   CREATININE 1.06 0.97 0.93   CALCIUM 9.7 9.2 8.1*     Recent Labs     10/04/19  1525   APTT 31.9   INR 0.98               Imaging  CT-HEAD W/O   Final Result         1.  Postoperative changes of pneumocephalus, small quantity of hemorrhage along the surgical bed, and subarachnoid hemorrhage of right frontal lobe mass is seen. Within expected limits for typical postop changes.   2.  Pneumocephalus and small quantity of hemorrhage along the surgical bed of left cerebellar mass resection, within expected limits.   3.  Right to left midline shift  measuring 5 mm.      US-EXTREMITY VENOUS LOWER BILAT   Final Result      MR-BRAIN-WITH & W/O   Final Result      1.  Large right frontal and left cerebellar heterogeneously enhancing intra-axial masses concerning for metastatic disease.   2.  Right to left midline shift measuring 1 cm.   3.  Effacement of the fourth ventricle secondary to the cerebellar lesion without evidence of hydrocephalus.   4.  Mild diffuse cerebral substance loss.   5.  Mild microangiopathic ischemic change versus the myelination gliosis.      CT-CHEST,ABDOMEN,PELVIS WITH   Final Result      1.  There is a spiculated 3.4 cm left upper lobe lung mass extending to the left suprahilar region and in casing the left upper lobe pulmonary arteries with questionable small amount of thrombus in one anterior branch. This finding is most consistent    with malignancy.   2.  There are several peripheral groundglass nodules in left upper lobe which could be additional areas of malignancy versus post obstructive pneumonitis.   3.  There is otherwise no mediastinal or hilar lymphadenopathy.   4.  There are a few nonspecific subcentimeter hypodense hepatic lesions, too small to characterize.   5.  There is no adenopathy in the abdomen or pelvis.   6.  There is mild intrahepatic biliary prominence of uncertain etiology.   7.  Stomach is not well evaluated as described above.   8.  There is one small area of sclerosis in the left iliac bone which could be a bone island or conceivably metastases although no other bone metastases are evident.      OUTSIDE IMAGES-CT HEAD   Final Result           Assessment/Plan  * Brain mass- (present on admission)  Assessment & Plan  Status post resection  Probable metastatic disease, pathology is pending  Continue neurochecks, he is status cranial active, follow neuro exam every 4 hours  Continue Decadron and Keppra  Transfer to neurology floor    History of lung cancer- (present on admission)  Assessment & Plan  History of  lung cancer treated with lobectomy 8 years ago  Concerned that new lung mas and brain mets represent recurrence  Await path    Lung mass- (present on admission)  Assessment & Plan  Await pathology    Essential hypertension- (present on admission)  Assessment & Plan  IV hyralazine with parameters    Urinary retention- (present on admission)  Assessment & Plan  Burns    Hypophosphatemia- (present on admission)  Assessment & Plan  Kphos    Hypokalemia- (present on admission)  Assessment & Plan  Replace    ERNST (acute kidney injury) (HCC)- (present on admission)  Assessment & Plan  This mornings labs reviewed, creatinine has returned to normal range      Tobacco abuse- (present on admission)  Assessment & Plan  Nicotine replacement protocol       VTE prophylaxis: SCD's

## 2019-10-07 NOTE — CARE PLAN
Problem: Nutritional:  Goal: Achieve adequate nutritional intake  Description  Patient will consume 50% or greater of meals/supplements    Outcome: NOT MET

## 2019-10-08 LAB
ANION GAP SERPL CALC-SCNC: 5 MMOL/L (ref 0–11.9)
BUN SERPL-MCNC: 23 MG/DL (ref 8–22)
CALCIUM SERPL-MCNC: 8.2 MG/DL (ref 8.5–10.5)
CHLORIDE SERPL-SCNC: 108 MMOL/L (ref 96–112)
CO2 SERPL-SCNC: 22 MMOL/L (ref 20–33)
CREAT SERPL-MCNC: 0.91 MG/DL (ref 0.5–1.4)
ERYTHROCYTE [DISTWIDTH] IN BLOOD BY AUTOMATED COUNT: 47.7 FL (ref 35.9–50)
GLUCOSE SERPL-MCNC: 133 MG/DL (ref 65–99)
HCT VFR BLD AUTO: 38.3 % (ref 42–52)
HGB BLD-MCNC: 12.5 G/DL (ref 14–18)
MCH RBC QN AUTO: 30.3 PG (ref 27–33)
MCHC RBC AUTO-ENTMCNC: 32.6 G/DL (ref 33.7–35.3)
MCV RBC AUTO: 93 FL (ref 81.4–97.8)
PLATELET # BLD AUTO: 251 K/UL (ref 164–446)
PMV BLD AUTO: 10.5 FL (ref 9–12.9)
POTASSIUM SERPL-SCNC: 3.7 MMOL/L (ref 3.6–5.5)
PSA FREE MFR SERPL: NORMAL %
PSA FREE SERPL-MCNC: NORMAL NG/ML
PSA SERPL-MCNC: 2.3 NG/ML (ref 0–4)
RBC # BLD AUTO: 4.12 M/UL (ref 4.7–6.1)
SODIUM SERPL-SCNC: 135 MMOL/L (ref 135–145)
WBC # BLD AUTO: 11 K/UL (ref 4.8–10.8)

## 2019-10-08 PROCEDURE — 700102 HCHG RX REV CODE 250 W/ 637 OVERRIDE(OP): Performed by: NURSE PRACTITIONER

## 2019-10-08 PROCEDURE — 700102 HCHG RX REV CODE 250 W/ 637 OVERRIDE(OP): Performed by: PSYCHIATRY & NEUROLOGY

## 2019-10-08 PROCEDURE — A9270 NON-COVERED ITEM OR SERVICE: HCPCS | Performed by: HOSPITALIST

## 2019-10-08 PROCEDURE — 700102 HCHG RX REV CODE 250 W/ 637 OVERRIDE(OP): Performed by: HOSPITALIST

## 2019-10-08 PROCEDURE — 99233 SBSQ HOSP IP/OBS HIGH 50: CPT | Performed by: HOSPITALIST

## 2019-10-08 PROCEDURE — 85027 COMPLETE CBC AUTOMATED: CPT

## 2019-10-08 PROCEDURE — 80048 BASIC METABOLIC PNL TOTAL CA: CPT

## 2019-10-08 PROCEDURE — 51798 US URINE CAPACITY MEASURE: CPT

## 2019-10-08 PROCEDURE — 770001 HCHG ROOM/CARE - MED/SURG/GYN PRIV*

## 2019-10-08 PROCEDURE — A9270 NON-COVERED ITEM OR SERVICE: HCPCS | Performed by: NURSE PRACTITIONER

## 2019-10-08 PROCEDURE — A9270 NON-COVERED ITEM OR SERVICE: HCPCS | Performed by: FAMILY MEDICINE

## 2019-10-08 PROCEDURE — 700102 HCHG RX REV CODE 250 W/ 637 OVERRIDE(OP): Performed by: FAMILY MEDICINE

## 2019-10-08 PROCEDURE — A9270 NON-COVERED ITEM OR SERVICE: HCPCS | Performed by: PSYCHIATRY & NEUROLOGY

## 2019-10-08 PROCEDURE — 700111 HCHG RX REV CODE 636 W/ 250 OVERRIDE (IP): Performed by: NURSE PRACTITIONER

## 2019-10-08 PROCEDURE — 700101 HCHG RX REV CODE 250: Performed by: NURSE PRACTITIONER

## 2019-10-08 PROCEDURE — 700112 HCHG RX REV CODE 229: Performed by: NURSE PRACTITIONER

## 2019-10-08 RX ORDER — DEXAMETHASONE 4 MG/1
4 TABLET ORAL EVERY 8 HOURS
Status: DISCONTINUED | OUTPATIENT
Start: 2019-10-08 | End: 2019-10-11

## 2019-10-08 RX ORDER — TAMSULOSIN HYDROCHLORIDE 0.4 MG/1
0.4 CAPSULE ORAL
Status: DISCONTINUED | OUTPATIENT
Start: 2019-10-08 | End: 2019-10-11 | Stop reason: HOSPADM

## 2019-10-08 RX ORDER — DEXAMETHASONE SODIUM PHOSPHATE 4 MG/ML
4 INJECTION, SOLUTION INTRA-ARTICULAR; INTRALESIONAL; INTRAMUSCULAR; INTRAVENOUS; SOFT TISSUE EVERY 8 HOURS
Status: DISCONTINUED | OUTPATIENT
Start: 2019-10-08 | End: 2019-10-11

## 2019-10-08 RX ADMIN — DIBASIC SODIUM PHOSPHATE, MONOBASIC POTASSIUM PHOSPHATE AND MONOBASIC SODIUM PHOSPHATE 1 TABLET: 852; 155; 130 TABLET ORAL at 18:14

## 2019-10-08 RX ADMIN — HYDRALAZINE HYDROCHLORIDE 10 MG: 20 INJECTION INTRAMUSCULAR; INTRAVENOUS at 05:16

## 2019-10-08 RX ADMIN — NICOTINE 14 MG: 14 PATCH TRANSDERMAL at 05:12

## 2019-10-08 RX ADMIN — FAMOTIDINE 20 MG: 20 TABLET ORAL at 05:11

## 2019-10-08 RX ADMIN — LEVETIRACETAM 500 MG: 500 TABLET ORAL at 18:14

## 2019-10-08 RX ADMIN — LEVETIRACETAM 500 MG: 500 TABLET ORAL at 05:11

## 2019-10-08 RX ADMIN — ACETAMINOPHEN 650 MG: 325 TABLET, FILM COATED ORAL at 07:41

## 2019-10-08 RX ADMIN — SIMVASTATIN 40 MG: 20 TABLET, FILM COATED ORAL at 18:14

## 2019-10-08 RX ADMIN — TAMSULOSIN HYDROCHLORIDE 0.4 MG: 0.4 CAPSULE ORAL at 10:15

## 2019-10-08 RX ADMIN — ACETAMINOPHEN 650 MG: 325 TABLET, FILM COATED ORAL at 16:01

## 2019-10-08 RX ADMIN — DEXAMETHASONE 4 MG: 4 TABLET ORAL at 05:12

## 2019-10-08 RX ADMIN — DIBASIC SODIUM PHOSPHATE, MONOBASIC POTASSIUM PHOSPHATE AND MONOBASIC SODIUM PHOSPHATE 1 TABLET: 852; 155; 130 TABLET ORAL at 05:11

## 2019-10-08 RX ADMIN — LABETALOL HYDROCHLORIDE 10 MG: 5 INJECTION INTRAVENOUS at 04:30

## 2019-10-08 RX ADMIN — DEXAMETHASONE 4 MG: 4 TABLET ORAL at 22:30

## 2019-10-08 RX ADMIN — HYDRALAZINE HYDROCHLORIDE 10 MG: 20 INJECTION INTRAMUSCULAR; INTRAVENOUS at 06:28

## 2019-10-08 RX ADMIN — SENNOSIDES, DOCUSATE SODIUM 1 TABLET: 50; 8.6 TABLET, FILM COATED ORAL at 22:31

## 2019-10-08 RX ADMIN — FAMOTIDINE 20 MG: 20 TABLET ORAL at 18:14

## 2019-10-08 RX ADMIN — DEXAMETHASONE 4 MG: 4 TABLET ORAL at 00:15

## 2019-10-08 RX ADMIN — POTASSIUM CHLORIDE 20 MEQ: 1500 TABLET, EXTENDED RELEASE ORAL at 05:11

## 2019-10-08 RX ADMIN — SENNOSIDES, DOCUSATE SODIUM 2 TABLET: 50; 8.6 TABLET, FILM COATED ORAL at 05:11

## 2019-10-08 RX ADMIN — DOCUSATE SODIUM 100 MG: 100 CAPSULE, LIQUID FILLED ORAL at 05:11

## 2019-10-08 RX ADMIN — DEXAMETHASONE 4 MG: 4 TABLET ORAL at 12:09

## 2019-10-08 RX ADMIN — SENNOSIDES, DOCUSATE SODIUM 2 TABLET: 50; 8.6 TABLET, FILM COATED ORAL at 18:14

## 2019-10-08 RX ADMIN — LISINOPRIL 10 MG: 20 TABLET ORAL at 05:12

## 2019-10-08 RX ADMIN — DOCUSATE SODIUM 100 MG: 100 CAPSULE, LIQUID FILLED ORAL at 18:15

## 2019-10-08 ASSESSMENT — PATIENT HEALTH QUESTIONNAIRE - PHQ9
1. LITTLE INTEREST OR PLEASURE IN DOING THINGS: NOT AT ALL
2. FEELING DOWN, DEPRESSED, IRRITABLE, OR HOPELESS: NOT AT ALL
SUM OF ALL RESPONSES TO PHQ9 QUESTIONS 1 AND 2: 0

## 2019-10-08 ASSESSMENT — ENCOUNTER SYMPTOMS
DIZZINESS: 0
VOMITING: 0
CHILLS: 0
NAUSEA: 0
SORE THROAT: 0
ABDOMINAL PAIN: 1
LOSS OF CONSCIOUSNESS: 0
SHORTNESS OF BREATH: 0
FEVER: 0
DIARRHEA: 0
PALPITATIONS: 0
BACK PAIN: 0
BLURRED VISION: 0
HEADACHES: 0
DOUBLE VISION: 0
COUGH: 0

## 2019-10-08 NOTE — PROGRESS NOTES
Neurosurgery Progress Note    Subjective:  Denies new symptoms. Burns back in place for retention.      Exam:  AAOx4. Tongue ML, no drift. Min/mod dysmetria on left.  FCx4, only tace weakness to L side noted. CDI x2    BP  Min: 127/76  Max: 202/87  Pulse  Av.1  Min: 50  Max: 74  Resp  Av.8  Min: 12  Max: 20  Temp  Av.6 °C (99.6 °F)  Min: 37.4 °C (99.3 °F)  Max: 37.9 °C (100.2 °F)  SpO2  Av.9 %  Min: 91 %  Max: 98 %    No data recorded    Recent Labs     10/06/19  0324 10/07/19  0415 10/08/19  0339   WBC 9.3 9.5 11.0*   RBC 4.48* 3.87* 4.12*   HEMOGLOBIN 14.1 12.4* 12.5*   HEMATOCRIT 41.8* 37.5* 38.3*   MCV 93.3 96.9 93.0   MCH 31.5 32.0 30.3   MCHC 33.7 33.1* 32.6*   RDW 47.6 50.3* 47.7   PLATELETCT 334 267 251   MPV 10.7 9.9 10.5     Recent Labs     10/06/19  0324 10/07/19  0415 10/08/19  0339   SODIUM 140 136 135   POTASSIUM 3.3* 3.3* 3.7   CHLORIDE 110 108 108   CO2 23 21 22   GLUCOSE 134* 160* 133*   BUN 33* 24* 23*   CREATININE 0.97 0.93 0.91   CALCIUM 9.2 8.1* 8.2*               Intake/Output       10/07/19 0700 - 10/08/19 0659 10/08/19 0700 - 10/09/19 0659       Total 1900-0659 Total       Intake    P.O.  720  240 960  600  -- 600    P.O. 720 240 960 600 -- 600    I.V.  806.7  0 806.7  --  -- --    Volume (mL) (0.9 % NaCl with KCl 20 mEq infusion) 806.7 0 806.7 -- -- --    Total Intake 1526.7 240 1766.7 600 -- 600       Output    Urine  395  1525 1920    --     Number of Times Voided -- 7 x 7 x -- -- --    Urine Void (mL) -- 1525 1525 -- -- --    Output (mL) (Urethral Catheter Non-latex 16 Fr.) -- -- --  -2050    Output (mL) ([REMOVED] Urethral Catheter Latex) 395 -- 395 -- -- --    Total Output 395 1525 0  --        Net I/O     1131.7 -1285 -153.3 -1450 -- -1450            Intake/Output Summary (Last 24 hours) at 10/8/2019 1425  Last data filed at 10/8/2019 1200  Gross per 24 hour   Intake 840 ml   Output 3650 ml   Net -2810 ml       $  Bladder Scan Results (mL): 999    • tamsulosin  0.4 mg AFTER BREAKFAST   • levETIRAcetam  500 mg BID   • famotidine  20 mg BID   • potassium chloride SA  20 mEq DAILY   • lisinopril  10 mg DAILY   • simvastatin  40 mg Q EVENING   • Pharmacy Consult Request  1 Each PHARMACY TO DOSE   • MD ALERT...DO NOT ADMINISTER NSAIDS or ASPIRIN unless ORDERED By Neurosurgery  1 Each PRN   • ondansetron  4 mg Q4HRS PRN   • diphenhydrAMINE  25 mg Q6HRS PRN   • scopolamine  1 Patch Q72HRS PRN   • labetalol  10 mg Q HOUR PRN   • cloNIDine  0.1 mg Q4HRS PRN   • docusate sodium  100 mg BID   • senna-docusate  1 Tab Nightly   • senna-docusate  1 Tab Q24HRS PRN   • polyethylene glycol/lytes  1 Packet BID PRN   • magnesium hydroxide  30 mL QDAY PRN   • bisacodyl  10 mg Q24HRS PRN   • artificial tears  1 Application Q8HRS   • morphine   Continuous   • dexamethasone  4 mg Q6HRS    Or   • dexamethasone  4 mg Q6HRS   • ipratropium-albuterol  3 mL Q4H PRN (RT)   • hydrALAZINE  10-20 mg Q6HRS PRN   • phosphorus  1 Tab BID   • senna-docusate  2 Tab BID    And   • polyethylene glycol/lytes  1 Packet QDAY PRN    And   • magnesium hydroxide  30 mL QDAY PRN    And   • bisacodyl  10 mg QDAY PRN   • Respiratory Care per Protocol   Continuous RT   • acetaminophen  650 mg Q6HRS PRN   • ondansetron  4 mg Q4HRS PRN   • nicotine  14 mg Daily-0600    And   • nicotine polacrilex  2 mg Q HOUR PRN       Assessment and Plan:  POD #2 Crani x2 Hx Lung CA  Prophylactic anticoagulation: no         Start date/time: tbd    Plan:  CT- stable reviewed by Dr Lacey  PTOT- rec transitional facility  Dex 4q8 x3 days then Bid x3 days, then 2q8 x3 days, 2 bid x3 days, 2 qday then stop  Path- pending  [facility]  Angelo has [facility] pt for XRT  Ok to floor- will place rehab consult

## 2019-10-08 NOTE — PROGRESS NOTES
1905 Received report. Patient assessed, orders reviewed. Medications administered as ordered.  Patient resting comfortably at this time  2020 Bedside report given to Gab BARAKAT.

## 2019-10-08 NOTE — PROGRESS NOTES
"Hospital Medicine Daily Progress Note    Date of Service  10/8/2019    Chief Complaint  74 y.o. male admitted 10/4/2019 with L side weakness    Hospital Course    PMHx Lung CA s/p lobectomy and XRT 5 y.a..  Had been experiencing slowly progressive L side weakness and altered mental status over several months.  Initial imaging showing L cerebellar mass.  Went to the OR on 10/6 with Dr Lacey for a microscopic total excision.  Also noted to have apparently new lung mass.      Interval Problem Update  Pt with some episodes of HTN overnight to 170s  C/o some abd \"bloating\" this am.  Also having urinary incontinence overnight which he has never had previously    Consultants/Specialty  Neuro Surgery      Code Status  full    Disposition  OK to floor    Review of Systems  Review of Systems   Constitutional: Negative for chills and fever.   HENT: Negative for nosebleeds and sore throat.    Eyes: Negative for blurred vision and double vision.   Respiratory: Negative for cough and shortness of breath.    Cardiovascular: Negative for chest pain, palpitations and leg swelling.   Gastrointestinal: Positive for abdominal pain. Negative for diarrhea, nausea and vomiting.   Genitourinary: Negative for dysuria and urgency.   Musculoskeletal: Negative for back pain.   Skin: Negative for rash.   Neurological: Negative for dizziness, loss of consciousness and headaches.        Physical Exam  Temp:  [37.1 °C (98.8 °F)-37.9 °C (100.2 °F)] 37.5 °C (99.5 °F)  Pulse:  [54-74] 54  Resp:  [13-27] 18  BP: (110-159)/(71-93) 159/93  SpO2:  [93 %-97 %] 95 %    Physical Exam   Constitutional: He is oriented to person, place, and time. He appears well-developed and well-nourished. No distress.   HENT:   Head: Normocephalic and atraumatic.   Nose: Nose normal.   Mouth/Throat: Oropharynx is clear and moist.   Eyes: Conjunctivae are normal.   Neck: No JVD present.   Cardiovascular: Normal rate. Exam reveals no gallop.   No murmur " heard.  Pulmonary/Chest: Effort normal. No stridor. No respiratory distress. He has no wheezes. He has no rales.   Abdominal: Soft. There is no tenderness. There is no rebound and no guarding.   Distended bladder   Musculoskeletal: He exhibits no edema.   Neurological: He is alert and oriented to person, place, and time.   Skin: Skin is warm and dry. No rash noted. He is not diaphoretic.   Psychiatric: He has a normal mood and affect. Thought content normal.   Nursing note and vitals reviewed.      Fluids    Intake/Output Summary (Last 24 hours) at 10/8/2019 0527  Last data filed at 10/8/2019 0200  Gross per 24 hour   Intake 2166.67 ml   Output 1470 ml   Net 696.67 ml       Laboratory  Recent Labs     10/06/19  0324 10/07/19  0415 10/08/19  0339   WBC 9.3 9.5 11.0*   RBC 4.48* 3.87* 4.12*   HEMOGLOBIN 14.1 12.4* 12.5*   HEMATOCRIT 41.8* 37.5* 38.3*   MCV 93.3 96.9 93.0   MCH 31.5 32.0 30.3   MCHC 33.7 33.1* 32.6*   RDW 47.6 50.3* 47.7   PLATELETCT 334 267 251   MPV 10.7 9.9 10.5     Recent Labs     10/06/19  0324 10/07/19  0415 10/08/19  0339   SODIUM 140 136 135   POTASSIUM 3.3* 3.3* 3.7   CHLORIDE 110 108 108   CO2 23 21 22   GLUCOSE 134* 160* 133*   BUN 33* 24* 23*   CREATININE 0.97 0.93 0.91   CALCIUM 9.2 8.1* 8.2*                   Imaging  CT-HEAD W/O   Final Result         1.  Postoperative changes of pneumocephalus, small quantity of hemorrhage along the surgical bed, and subarachnoid hemorrhage of right frontal lobe mass is seen. Within expected limits for typical postop changes.   2.  Pneumocephalus and small quantity of hemorrhage along the surgical bed of left cerebellar mass resection, within expected limits.   3.  Right to left midline shift measuring 5 mm.      US-EXTREMITY VENOUS LOWER BILAT   Final Result      MR-BRAIN-WITH & W/O   Final Result      1.  Large right frontal and left cerebellar heterogeneously enhancing intra-axial masses concerning for metastatic disease.   2.  Right to left midline  shift measuring 1 cm.   3.  Effacement of the fourth ventricle secondary to the cerebellar lesion without evidence of hydrocephalus.   4.  Mild diffuse cerebral substance loss.   5.  Mild microangiopathic ischemic change versus the myelination gliosis.      CT-CHEST,ABDOMEN,PELVIS WITH   Final Result      1.  There is a spiculated 3.4 cm left upper lobe lung mass extending to the left suprahilar region and in casing the left upper lobe pulmonary arteries with questionable small amount of thrombus in one anterior branch. This finding is most consistent    with malignancy.   2.  There are several peripheral groundglass nodules in left upper lobe which could be additional areas of malignancy versus post obstructive pneumonitis.   3.  There is otherwise no mediastinal or hilar lymphadenopathy.   4.  There are a few nonspecific subcentimeter hypodense hepatic lesions, too small to characterize.   5.  There is no adenopathy in the abdomen or pelvis.   6.  There is mild intrahepatic biliary prominence of uncertain etiology.   7.  Stomach is not well evaluated as described above.   8.  There is one small area of sclerosis in the left iliac bone which could be a bone island or conceivably metastases although no other bone metastases are evident.      OUTSIDE IMAGES-CT HEAD   Final Result           Assessment/Plan  * Brain mass- (present on admission)  Assessment & Plan  Status post resection  Probable metastatic disease, pathology is pending  Continue neurochecks, he is status cranial active, follow neuro exam every 4 hours  Continue Decadron and Keppra  Transfer to neurology floor  Consult Onc once we have a tissue Dx    History of lung cancer- (present on admission)  Assessment & Plan  History of lung cancer treated with lobectomy 8 years ago  Concerned that new lung mas and brain mets represent recurrence  Await path    Lung mass- (present on admission)  Assessment & Plan  Await pathology    Essential hypertension-  (present on admission)  Assessment & Plan  IV hyralazine with parameters  Resume home lisinopril 10mg    Urine retention  Assessment & Plan  Place alejandra  Start flomax  Voiding trial in 48hrs    Hypophosphatemia- (present on admission)  Assessment & Plan  Kphos    Hypokalemia- (present on admission)  Assessment & Plan  Replace  Follow daily  Check Mg    ERNST (acute kidney injury) ([facility])- (present on admission)  Assessment & Plan  , creatinine has returned to normal range  Cont to follow daily BMP      COPD (chronic obstructive pulmonary disease) ([facility])  Assessment & Plan  Lung exam benign  Cont O2 and Resp protocols  Encourage smoking cessation    Tobacco abuse- (present on admission)  Assessment & Plan  Nicotine replacement protocol       VTE prophylaxis: none due to recent brain surgery

## 2019-10-08 NOTE — CARE PLAN
Problem: Safety  Goal: Will remain free from injury  Note:   Bed locked in low position with lower rails raised, treaded slipper socks on patient, call light in hand, patient successfully demonstrates use.      Problem: Venous Thromboembolism (VTW)/Deep Vein Thrombosis (DVT) Prevention:  Goal: Patient will participate in Venous Thrombosis (VTE)/Deep Vein Thrombosis (DVT)Prevention Measures  Note:   Patient not anticoagulated r/t surgery. Ambulation encouraged and scds in place and turned on.     Problem: Pain Management  Goal: Pain level will decrease to patient's comfort goal  Note:   Patient complaining of chronic back pain. Patient is not interested in prn pain meds. RN introduced nonpharmological pain alternatives, including ice pack. Ice pack currently in use.

## 2019-10-09 ENCOUNTER — APPOINTMENT (OUTPATIENT)
Dept: RADIOLOGY | Facility: MEDICAL CENTER | Age: 74
DRG: 025 | End: 2019-10-09
Attending: INTERNAL MEDICINE
Payer: COMMERCIAL

## 2019-10-09 DIAGNOSIS — C79.31 SECONDARY MALIGNANT NEOPLASM OF BRAIN (HCC): ICD-10-CM

## 2019-10-09 LAB
ANION GAP SERPL CALC-SCNC: 8 MMOL/L (ref 0–11.9)
BASOPHILS # BLD AUTO: 0 % (ref 0–1.8)
BASOPHILS # BLD: 0 K/UL (ref 0–0.12)
BUN SERPL-MCNC: 26 MG/DL (ref 8–22)
CALCIUM SERPL-MCNC: 8.6 MG/DL (ref 8.5–10.5)
CHLORIDE SERPL-SCNC: 107 MMOL/L (ref 96–112)
CO2 SERPL-SCNC: 21 MMOL/L (ref 20–33)
CREAT SERPL-MCNC: 0.92 MG/DL (ref 0.5–1.4)
EKG IMPRESSION: NORMAL
EOSINOPHIL # BLD AUTO: 0 K/UL (ref 0–0.51)
EOSINOPHIL NFR BLD: 0 % (ref 0–6.9)
ERYTHROCYTE [DISTWIDTH] IN BLOOD BY AUTOMATED COUNT: 49.3 FL (ref 35.9–50)
GLUCOSE SERPL-MCNC: 129 MG/DL (ref 65–99)
HCT VFR BLD AUTO: 37.8 % (ref 42–52)
HGB BLD-MCNC: 12.6 G/DL (ref 14–18)
IMM GRANULOCYTES # BLD AUTO: 0.05 K/UL (ref 0–0.11)
IMM GRANULOCYTES NFR BLD AUTO: 0.5 % (ref 0–0.9)
LYMPHOCYTES # BLD AUTO: 0.76 K/UL (ref 1–4.8)
LYMPHOCYTES NFR BLD: 7.8 % (ref 22–41)
MCH RBC QN AUTO: 31.3 PG (ref 27–33)
MCHC RBC AUTO-ENTMCNC: 33.3 G/DL (ref 33.7–35.3)
MCV RBC AUTO: 93.8 FL (ref 81.4–97.8)
MONOCYTES # BLD AUTO: 0.66 K/UL (ref 0–0.85)
MONOCYTES NFR BLD AUTO: 6.7 % (ref 0–13.4)
NEUTROPHILS # BLD AUTO: 8.31 K/UL (ref 1.82–7.42)
NEUTROPHILS NFR BLD: 85 % (ref 44–72)
NRBC # BLD AUTO: 0 K/UL
NRBC BLD-RTO: 0 /100 WBC
PLATELET # BLD AUTO: 270 K/UL (ref 164–446)
PMV BLD AUTO: 9.8 FL (ref 9–12.9)
POTASSIUM SERPL-SCNC: 3.9 MMOL/L (ref 3.6–5.5)
RBC # BLD AUTO: 4.03 M/UL (ref 4.7–6.1)
SODIUM SERPL-SCNC: 136 MMOL/L (ref 135–145)
TROPONIN T SERPL-MCNC: 12 NG/L (ref 6–19)
WBC # BLD AUTO: 9.8 K/UL (ref 4.8–10.8)

## 2019-10-09 PROCEDURE — 700112 HCHG RX REV CODE 229: Performed by: NURSE PRACTITIONER

## 2019-10-09 PROCEDURE — A9270 NON-COVERED ITEM OR SERVICE: HCPCS | Performed by: PSYCHIATRY & NEUROLOGY

## 2019-10-09 PROCEDURE — A9270 NON-COVERED ITEM OR SERVICE: HCPCS | Performed by: FAMILY MEDICINE

## 2019-10-09 PROCEDURE — 770001 HCHG ROOM/CARE - MED/SURG/GYN PRIV*

## 2019-10-09 PROCEDURE — 700102 HCHG RX REV CODE 250 W/ 637 OVERRIDE(OP): Performed by: HOSPITALIST

## 2019-10-09 PROCEDURE — 84484 ASSAY OF TROPONIN QUANT: CPT

## 2019-10-09 PROCEDURE — 700111 HCHG RX REV CODE 636 W/ 250 OVERRIDE (IP): Performed by: INTERNAL MEDICINE

## 2019-10-09 PROCEDURE — 90471 IMMUNIZATION ADMIN: CPT

## 2019-10-09 PROCEDURE — A9270 NON-COVERED ITEM OR SERVICE: HCPCS | Performed by: HOSPITALIST

## 2019-10-09 PROCEDURE — 85025 COMPLETE CBC W/AUTO DIFF WBC: CPT

## 2019-10-09 PROCEDURE — 700102 HCHG RX REV CODE 250 W/ 637 OVERRIDE(OP): Performed by: FAMILY MEDICINE

## 2019-10-09 PROCEDURE — 80048 BASIC METABOLIC PNL TOTAL CA: CPT

## 2019-10-09 PROCEDURE — 71045 X-RAY EXAM CHEST 1 VIEW: CPT

## 2019-10-09 PROCEDURE — 93005 ELECTROCARDIOGRAM TRACING: CPT | Performed by: INTERNAL MEDICINE

## 2019-10-09 PROCEDURE — 3E02340 INTRODUCTION OF INFLUENZA VACCINE INTO MUSCLE, PERCUTANEOUS APPROACH: ICD-10-PCS | Performed by: INTERNAL MEDICINE

## 2019-10-09 PROCEDURE — 700102 HCHG RX REV CODE 250 W/ 637 OVERRIDE(OP): Performed by: NURSE PRACTITIONER

## 2019-10-09 PROCEDURE — 700102 HCHG RX REV CODE 250 W/ 637 OVERRIDE(OP): Performed by: PSYCHIATRY & NEUROLOGY

## 2019-10-09 PROCEDURE — 99233 SBSQ HOSP IP/OBS HIGH 50: CPT | Performed by: HOSPITALIST

## 2019-10-09 PROCEDURE — 99406 BEHAV CHNG SMOKING 3-10 MIN: CPT

## 2019-10-09 PROCEDURE — 90662 IIV NO PRSV INCREASED AG IM: CPT | Performed by: INTERNAL MEDICINE

## 2019-10-09 PROCEDURE — A9270 NON-COVERED ITEM OR SERVICE: HCPCS | Performed by: NURSE PRACTITIONER

## 2019-10-09 RX ORDER — LISINOPRIL 10 MG/1
10 TABLET ORAL
Status: DISCONTINUED | OUTPATIENT
Start: 2019-10-09 | End: 2019-10-11 | Stop reason: HOSPADM

## 2019-10-09 RX ORDER — LISINOPRIL 20 MG/1
20 TABLET ORAL DAILY
Status: DISCONTINUED | OUTPATIENT
Start: 2019-10-10 | End: 2019-10-11 | Stop reason: HOSPADM

## 2019-10-09 RX ADMIN — FAMOTIDINE 20 MG: 20 TABLET ORAL at 17:16

## 2019-10-09 RX ADMIN — DIBASIC SODIUM PHOSPHATE, MONOBASIC POTASSIUM PHOSPHATE AND MONOBASIC SODIUM PHOSPHATE 1 TABLET: 852; 155; 130 TABLET ORAL at 05:08

## 2019-10-09 RX ADMIN — POTASSIUM CHLORIDE 20 MEQ: 1500 TABLET, EXTENDED RELEASE ORAL at 05:08

## 2019-10-09 RX ADMIN — LEVETIRACETAM 500 MG: 500 TABLET ORAL at 17:16

## 2019-10-09 RX ADMIN — LABETALOL HYDROCHLORIDE 10 MG: 5 INJECTION INTRAVENOUS at 07:22

## 2019-10-09 RX ADMIN — TAMSULOSIN HYDROCHLORIDE 0.4 MG: 0.4 CAPSULE ORAL at 08:58

## 2019-10-09 RX ADMIN — SENNOSIDES, DOCUSATE SODIUM 2 TABLET: 50; 8.6 TABLET, FILM COATED ORAL at 05:08

## 2019-10-09 RX ADMIN — DOCUSATE SODIUM 100 MG: 100 CAPSULE, LIQUID FILLED ORAL at 05:08

## 2019-10-09 RX ADMIN — INFLUENZA A VIRUS A/MICHIGAN/45/2015 X-275 (H1N1) ANTIGEN (FORMALDEHYDE INACTIVATED), INFLUENZA A VIRUS A/SINGAPORE/INFIMH-16-0019/2016 IVR-186 (H3N2) ANTIGEN (FORMALDEHYDE INACTIVATED), AND INFLUENZA B VIRUS B/MARYLAND/15/2016 BX-69A (A B/COLORADO/6/2017-LIKE VIRUS) ANTIGEN (FORMALDEHYDE INACTIVATED) 0.5 ML: 60; 60; 60 INJECTION, SUSPENSION INTRAMUSCULAR at 00:16

## 2019-10-09 RX ADMIN — DEXAMETHASONE 4 MG: 4 TABLET ORAL at 14:52

## 2019-10-09 RX ADMIN — NICOTINE 14 MG: 14 PATCH TRANSDERMAL at 05:10

## 2019-10-09 RX ADMIN — LEVETIRACETAM 500 MG: 500 TABLET ORAL at 05:08

## 2019-10-09 RX ADMIN — DEXAMETHASONE 4 MG: 4 TABLET ORAL at 22:03

## 2019-10-09 RX ADMIN — FAMOTIDINE 20 MG: 20 TABLET ORAL at 05:08

## 2019-10-09 RX ADMIN — LISINOPRIL 10 MG: 20 TABLET ORAL at 08:59

## 2019-10-09 RX ADMIN — DIBASIC SODIUM PHOSPHATE, MONOBASIC POTASSIUM PHOSPHATE AND MONOBASIC SODIUM PHOSPHATE 1 TABLET: 852; 155; 130 TABLET ORAL at 17:15

## 2019-10-09 RX ADMIN — DOCUSATE SODIUM 100 MG: 100 CAPSULE, LIQUID FILLED ORAL at 17:16

## 2019-10-09 RX ADMIN — SENNOSIDES, DOCUSATE SODIUM 2 TABLET: 50; 8.6 TABLET, FILM COATED ORAL at 17:15

## 2019-10-09 RX ADMIN — SIMVASTATIN 40 MG: 20 TABLET, FILM COATED ORAL at 17:15

## 2019-10-09 RX ADMIN — DEXAMETHASONE 4 MG: 4 TABLET ORAL at 05:10

## 2019-10-09 RX ADMIN — LISINOPRIL 10 MG: 20 TABLET ORAL at 05:08

## 2019-10-09 ASSESSMENT — ENCOUNTER SYMPTOMS
DIARRHEA: 0
PALPITATIONS: 0
COUGH: 0
FEVER: 0
DOUBLE VISION: 0
CHILLS: 0
SHORTNESS OF BREATH: 0
DIZZINESS: 0
BACK PAIN: 0
VOMITING: 0
SORE THROAT: 0
BLURRED VISION: 0
LOSS OF CONSCIOUSNESS: 0
ABDOMINAL PAIN: 0
NAUSEA: 0
HEADACHES: 0

## 2019-10-09 NOTE — PROGRESS NOTES
1600Report received, plan of care reviewed and discussed, assessment complete, oriented to room, bed alarm on, nonskid socks applied, advised to call for assistance.   1845 Report given to next shift.

## 2019-10-09 NOTE — PROGRESS NOTES
Neurosurgery Progress Note    Subjective:  Denies new symptoms.  PTOT notes indicate rehab- patient open to this      Exam:  AAOx4. Tongue ML, no drift. Min/mod dysmetria on left.  FCx4, only tace weakness to L side noted. CDI x2    BP  Min: 117/73  Max: 167/94  Pulse  Av.1  Min: 50  Max: 100  Resp  Avg: 15.9  Min: 13  Max: 18  Temp  Av.5 °C (99.5 °F)  Min: 37.1 °C (98.7 °F)  Max: 38.2 °C (100.8 °F)  SpO2  Av.4 %  Min: 91 %  Max: 99 %    No data recorded    Recent Labs     10/07/19  0415 10/08/19  0339 10/09/19  0455   WBC 9.5 11.0* 9.8   RBC 3.87* 4.12* 4.03*   HEMOGLOBIN 12.4* 12.5* 12.6*   HEMATOCRIT 37.5* 38.3* 37.8*   MCV 96.9 93.0 93.8   MCH 32.0 30.3 31.3   MCHC 33.1* 32.6* 33.3*   RDW 50.3* 47.7 49.3   PLATELETCT 267 251 270   MPV 9.9 10.5 9.8     Recent Labs     10/07/19  0415 10/08/19  0339 10/09/19  0455   SODIUM 136 135 136   POTASSIUM 3.3* 3.7 3.9   CHLORIDE 108 108 107   CO2 21 22 21   GLUCOSE 160* 133* 129*   BUN 24* 23* 26*   CREATININE 0.93 0.91 0.92   CALCIUM 8.1* 8.2* 8.6               Intake/Output       10/08/19 0700 - 10/09/19 0659 10/09/19 0700 - 10/10/19 0659      7527-6567 7765-3889 Total 7842-6282 4412-1885 Total       Intake    P.O.  1440  720 2160  100  -- 100    P.O. 8187 953 2747 100 -- 100    Total Intake 7770 457 6379 100 -- 100       Output    Urine  2300  1075 3375  585  -- 585    Output (mL) (Urethral Catheter Non-latex 16 Fr.) 2300 1075 3375 585 -- 585    Total Output 2300 1075 3375 585 -- 585       Net I/O     -860 -355 -1215 -485 -- -485            Intake/Output Summary (Last 24 hours) at 10/9/2019 0853  Last data filed at 10/9/2019 0800  Gross per 24 hour   Intake 2140 ml   Output 2760 ml   Net -620 ml            • [START ON 10/10/2019] lisinopril  20 mg DAILY   • lisinopril  10 mg Q DAY   • tamsulosin  0.4 mg AFTER BREAKFAST   • dexamethasone  4 mg Q8HRS    Or   • dexamethasone  4 mg Q8HRS   • levETIRAcetam  500 mg BID   • famotidine  20 mg BID   • potassium  chloride SA  20 mEq DAILY   • simvastatin  40 mg Q EVENING   • Pharmacy Consult Request  1 Each PHARMACY TO DOSE   • MD ALERT...DO NOT ADMINISTER NSAIDS or ASPIRIN unless ORDERED By Neurosurgery  1 Each PRN   • ondansetron  4 mg Q4HRS PRN   • scopolamine  1 Patch Q72HRS PRN   • labetalol  10 mg Q HOUR PRN   • cloNIDine  0.1 mg Q4HRS PRN   • docusate sodium  100 mg BID   • senna-docusate  1 Tab Nightly   • senna-docusate  1 Tab Q24HRS PRN   • polyethylene glycol/lytes  1 Packet BID PRN   • magnesium hydroxide  30 mL QDAY PRN   • bisacodyl  10 mg Q24HRS PRN   • artificial tears  1 Application Q8HRS   • morphine   Continuous   • ipratropium-albuterol  3 mL Q4H PRN (RT)   • hydrALAZINE  10-20 mg Q6HRS PRN   • phosphorus  1 Tab BID   • senna-docusate  2 Tab BID    And   • polyethylene glycol/lytes  1 Packet QDAY PRN    And   • magnesium hydroxide  30 mL QDAY PRN    And   • bisacodyl  10 mg QDAY PRN   • Respiratory Care per Protocol   Continuous RT   • acetaminophen  650 mg Q6HRS PRN   • ondansetron  4 mg Q4HRS PRN   • nicotine  14 mg Daily-0600    And   • nicotine polacrilex  2 mg Q HOUR PRN       Assessment and Plan:  POD #3 Crani x2 Hx Lung CA  Prophylactic anticoagulation: no         Start date/time: tbd    Plan:  CT- stable reviewed by Dr Lacey  PTOT- rec transitional facility  Dex 4q8 x3 days then Bid x3 days, then 2q8 x3 days, 2 bid x3 days, 2 qday then stop  Keppra continue for one month  Path-Non SCC cw lung  [facility]?  Angelo has seen pt for XRT  Ok to floor- will place rehab consult

## 2019-10-09 NOTE — CONSULTS
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 year old male with a remote history of lung carcinoma around 5 years ago.  Details not available apparently he had surgery.  Unclear whether he had any adjuvant treatment at that time.  He has long-standing history of smoking.    Presented with ataxia. MRI scan of the brain 10/4/2019 showed a large right frontal and left cerebellar heterogeneously enhancing intra-axial masses concerning for metastatic disease.  There was a right to left midline shift of 1 cm and effacement of the fourth ventricle secondary to the cerebellar lesion without evidence of hydrocephalus.  CT scan of the chest abdomen and pelvis was done the same day revealing a 3.4 cm left upper lobe lung mass extending into the left suprahilar region and encasing the left upper lobe pulmonary arteries with questionable small amount of thrombus on the anterior branch consistent with malignancy.  There were several peripheral gland glass nodules in the left upper lobe which could be additional areas of malignancy versus obstructive pneumonitis.  There was otherwise no mediastinal or hilar lymphadenopathy.  There were a few nonspecific subcentimeter hypodense hepatic lesions too small to characterize no adenopathy in the abdomen or pelvis.  There was one area of sclerosis in the left iliac bone .   s/p resection of both brain lesions on 10/6/2019 with pathology showing poorly differentiated non-small cell lung carcinoma    Is currently feeling better with significant improvement in his ataxia and weakness.  He is on steroid taper.  He usually go to [facility] care.    Past Medical History: Hypertension, lung cancer as above History reviewed. No pertinent past medical history.    Past surgical history:     Past Surgical History:   Procedure Laterality Date   • CRANIOTOMY Right 10/6/2019    Procedure: Stage 2: Right frontal craniotomy and removal o right frontal tumor;  Surgeon: Gagan Lacey M.D.;  Location: SURGERY [facility];  Service: Neurosurgery   • CRANIECTOMY Left 10/6/2019    Procedure: Stage 1: Left suboccipital craniectomy and removal of left cerebellar tumor;  Surgeon: Gagan Lacey M.D.;  Location: SURGERY [facility];  Service: Neurosurgery       Allergies:  Patient has no known allergies.    Medications:    Current Facility-Administered Medications   Medication Dose Route Frequency Provider Last Rate Last Dose   • [START ON 10/10/2019] lisinopril (PRINIVIL) tablet 20 mg  20 mg Oral DAILY Pedro Cyr D.O.       • lisinopril (PRINIVIL) tablet 10 mg  10 mg Oral Q DAY Pedro Cyr D.O.   10 mg at 10/09/19 0859   • tamsulosin (FLOMAX) capsule 0.4 mg  0.4 mg Oral AFTER BREAKFAST Pedro Cyr D.O.   0.4 mg at 10/09/19 0858   • dexamethasone (DECADRON) tablet 4 mg  4 mg Oral Q8HRS Keiry Lema, ALYSIA.P.N.   4 mg at 10/09/19 1452    Or   • dexamethasone (DECADRON) injection 4 mg  4 mg Intravenous Q8HRS Keiry Lema, ALYSIA.P.N.       • levETIRAcetam (KEPPRA) tablet 500 mg  500 mg Oral BID Emigdio Bower M.D.   500 mg at 10/09/19 0508   • famotidine (PEPCID) tablet 20 mg  20 mg Oral BID Emigdio Bower M.D.   20 mg at 10/09/19 0508   • potassium chloride SA (Kdur) tablet 20 mEq  20 mEq Oral DAILY Km Wright M.D.   20 mEq at 10/09/19 0508   • simvastatin (ZOCOR) tablet 40 mg  40 mg Oral Q EVENING Arthur Galvan M.D.   40 mg at 10/08/19 1814   • Pharmacy Consult Request ...Pain Management Review 1 Each  1 Each Other PHARMACY TO DOSE ALYSIA Dominguez.P.N.       • MD ALERT...DO NOT ADMINISTER NSAIDS or ASPIRIN unless ORDERED By Neurosurgery 1 Each  1 Each Other PRN NEIDA Dominguez.       • ondansetron (ZOFRAN) syringe/vial injection 4 mg  4 mg  Intravenous Q4HRS PRN Keiry Lema, A.P.N.       • scopolamine (TRANSDERM-SCOP) patch 1 Patch  1 Patch Transdermal Q72HRS PRN Keiry Lema A.P.N.       • labetalol (NORMODYNE,TRANDATE) injection 10 mg  10 mg Intravenous Q HOUR PRN Keiry Lema, A.P.N.   10 mg at 10/09/19 0722   • cloNIDine (CATAPRES) tablet 0.1 mg  0.1 mg Oral Q4HRS PRN Keiry Lema, A.P.N.   0.1 mg at 10/06/19 2019   • docusate sodium (COLACE) capsule 100 mg  100 mg Oral BID Keiry Lema, A.P.N.   100 mg at 10/09/19 0508   • senna-docusate (PERICOLACE or SENOKOT S) 8.6-50 MG per tablet 1 Tab  1 Tab Oral Nightly Keiry Lema A.P.N.   1 Tab at 10/08/19 2231   • senna-docusate (PERICOLACE or SENOKOT S) 8.6-50 MG per tablet 1 Tab  1 Tab Oral Q24HRS PRN Keiry Lema, A.P.N.       • polyethylene glycol/lytes (MIRALAX) PACKET 1 Packet  1 Packet Oral BID PRN Keiry Lema, A.P.N.       • magnesium hydroxide (MILK OF MAGNESIA) suspension 30 mL  30 mL Oral QDAY PRN Keiry Lema, A.P.N.       • bisacodyl (DULCOLAX) suppository 10 mg  10 mg Rectal Q24HRS PRN Keiry Lema, A.P.N.       • artificial tears (EYE LUBRICANT) ophth ointment 1 Application  1 Application Both Eyes Q8HRS Keiry Lema A.P.N.   Stopped at 10/08/19 2200   • morphine 1 mg/mL in 50 mL (PCA)   Intravenous Continuous Keiry Lema, A.P.N.       • ipratropium-albuterol (DUONEB) nebulizer solution  3 mL Nebulization Q4H PRN (RT) Yuri Black M.D.       • hydrALAZINE (APRESOLINE) injection 10-20 mg  10-20 mg Intravenous Q6HRS PRN Garry Smith M.D.       • phosphorus (K-PHOS-NEUTRAL, PHOSPHA 250 NEUTRAL) per tablet 1 Tab  1 Tab Oral BID Arthur Galvan M.D.   1 Tab at 10/09/19 0508   • senna-docusate (PERICOLACE or SENOKOT S) 8.6-50 MG per tablet 2 Tab  2 Tab Oral BID Nasima Smith M.D.   2 Tab at 10/09/19 0508    And   • polyethylene glycol/lytes (MIRALAX) PACKET 1 Packet  1 Packet Oral QDAY PRN Nasima Smith M.D.        And   •  magnesium hydroxide (MILK OF MAGNESIA) suspension 30 mL  30 mL Oral QDAY PRN Nasima Smith M.D.   30 mL at 10/05/19 1734    And   • bisacodyl (DULCOLAX) suppository 10 mg  10 mg Rectal QDAY PRN Nasima Smith M.D.   10 mg at 10/06/19 2018   • Respiratory Care per Protocol   Nebulization Continuous RT Nasima Smith M.D.       • acetaminophen (TYLENOL) tablet 650 mg  650 mg Oral Q6HRS PRN Nasima Smith M.D.   650 mg at 10/08/19 1601   • ondansetron (ZOFRAN ODT) dispertab 4 mg  4 mg Oral Q4HRS PRN Nasima Smith M.D.       • nicotine (NICODERM) 14 MG/24HR 14 mg  14 mg Transdermal Daily-0600 Nasima Smith M.D.   14 mg at 10/09/19 0510    And   • nicotine polacrilex (NICORETTE) 2 MG piece 2 mg  2 mg Oral Q HOUR PRN Nasima Smith M.D.           Social History:     Social History     Socioeconomic History   • Marital status: Single     Spouse name: Not on file   • Number of children: Not on file   • Years of education: Not on file   • Highest education level: Not on file   Occupational History   • Not on file   Social Needs   • Financial resource strain: Not on file   • Food insecurity:     Worry: Not on file     Inability: Not on file   • Transportation needs:     Medical: Not on file     Non-medical: Not on file   Tobacco Use   • Smoking status: Current Every Day Smoker     Packs/day: 0.10   • Smokeless tobacco: Never Used   Substance and Sexual Activity   • Alcohol use: Yes     Frequency: Monthly or less     Drinks per session: 1 or 2   • Drug use: Never   • Sexual activity: Not on file   Lifestyle   • Physical activity:     Days per week: Not on file     Minutes per session: Not on file   • Stress: Not on file   Relationships   • Social connections:     Talks on phone: Not on file     Gets together: Not on file     Attends Mosque service: Not on file     Active member of club or organization: Not on file     Attends meetings of clubs or organizations: Not on file     Relationship status: Not on file   • Intimate  "partner violence:     Fear of current or ex partner: Not on file     Emotionally abused: Not on file     Physically abused: Not on file     Forced sexual activity: Not on file   Other Topics Concern   • Not on file   Social History Narrative   • Not on file       Family History:   History reviewed. No pertinent family history.    Review of Systems:  All other review of systems are negative except what was mentioned above in the HPI.    Constitutional: No fever, chills, positive weight loss ,malaise/fatigue.    HEENT: No new auditory or visual complaints. No sore throat and neck pain.     Respiratory:No new cough, sputum production, shortness of breath and wheezing.    Cardiovascular: No new chest pain, palpitations, orthopnea and leg swelling.    Gastrointestinal: No heartburn, nausea, vomiting ,abdominal pain, hematochezia or melena     Genitourinary: Negative for dysuria, hematuria    Musculoskeletal: No new arthralgias or myalgias.  He has chronic back pain for which she has been getting epidural injections  Skin: Negative for rash and itching.    Neurological: Ataxia and left hemiparesis  Endo/Heme/Allergies: No abnormal bleed/bruise.    Psychiatric/Behavioral: No new depression/anxiety.    Physical Exam:  Vitals:   /70   Pulse (!) 59   Temp 37.5 °C (99.5 °F) (Temporal)   Resp 16   Ht 1.803 m (5' 11\")   Wt 58 kg (127 lb 13.9 oz)   SpO2 96%   BMI 17.83 kg/m²     General: Not in acute distress, alert and oriented x 3  HEENT: No pallor, icterus. Oropharynx clear.   Neck: Supple, no palpable masses.  Lymph nodes: No palpable cervical, supraclavicular, axillary or inguinal lymphadenopathy.    CVS: regular rate and rhythm, no rubs or gallops  RESP: Clear to auscultate bilaterally, no wheezing or crackles.   ABD: Soft, non tender, non distended, positive bowel sounds, no palpable organomegaly  EXT: No edema or cyanosis  CNS: Alert and oriented x3, No focal deficits.  Skin- No rash      Labs:   Recent Labs "     10/07/19  0415 10/08/19  0339 10/09/19  0455   RBC 3.87* 4.12* 4.03*   HEMOGLOBIN 12.4* 12.5* 12.6*   HEMATOCRIT 37.5* 38.3* 37.8*   PLATELETCT 267 251 270     Lab Results   Component Value Date/Time    SODIUM 136 10/09/2019 04:55 AM    POTASSIUM 3.9 10/09/2019 04:55 AM    CHLORIDE 107 10/09/2019 04:55 AM    CO2 21 10/09/2019 04:55 AM    GLUCOSE 129 (H) 10/09/2019 04:55 AM    BUN 26 (H) 10/09/2019 04:55 AM    CREATININE 0.92 10/09/2019 04:55 AM        Assessment and Plan:    Stage IV poorly differentiated non-small cell lung carcinoma in a patient with prior history of non-small cell lung carcinoma.    He is status post resection of a large right frontal and left cerebellar brain met with pathology consistent with poorly differentiated non-small cell lung carcinoma.  Staging studies also shows a left hilar mass.  No clear-cut mediastinal hilar adenopathy.  There are some nonspecific liver cyst and some question of a bone island.    He is scheduled to undergo whole brain radiation in the next few weeks by Dr. Stephens .    He is not having any acute symptoms related to the lung mass.  He will need a staging PET scan as outpatient to figure out the disease burden.  Discussed the standard of care for stage IV disease which will be systemic treatment employing either immunotherapy or chemotherapy or a combination depending on the PDL1 and NGS results.  Treatment can be done as an outpatient once he is finished with his brain radiation.    The [facility] as he is well established there.  I will ask pathology to send specimen for EGFR/Alk/RoS and BRAF. NGS can be requested by [facility] if needed.   If he has oligo metastatic disease consideration can be also given for more aggressive management of his primary including radiation for long-term disease control.     Please refer him to [facility] at the time of DC for furthe oncology care.   Will sign off for now.   Please call with questions            He agreed and verbalized his agreement and understanding with the current plan.  I answered all questions and concerns he has at this time.              Thank you for allowing me to participate in his care.    Please note that this dictation was created using voice recognition software. I have made every reasonable attempt to correct obvious errors, but I expect that there are errors of grammar and possibly content that I did not discover before finalizing the note.      SIGNATURES:  Merlin Adrian    CC:  No primary care provider on file.  No ref. provider found

## 2019-10-09 NOTE — CARE PLAN
Problem: Nutritional:  Goal: Achieve adequate nutritional intake  Description  Patient will consume 50% or greater of meals/supplements    Outcome: MET

## 2019-10-09 NOTE — RESPIRATORY CARE
COPD EDUCATION by COPD CLINICAL EDUCATOR  10/9/2019 at 8:49 AM by Ayo Riggs     Patient reviewed by COPD education team. Patient does not have a history or diagnosis of COPD. Patient is a smoker, smoking cessation education done. A comprehensive packet with tips to quit and information on outpatient classes given to patient.

## 2019-10-09 NOTE — PROGRESS NOTES
Pt reports tightness in his chest, 6/10 and a feeling of lightheadedness to charge RN during her rounding. This RN orders STAT ECG.

## 2019-10-09 NOTE — CARE PLAN
Problem: Safety  Goal: Will remain free from injury  Outcome: PROGRESSING AS EXPECTED  Note:   Bed in low and locked position, bed alarm on, call light within reach, patient educated to call prior to mobilizing, patient in room near nurses station     Problem: Pain Management  Goal: Pain level will decrease to patient's comfort goal  Outcome: PROGRESSING AS EXPECTED  Note:   Pain assessed regularly, patient's pain comfort level determined, repositioned for pain alleviation, ice packs in use for pain

## 2019-10-09 NOTE — CARE PLAN
Problem: Safety  Goal: Will remain free from falls  Note:   Pt mobility assessed. Pt is a standby assist up to the chair. Shuffling gait. Pt uses the FWW, rails, hand held assist to steady himself. Bed alarm on. Fall precautions in place. Bed is locked and in lowest position. Pt has treaded slippers in place. Pt educated on call light/phone system. Pt is AAO x 4 and verbalizes understanding. Pt calls appropriately and has intermittent confusion with attempts to get OOB during NOC shift, pt requires re-orientation and is able to re-orient x 4.     Problem: Venous Thromboembolism (VTW)/Deep Vein Thrombosis (DVT) Prevention:  Goal: Patient will participate in Venous Thrombosis (VTE)/Deep Vein Thrombosis (DVT)Prevention Measures  Note:   SCDs in place and turned on.

## 2019-10-09 NOTE — PROGRESS NOTES
[facility] Medicine Daily Progress Note    Date of Service  10/9/2019    Chief Complaint  74 y.o. male admitted 10/4/2019 with L side weakness    Hospital Course    PMHx Lung CA s/p lobectomy and XRT 5 y.a..  Had been experiencing slowly progressive L side weakness and altered mental status over several months.  Initial imaging showing L cerebellar mass.  Went to the OR on 10/6 with Dr Lacey for a microscopic total excision.  Also noted to have apparently new lung mass.      Interval Problem Update    Belly pain is gone after placing alejandra yesterday.    Sinus  's overnight  AFebrile  RA  UOP adequate  Consultants/Specialty  Neuro Surgery      Code Status  full    Disposition  OK to floor  Dispo dependent on functional status and Onc, Rad/Onc recommendations    Review of Systems  Review of Systems   Constitutional: Negative for chills and fever.   HENT: Negative for nosebleeds and sore throat.    Eyes: Negative for blurred vision and double vision.   Respiratory: Negative for cough and shortness of breath.    Cardiovascular: Negative for chest pain, palpitations and leg swelling.   Gastrointestinal: Negative for abdominal pain, diarrhea, nausea and vomiting.   Genitourinary: Negative for dysuria and urgency.   Musculoskeletal: Negative for back pain.   Skin: Negative for rash.   Neurological: Negative for dizziness, loss of consciousness and headaches.        Physical Exam  Temp:  [37.2 °C (99 °F)-38.2 °C (100.8 °F)] 37.2 °C (99 °F)  Pulse:  [50-73] 66  Resp:  [12-18] 18  BP: (117-202)/() 117/73  SpO2:  [91 %-99 %] 96 %    Physical Exam   Constitutional: He is oriented to person, place, and time. He appears well-developed and well-nourished. No distress.   HENT:   Head: Normocephalic and atraumatic.   Nose: Nose normal.   Mouth/Throat: Oropharynx is clear and moist.   Eyes: Conjunctivae are normal.   Neck: No JVD present.   Cardiovascular: Normal rate. Exam reveals no gallop.   No murmur  Informed patient: Scheduling will contact you to make arrangements for your angiogram. You will need a  home and someone that can stay with you through the night. Do not eat for 8 hours prior to your procedure. You may drink clear liquids (includes water, Jell-O, clear broth, apple juice or any non-carbonated beverage that you can see through) until 2 hours prior to your procedure. You may take your medications with a sip of water. You will check in at the Gold waiting room at the [facility] 1.5 hours prior to your procedure. You will receive written instructions and a map to the hospital after your procedure has been scheduled. Patient verbalized understanding. She has no questions at this time. Contact information provided and encouraged to call with questions/concerns.       heard.  Pulmonary/Chest: Effort normal. No stridor. No respiratory distress. He has no wheezes. He has no rales.   Abdominal: Soft. There is no tenderness. There is no rebound and no guarding.   Soft abd through out   Musculoskeletal: He exhibits no edema.   Neurological: He is alert and oriented to person, place, and time.   Skin: Skin is warm and dry. No rash noted. He is not diaphoretic.   Psychiatric: He has a normal mood and affect. Thought content normal.   Nursing note and vitals reviewed.      Fluids    Intake/Output Summary (Last 24 hours) at 10/9/2019 0535  Last data filed at 10/8/2019 2200  Gross per 24 hour   Intake 1800 ml   Output 2610 ml   Net -810 ml       Laboratory  Recent Labs     10/07/19  0415 10/08/19  0339 10/09/19  0455   WBC 9.5 11.0* 9.8   RBC 3.87* 4.12* 4.03*   HEMOGLOBIN 12.4* 12.5* 12.6*   HEMATOCRIT 37.5* 38.3* 37.8*   MCV 96.9 93.0 93.8   MCH 32.0 30.3 31.3   MCHC 33.1* 32.6* 33.3*   RDW 50.3* 47.7 49.3   PLATELETCT 267 251 270   MPV 9.9 10.5 9.8     Recent Labs     10/07/19  0415 10/08/19  0339 10/09/19  0455   SODIUM 136 135 136   POTASSIUM 3.3* 3.7 3.9   CHLORIDE 108 108 107   CO2 21 22 21   GLUCOSE 160* 133* 129*   BUN 24* 23* 26*   CREATININE 0.93 0.91 0.92   CALCIUM 8.1* 8.2* 8.6                   Imaging  CT-HEAD W/O   Final Result         1.  Postoperative changes of pneumocephalus, small quantity of hemorrhage along the surgical bed, and subarachnoid hemorrhage of right frontal lobe mass is seen. Within expected limits for typical postop changes.   2.  Pneumocephalus and small quantity of hemorrhage along the surgical bed of left cerebellar mass resection, within expected limits.   3.  Right to left midline shift measuring 5 mm.      US-EXTREMITY VENOUS LOWER BILAT   Final Result      MR-BRAIN-WITH & W/O   Final Result      1.  Large right frontal and left cerebellar heterogeneously enhancing intra-axial masses concerning for metastatic disease.   2.  Right to left midline  shift measuring 1 cm.   3.  Effacement of the fourth ventricle secondary to the cerebellar lesion without evidence of hydrocephalus.   4.  Mild diffuse cerebral substance loss.   5.  Mild microangiopathic ischemic change versus the myelination gliosis.      CT-CHEST,ABDOMEN,PELVIS WITH   Final Result      1.  There is a spiculated 3.4 cm left upper lobe lung mass extending to the left suprahilar region and in casing the left upper lobe pulmonary arteries with questionable small amount of thrombus in one anterior branch. This finding is most consistent    with malignancy.   2.  There are several peripheral groundglass nodules in left upper lobe which could be additional areas of malignancy versus post obstructive pneumonitis.   3.  There is otherwise no mediastinal or hilar lymphadenopathy.   4.  There are a few nonspecific subcentimeter hypodense hepatic lesions, too small to characterize.   5.  There is no adenopathy in the abdomen or pelvis.   6.  There is mild intrahepatic biliary prominence of uncertain etiology.   7.  Stomach is not well evaluated as described above.   8.  There is one small area of sclerosis in the left iliac bone which could be a bone island or conceivably metastases although no other bone metastases are evident.      OUTSIDE IMAGES-CT HEAD   Final Result      DX-CHEST-PORTABLE (1 VIEW)    (Results Pending)        Assessment/Plan  * Brain mass- (present on admission)  Assessment & Plan  Status post resection  NSCLC on pathology: Med and Rad Onc consulted  Continue neurochecks, he is status cranial active, follow neuro exam every 4 hours  Continue Decadron and Keppra  Transfer to neurology floor      History of lung cancer- (present on admission)  Assessment & Plan  History of lung cancer treated with lobectomy 8 years ago  Now has metastatic disease   Onc consulted    Lung mass- (present on admission)  Assessment & Plan  Await pathology    Essential hypertension- (present on  admission)  Assessment & Plan  IV hyralazine with parameters  Resume home lisinopril 10mg    Urine retention  Assessment & Plan  Place alejandra  Start flomax  Voiding trial in 48hrs    Hypophosphatemia- (present on admission)  Assessment & Plan  Kphos    Hypokalemia- (present on admission)  Assessment & Plan  Replace  Follow daily  Check Mg    ERNST (acute kidney injury) (Formerly [facility])- (present on admission)  Assessment & Plan  , creatinine has returned to normal range  Cont to follow daily BMP      COPD (chronic obstructive pulmonary disease) (Formerly [facility])  Assessment & Plan  Lung exam benign  Cont O2 and Resp protocols  Encourage smoking cessation    Tobacco abuse- (present on admission)  Assessment & Plan  Nicotine replacement protocol       VTE prophylaxis: none due to recent brain surgery

## 2019-10-09 NOTE — DISCHARGE PLANNING
Care Transition Team Assessment    In the case of an emergency, pt's legal NOK is Aditi Clark [phone]      This LSW met with pt at bedside and obtained the following information used in this assessment. Pt verified accuracy of facesheet. Pt lives in a two level house with his daughter, Aditi, son-in-law & their children.  Pt states his living quarters are on the first floor - bedroom and bathroom. Prior to current hospitalization, pt was completely independent in ADLS/IADLS. Pt reports having a FWW at home yet doesn't use it. Pt reports his daughter purchased bathroom shower handrails and a shower chair. No other DME. Pt reports no financial concerns at this time. Pt has a good support system (family). Pt denies any hx of substance use and denies any hx of mh. Pharmacy and PCP are through [facility].     LSW called and spoke with Crys from [phone] who stated pt is 60% service connected. Pt also has Medicare A&B. [facility].     This LSW to continue to follow for any continued needs.    Care Transition Team Assessment    Information Source  Orientation : Oriented x 4  Information Given By: Patient  Informant's Name: n/a  Who is responsible for making decisions for patient? : Patient    Readmission Evaluation  Is this a readmission?: No    Elopement Risk  Legal Hold: No  Ambulatory or Self Mobile in Wheelchair: No-Not an Elopement Risk  Disoriented: No  Psychiatric Symptoms: None  History of Wandering: No  Elopement this Admit: No  Vocalizing Wanting to Leave: No  Displays Behaviors, Body Language Wanting to Leave: No-Not at Risk for Elopement  Elopement Risk: Not at Risk for Elopement    Interdisciplinary Discharge Planning  Lives with - Patient's Self Care Capacity: Adult Children, Child Less than 18 Years of Age(pt lives with his daughter, son-in-law & 2 grandchildren )  Patient or legal guardian wants to designate a caregiver (see row info): No  Housing / Facility: [address]  Prior Services: Home-Independent    Discharge Preparedness  What is your plan after discharge?: Uncertain - pending medical team collaboration, Home with help, Skilled nursing facility  What are your discharge supports?: Child  Prior Functional Level: Ambulatory, Independent with Activities of Daily Living, Independent with Medication Management    Functional Assesment  Prior Functional Level: Ambulatory, Independent with Activities of Daily Living, Independent with Medication Management    Finances  Financial Barriers to Discharge: No  Prescription Coverage: Yes    Vision / Hearing Impairment  Vision Impairment : Yes  Right Eye Vision: Impaired, Wears Glasses  Left Eye Vision: Impaired, Wears Glasses  Hearing Impairment : No         Advance Directive  Advance Directive?: None  Advance Directive offered?: AD Booklet refused    Domestic Abuse  Have you ever been the victim of abuse or violence?: No  Physical Abuse or Sexual Abuse: No  Verbal Abuse or Emotional Abuse: No  Possible Abuse Reported to:: Not Applicable    Psychological Assessment  History of Substance Abuse: None  History of Psychiatric Problems: No  Non-compliant with Treatment: No  Newly Diagnosed Illness: Yes    Discharge Risks or Barriers  Discharge risks or barriers?: No    Anticipated Discharge Information  Anticipated discharge disposition: Acute rehab, Discharge needs currently unknown, Home, SNF

## 2019-10-09 NOTE — PROGRESS NOTES
Intensivist notified of ECG results. Dr. Sin orders troponin, BMP, CBC, and chest xray for the morning.

## 2019-10-10 PROCEDURE — 770001 HCHG ROOM/CARE - MED/SURG/GYN PRIV*

## 2019-10-10 PROCEDURE — 700102 HCHG RX REV CODE 250 W/ 637 OVERRIDE(OP): Performed by: PSYCHIATRY & NEUROLOGY

## 2019-10-10 PROCEDURE — 700102 HCHG RX REV CODE 250 W/ 637 OVERRIDE(OP): Performed by: HOSPITALIST

## 2019-10-10 PROCEDURE — 700102 HCHG RX REV CODE 250 W/ 637 OVERRIDE(OP): Performed by: FAMILY MEDICINE

## 2019-10-10 PROCEDURE — 99223 1ST HOSP IP/OBS HIGH 75: CPT | Performed by: PHYSICAL MEDICINE & REHABILITATION

## 2019-10-10 PROCEDURE — A9270 NON-COVERED ITEM OR SERVICE: HCPCS | Performed by: HOSPITALIST

## 2019-10-10 PROCEDURE — 700112 HCHG RX REV CODE 229: Performed by: NURSE PRACTITIONER

## 2019-10-10 PROCEDURE — A9270 NON-COVERED ITEM OR SERVICE: HCPCS | Performed by: NURSE PRACTITIONER

## 2019-10-10 PROCEDURE — A9270 NON-COVERED ITEM OR SERVICE: HCPCS | Performed by: PSYCHIATRY & NEUROLOGY

## 2019-10-10 PROCEDURE — 700102 HCHG RX REV CODE 250 W/ 637 OVERRIDE(OP): Performed by: NURSE PRACTITIONER

## 2019-10-10 PROCEDURE — 97116 GAIT TRAINING THERAPY: CPT

## 2019-10-10 PROCEDURE — 97530 THERAPEUTIC ACTIVITIES: CPT

## 2019-10-10 PROCEDURE — 99233 SBSQ HOSP IP/OBS HIGH 50: CPT | Performed by: INTERNAL MEDICINE

## 2019-10-10 PROCEDURE — A9270 NON-COVERED ITEM OR SERVICE: HCPCS | Performed by: FAMILY MEDICINE

## 2019-10-10 PROCEDURE — 97535 SELF CARE MNGMENT TRAINING: CPT

## 2019-10-10 RX ORDER — HYDROCODONE BITARTRATE AND ACETAMINOPHEN 5; 325 MG/1; MG/1
1-2 TABLET ORAL EVERY 8 HOURS PRN
Qty: 15 TAB | Refills: 0 | Status: ON HOLD | OUTPATIENT
Start: 2019-10-10 | End: 2019-10-21

## 2019-10-10 RX ORDER — NICOTINE 21 MG/24HR
1 PATCH, TRANSDERMAL 24 HOURS TRANSDERMAL EVERY 24 HOURS
Qty: 30 PATCH | Status: ON HOLD
Start: 2019-10-10 | End: 2019-10-21

## 2019-10-10 RX ORDER — AMOXICILLIN 250 MG
2 CAPSULE ORAL 2 TIMES DAILY
Qty: 30 TAB | Refills: 0 | Status: ON HOLD
Start: 2019-10-11 | End: 2019-10-21

## 2019-10-10 RX ORDER — IPRATROPIUM BROMIDE AND ALBUTEROL SULFATE 2.5; .5 MG/3ML; MG/3ML
3 SOLUTION RESPIRATORY (INHALATION) EVERY 4 HOURS PRN
Qty: 30 BULLET | Status: ON HOLD
Start: 2019-10-10 | End: 2019-10-21

## 2019-10-10 RX ORDER — HYDROCODONE BITARTRATE AND ACETAMINOPHEN 5; 325 MG/1; MG/1
1-2 TABLET ORAL EVERY 6 HOURS PRN
Status: DISCONTINUED | OUTPATIENT
Start: 2019-10-10 | End: 2019-10-11 | Stop reason: HOSPADM

## 2019-10-10 RX ORDER — POTASSIUM CHLORIDE 20 MEQ/1
20 TABLET, EXTENDED RELEASE ORAL DAILY
Qty: 60 TAB | Refills: 11 | Status: ON HOLD
Start: 2019-10-11 | End: 2019-10-21

## 2019-10-10 RX ORDER — FAMOTIDINE 20 MG/1
20 TABLET, FILM COATED ORAL 2 TIMES DAILY
Qty: 60 TAB | Status: ON HOLD
Start: 2019-10-11 | End: 2019-10-17 | Stop reason: SDUPTHER

## 2019-10-10 RX ORDER — ACETAMINOPHEN 325 MG/1
650 TABLET ORAL EVERY 6 HOURS PRN
Qty: 30 TAB | Refills: 0 | Status: ON HOLD
Start: 2019-10-10 | End: 2019-10-21

## 2019-10-10 RX ORDER — LEVETIRACETAM 500 MG/1
500 TABLET ORAL 2 TIMES DAILY
Qty: 60 TAB | Status: ON HOLD
Start: 2019-10-11 | End: 2019-10-17 | Stop reason: SDUPTHER

## 2019-10-10 RX ORDER — BISACODYL 10 MG
10 SUPPOSITORY, RECTAL RECTAL
Refills: 0 | Status: ON HOLD
Start: 2019-10-10 | End: 2019-10-21

## 2019-10-10 RX ORDER — LISINOPRIL 20 MG/1
20 TABLET ORAL DAILY
Qty: 30 TAB | Status: ON HOLD
Start: 2019-10-11 | End: 2019-10-17 | Stop reason: SDUPTHER

## 2019-10-10 RX ORDER — POLYETHYLENE GLYCOL 3350 17 G/17G
17 POWDER, FOR SOLUTION ORAL
Refills: 3 | Status: ON HOLD
Start: 2019-10-10 | End: 2019-10-21

## 2019-10-10 RX ORDER — TAMSULOSIN HYDROCHLORIDE 0.4 MG/1
0.4 CAPSULE ORAL
Qty: 30 CAP | Status: ON HOLD
Start: 2019-10-11 | End: 2019-10-17 | Stop reason: SDUPTHER

## 2019-10-10 RX ORDER — AMOXICILLIN 250 MG
1 CAPSULE ORAL 2 TIMES DAILY
Qty: 30 TAB | Refills: 0 | Status: ON HOLD | OUTPATIENT
Start: 2019-10-10 | End: 2019-10-21

## 2019-10-10 RX ORDER — ONDANSETRON 4 MG/1
4 TABLET, ORALLY DISINTEGRATING ORAL EVERY 4 HOURS PRN
Qty: 10 TAB | Refills: 0 | Status: ON HOLD
Start: 2019-10-10 | End: 2019-10-21

## 2019-10-10 RX ADMIN — FAMOTIDINE 20 MG: 20 TABLET ORAL at 18:13

## 2019-10-10 RX ADMIN — LISINOPRIL 10 MG: 20 TABLET ORAL at 05:42

## 2019-10-10 RX ADMIN — SIMVASTATIN 40 MG: 20 TABLET, FILM COATED ORAL at 18:13

## 2019-10-10 RX ADMIN — MINERAL OIL, PETROLATUM 1 APPLICATION: 425; 573 OINTMENT OPHTHALMIC at 18:12

## 2019-10-10 RX ADMIN — LEVETIRACETAM 500 MG: 500 TABLET ORAL at 18:14

## 2019-10-10 RX ADMIN — FAMOTIDINE 20 MG: 20 TABLET ORAL at 05:41

## 2019-10-10 RX ADMIN — POTASSIUM CHLORIDE 20 MEQ: 1500 TABLET, EXTENDED RELEASE ORAL at 05:41

## 2019-10-10 RX ADMIN — TAMSULOSIN HYDROCHLORIDE 0.4 MG: 0.4 CAPSULE ORAL at 08:46

## 2019-10-10 RX ADMIN — SENNOSIDES, DOCUSATE SODIUM 1 TABLET: 50; 8.6 TABLET, FILM COATED ORAL at 21:01

## 2019-10-10 RX ADMIN — DIBASIC SODIUM PHOSPHATE, MONOBASIC POTASSIUM PHOSPHATE AND MONOBASIC SODIUM PHOSPHATE 1 TABLET: 852; 155; 130 TABLET ORAL at 05:41

## 2019-10-10 RX ADMIN — LEVETIRACETAM 500 MG: 500 TABLET ORAL at 05:41

## 2019-10-10 RX ADMIN — DEXAMETHASONE 4 MG: 4 TABLET ORAL at 18:12

## 2019-10-10 RX ADMIN — DEXAMETHASONE 4 MG: 4 TABLET ORAL at 21:01

## 2019-10-10 RX ADMIN — DOCUSATE SODIUM 100 MG: 100 CAPSULE, LIQUID FILLED ORAL at 18:13

## 2019-10-10 RX ADMIN — DIBASIC SODIUM PHOSPHATE, MONOBASIC POTASSIUM PHOSPHATE AND MONOBASIC SODIUM PHOSPHATE 1 TABLET: 852; 155; 130 TABLET ORAL at 18:13

## 2019-10-10 RX ADMIN — NICOTINE 14 MG: 14 PATCH TRANSDERMAL at 05:42

## 2019-10-10 RX ADMIN — SENNOSIDES, DOCUSATE SODIUM 2 TABLET: 50; 8.6 TABLET, FILM COATED ORAL at 18:13

## 2019-10-10 RX ADMIN — DEXAMETHASONE 4 MG: 4 TABLET ORAL at 05:42

## 2019-10-10 ASSESSMENT — COGNITIVE AND FUNCTIONAL STATUS - GENERAL
STANDING UP FROM CHAIR USING ARMS: A LITTLE
HELP NEEDED FOR BATHING: A LITTLE
WALKING IN HOSPITAL ROOM: A LITTLE
MOBILITY SCORE: 17
CLIMB 3 TO 5 STEPS WITH RAILING: A LOT
DRESSING REGULAR UPPER BODY CLOTHING: A LITTLE
DRESSING REGULAR LOWER BODY CLOTHING: A LITTLE
MOVING FROM LYING ON BACK TO SITTING ON SIDE OF FLAT BED: A LITTLE
TURNING FROM BACK TO SIDE WHILE IN FLAT BAD: A LITTLE
DAILY ACTIVITIY SCORE: 20
TOILETING: A LITTLE
MOVING TO AND FROM BED TO CHAIR: A LITTLE
SUGGESTED CMS G CODE MODIFIER DAILY ACTIVITY: CJ
SUGGESTED CMS G CODE MODIFIER MOBILITY: CK

## 2019-10-10 ASSESSMENT — ENCOUNTER SYMPTOMS
DOUBLE VISION: 0
CHILLS: 0
PALPITATIONS: 0
SORE THROAT: 0
HEADACHES: 0
FEVER: 0
ABDOMINAL PAIN: 0
SHORTNESS OF BREATH: 0
DIARRHEA: 0
LOSS OF CONSCIOUSNESS: 0
BACK PAIN: 0
COUGH: 0
VOMITING: 0
DIZZINESS: 0
BLURRED VISION: 0
NAUSEA: 0

## 2019-10-10 ASSESSMENT — PATIENT HEALTH QUESTIONNAIRE - PHQ9
SUM OF ALL RESPONSES TO PHQ9 QUESTIONS 1 AND 2: 0
2. FEELING DOWN, DEPRESSED, IRRITABLE, OR HOPELESS: NOT AT ALL
1. LITTLE INTEREST OR PLEASURE IN DOING THINGS: NOT AT ALL

## 2019-10-10 ASSESSMENT — GAIT ASSESSMENTS
DEVIATION: DECREASED BASE OF SUPPORT
DISTANCE (FEET): 150
ASSISTIVE DEVICE: FRONT WHEEL WALKER
GAIT LEVEL OF ASSIST: MINIMAL ASSIST

## 2019-10-10 NOTE — PROGRESS NOTES
Surgery patient?: yes  Date of surgery: 10/06/19  Ambulated 50 ft on day of surgery? (N/A if today is not date of surgery): n/a  Number of times ambulated 50 feet or greater today: 4  Patient has been up to chair, edge of bed or HOB 90 degrees for all meals?: yes  Goal met? (goal is ambulating at least 50 feet 2 times on day shift, one time on night shift): yes  If patient did not meet mobility goal, why?:

## 2019-10-10 NOTE — THERAPY
"Occupational Therapy Treatment completed with focus on ADLs, ADL transfers and patient education.  Functional Status:  Pt seen for OT treatment. Pt performed grooming ADLs w/supv at EOB using left and right UE. Min A supine>sit, Min A sit >stand w/fww. Pt decline toileting d/t catheter. Pt very pleasant, cooperative and eager for therapy treatment. Pt will continue to benefit from OT services while in-house to regain strength, endurance, and independence in ADLs and ADL txfs.   Plan of Care: Will benefit from Occupational Therapy 3 times per week  Discharge Recommendations:  Equipment Will Continue to Assess for Equipment Needs. Post-acute therapy Discharge to a transitional care facility for continued skilled therapy services.    See \"Rehab Therapy-Acute\" Patient Summary Report for complete documentation.   "

## 2019-10-10 NOTE — PALLIATIVE CARE
Palliative Care follow-up  PC RN called pt's dtr Iris Eduardo per pt's request in regards to his AD. PC RN informed Iris that [facility]'t [facility]. Iris stated she will be at bedside shortly and would like to complete AD at that time with the pt so they are sure they have one done, Iris isn't sure if pt has completed one.      Plan: AD today.    Thank you for allowing Palliative Care to participate in this patient's care. Please feel free to call [phone] with any questions or concerns.

## 2019-10-10 NOTE — PROGRESS NOTES
0705 Patient's sitting up in bed. Bedside rport reiceved from NOC RN (Lesli) at the begining of the shift.    0750 ANGELA Ricci visited. POC discussed with the patient. New order received & acknowledged - see MAR..    0820 Patient's sitting up in bed, having Breakfast. Educated on the importance/use of IS at least 10x every hour while awake, able to reach  1750 . Rates back pain 2/10 declines pain medication at this time. Cold pack given. Fall Protocol in effect. Call light within reach. Reminded patient to call for assist.  Plan of care reviewed with the patient. Verbalized understanding.    0953 New order received from ANGELA Ricci (Physiatry Consult).    0955 LEILA No & Yanira, OT working with the patient. Ambulated in the hallway.    1200 Patient's sitting up in bed, having lunch. No distress noted.    1313 Patient's sitting up in bed. Dr Smith visited. POC discussed with the patient. Verbalized understanding.    1525 Patient's in bed. No distress noted.    1745 Patient's sitting up in bed, having Dinner. No distress noted.

## 2019-10-10 NOTE — DISCHARGE PLANNING
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    Chart review indicates patient does have on going medical management and does have therapy needs to possibly meet inpatient rehab facility criteria with the goal of returning to community.    D/C support: Lives with Daughter, [email or web address]. and their children     Physiatry consultation forwarded per protocol.      Free Text:   He will need a staging PET scan as outpatient.  BX pending. He is scheduled to undergo whole brain radiation x10 treatments in the next few weeks by Dr. Stephens .  If Physiatry finds [place] appropriate for [facility], will need a window for rehab without chemo, radiation etc.      Thank you for referral.

## 2019-10-10 NOTE — PROGRESS NOTES
Neurosurgery Progress Note    Subjective:  Denies new symptoms.  PTOT notes indicate rehab    Exam:  AAOx4. Tongue ML, no drift. Min/mod dysmetria on left.  FCx4. CDI x2    BP  Min: 123/75  Max: 172/96  Pulse  Av.7  Min: 54  Max: 72  Resp  Av.4  Min: 17  Max: 20  Temp  Av °C (98.6 °F)  Min: 36.7 °C (98.1 °F)  Max: 37.3 °C (99.1 °F)  SpO2  Av.4 %  Min: 91 %  Max: 95 %    No data recorded    Recent Labs     10/08/19  0339 10/09/19  0455   WBC 11.0* 9.8   RBC 4.12* 4.03*   HEMOGLOBIN 12.5* 12.6*   HEMATOCRIT 38.3* 37.8*   MCV 93.0 93.8   MCH 30.3 31.3   MCHC 32.6* 33.3*   RDW 47.7 49.3   PLATELETCT 251 270   MPV 10.5 9.8     Recent Labs     10/08/19  0339 10/09/19  0455   SODIUM 135 136   POTASSIUM 3.7 3.9   CHLORIDE 108 107   CO2 22 21   GLUCOSE 133* 129*   BUN 23* 26*   CREATININE 0.91 0.92   CALCIUM 8.2* 8.6               Intake/Output       10/09/19 0700 - 10/10/19 0659 10/10/19 0700 - 10/11/19 0659      [id number] 4938-1558 Total  3446-3024 Total       Intake    P.O.  100  -- 100  --  -- --    P.O. 100 -- 100 -- -- --    Total Intake 100 -- 100 -- -- --       Output    Urine  810  1000 1810  --  -- --    Output (mL) (Urethral Catheter Non-latex 16 Fr.) 810 1000 1810 -- -- --    Stool  --  -- --  --  -- --    Number of Times Stooled 2 x -- 2 x -- -- --    Total Output 810 1000 1810 -- -- --       Net I/O     -710 -1000 -1710 -- -- --            Intake/Output Summary (Last 24 hours) at 10/10/2019 1137  Last data filed at 10/10/2019 0400  Gross per 24 hour   Intake --   Output 1000 ml   Net -1000 ml            • HYDROcodone-acetaminophen  1-2 Tab Q6HRS PRN   • lisinopril  20 mg DAILY   • lisinopril  10 mg Q DAY   • tamsulosin  0.4 mg AFTER BREAKFAST   • dexamethasone  4 mg Q8HRS    Or   • dexamethasone  4 mg Q8HRS   • levETIRAcetam  500 mg BID   • famotidine  20 mg BID   • potassium chloride SA  20 mEq DAILY   • simvastatin  40 mg Q EVENING   • Pharmacy Consult Request  1 Each PHARMACY TO  DOSE   • MD ALERT...DO NOT ADMINISTER NSAIDS or ASPIRIN unless ORDERED By Neurosurgery  1 Each PRN   • ondansetron  4 mg Q4HRS PRN   • scopolamine  1 Patch Q72HRS PRN   • labetalol  10 mg Q HOUR PRN   • cloNIDine  0.1 mg Q4HRS PRN   • docusate sodium  100 mg BID   • senna-docusate  1 Tab Nightly   • senna-docusate  1 Tab Q24HRS PRN   • polyethylene glycol/lytes  1 Packet BID PRN   • magnesium hydroxide  30 mL QDAY PRN   • bisacodyl  10 mg Q24HRS PRN   • artificial tears  1 Application Q8HRS   • ipratropium-albuterol  3 mL Q4H PRN (RT)   • hydrALAZINE  10-20 mg Q6HRS PRN   • phosphorus  1 Tab BID   • senna-docusate  2 Tab BID    And   • polyethylene glycol/lytes  1 Packet QDAY PRN    And   • magnesium hydroxide  30 mL QDAY PRN    And   • bisacodyl  10 mg QDAY PRN   • Respiratory Care per Protocol   Continuous RT   • acetaminophen  650 mg Q6HRS PRN   • ondansetron  4 mg Q4HRS PRN   • nicotine  14 mg Daily-0600    And   • nicotine polacrilex  2 mg Q HOUR PRN       Assessment and Plan:  POD #4 Crani x2 Hx Lung CA  Prophylactic anticoagulation: no         Start date/time: tbd    Plan:  CT- stable reviewed by Dr Lacey  PTOT- rec transitional facility  Dex 4q8 x3 days then Bid x3 days, then 2q8 x3 days, 2 bid x3 days, 2 qday then stop  Keppra continue for one month  Path-Non SCC cw lung  [facility]- [facility] as well  Angelo has seen pt for XRT  Ok to rehab when able

## 2019-10-10 NOTE — CARE PLAN
Problem: Communication  Goal: The ability to communicate needs accurately and effectively will improve  Outcome: PROGRESSING AS EXPECTED    Patient able to communicate needs to staff. Call light within reach, patient educated to call for assistance. Patient calls appropriately. Will continue to assess.      Problem: Safety  Goal: Will remain free from injury  Outcome: PROGRESSING AS EXPECTED    Patient educated to call for assistance. Precautions in place; Call light within reach. Bed locked and in lowest position. Treaded socks in place. Hourly rounding. Will continue to monitor.

## 2019-10-10 NOTE — PALLIATIVE CARE
Palliative Care follow-up  PC RN met with pt and his dtr Iris at bedside to complete Advance Directive. Pt named dtr Iris as DPOA and son Milan as alternate. Pt chose, 2,3,5. Cert of competency signed by Dr. Ladd.     Plan: notarize AD tomorrow.     Thank you for allowing Palliative Care to participate in this patient's care. Please feel free to call [phone] with any questions or concerns.

## 2019-10-10 NOTE — PROGRESS NOTES
RN MOBILITY NOTE - 10/9/10    Surgery patient?: ytes  Date of surgery: 10/6  Ambulated 50 ft on day of surgery? (N/A if today is not date of surgery): n/a  Number of times ambulated 50 feet or greater today: 1  Patient has been up to chair, edge of bed or HOB 90 degrees for all meals?: 2/3  Goal met? (goal is ambulating at least 50 feet 2 times on day shift, one time on night shift): no  If patient did not meet mobility goal, why?: pt refused, education provided

## 2019-10-10 NOTE — CARE PLAN
Problem: Safety  Goal: Will remain free from injury  Outcome: PROGRESSING AS EXPECTED  Note:   Safety precaution in effect. Call light within reach. Reminded patient to call for assist. Hourly rounds in effect. Verbalized understanding.     Problem: Infection  Goal: Will remain free from infection  Outcome: PROGRESSING AS EXPECTED  Note:   Implement Standard precaution. Hand washing every encounter & before & after patient care. Verbalized understanding.     Problem: Knowledge Deficit  Goal: Knowledge of disease process/condition, treatment plan, diagnostic tests, and medications will improve  Outcome: PROGRESSING AS EXPECTED  Note:   Discussed Plan of care. Questions answered. Verbalized understanding.     Problem: Pain Management  Goal: Pain level will decrease to patient's comfort goal  Outcome: PROGRESSING AS EXPECTED  Note:   Educated on pain scale. Encouraged to verbalize pain. Will medicate as per MAR.

## 2019-10-10 NOTE — CONSULTS
Physical Medicine and Rehabilitation Consultation         Initial Consult      Date of Consultation: 10/10/2019  Consulting provider: ANGELA Medellin  Reason for consultation: assess for acute inpatient rehab appropriateness  LOS: 6 Day(s)      Chief complaint: weakness, falls    HPI: The patient is a 74 y.o. right hand dominant male with a past medical history of lung cancer status post lobectomy and radiation 5 years ago;  who presented on 10/4/2019  2:51 PM with left-sided weakness and increased falls over the last 3 weeks.  Patient also complained of confusion, blank stares, left-sided weakness, left sided numbness, unstable gait, left-sided facial droop .  He presented to the [facility] initially and a CT scan showed an intracranial mass, and he was transferred to [facility] for further evaluation.  Review of CT at outside facility by Dr. Lacey of neurosurgery was significant for at least 2 separate metastatic lesions, one in the posterior fossa and a large right frontal lesion.  Patient was taken to the operating room by Dr. Lacey on 10/4/2019 for two-stage left suboccipital craniectomy, microscopic gross total excision of left superior cerebellar mass, and right frontotemporal craniotomy with gross total resection of large right frontal metastasis.  Pathology of these tumors are consistent with non-small cell lung cancer.  CT of the chest reveals 3.4 cm left upper lobe lung mass extending into the left suprahilar region and encasing the left upper lobe pulmonary arteries with questionable small amount of thrombus on the anterior branch consistent with malignancy.  Patient is scheduled to undergo whole brain radiation in the next couple of days by Dr. Stephens.  He will need outpatient PET scan, and if he chooses to move forward with treatment he will also need immunotherapy, chemotherapy, or a combination of both.  Treatment can begin after brain radiation.  Palliative care has been consulted.      The  patient currently reports improved weakness in the left upper and lower extremity with improved paresthesias. Endorses blurry vision sometimes. Overall he feels well.       ROS:  Pertinent positives are listed in HPI, all other systems reviewed and are negative      Social Hx:  Pre-morbidly, this patient lived in a 2 level home with Two  steps to enter, with family (daughter/son-in-law, 2 grand children.   Employment: retired from postal work  Tobacco: occasional   Alcohol: occasional   Drugs: denies       Son in law works nights, daughter works days, there is always someone at home     Current level of function:  The patient was evaluated by acute care Physical Therapy and Occupational Therapy; currently requiring minimal assistance for mobility and minimal assistance for ADLs        PMH:  History reviewed. No pertinent past medical history.    PSH:  Past Surgical History:   Procedure Laterality Date   • CRANIOTOMY Right 10/6/2019    Procedure: Stage 2: Right frontal craniotomy and removal o right frontal tumor;  Surgeon: Gagan Lacey M.D.;  Location: [facility];  Service: Neurosurgery   • CRANIECTOMY Left 10/6/2019    Procedure: Stage 1: Left suboccipital craniectomy and removal of left cerebellar tumor;  Surgeon: Gagan Lacey M.D.;  Location: SURGERY [facility];  Service: Neurosurgery       FHX:  Non-pertinent to today's issues    Medications:  Current Facility-Administered Medications   Medication Dose   • HYDROcodone-acetaminophen (NORCO) 5-325 MG per tablet 1-2 Tab  1-2 Tab   • lisinopril (PRINIVIL) tablet 20 mg  20 mg   • lisinopril (PRINIVIL) tablet 10 mg  10 mg   • tamsulosin (FLOMAX) capsule 0.4 mg  0.4 mg   • dexamethasone (DECADRON) tablet 4 mg  4 mg    Or   • dexamethasone (DECADRON) injection 4 mg  4 mg   • levETIRAcetam (KEPPRA) tablet 500 mg  500 mg   • famotidine (PEPCID) tablet 20 mg  20 mg   • potassium chloride SA (Kdur) tablet 20 mEq  20 mEq   • simvastatin (ZOCOR) tablet  "40 mg  40 mg   • Pharmacy Consult Request ...Pain Management Review 1 Each  1 Each   • MD ALERT...DO NOT ADMINISTER NSAIDS or ASPIRIN unless ORDERED By Neurosurgery 1 Each  1 Each   • ondansetron (ZOFRAN) syringe/vial injection 4 mg  4 mg   • scopolamine (TRANSDERM-SCOP) patch 1 Patch  1 Patch   • labetalol (NORMODYNE,TRANDATE) injection 10 mg  10 mg   • cloNIDine (CATAPRES) tablet 0.1 mg  0.1 mg   • docusate sodium (COLACE) capsule 100 mg  100 mg   • senna-docusate (PERICOLACE or SENOKOT S) 8.6-50 MG per tablet 1 Tab  1 Tab   • senna-docusate (PERICOLACE or SENOKOT S) 8.6-50 MG per tablet 1 Tab  1 Tab   • polyethylene glycol/lytes (MIRALAX) PACKET 1 Packet  1 Packet   • magnesium hydroxide (MILK OF MAGNESIA) suspension 30 mL  30 mL   • bisacodyl (DULCOLAX) suppository 10 mg  10 mg   • artificial tears (EYE LUBRICANT) ophth ointment 1 Application  1 Application   • ipratropium-albuterol (DUONEB) nebulizer solution  3 mL   • hydrALAZINE (APRESOLINE) injection 10-20 mg  10-20 mg   • phosphorus (K-PHOS-NEUTRAL, PHOSPHA 250 NEUTRAL) per tablet 1 Tab  1 Tab   • senna-docusate (PERICOLACE or SENOKOT S) 8.6-50 MG per tablet 2 Tab  2 Tab    And   • polyethylene glycol/lytes (MIRALAX) PACKET 1 Packet  1 Packet    And   • magnesium hydroxide (MILK OF MAGNESIA) suspension 30 mL  30 mL    And   • bisacodyl (DULCOLAX) suppository 10 mg  10 mg   • Respiratory Care per Protocol     • acetaminophen (TYLENOL) tablet 650 mg  650 mg   • ondansetron (ZOFRAN ODT) dispertab 4 mg  4 mg   • nicotine (NICODERM) 14 MG/24HR 14 mg  14 mg    And   • nicotine polacrilex (NICORETTE) 2 MG piece 2 mg  2 mg       Allergies:  No Known Allergies    Physical Exam:  Vitals: /82   Pulse 61   Temp 37.1 °C (98.7 °F) (Temporal)   Resp 17   Ht 1.803 m (5' 11\")   Wt 58 kg (127 lb 13.9 oz)   SpO2 94%   Gen: NAD  Head: Large right sided incision closed with staples and open to air, non-draining    Eyes/ Nose/ Mouth: PERRLA, moist mucous " membranes  Cardio: RRR, no mumurs  Pulm: CTAB, with normal respiratory effort  Abd: Soft NTND, active bowel sounds,   Ext: No peripheral edema. No calf tenderness. No clubbing/cyanosis. +dorsal pedalis pulses bilaterally.    Mental status:  A&Ox4 (person, place, date, situation) answers questions appropriately follows commands  Speech: fluent, no aphasia or dysarthria    CRANIAL NERVES:  2,3: visual acuity grossly intact, PERRL  3,4,6: EOMI bilaterally, no nystagmus or diplopia  5: sensation intact to light touch bilaterally and symmetric  7: no facial asymmetry  8: hearing grossly intact  9,10: symmetric palate elevation  11: SCM/Trapezius strength 5/5 bilaterally  12: tongue protrudes midline    Motor:      Shoulder flexors:  Bilateral -  5/5  Elbow flexors:  Right -  5/5, Left -  4/5   Wrist extensors:   Right -  5/5, Left -  4/5   Elbow extensors:   Right -  5/5, Left -  4/5   Finger flexors:  Bilateral -  5/5  Finger abductors:  Bilateral -  5/5  Hip flexors:  Bilateral -  5/5  Knee ext:   Right -  5/5, Left -  4/5   Dorsiflexors:  Bilateral -  5/5  EHL:  Bilateral -  5/5  Plantar flexors:  Bilateral -  5/5     Sensory:   intact to light touch through out  intact to proprioception at bilateral great toes  Negative double simultaneous touch bilaterally UE and LE    DTRs: 2+ in bilateral biceps, triceps, brachioradialis, 3+ in bilateral patellar and achilles tendons  No clonus at bilateral ankles  Negative babinski b/l  Negative Tavarez b/l     Tone: no spasticity noted, no cogwheeling noted    Coordination:   intact finger to nose bilaterally  intact fine motor with fingers bilaterally  intact heel to shin bilaterally    Labs: Reviewed and significant for   Recent Labs     10/08/19  0339 10/09/19  0455   RBC 4.12* 4.03*   HEMOGLOBIN 12.5* 12.6*   HEMATOCRIT 38.3* 37.8*   PLATELETCT 251 270     Recent Labs     10/08/19  0339 10/09/19  0455   SODIUM 135 136   POTASSIUM 3.7 3.9   CHLORIDE 108 107   CO2 22 21    GLUCOSE 133* 129*   BUN 23* 26*   CREATININE 0.91 0.92   CALCIUM 8.2* 8.6     No results found for this or any previous visit (from the past 24 hour(s)).    Imaging:   MRI brain 10/5/2019  1.  Large right frontal and left cerebellar heterogeneously enhancing intra-axial masses concerning for metastatic disease.  2.  Right to left midline shift measuring 1 cm.  3.  Effacement of the fourth ventricle secondary to the cerebellar lesion without evidence of hydrocephalus.  4.  Mild diffuse cerebral substance loss.  5.  Mild microangiopathic ischemic change versus the myelination gliosis.    ASSESSMENT:  Patient is a 74 y.o. male admitted with weakness and falls, found to have 2 brain mets now s/p right craniectomy and removal of brain mets. Also found to have left lung CA.     Rehabilitation: Impaired ADLs and mobility  Patient is a good candidate for inpatient rehab based on needs for PT, OT.  Patient will also benefit from family training.  Patient has a good discharge situation which will be home with family.    S/p craniectomy and removal of brain mets by Dr. Lacey on 10/06  - Stable CT per NSG  - on Dexamethasone taper  - Keppra 500mg BID for 1 month   - Plan for brain radiation simulation at 1pm on 10/21/2019, staples need to be removed by that time.   - Will seek to admit to IPR prior to simulation   - Recommend speech consult for cog eval on arrival to IPR    Hypertension  - Goal to maintain BP<160   - Clonidine 0.1mg Q4 PRN  - Labetalol 10mg inj Q1 PRN - being used 1x daily   - Lisinopril 10mg tab - home dose, restarted 10/9    Hypokalemia   - K-Dur 20mEq Daily     Pain  - well controlled with tylenol 650 Q6 PRN  - Paresthesias improved after surgery, recommend not starting gabapentin     DVT Prophylaxis:   - SCDs   - would appreciate recs on when to restart AC      Discussed with pt, summarized hospitalization and care, options for next step of care  Labs reviewed and significant for anemia  Imaging  personally reviewed and MRI brain shows large right frontal mass with vasogenic edema, appearance consistent with metastasis  Discussed with Dr. Stephens  Discussed with team about recommendations     Thank you for allowing us to participate in the care of this patient.       Discussed with patient about recommendations for and plan for rehabilitation as follows. Patient with multiple co-morbidities(including but not limited to hypertension, hypokalemia, weakness, debility); with functional deficits in mobility/self-care, and Moderate de-conditioning.     Pre-morbidly, this patient lived in a single level home with Two  steps to enter, with family. The patient was evaluated by acute care Physical Therapy and Occupational Therapy; currently requiring minimal assistance for mobility and minimal assistance for ADLs.    The patient is a(n) Good candidate for an acute inpatient rehabilitation program with a coordinated program of care at an intensity and frequency not available at a lower level of care.     Note: if patient continues to progress while waiting for medical clearance, and no longer requires 2 of of 3 therapy services (PT/OT/SLP) at a CGA/Minimal assistance level, patient will no longer need acute inpatient rehabilitation.    This recommendation is substantiated by the patient's current medical condition with intervention and assessment of medical issues requiring an acute level of care for patient's safety and maximum outcome. A coordinated program of care will be provided by an interdisciplinary team including physical therapy, occupational therapy, hospitalist, physiatry and rehab nursing. Rehab goals include improved mobility, self-care management, strength and conditioning/endurance, pain management, bowel and bladder management, mood and affect, and safety with independent home management including caregiver training.     Estimated length of stay is approximately 10 days. Rehab potential: Very Good.  Disposition: to pre-morbid independent living setting with supportive care of patient's family. We will continue to follow with you in anticipation of discharge to acute inpatient rehabilitation when medically stable to do so at the discretion of his  attending physician. Thank you for allowing us to participate in his care. Please call with any questions regarding this recommendation.    Israel Deluca, DO   Physical Medicine and Rehabilitation   10/10/2019

## 2019-10-10 NOTE — PROGRESS NOTES
2150 Pt A&Ox4. Pt denies numbness and tingling. Pain 5/10, ice pack provided medication refused. Bed locked and in lowest position. Bed alarm refused, education provided. Pt educated to call for assistance. Treaded socks in place. Call light within reach. SCDs in use.

## 2019-10-10 NOTE — PALLIATIVE CARE
Palliative Care follow-up  PC RN visited pt at bedside to offer to do AD because [facility] not responded to request for AD. Pt request that PC RN call pt's dtr Iris.       Plan: PC RN to call pt's dtr Iris to discuss AD.     Thank you for allowing Palliative Care to participate in this patient's care. Please feel free to call [phone] with any questions or concerns.

## 2019-10-11 ENCOUNTER — HOSPITAL ENCOUNTER (INPATIENT)
Facility: REHABILITATION | Age: 74
LOS: 10 days | DRG: 055 | End: 2019-10-21
Attending: PHYSICAL MEDICINE & REHABILITATION | Admitting: PHYSICAL MEDICINE & REHABILITATION
Payer: MEDICARE

## 2019-10-11 VITALS
HEIGHT: 71 IN | HEART RATE: 64 BPM | SYSTOLIC BLOOD PRESSURE: 100 MMHG | OXYGEN SATURATION: 90 % | DIASTOLIC BLOOD PRESSURE: 69 MMHG | TEMPERATURE: 97.4 F | RESPIRATION RATE: 16 BRPM | BODY MASS INDEX: 17.9 KG/M2 | WEIGHT: 127.87 LBS

## 2019-10-11 PROCEDURE — A9270 NON-COVERED ITEM OR SERVICE: HCPCS | Performed by: FAMILY MEDICINE

## 2019-10-11 PROCEDURE — 700102 HCHG RX REV CODE 250 W/ 637 OVERRIDE(OP): Performed by: NURSE PRACTITIONER

## 2019-10-11 PROCEDURE — A9270 NON-COVERED ITEM OR SERVICE: HCPCS | Performed by: PSYCHIATRY & NEUROLOGY

## 2019-10-11 PROCEDURE — 700112 HCHG RX REV CODE 229: Performed by: NURSE PRACTITIONER

## 2019-10-11 PROCEDURE — A9270 NON-COVERED ITEM OR SERVICE: HCPCS | Performed by: PHYSICAL MEDICINE & REHABILITATION

## 2019-10-11 PROCEDURE — A9270 NON-COVERED ITEM OR SERVICE: HCPCS | Performed by: NURSE PRACTITIONER

## 2019-10-11 PROCEDURE — 700102 HCHG RX REV CODE 250 W/ 637 OVERRIDE(OP): Performed by: HOSPITALIST

## 2019-10-11 PROCEDURE — 770010 HCHG ROOM/CARE - REHAB SEMI PRIVAT*

## 2019-10-11 PROCEDURE — 700102 HCHG RX REV CODE 250 W/ 637 OVERRIDE(OP): Performed by: FAMILY MEDICINE

## 2019-10-11 PROCEDURE — 700102 HCHG RX REV CODE 250 W/ 637 OVERRIDE(OP): Performed by: PHYSICAL MEDICINE & REHABILITATION

## 2019-10-11 PROCEDURE — A9270 NON-COVERED ITEM OR SERVICE: HCPCS | Performed by: HOSPITALIST

## 2019-10-11 PROCEDURE — 99223 1ST HOSP IP/OBS HIGH 75: CPT | Mod: AI | Performed by: PHYSICAL MEDICINE & REHABILITATION

## 2019-10-11 PROCEDURE — 700102 HCHG RX REV CODE 250 W/ 637 OVERRIDE(OP): Performed by: PSYCHIATRY & NEUROLOGY

## 2019-10-11 PROCEDURE — 99239 HOSP IP/OBS DSCHRG MGMT >30: CPT | Performed by: INTERNAL MEDICINE

## 2019-10-11 RX ORDER — DEXAMETHASONE 1 MG
2 TABLET ORAL EVERY 8 HOURS
Status: COMPLETED | OUTPATIENT
Start: 2019-10-15 | End: 2019-10-17

## 2019-10-11 RX ORDER — DEXAMETHASONE 4 MG/1
4 TABLET ORAL EVERY 8 HOURS
Status: DISCONTINUED | OUTPATIENT
Start: 2019-10-11 | End: 2019-10-11 | Stop reason: HOSPADM

## 2019-10-11 RX ORDER — POTASSIUM CHLORIDE 20 MEQ/1
20 TABLET, EXTENDED RELEASE ORAL DAILY
Status: DISCONTINUED | OUTPATIENT
Start: 2019-10-12 | End: 2019-10-13

## 2019-10-11 RX ORDER — BISACODYL 10 MG
10 SUPPOSITORY, RECTAL RECTAL
Status: DISCONTINUED | OUTPATIENT
Start: 2019-10-11 | End: 2019-10-21 | Stop reason: HOSPADM

## 2019-10-11 RX ORDER — ACETAMINOPHEN 325 MG/1
650 TABLET ORAL EVERY 4 HOURS PRN
Status: DISCONTINUED | OUTPATIENT
Start: 2019-10-11 | End: 2019-10-21 | Stop reason: HOSPADM

## 2019-10-11 RX ORDER — NICOTINE 21 MG/24HR
14 PATCH, TRANSDERMAL 24 HOURS TRANSDERMAL
Status: DISCONTINUED | OUTPATIENT
Start: 2019-10-12 | End: 2019-10-17

## 2019-10-11 RX ORDER — DEXAMETHASONE 2 MG/1
2 TABLET ORAL DAILY
Qty: 10 TAB | Refills: 0 | Status: SHIPPED | OUTPATIENT
Start: 2019-10-21 | End: 2019-10-11

## 2019-10-11 RX ORDER — DEXAMETHASONE 4 MG/1
4 TABLET ORAL EVERY 12 HOURS
Status: DISCONTINUED | OUTPATIENT
Start: 2019-10-12 | End: 2019-10-11 | Stop reason: HOSPADM

## 2019-10-11 RX ORDER — DEXAMETHASONE 4 MG/1
4 TABLET ORAL EVERY 12 HOURS
Status: COMPLETED | OUTPATIENT
Start: 2019-10-12 | End: 2019-10-14

## 2019-10-11 RX ORDER — IPRATROPIUM BROMIDE AND ALBUTEROL SULFATE 2.5; .5 MG/3ML; MG/3ML
3 SOLUTION RESPIRATORY (INHALATION)
Status: CANCELLED | OUTPATIENT
Start: 2019-10-11

## 2019-10-11 RX ORDER — TAMSULOSIN HYDROCHLORIDE 0.4 MG/1
0.4 CAPSULE ORAL
Status: CANCELLED | OUTPATIENT
Start: 2019-10-12

## 2019-10-11 RX ORDER — TAMSULOSIN HYDROCHLORIDE 0.4 MG/1
0.4 CAPSULE ORAL
Status: DISCONTINUED | OUTPATIENT
Start: 2019-10-12 | End: 2019-10-15

## 2019-10-11 RX ORDER — ECHINACEA PURPUREA EXTRACT 125 MG
2 TABLET ORAL PRN
Status: DISCONTINUED | OUTPATIENT
Start: 2019-10-11 | End: 2019-10-21 | Stop reason: HOSPADM

## 2019-10-11 RX ORDER — IPRATROPIUM BROMIDE AND ALBUTEROL SULFATE 2.5; .5 MG/3ML; MG/3ML
3 SOLUTION RESPIRATORY (INHALATION)
Status: DISCONTINUED | OUTPATIENT
Start: 2019-10-11 | End: 2019-10-21 | Stop reason: HOSPADM

## 2019-10-11 RX ORDER — ALUMINA, MAGNESIA, AND SIMETHICONE 2400; 2400; 240 MG/30ML; MG/30ML; MG/30ML
20 SUSPENSION ORAL
Status: DISCONTINUED | OUTPATIENT
Start: 2019-10-11 | End: 2019-10-21 | Stop reason: HOSPADM

## 2019-10-11 RX ORDER — DEXAMETHASONE 1 MG
2 TABLET ORAL DAILY
Status: DISCONTINUED | OUTPATIENT
Start: 2019-10-21 | End: 2019-10-11 | Stop reason: HOSPADM

## 2019-10-11 RX ORDER — HYDROCODONE BITARTRATE AND ACETAMINOPHEN 5; 325 MG/1; MG/1
1-2 TABLET ORAL EVERY 6 HOURS PRN
Status: CANCELLED | OUTPATIENT
Start: 2019-10-11

## 2019-10-11 RX ORDER — DEXAMETHASONE 2 MG/1
2 TABLET ORAL EVERY 12 HOURS
Qty: 10 TAB | Refills: 0 | Status: ON HOLD
Start: 2019-10-18 | End: 2019-10-17

## 2019-10-11 RX ORDER — DEXAMETHASONE 1 MG
2 TABLET ORAL DAILY
Status: DISCONTINUED | OUTPATIENT
Start: 2019-10-21 | End: 2019-10-21 | Stop reason: HOSPADM

## 2019-10-11 RX ORDER — POLYVINYL ALCOHOL 14 MG/ML
1 SOLUTION/ DROPS OPHTHALMIC PRN
Status: DISCONTINUED | OUTPATIENT
Start: 2019-10-11 | End: 2019-10-21 | Stop reason: HOSPADM

## 2019-10-11 RX ORDER — FAMOTIDINE 20 MG/1
20 TABLET, FILM COATED ORAL 2 TIMES DAILY
Status: DISCONTINUED | OUTPATIENT
Start: 2019-10-11 | End: 2019-10-21 | Stop reason: HOSPADM

## 2019-10-11 RX ORDER — DEXAMETHASONE 1 MG
2 TABLET ORAL EVERY 8 HOURS
Status: DISCONTINUED | OUTPATIENT
Start: 2019-10-15 | End: 2019-10-11 | Stop reason: HOSPADM

## 2019-10-11 RX ORDER — SIMVASTATIN 20 MG
40 TABLET ORAL EVERY EVENING
Status: CANCELLED | OUTPATIENT
Start: 2019-10-11

## 2019-10-11 RX ORDER — DEXAMETHASONE 4 MG/1
4 TABLET ORAL EVERY 12 HOURS
Status: CANCELLED | OUTPATIENT
Start: 2019-10-12 | End: 2019-10-15

## 2019-10-11 RX ORDER — DEXAMETHASONE 4 MG/1
4 TABLET ORAL EVERY 12 HOURS
Qty: 4 TAB | Refills: 0 | Status: ON HOLD
Start: 2019-10-12 | End: 2019-10-17

## 2019-10-11 RX ORDER — LEVETIRACETAM 250 MG/1
500 TABLET ORAL 2 TIMES DAILY
Status: CANCELLED | OUTPATIENT
Start: 2019-10-11

## 2019-10-11 RX ORDER — SIMVASTATIN 40 MG
40 TABLET ORAL EVERY EVENING
Status: DISCONTINUED | OUTPATIENT
Start: 2019-10-11 | End: 2019-10-21 | Stop reason: HOSPADM

## 2019-10-11 RX ORDER — DEXAMETHASONE 1 MG
2 TABLET ORAL EVERY 12 HOURS
Status: COMPLETED | OUTPATIENT
Start: 2019-10-18 | End: 2019-10-20

## 2019-10-11 RX ORDER — NICOTINE 21 MG/24HR
14 PATCH, TRANSDERMAL 24 HOURS TRANSDERMAL
Status: CANCELLED | OUTPATIENT
Start: 2019-10-12

## 2019-10-11 RX ORDER — TRAZODONE HYDROCHLORIDE 50 MG/1
50 TABLET ORAL
Status: DISCONTINUED | OUTPATIENT
Start: 2019-10-11 | End: 2019-10-21 | Stop reason: HOSPADM

## 2019-10-11 RX ORDER — DEXAMETHASONE 1 MG
2 TABLET ORAL EVERY 8 HOURS
Status: CANCELLED | OUTPATIENT
Start: 2019-10-15 | End: 2019-10-18

## 2019-10-11 RX ORDER — DEXAMETHASONE 1 MG
2 TABLET ORAL DAILY
Status: CANCELLED | OUTPATIENT
Start: 2019-10-21 | End: 2019-10-24

## 2019-10-11 RX ORDER — ONDANSETRON 4 MG/1
4 TABLET, ORALLY DISINTEGRATING ORAL 4 TIMES DAILY PRN
Status: DISCONTINUED | OUTPATIENT
Start: 2019-10-11 | End: 2019-10-21 | Stop reason: HOSPADM

## 2019-10-11 RX ORDER — DEXAMETHASONE 4 MG/1
4 TABLET ORAL EVERY 8 HOURS
Qty: 10 TAB | Refills: 0 | Status: ON HOLD
Start: 2019-10-11 | End: 2019-10-17

## 2019-10-11 RX ORDER — LISINOPRIL 20 MG/1
20 TABLET ORAL DAILY
Status: CANCELLED | OUTPATIENT
Start: 2019-10-12

## 2019-10-11 RX ORDER — AMOXICILLIN 250 MG
2 CAPSULE ORAL 2 TIMES DAILY
Status: DISCONTINUED | OUTPATIENT
Start: 2019-10-11 | End: 2019-10-21 | Stop reason: HOSPADM

## 2019-10-11 RX ORDER — LISINOPRIL 20 MG/1
20 TABLET ORAL DAILY
Status: DISCONTINUED | OUTPATIENT
Start: 2019-10-12 | End: 2019-10-18

## 2019-10-11 RX ORDER — HYDRALAZINE HYDROCHLORIDE 25 MG/1
25 TABLET, FILM COATED ORAL EVERY 8 HOURS PRN
Status: DISCONTINUED | OUTPATIENT
Start: 2019-10-11 | End: 2019-10-21 | Stop reason: HOSPADM

## 2019-10-11 RX ORDER — HYDROCODONE BITARTRATE AND ACETAMINOPHEN 5; 325 MG/1; MG/1
1-2 TABLET ORAL EVERY 6 HOURS PRN
Status: DISCONTINUED | OUTPATIENT
Start: 2019-10-11 | End: 2019-10-21 | Stop reason: HOSPADM

## 2019-10-11 RX ORDER — LEVETIRACETAM 500 MG/1
500 TABLET ORAL 2 TIMES DAILY
Status: DISCONTINUED | OUTPATIENT
Start: 2019-10-11 | End: 2019-10-21 | Stop reason: HOSPADM

## 2019-10-11 RX ORDER — DEXAMETHASONE 2 MG/1
2 TABLET ORAL EVERY 12 HOURS
Qty: 10 TAB | Refills: 0 | Status: SHIPPED | OUTPATIENT
Start: 2019-10-18 | End: 2019-10-11 | Stop reason: SDUPTHER

## 2019-10-11 RX ORDER — FAMOTIDINE 20 MG/1
20 TABLET, FILM COATED ORAL 2 TIMES DAILY
Status: CANCELLED | OUTPATIENT
Start: 2019-10-11

## 2019-10-11 RX ORDER — ONDANSETRON 2 MG/ML
4 INJECTION INTRAMUSCULAR; INTRAVENOUS 4 TIMES DAILY PRN
Status: DISCONTINUED | OUTPATIENT
Start: 2019-10-11 | End: 2019-10-21 | Stop reason: HOSPADM

## 2019-10-11 RX ORDER — POTASSIUM CHLORIDE 20 MEQ/1
20 TABLET, EXTENDED RELEASE ORAL DAILY
Status: CANCELLED | OUTPATIENT
Start: 2019-10-12

## 2019-10-11 RX ORDER — DEXAMETHASONE 4 MG/1
4 TABLET ORAL EVERY 8 HOURS
Status: CANCELLED | OUTPATIENT
Start: 2019-10-11 | End: 2019-10-12

## 2019-10-11 RX ORDER — DEXAMETHASONE 1 MG
2 TABLET ORAL EVERY 12 HOURS
Status: DISCONTINUED | OUTPATIENT
Start: 2019-10-18 | End: 2019-10-11 | Stop reason: HOSPADM

## 2019-10-11 RX ORDER — DEXAMETHASONE 1 MG
2 TABLET ORAL EVERY 12 HOURS
Status: CANCELLED | OUTPATIENT
Start: 2019-10-18 | End: 2019-10-21

## 2019-10-11 RX ORDER — DEXAMETHASONE 4 MG/1
4 TABLET ORAL EVERY 8 HOURS
Status: COMPLETED | OUTPATIENT
Start: 2019-10-11 | End: 2019-10-11

## 2019-10-11 RX ORDER — DEXAMETHASONE 2 MG/1
2 TABLET ORAL EVERY 8 HOURS
Qty: 10 TAB | Refills: 0 | Status: ON HOLD
Start: 2019-10-15 | End: 2019-10-17

## 2019-10-11 RX ORDER — DEXAMETHASONE 2 MG/1
2 TABLET ORAL DAILY
Qty: 10 TAB | Refills: 0 | Status: ON HOLD
Start: 2019-10-21 | End: 2019-10-17 | Stop reason: SDUPTHER

## 2019-10-11 RX ORDER — POLYETHYLENE GLYCOL 3350 17 G/17G
1 POWDER, FOR SOLUTION ORAL
Status: DISCONTINUED | OUTPATIENT
Start: 2019-10-11 | End: 2019-10-21 | Stop reason: HOSPADM

## 2019-10-11 RX ADMIN — DIBASIC SODIUM PHOSPHATE, MONOBASIC POTASSIUM PHOSPHATE AND MONOBASIC SODIUM PHOSPHATE 1 TABLET: 852; 155; 130 TABLET ORAL at 21:00

## 2019-10-11 RX ADMIN — FAMOTIDINE 20 MG: 20 TABLET ORAL at 20:52

## 2019-10-11 RX ADMIN — POTASSIUM CHLORIDE 20 MEQ: 1500 TABLET, EXTENDED RELEASE ORAL at 06:11

## 2019-10-11 RX ADMIN — LISINOPRIL 10 MG: 20 TABLET ORAL at 06:12

## 2019-10-11 RX ADMIN — MINERAL OIL AND WHITE PETROLATUM 1 APPLICATION: 30; 940 OINTMENT OPHTHALMIC at 21:02

## 2019-10-11 RX ADMIN — DEXAMETHASONE 4 MG: 4 TABLET ORAL at 21:00

## 2019-10-11 RX ADMIN — DIBASIC SODIUM PHOSPHATE, MONOBASIC POTASSIUM PHOSPHATE AND MONOBASIC SODIUM PHOSPHATE 1 TABLET: 852; 155; 130 TABLET ORAL at 06:10

## 2019-10-11 RX ADMIN — DEXAMETHASONE 4 MG: 4 TABLET ORAL at 15:11

## 2019-10-11 RX ADMIN — SIMVASTATIN 40 MG: 40 TABLET, FILM COATED ORAL at 20:53

## 2019-10-11 RX ADMIN — SENNOSIDES, DOCUSATE SODIUM 2 TABLET: 50; 8.6 TABLET, FILM COATED ORAL at 06:11

## 2019-10-11 RX ADMIN — LISINOPRIL 20 MG: 20 TABLET ORAL at 06:12

## 2019-10-11 RX ADMIN — TAMSULOSIN HYDROCHLORIDE 0.4 MG: 0.4 CAPSULE ORAL at 08:29

## 2019-10-11 RX ADMIN — NICOTINE 14 MG: 14 PATCH TRANSDERMAL at 06:11

## 2019-10-11 RX ADMIN — LEVETIRACETAM 500 MG: 500 TABLET ORAL at 06:11

## 2019-10-11 RX ADMIN — MINERAL OIL AND WHITE PETROLATUM 1 APPLICATION: 30; 940 OINTMENT OPHTHALMIC at 15:11

## 2019-10-11 RX ADMIN — DEXAMETHASONE 4 MG: 4 TABLET ORAL at 06:46

## 2019-10-11 RX ADMIN — FAMOTIDINE 20 MG: 20 TABLET ORAL at 06:10

## 2019-10-11 RX ADMIN — DOCUSATE SODIUM 100 MG: 100 CAPSULE, LIQUID FILLED ORAL at 06:11

## 2019-10-11 RX ADMIN — SENNOSIDES, DOCUSATE SODIUM 2 TABLET: 50; 8.6 TABLET, FILM COATED ORAL at 20:53

## 2019-10-11 RX ADMIN — LEVETIRACETAM 500 MG: 500 TABLET, FILM COATED ORAL at 20:53

## 2019-10-11 RX ADMIN — ACETAMINOPHEN 650 MG: 325 TABLET, FILM COATED ORAL at 08:29

## 2019-10-11 ASSESSMENT — LIFESTYLE VARIABLES
EVER FELT BAD OR GUILTY ABOUT YOUR DRINKING: NO
AVERAGE NUMBER OF DAYS PER WEEK YOU HAVE A DRINK CONTAINING ALCOHOL: 0
TOTAL SCORE: 0
HAVE PEOPLE ANNOYED YOU BY CRITICIZING YOUR DRINKING: NO
CONSUMPTION TOTAL: NEGATIVE
HOW MANY TIMES IN THE PAST YEAR HAVE YOU HAD 5 OR MORE DRINKS IN A DAY: 0
HAVE YOU EVER FELT YOU SHOULD CUT DOWN ON YOUR DRINKING: NO
DOES PATIENT WANT TO STOP DRINKING: NO
TOTAL SCORE: 0
EVER HAD A DRINK FIRST THING IN THE MORNING TO STEADY YOUR NERVES TO GET RID OF A HANGOVER: NO
ALCOHOL_USE: NO
TOTAL SCORE: 0
ON A TYPICAL DAY WHEN YOU DRINK ALCOHOL HOW MANY DRINKS DO YOU HAVE: 0

## 2019-10-11 ASSESSMENT — PATIENT HEALTH QUESTIONNAIRE - PHQ9
2. FEELING DOWN, DEPRESSED, IRRITABLE, OR HOPELESS: NOT AT ALL
2. FEELING DOWN, DEPRESSED, IRRITABLE, OR HOPELESS: NOT AT ALL
1. LITTLE INTEREST OR PLEASURE IN DOING THINGS: NOT AT ALL
1. LITTLE INTEREST OR PLEASURE IN DOING THINGS: NOT AT ALL
SUM OF ALL RESPONSES TO PHQ9 QUESTIONS 1 AND 2: 0
SUM OF ALL RESPONSES TO PHQ9 QUESTIONS 1 AND 2: 0

## 2019-10-11 NOTE — PROGRESS NOTES
Hospital Medicine Daily Progress Note    Date of Service  10/10/2019    Chief Complaint  74 y.o. male admitted 10/4/2019 with L side weakness    Hospital Course    PMHx Lung CA s/p lobectomy and XRT 5 y.a..  Had been experiencing slowly progressive L side weakness and altered mental status over several months.  Initial imaging showing L cerebellar mass.  Went to the OR on 10/6 with Dr Lacey for a microscopic total excision.  Also noted to have apparently new lung mass.      Interval Problem Update  10/10. Tolerated procedure. Doing well good mentation. Deconditioned but participating with PT.     Consultants/Specialty  Neuro Surgery      Code Status  full    Disposition  OK to floor  Dispo dependent on functional status and Onc, Rad/Onc recommendations    Review of Systems  Review of Systems   Constitutional: Negative for chills and fever.   HENT: Negative for nosebleeds and sore throat.    Eyes: Negative for blurred vision and double vision.   Respiratory: Negative for cough and shortness of breath.    Cardiovascular: Negative for chest pain, palpitations and leg swelling.   Gastrointestinal: Negative for abdominal pain, diarrhea, nausea and vomiting.   Genitourinary: Negative for dysuria and urgency.   Musculoskeletal: Negative for back pain.   Skin: Negative for rash.   Neurological: Negative for dizziness, loss of consciousness and headaches.        Physical Exam  Temp:  [36.7 °C (98.1 °F)-37.3 °C (99.1 °F)] 36.8 °C (98.3 °F)  Pulse:  [54-76] 76  Resp:  [17-20] 17  BP: (123-172)/(71-96) 123/85  SpO2:  [91 %-94 %] 94 %    Physical Exam   Constitutional: He is oriented to person, place, and time. He appears well-developed and well-nourished. No distress.   HENT:   Head: Normocephalic and atraumatic.   Nose: Nose normal.   Mouth/Throat: Oropharynx is clear and moist.   Staples and inicion, R side of scalp and L occiput, no discharge, erythema, minimal swelling and minimal tenderness   Eyes: Conjunctivae are normal.    Neck: No JVD present.   Cardiovascular: Normal rate. Exam reveals no gallop.   No murmur heard.  Pulmonary/Chest: Effort normal. No stridor. No respiratory distress. He has no wheezes. He has no rales.   Abdominal: Soft. There is no tenderness. There is no rebound and no guarding.   Soft abd through out   Musculoskeletal: He exhibits no edema.   Neurological: He is alert and oriented to person, place, and time.   Skin: Skin is warm and dry. No rash noted. He is not diaphoretic.   Psychiatric: He has a normal mood and affect. Thought content normal.   Nursing note and vitals reviewed.      Fluids    Intake/Output Summary (Last 24 hours) at 10/10/2019 1816  Last data filed at 10/10/2019 0820  Gross per 24 hour   Intake 360 ml   Output 1000 ml   Net -640 ml       Laboratory  Recent Labs     10/08/19  0339 10/09/19  0455   WBC 11.0* 9.8   RBC 4.12* 4.03*   HEMOGLOBIN 12.5* 12.6*   HEMATOCRIT 38.3* 37.8*   MCV 93.0 93.8   MCH 30.3 31.3   MCHC 32.6* 33.3*   RDW 47.7 49.3   PLATELETCT 251 270   MPV 10.5 9.8     Recent Labs     10/08/19  0339 10/09/19  0455   SODIUM 135 136   POTASSIUM 3.7 3.9   CHLORIDE 108 107   CO2 22 21   GLUCOSE 133* 129*   BUN 23* 26*   CREATININE 0.91 0.92   CALCIUM 8.2* 8.6                   Imaging  DX-CHEST-PORTABLE (1 VIEW)   Final Result         1.  Left basilar atelectasis, no focal infiltrate   2.  Atherosclerosis      CT-HEAD W/O   Final Result         1.  Postoperative changes of pneumocephalus, small quantity of hemorrhage along the surgical bed, and subarachnoid hemorrhage of right frontal lobe mass is seen. Within expected limits for typical postop changes.   2.  Pneumocephalus and small quantity of hemorrhage along the surgical bed of left cerebellar mass resection, within expected limits.   3.  Right to left midline shift measuring 5 mm.      US-EXTREMITY VENOUS LOWER BILAT   Final Result      MR-BRAIN-WITH & W/O   Final Result      1.  Large right frontal and left cerebellar  heterogeneously enhancing intra-axial masses concerning for metastatic disease.   2.  Right to left midline shift measuring 1 cm.   3.  Effacement of the fourth ventricle secondary to the cerebellar lesion without evidence of hydrocephalus.   4.  Mild diffuse cerebral substance loss.   5.  Mild microangiopathic ischemic change versus the myelination gliosis.      CT-CHEST,ABDOMEN,PELVIS WITH   Final Result      1.  There is a spiculated 3.4 cm left upper lobe lung mass extending to the left suprahilar region and in casing the left upper lobe pulmonary arteries with questionable small amount of thrombus in one anterior branch. This finding is most consistent    with malignancy.   2.  There are several peripheral groundglass nodules in left upper lobe which could be additional areas of malignancy versus post obstructive pneumonitis.   3.  There is otherwise no mediastinal or hilar lymphadenopathy.   4.  There are a few nonspecific subcentimeter hypodense hepatic lesions, too small to characterize.   5.  There is no adenopathy in the abdomen or pelvis.   6.  There is mild intrahepatic biliary prominence of uncertain etiology.   7.  Stomach is not well evaluated as described above.   8.  There is one small area of sclerosis in the left iliac bone which could be a bone island or conceivably metastases although no other bone metastases are evident.      OUTSIDE IMAGES-CT HEAD   Final Result           Assessment/Plan  * Brain mass- (present on admission)  Assessment & Plan  Status post resection  NSCLC on pathology: Med and Rad Onc consulted  Continue neurochecks, he is status cranial active, follow neuro exam every 4 hours  Continue Decadron and Keppra  Transfer to neurology floor  PT/OT recommends rehab  Rehab accepted      History of lung cancer- (present on admission)  Assessment & Plan  History of lung cancer treated with lobectomy 8 years ago  Now has metastatic disease   Onc consulted    Lung mass- (present on  admission)  Assessment & Plan  Await pathology    Essential hypertension- (present on admission)  Assessment & Plan  IV hyralazine with parameters  Resume home lisinopril 10mg    Urine retention  Assessment & Plan  Place alejandra  Start flomax  Voiding trial in 48hrs    Hypophosphatemia- (present on admission)  Assessment & Plan  Kphos    Hypokalemia- (present on admission)  Assessment & Plan  Replace  Follow daily  Check Mg    ERNST (acute kidney injury) ([facility])- (present on admission)  Assessment & Plan  , creatinine has returned to normal range  Cont to follow daily BMP      COPD (chronic obstructive pulmonary disease) ([facility])  Assessment & Plan  Lung exam benign  Cont O2 and Resp protocols  Encourage smoking cessation    Tobacco abuse- (present on admission)  Assessment & Plan  Nicotine replacement protocol       VTE prophylaxis: none due to recent brain surgery

## 2019-10-11 NOTE — CARE PLAN
Problem: Safety  Goal: Will remain free from injury  Outcome: PROGRESSING AS EXPECTED  Note:   Instructed him on the importance of calling for all transfers and he verbalized understanding. Alarms in place.  Goal: Will remain free from falls  Outcome: PROGRESSING AS EXPECTED

## 2019-10-11 NOTE — PROGRESS NOTES
Assumed patient care, assessment completed. Complained of 3/10 pain, medication given per MAR. POC discussed, questions/concerns addressed. Bed locked, in lowest position, call light within reach, advised to call for assistance.

## 2019-10-11 NOTE — WOUND TEAM
"[facility]  Inpatient Services  Initial Wound and Skin Care Evaluation    Admission Date:  10/4/2019   HPI, PMH, SH: Reviewed  Unit where seen by Wound Team: S145/00    WOUND CONSULT RELATED TO: pressure injury mgmt    SUBJECTIVE:   \"I hit it when I fell then hit my head.\"    Self Report / Pain Level: slight pain with palpation      OBJECTIVE: sitting up sob, mepilex to sacrococcygeal area  WOUND TYPE, LOCATION, CHARACTERISTICS (Pressure ulcers: location, stage, POA or date identified)  Wound 10/06/19 Traumatic Coccyx;Sacrum (Active)      10/10/2019  3:00 PM   Site Assessment Red;Pink    Shreya-wound Assessment Intact    Margins Defined edges;Attached edges    Wound Length (cm) 3.5 cm 10/10/2019  3:00 PM   Wound Width (cm) 2.5 cm 10/10/2019  3:00 PM   Wound Depth (cm) 0 cm 10/10/2019  3:00 PM   Wound Surface Area (cm^2) 8.75 cm^2 10/10/2019  3:00 PM   Tunneling 0 cm 10/10/2019  3:00 PM   Undermining 0 cm 10/10/2019  3:00 PM   Drainage Amount None    Non-staged Wound Description Partial thickness    Treatments Cleansed    Cleansing Normal Saline Irrigation    Periwound Protectant Not Applicable    Dressing Options Mepilex    Dressing Status Intact    Dressing Change Frequency Every 72 hrs    NEXT Dressing Change  10/12/19    NEXT Weekly Photo (Inpatient Only) 10/17/19    Odor None     Exposed Structures None     Tissue Type and Percentage 100% red/pink scab      Vascular:  Wounds not r/t vascular problem    Lab Values:    WBC:       Lab Results   Component Value Date/Time    WBC 9.8 10/09/2019 04:55 AM    RBC 4.03 (L) 10/09/2019 04:55 AM      AIC:    No results found for: HBA1C       Culture:   na    INTERVENTIONS BY WOUND TEAM: pt stood with walker, peeled back mepilex, assessed skin then reapplied mepilex, pt then sat sob.   Dressing Options: Mepilex    Interdisciplinary consultation:   With Nursing;With Patient     EVALUATION: pt has traumatic wound to L side of sacrococcygeal area, it has a scab " present that blanches.     Factors affecting wound healing: fall, lung mass, brain mass, tobacco abuse, HTN, COPD  Goals:  Steady decrease in wound area and depth weekly      NURSING PLAN OF CARE ORDERS (X):    Dressing changes: See Dressing Care orders:  x   Skin care: See Skin Care orders:   Rectal tube care: See Rectal Tube Care orders:   Other orders:    RSKIN: CURRENT (X) ORDERED (O)  Q shift Tim:  X  Q shift pressure point assessments:  X  Pressure redistribution mattress  x          IRAJ          Bariatric IRAJ        Bariatric foam           Heel float boots         Float Heels off Bed with Pillows                  Barrier Cream         Barrier paste        Sacral silicone dressing   x      Silicone O2 tubing         Anchorfast         Cannula fixation Device (Tender )          Gray Foam Ear protectors           Trach with split foam               Waffle cushion        Waffle Overlay         Rectal tube or BMS         Antifungal tx   Interdry         Reposition q 2 hours     pt can perform  Up to chair        Ambulate   with walker and staff   PT/OT        Dietician        Diabetes Education      PO  x  TF     TPN     NPO   # days   Other     WOUND TEAM PLAN OF CARE (X):   NPWT change 3 x week:        Dressing changes by wound team:       Follow up as needed:  If wound worsens     Other (explain):    Anticipated discharge plans (X):  SNF:           Home Care:           Outpatient Wound Center:            Self Care:            Other:  No advanced wound care needs@ this time

## 2019-10-11 NOTE — CARE PLAN
Problem: Communication  Goal: The ability to communicate needs accurately and effectively will improve  Outcome: PROGRESSING AS EXPECTED    Patient able to communicate needs to staff. Call light within reach, patient educated to call for assistance. Patient calls appropriately. Will continue to assess.      Problem: Safety  Goal: Will remain free from falls  Outcome: PROGRESSING AS EXPECTED    Patient educated to call for assistance. Precautions in place; Call light within reach. Bed alarm in use.  Bed locked and in lowest position. Treaded socks in place. Hourly rounding. Will continue to monitor.

## 2019-10-11 NOTE — PROGRESS NOTES
1221  Patient was discharged with patient's belongings, prescription for Norco, piv, alejandra catheter, Advance Directives copy to be given to the patient daughter (notified YUVAL William at [facility]) via w/c accompanied by one male [facility] ( Hipolito Robb - showed the [facility] prescription) via [facility].

## 2019-10-11 NOTE — PROGRESS NOTES
Neurosurgery Progress Note    Subjective:  Denies new symptoms.  To rehab today  Exam:  AAOx4. Tongue ML, no drift. Min/mod dysmetria on left.  FCx4. CDI x2    BP  Min: 123/85  Max: 149/88  Pulse  Av.8  Min: 61  Max: 76  Resp  Av.5  Min: 16  Max: 17  Temp  Av °C (98.6 °F)  Min: 36.8 °C (98.3 °F)  Max: 37.1 °C (98.7 °F)  SpO2  Av %  Min: 93 %  Max: 95 %    No data recorded    Recent Labs     10/09/19  0455   WBC 9.8   RBC 4.03*   HEMOGLOBIN 12.6*   HEMATOCRIT 37.8*   MCV 93.8   MCH 31.3   MCHC 33.3*   RDW 49.3   PLATELETCT 270   MPV 9.8     Recent Labs     10/09/19  0455   SODIUM 136   POTASSIUM 3.9   CHLORIDE 107   CO2 21   GLUCOSE 129*   BUN 26*   CREATININE 0.92   CALCIUM 8.6               Intake/Output       10/10/19 07 - 10/11/19 0659 10/11/19 07 - 10/12/19 0659      [id number] Total [id number] Total       Intake    P.O.  1080  -- 1080  --  -- --    P.O. 1080 -- 1080 -- -- --    Total Intake 1080 -- 1080 -- -- --       Output    Urine  --  3000 3000  --  -- --    Output (mL) (Urethral Catheter Non-latex 16 Fr.) -- 3000 3000 -- -- --    Total Output -- 3000 3000 -- -- --       Net I/O     1080 -3000 -1920 -- -- --            Intake/Output Summary (Last 24 hours) at 10/11/2019 0843  Last data filed at 10/11/2019 0400  Gross per 24 hour   Intake 720 ml   Output 3000 ml   Net -2280 ml            • HYDROcodone-acetaminophen  1-2 Tab Q6HRS PRN   • lisinopril  20 mg DAILY   • lisinopril  10 mg Q DAY   • tamsulosin  0.4 mg AFTER BREAKFAST   • dexamethasone  4 mg Q8HRS    Or   • dexamethasone  4 mg Q8HRS   • levETIRAcetam  500 mg BID   • famotidine  20 mg BID   • potassium chloride SA  20 mEq DAILY   • simvastatin  40 mg Q EVENING   • Pharmacy Consult Request  1 Each PHARMACY TO DOSE   • MD ALERT...DO NOT ADMINISTER NSAIDS or ASPIRIN unless ORDERED By Neurosurgery  1 Each PRN   • ondansetron  4 mg Q4HRS PRN   • scopolamine  1 Patch Q72HRS PRN   • labetalol  10 mg Q HOUR PRN   •  cloNIDine  0.1 mg Q4HRS PRN   • docusate sodium  100 mg BID   • senna-docusate  1 Tab Nightly   • senna-docusate  1 Tab Q24HRS PRN   • polyethylene glycol/lytes  1 Packet BID PRN   • magnesium hydroxide  30 mL QDAY PRN   • bisacodyl  10 mg Q24HRS PRN   • artificial tears  1 Application Q8HRS   • ipratropium-albuterol  3 mL Q4H PRN (RT)   • hydrALAZINE  10-20 mg Q6HRS PRN   • phosphorus  1 Tab BID   • senna-docusate  2 Tab BID    And   • polyethylene glycol/lytes  1 Packet QDAY PRN    And   • magnesium hydroxide  30 mL QDAY PRN    And   • bisacodyl  10 mg QDAY PRN   • Respiratory Care per Protocol   Continuous RT   • acetaminophen  650 mg Q6HRS PRN   • ondansetron  4 mg Q4HRS PRN   • nicotine  14 mg Daily-0600    And   • nicotine polacrilex  2 mg Q HOUR PRN       Assessment and Plan:  POD #5 Crani x2 Hx Lung CA  Prophylactic anticoagulation: no         Start date/time: tbd    Plan:  CT- stable reviewed by Dr Lacey  PTOT- rec transitional facility  Dex 4q8 x3 days then Bid x3 days, then 2q8 x3 days, 2 bid x3 days, 2 qday then stop  Keppra continue for one month  Path-Non SCC cw lung  [facility]- [facility] as well  Angelo has seen pt for XRT  Ok to rehab when able- staples out in 10-14 days follow up in one month

## 2019-10-11 NOTE — DISCHARGE SUMMARY
Discharge Summary    CHIEF COMPLAINT ON ADMISSION  Chief Complaint   Patient presents with   • Weakness     [facility]  Pt has been weak and falling x 3 weeks.  [facility] found [facility]       Reason for Admission  transfer     CODE STATUS  Full Code    HPI & HOSPITAL COURSE  This is a 74 y.o. male here with Weakness (BIB [facility].  Pt has been weak and falling x 3 weeks.  [facility] found [facility])    Please review Dr. Nasima Smith M.D. notes for further details of history of present illness, past medical/social/family histories, allergies and medications. Please review Dr. White, SHAKIRA, Dr. Black PMA/CC, Dr. Vernon, Rad Onc, Dr. Adrian. Onc,. Dr. Deluca, Rehab consultation notes.  Remote history of lung cance,r unclear if he had chemotherapy or radiation.  Presents with Weakness (BIB [facility].  Pt has been weak and falling x 3 weeks.  [facility] found [facility])  [facility] brain [facility].  At the ED, afebrile, mild hypertension.  No leukocytosis.  Dr. Lacey consulted.  He was put on IV decadron and Keppra  He is s/p   1. 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  2. A 5 cm titanium cranioplasty.  For  1. Left superior cerebellar metastasis.  2. Large right frontal metastasis.  By Dr. Lacey on 10/6.   Dr. Stephens Rad Onc consulted and commented that sterotactic radiosurgery wasn't an option.   Postop head CT:  1.  Postoperative changes of pneumocephalus, small quantity of hemorrhage along the surgical bed, and subarachnoid hemorrhage of right frontal lobe mass is seen. Within expected limits for typical postop changes.  2.  Pneumocephalus and small quantity of hemorrhage along the surgical bed of left cerebellar mass resection, within expected limits.  3.  Right to left midline shift measuring 5 mm.  He tolerated the procedure. PT/OT recommended SNF. He was accepted to [facility] rehab/SNF.    Kaylah, Oncology, Dr. Stephens Rad Onc consulted as above.  Pan CT showed:  1.  There is a spiculated 3.4 cm left upper lobe lung mass extending to the left suprahilar region and in casing the left upper lobe pulmonary arteries with questionable small amount of thrombus in one anterior branch. This finding is most consistent   with malignancy.  2.  There are several peripheral groundglass nodules in left upper lobe which could be additional areas of malignancy versus post obstructive pneumonitis.  3.  There is otherwise no mediastinal or hilar lymphadenopathy.  4.  There are a few nonspecific subcentimeter hypodense hepatic lesions, too small to characterize.  5.  There is no adenopathy in the abdomen or pelvis.  6.  There is mild intrahepatic biliary prominence of uncertain etiology.  7.  Stomach is not well evaluated as described above.  8.  There is one small area of sclerosis in the left iliac bone which could be a bone island or conceivably metastases although no other bone metastases are evident.  MRI brain:  1.  Large right frontal and left cerebellar heterogeneously enhancing intra-axial masses concerning for metastatic disease.  2.  Right to left midline shift measuring 1 cm.  3.  Effacement of the fourth ventricle secondary to the cerebellar lesion without evidence of hydrocephalus.  4.  Mild diffuse cerebral substance loss.  5.  Mild microangiopathic ischemic change versus the myelination gliosis.  Studies were ordered: EGFR/Alk/RoS and BRAF. [facility] if needed. This [facility] be followed up by [facility].     His blood pressures remained stable. This can be checked at rehab. He has urinary retention likely due to medications. He will be on Flomax and Burns as needed. Recommend Urology referral in the outpatient. His COPD is stable/not exacerbating and he is encouraged not to smoke. COntinue respiratory care and O2 per protocol at rehab    At discharge date, Milan Clark afebrile  and hemodynamically stable.  Milan Clark wanted to be discharged today.    Discharge Physical Exam  Constitutional: He is oriented to person, place, and time. He appears well-developed and well-nourished. No distress.   HENT:   Head: Normocephalic and atraumatic.   Nose: Nose normal.   Mouth/Throat: Oropharynx is clear and moist.   Staples and inicion, R side of scalp and L occiput, no discharge, erythema, minimal swelling and minimal tenderness   Eyes: Conjunctivae are normal.   Neck: No JVD present.   Cardiovascular: Normal rate. Exam reveals no gallop.   No murmur heard.  Pulmonary/Chest: Effort normal. No stridor. No respiratory distress. He has no wheezes. He has no rales.   Abdominal: Soft. There is no tenderness. There is no rebound and no guarding.   Soft abd through out   Musculoskeletal: He exhibits no edema.   Neurological: He is alert and oriented to person, place, and time.   Skin: Skin is warm and dry. No rash noted. He is not diaphoretic.   Psychiatric: He has a normal mood and affect. Thought content normal.   Nursing note and vitals reviewed.       Imaging  DX-CHEST-PORTABLE (1 VIEW)   Final Result         1.  Left basilar atelectasis, no focal infiltrate   2.  Atherosclerosis      CT-HEAD W/O   Final Result         1.  Postoperative changes of pneumocephalus, small quantity of hemorrhage along the surgical bed, and subarachnoid hemorrhage of right frontal lobe mass is seen. Within expected limits for typical postop changes.   2.  Pneumocephalus and small quantity of hemorrhage along the surgical bed of left cerebellar mass resection, within expected limits.   3.  Right to left midline shift measuring 5 mm.      US-EXTREMITY VENOUS LOWER BILAT   Final Result      MR-BRAIN-WITH & W/O   Final Result      1.  Large right frontal and left cerebellar heterogeneously enhancing intra-axial masses concerning for metastatic disease.   2.  Right to left midline shift measuring 1 cm.   3.  Effacement of  the fourth ventricle secondary to the cerebellar lesion without evidence of hydrocephalus.   4.  Mild diffuse cerebral substance loss.   5.  Mild microangiopathic ischemic change versus the myelination gliosis.      CT-CHEST,ABDOMEN,PELVIS WITH   Final Result      1.  There is a spiculated 3.4 cm left upper lobe lung mass extending to the left suprahilar region and in casing the left upper lobe pulmonary arteries with questionable small amount of thrombus in one anterior branch. This finding is most consistent    with malignancy.   2.  There are several peripheral groundglass nodules in left upper lobe which could be additional areas of malignancy versus post obstructive pneumonitis.   3.  There is otherwise no mediastinal or hilar lymphadenopathy.   4.  There are a few nonspecific subcentimeter hypodense hepatic lesions, too small to characterize.   5.  There is no adenopathy in the abdomen or pelvis.   6.  There is mild intrahepatic biliary prominence of uncertain etiology.   7.  Stomach is not well evaluated as described above.   8.  There is one small area of sclerosis in the left iliac bone which could be a bone island or conceivably metastases although no other bone metastases are evident.      OUTSIDE IMAGES-CT HEAD   Final Result            Therefore, he is discharged in good and stable condition to an inpatient rehabilitation hospital.    The patient met 2-midnight criteria for an inpatient stay at the time of discharge.      FOLLOW UP ITEMS POST DISCHARGE  [facility] to [facility] studies Dr. Adrian, Oncology sent    DISCHARGE DIAGNOSES  Principal Problem:    Brain mass POA: Yes  Active Problems:    Lung mass POA: Yes    History of lung cancer POA: Yes    ERNST (acute kidney injury) (HCC) POA: Yes    Hypokalemia POA: Yes    Hypophosphatemia POA: Yes    Urine retention POA: Unknown    Essential hypertension POA: Yes    COPD (chronic obstructive pulmonary disease) (HCC) POA: Unknown    Tobacco abuse POA:  Yes      FOLLOW UP  Future Appointments   Date Time Provider Department Center   10/21/2019  1:00 PM RADIATION THERAPY RAD[facility] None     No follow-up provider specified.    MEDICATIONS ON DISCHARGE     Medication List      START taking these medications      Instructions   acetaminophen 325 MG Tabs  Commonly known as:  TYLENOL   Take 2 Tabs by mouth every 6 hours as needed (Mild Pain; (Pain scale 1-3); Temp greater than 100.5 F).  Dose:  650 mg     bisacodyl 10 MG Supp  Commonly known as:  DULCOLAX   Insert 1 Suppository in rectum every 24 hours as needed (if sennosides, docusate, polyethylene glycol 3350, and/or magnesium hydroxide ineffective or not ordered).  Dose:  10 mg     * dexamethasone 4 MG Tabs  Commonly known as:  DECADRON   Take 1 Tab by mouth every 8 hours for 1 day.  Dose:  4 mg     * dexamethasone 4 MG Tabs  Start taking on:  10/12/2019  Commonly known as:  DECADRON   Take 1 Tab by mouth every 12 hours for 2 days.  Dose:  4 mg     * dexamethasone 2 MG tablet  Start taking on:  10/15/2019  Commonly known as:  DECADRON   Take 1 Tab by mouth every 8 hours for 2 days.  Dose:  2 mg     * dexamethasone 2 MG tablet  Start taking on:  10/18/2019  Commonly known as:  DECADRON   Take 1 Tab by mouth every 12 hours for 2 days.  Dose:  2 mg     * dexamethasone 2 MG tablet  Start taking on:  10/21/2019  Commonly known as:  DECADRON   Take 1 Tab by mouth every day for 2 days.  Dose:  2 mg     famotidine 20 MG Tabs  Commonly known as:  PEPCID   Take 1 Tab by mouth 2 Times a Day.  Dose:  20 mg     HYDROcodone-acetaminophen 5-325 MG Tabs per tablet  Commonly known as:  NORCO   Take 1-2 Tabs by mouth every 8 hours as needed for up to 5 days.  Dose:  1-2 Tab     ipratropium-albuterol 0.5-2.5 (3) MG/3ML nebulizer solution  Commonly known as:  DUONEB   3 mL by Nebulization route every four hours as needed for Shortness of Breath.  Dose:  3 mL     levETIRAcetam 500 MG Tabs  Commonly known as:  KEPPRA   Take 1 Tab by mouth 2  Times a Day.  Dose:  500 mg     nicotine 14 MG/24HR Pt24  Commonly known as:  NICODERM   Apply 1 Patch to skin as directed every 24 hours.  Dose:  1 Patch     ondansetron 4 MG Tbdp  Commonly known as:  ZOFRAN ODT   Take 1 Tab by mouth every four hours as needed for Nausea (give PO if IV route is unavailable.).  Dose:  4 mg     polyethylene glycol/lytes Pack  Commonly known as:  MIRALAX   Take 1 Packet by mouth 1 time daily as needed (if sennosides and docusate ineffective after 24 hours).  Dose:  17 g     potassium chloride SA 20 MEQ Tbcr  Commonly known as:  Kdur   Take 1 Tab by mouth every day.  Dose:  20 mEq     * senna-docusate 8.6-50 MG Tabs  Commonly known as:  PERICOLACE or SENOKOT S   Take 1 Tab by mouth 2 times a day.  Dose:  1 Tab     * senna-docusate 8.6-50 MG Tabs  Commonly known as:  PERICOLACE or SENOKOT S   Take 2 Tabs by mouth 2 Times a Day.  Dose:  2 Tab     tamsulosin 0.4 MG capsule  Commonly known as:  FLOMAX   Take 1 Cap by mouth ONE-HALF HOUR AFTER BREAKFAST.  Dose:  0.4 mg         * This list has 7 medication(s) that are the same as other medications prescribed for you. Read the directions carefully, and ask your doctor or other care provider to review them with you.            CHANGE how you take these medications      Instructions   lisinopril 20 MG Tabs  What changed:    · medication strength  · how much to take  Commonly known as:  PRINIVIL   Take 1 Tab by mouth every day.  Dose:  20 mg        CONTINUE taking these medications      Instructions   losartan 25 MG Tabs  Commonly known as:  COZAAR   Take 12.5 mg by mouth every day.  Dose:  12.5 mg     simvastatin 40 MG Tabs  Commonly known as:  ZOCOR   Take 40 mg by mouth every evening.  Dose:  40 mg        STOP taking these medications    diclofenac EC 75 MG Tbec  Commonly known as:  VOLTAREN     tramadol 50 MG Tabs  Commonly known as:  ULTRAM            Allergies  No Known Allergies    DIET  Orders Placed This Encounter   Procedures   • Diet  Order Regular     Standing Status:   Standing     Number of Occurrences:   1     Order Specific Question:   Diet:     Answer:   Regular [1]       ACTIVITY  As per SNF  NO DRIVING. Do not forget DMV form to be signed once released from SNF or rehabv, will defer to the attending at SNF/rehab.    LINES, DRAINS, AND WOUNDS  This is an automated list. Peripheral IVs will be removed prior to discharge.  Peripheral IV 10/04/19 18 G Right Forearm (Active)   Site Assessment Clean;Dry;Intact 10/11/2019  8:26 AM   Dressing Type Transparent 10/11/2019  8:26 AM   Line Status Scrubbed the hub prior to access;Flushed;Saline locked 10/11/2019  8:26 AM   Dressing Status Clean;Dry;Intact 10/11/2019  8:26 AM   Dressing Intervention N/A 10/11/2019  8:26 AM   Date Primary Tubing Changed 10/06/19 10/6/2019  8:00 PM   Date Secondary Tubing Changed 10/06/19 10/6/2019  8:00 PM   NEXT Primary Tubing Change  10/08/19 10/6/2019  8:00 PM   NEXT Secondary Tubing Change  10/07/19 10/6/2019  8:00 PM   [place]fil[place] [facility] [place] 0 10/11/2019  8:26 AM   Phlebitis Scale ([facility] Only) 0 10/11/2019  8:26 AM     Urethral Catheter Non-latex 16 Fr. (Active)   Site Assessment Clean;Skin intact 10/9/2019  8:00 AM   Collection Container Standard drainage bag 10/9/2019  8:00 AM   Urinary Catheter Care Tamper Evident Seal in Place;Drainage Tube Extended;Drainage Bag Not Overfilled;Drainage Tubing Properly Secured;Drainage Bag Below Bladder Level and Not on Floor 10/9/2019  8:00 AM   Securement Method Securing device (Describe) 10/9/2019  8:00 AM   Output (mL) 700 mL 10/11/2019 10:56 AM      Wound 10/06/19 Traumatic Coccyx;Sacrum (Active)   Wound Image   10/10/2019  3:00 PM   Site Assessment Red;Pink 10/11/2019  8:26 AM   Shreya-wound Assessment Intact 10/11/2019  8:26 AM   Margins Defined edges;Attached edges 10/10/2019  8:50 PM   Wound Length (cm) 3.5 cm 10/10/2019  3:00 PM   Wound Width (cm) 2.5 cm 10/10/2019  3:00 PM   Wound Depth (cm) 0 cm  10/10/2019  3:00 PM   Wound Surface Area (cm^2) 8.75 cm^2 10/10/2019  3:00 PM   Tunneling 0 cm 10/10/2019  3:00 PM   Undermining 0 cm 10/10/2019  3:00 PM   Drainage Amount None 10/10/2019  3:00 PM   Non-staged Wound Description Partial thickness 10/10/2019  3:00 PM   Treatments Cleansed 10/10/2019  3:00 PM   Cleansing Normal Saline Irrigation 10/10/2019  3:00 PM   Periwound Protectant Not Applicable 10/10/2019  3:00 PM   Dressing Options Mepilex 10/11/2019  8:26 AM   Dressing Status Intact;Clean;Dry 10/11/2019  8:26 AM   Dressing Change Frequency Every 72 hrs 10/10/2019  8:50 PM   NEXT Dressing Change  10/12/19 10/10/2019  8:50 PM   NEXT Weekly Photo (Inpatient Only) 10/17/19 10/10/2019  8:50 PM   WOUND NURSE ONLY - Odor None 10/10/2019  3:00 PM   WOUND NURSE ONLY - Exposed Structures None 10/10/2019  3:00 PM   WOUND NURSE ONLY - Tissue Type and Percentage 100% red/pink scab 10/10/2019  3:00 PM   WOUND NURSE ONLY - Time Spent with Patient (mins) 40 10/10/2019  3:00 PM       Peripheral IV 10/04/19 18 G Right Forearm (Active)   Site Assessment Clean;Dry;Intact 10/11/2019  8:26 AM   Dressing Type Transparent 10/11/2019  8:26 AM   Line Status Scrubbed the hub prior to access;Flushed;Saline locked 10/11/2019  8:26 AM   Dressing Status Clean;Dry;Intact 10/11/2019  8:26 AM   Dressing Intervention N/A 10/11/2019  8:26 AM   Date Primary Tubing Changed 10/06/19 10/6/2019  8:00 PM   Date Secondary Tubing Changed 10/06/19 10/6/2019  8:00 PM   NEXT Primary Tubing Change  10/08/19 10/6/2019  8:00 PM   NEXT Secondary Tubing Change  10/07/19 10/6/2019  8:00 PM   Infiltration Grading ([facility], [facility] [place]) 0 10/11/2019  8:26 AM   Phlebitis Scale ([facility] Only) 0 10/11/2019  8:26 AM           Urethral Catheter Non-latex 16 Fr. (Active)   Site Assessment Clean;Skin intact 10/9/2019  8:00 AM   Collection Container Standard drainage bag 10/9/2019  8:00 AM   Urinary Catheter Care Tamper Evident Seal in Place;Drainage Tube Extended;Drainage Bag  Not Overfilled;Drainage Tubing Properly Secured;Drainage Bag Below Bladder Level and Not on Floor 10/9/2019  8:00 AM   Securement Method Securing device (Describe) 10/9/2019  8:00 AM   Output (mL) 700 mL 10/11/2019 10:56 AM        MENTAL STATUS ON TRANSFER  Level of Consciousness: Alert  Orientation : Oriented x 4  Speech: Speech Clear    CONSULTATIONS  NSG  PMA/CC  Oncology  Rad Onc    PROCEDURES  Ct-chest,abdomen,pelvis With    Result Date: 10/5/2019  10/5/2019 10:04 AM HISTORY/REASON FOR EXAM:  Unknown primary, adenocarcinoma or carcinoma NOS, bone eval; Brain mets  Hx lung CA 6 years ago. TECHNIQUE/EXAM DESCRIPTION: CT scan of the chest, abdomen and pelvis with contrast. Thin-section helical scanning was obtained with intravenous contrast from the lung apices through the pubic symphysis to include the chest, abdomen and pelvis.  100 mL of Omnipaque 350 nonionic contrast was administered intravenously without complication. Low dose optimization technique was utilized for this CT exam including automated exposure control and adjustment of the mA and/or kV according to patient size. COMPARISON: None available. FINDINGS: CT CHEST: There is extensive atherosclerosis of the thoracic aorta with coronary artery calcification. There is a lobulated spiculated mass in the anterior left upper lobe measuring an estimated 3.4 x 3.1 cm which extends into the left upper lobe pulmonary arteries and directly abuts the left main pulmonary artery. There may be some associated thrombus in  portions of the left upper lobe anterior branches. There are a few hazy groundglass nodular opacities in the more peripheral left upper lobe which could be small areas of active pulmonary metastases or inflammation. There is linear opacity in the posterior left lower lobe which is likely postinflammatory  scarring and/or atelectasis versus possibly pneumonitis. There is postoperative change involving the right upper lobe region extending into the  right hilum. There are no suspicious nodules or masses on the right. There is no mediastinal lymphadenopathy. There is no hilar adenopathy. There is no pleural effusion or pericardial effusion. CT ABDOMEN: Liver: There is mild intrahepatic biliary prominence. There for a few subcentimeter hypodense lesions which are too small to accurately characterize. Main portal vein is dilated at 2 cm in diameter. Spleen: Unremarkable. Pancreas: Somewhat atrophic but no definite masses are seen. Gallbladder: There are no stones. Adrenal glands: There are not well visualized but there is no definite mass. There may be some mild diffuse enlargement left. Kidneys: There is no hydronephrosis or mass. There is extensive atherosclerosis of the aorta and branch vessels. Bowel demonstrates no obstruction. The borders of the stomach are not well delineated with questionable wall thickening. There is some internal fluid and/or other debris. There is no definite abdominal adenopathy. CT PELVIS: Bladder is markedly distended. There is moderate stool. There is atherosclerosis. There is no pelvic adenopathy. Bones: There is degenerative change in the spine and pelvis. There is a subtle area of increased sclerosis in the left medial iliac bone.     1.  There is a spiculated 3.4 cm left upper lobe lung mass extending to the left suprahilar region and in casing the left upper lobe pulmonary arteries with questionable small amount of thrombus in one anterior branch. This finding is most consistent with malignancy. 2.  There are several peripheral groundglass nodules in left upper lobe which could be additional areas of malignancy versus post obstructive pneumonitis. 3.  There is otherwise no mediastinal or hilar lymphadenopathy. 4.  There are a few nonspecific subcentimeter hypodense hepatic lesions, too small to characterize. 5.  There is no adenopathy in the abdomen or pelvis. 6.  There is mild intrahepatic biliary prominence of uncertain  etiology. 7.  Stomach is not well evaluated as described above. 8.  There is one small area of sclerosis in the left iliac bone which could be a bone island or conceivably metastases although no other bone metastases are evident.    Ct-head W/o    Result Date: 10/7/2019  10/7/2019 3:08 AM HISTORY/REASON FOR EXAM: CNS metastatic lesions, monitor; Post Surgical TECHNIQUE/EXAM DESCRIPTION:  CT of the head without contrast. Sequential axial images were obtained from the vertex to the skull base without contrast. Up to date radiation dose reduction adjustments have been utilized to meet ALARA standards for radiation dose reduction. COMPARISON:  October 4, 2019 FINDINGS: Intraoperative changes of left cerebellar mass resection is seen. There is minimal hyperdense hemorrhage along the surgical bed. Pneumocephalus in the posterior fossa is visualized. Postsurgical changes of right frontal lobe mass resection are observed. There is 11 x 17 mm focus of hyperdense hemorrhage along the anterior aspect of the right frontal lobe surgical bed. Scattered subarachnoid hemorrhage of the right frontal sulci are noted. Vasogenic edema in the right frontal lobe is seen. There is partial effacement of the bilateral ventricular system with 5 mm right to left midline shift. Pneumocephalus along the right hemisphere is seen, greatest anteriorly. Small quantity of pneumocephalus is seen anterior to the left frontal lobe. The visualized paranasal sinuses and mastoid air cells are well aerated bilaterally. No depressed calvarial fractures are identified. The visualized globes and retrobulbar soft tissues appear within normal limits.  Atherosclerotic intracranial calcifications are seen. Soft tissue gas in the right scalp is seen.     1.  Postoperative changes of pneumocephalus, small quantity of hemorrhage along the surgical bed, and subarachnoid hemorrhage of right frontal lobe mass is seen. Within expected limits for typical postop changes.  2.  Pneumocephalus and small quantity of hemorrhage along the surgical bed of left cerebellar mass resection, within expected limits. 3.  Right to left midline shift measuring 5 mm.    Dx-chest-portable (1 View)    Result Date: 10/9/2019    10/9/2019 6:24 AM HISTORY/REASON FOR EXAM: Chest Pain TECHNIQUE/EXAM DESCRIPTION:  Single AP view of the chest. COMPARISON: None FINDINGS: The cardiac silhouette appears within normal limits. Atherosclerotic calcification of the aorta is noted.  The central pulmonary vasculature appears normal. The lungs appear well expanded bilaterally.  Hazy linear density in the left lung base is observed. No significant pleural effusions are identified. The bony structures appear age-appropriate.     1.  Left basilar atelectasis, no focal infiltrate 2.  Atherosclerosis    Mr-brain-with & W/o    Result Date: 10/5/2019  10/5/2019 8:55 AM HISTORY/REASON FOR EXAM:  Altered mental status, left-sided weakness. TECHNIQUE/EXAM DESCRIPTION: MRI of the brain without and with contrast, with diffusion. The study was performed on a Zula Signa 1.5 Mamie MRI scanner. Spoiled-GRASS sagittal, thin-section T2 axial, T1 coronal, T1 postcontrast coronal, T1 postcontrast axial, FLAIR coronal and diffusion-weighted axial images were obtained of the whole brain. 10 mL ProHance contrast were administered intravenously. COMPARISON:  Outside CT head dated 10/4/2019. FINDINGS: There is a lobular heterogeneously intra-axial enhancing mass in the right frontal lobe measuring 4.8 x 6.8 x 4.9 cm. There is adjacent confluent T2 and FLAIR signal hyperintensity consistent with edema. There is significant associated mass effect with midline shift measuring 1 cm. There is a second lobulated heterogeneously enhancing intra-axial mass in the left cerebellar hemisphere measuring 3.0 x 4.3 x 2.6 cm. There is adjacent T2 and FLAIR signal hyperintensity consistent with edema. There is local mass effect with effacement of the fourth  ventricle. There is no evidence of hydrocephalus. There is a pattern of mild cerebral atrophy manifest as prominence of sulcal markings over the convexities and vertex along with mild ventriculomegaly. The bony calvaria are intact. There is a pattern of mild supratentorial white matter disease with scattered foci of bright T2 and FLAIR signal in the subcortical and deep white matter of both hemispheres consistent with small vessel ischemic change versus demyelination or gliosis. There is no evidence of abnormal diffusion-weighted signal intensity in the brain. There are normal flow voids in the cavernous carotid arteries and M1 segments. There are normal flow voids in the distal vertebral basilar system. There are normal flow voids in the dural venous sinuses. The visualized paranasal sinuses and mastoid air cells appear clear.     1.  Large right frontal and left cerebellar heterogeneously enhancing intra-axial masses concerning for metastatic disease. 2.  Right to left midline shift measuring 1 cm. 3.  Effacement of the fourth ventricle secondary to the cerebellar lesion without evidence of hydrocephalus. 4.  Mild diffuse cerebral substance loss. 5.  Mild microangiopathic ischemic change versus the myelination gliosis.    Us-extremity Venous Lower Bilat    Result Date: 10/5/2019   Vascular Laboratory  CONCLUSIONS  Normal bilateral superficial and deep venous examination of the lower  extremities.  BRAULIO PRINCE  Exam Date:     10/05/2019 13:22  Room #:     Inpatient  Priority:     Stat  Ht (in):             Wt (lb):  Ordering Physician:        JOESPH PINTO  Referring Physician:       [id number]  Sonographer:               Akbar Lisa RVT  Study Type:                Complete Bilateral  Technical Quality:         Adequate  Age:    74    Gender:     M  MRN:    [id number]  :    1945      BSA:  Indications:     Pulmonary Embolus  CPT Codes:       03256  ICD Codes:       415.19  History:          brain mass, history of lung cancer; lung mass  Limitations:  PROCEDURES:  Venous duplex imaging was performed in both lower extremities including  serial compressions and spectral Doppler flow evaluation.  FINDINGS:  The veins of the lower extremities are readily compressible with complete  color filling observed.  Venous flow is continuous consistent with a more  proximal obstruction.   Continuous flow is observed in the external iliac veins, normal phasic flow  is observed in the proximal common femoral veins, distal common femoral &  proximal external iliac veins are not observed secondary to the enlarged  bladder, may be extrinsic compression.  Guerrero Fuentes MD  (Electronically Signed)  Final Date:      05 October 2019                   14:48    Please see Dr. Lacey's OP NOTE    LABORATORY  Lab Results   Component Value Date    SODIUM 136 10/09/2019    POTASSIUM 3.9 10/09/2019    CHLORIDE 107 10/09/2019    CO2 21 10/09/2019    GLUCOSE 129 (H) 10/09/2019    BUN 26 (H) 10/09/2019    CREATININE 0.92 10/09/2019        Lab Results   Component Value Date    WBC 9.8 10/09/2019    HEMOGLOBIN 12.6 (L) 10/09/2019    HEMATOCRIT 37.8 (L) 10/09/2019    PLATELETCT 270 10/09/2019        Total time of the discharge process exceeds 35 minutes.

## 2019-10-11 NOTE — PALLIATIVE CARE
Palliative Care follow-up  PC RN met with pt at bedside. Pt was unsure of location of AD, it was inside of care channel booklet. PC RN helped pt locate AD. Discussed purpose of AD and asked BS RN to ensure the RNs at rehab are aware to give the original copy of AD to pt's daughter Iris.       Updated: BS RN     Plan: pt will DC to rehab. Team aware that AD needs to be returned to pt's dtr.     Thank you for allowing Palliative Care to participate in this patient's care. Please feel free to call [phone] with any questions or concerns.

## 2019-10-11 NOTE — PROGRESS NOTES
RN MOBILITY NOTE - 10/10/19     Surgery patient?: yes  Date of surgery: 10/6  Ambulated 50 ft on day of surgery? (N/A if today is not date of surgery): n/a  Number of times ambulated 50 feet or greater today: 2  Patient has been up to chair, edge of bed or HOB 90 degrees for all meals?: 2/3  Goal met? (goal is ambulating at least 50 feet 2 times on day shift, one time on night shift): no  If patient did not meet mobility goal, why?: pt refused, education provided

## 2019-10-11 NOTE — DISCHARGE PLANNING
Dr. Allen has accepted [place].  Transport has been arranged for 1130. Msg placed to ANGELA Lema, Dr. Smith & Bre (CM).  Jing (BSN) is aware.

## 2019-10-11 NOTE — DISCHARGE INSTRUCTIONS
Discharge Instructions    Discharged to other [facility] by [facility] with escort. Discharged via wheelchair, hospital escort: Yes.  Special equipment needed: alejandra catheter    Be sure to schedule a follow-up appointment with your primary care doctor or any specialists as instructed.     Discharge Plan:   Smoking Cessation Offered: Patient Refused  Influenza Vaccine Indication: Indicated: 65 years and older  Influenza Vaccine Given - only chart on this line when given: Influenza Vaccine Given (See MAR)    I understand that a diet low in cholesterol, fat, and sodium is recommended for good health. Unless I have been given specific instructions below for another diet, I accept this instruction as my diet prescription.   Other diet: Regular    Special Instructions: None    · Is patient discharged on Warfarin / Coumadin?   No     Depression / Suicide Risk    As you are discharged from this [facility] facility, it is important to learn how to keep safe from harming yourself.    Recognize the warning signs:  · Abrupt changes in personality, positive or negative- including increase in energy   · Giving away possessions  · Change in eating patterns- significant weight changes-  positive or negative  · Change in sleeping patterns- unable to sleep or sleeping all the time   · Unwillingness or inability to communicate  · Depression  · Unusual sadness, discouragement and loneliness  · Talk of wanting to die  · Neglect of personal appearance   · Rebelliousness- reckless behavior  · Withdrawal from people/activities they love  · Confusion- inability to concentrate     If you or a loved one observes any of these behaviors or has concerns about self-harm, here's what you can do:  · Talk about it- your feelings and reasons for harming yourself  · Remove any means that you might use to hurt yourself (examples: pills, rope, extension cords, firearm)  · Get professional help from the community (Mental Health, Substance Abuse,  psychological counseling)  · Do not be alone:Call your Safe Contact- someone whom you trust who will be there for you.  · Call your local CRISIS HOTLINE 633-6233 or 439-742-7527  · Call your local Children's Mobile Crisis Response Team [place] (259) 758-8972 or www.[employer]  · Call the toll free National Suicide Prevention Hotlines   · National Suicide Prevention Lifeline 326-568-IQSO (8166)  · National Hope Line Network 800-SUICIDE (668-8089)    Craniotomy  A craniotomy is a surgery where part of your skull (bone flap) is removed briefly to get to the brain. The piece of skull is put back in place after surgery. This surgery may be done for different reasons. You may need a craniotomy if you have:  · Cancer.  · A brain injury.  · Bleeding inside your skull.  · To get a tumor or growth removed.  · Puffiness (swelling) of the brain.  · To have a collection of blood removed.  · Infections or fluid-filled pocket under your skin.  · To have stimulators placed on your brain.  What happens before the procedure?  · Ask your doctor about changing or stopping your regular medicines.  · Do not eat or drink anything after midnight on the night before the surgery or as told by your doctor.  · You may need to wash your hair with a special type of germ-killing shampoo the night before the surgery.  · Make plans to have someone drive you home. Also, plan to have someone help you at home when you are recovering.  What happens during the procedure?  This surgery may take 2½ hours or longer.  · You will likely be given a medicine to make you sleep (general anesthetic). This is given through an IV.  · The surgery site will be prepped (prepared). Hair may be removed from your scalp in this area.  · Your head will be held in place with a device. Pins will go into your skull so your head cannot move.  · A flap is cut in your scalp, and small holes will be drilled in your skull.  · A bone saw is used to connect the small  holes and to cut out part of your skull.  · After part of your skull is removed, work is performed on your brain.  · A drain may be put inside your head to remove blood or fluids that might collect after surgery.  · The piece of skull removed will then be put back in place with plates, wires, or sutures. Your scalp is then closed with stitches or staples.  What happens after the procedure?  · You will be taken to a recovery area to be watched closely.  · Once you are awake, doing well, and drinking fluids, you will be taken to your hospital room.  · You may stay in the hospital up to 3 weeks depending on the type of surgery done and if you have any problems related to the surgery.  · Your scalp may feel spongy for a while. This will get better. You may have numbness for a while in some areas of your scalp.  This information is not intended to replace advice given to you by your health care provider. Make sure you discuss any questions you have with your health care provider.  Document Released: 12/23/2014 Document Revised: 05/25/2017 Document Reviewed: 07/30/2014  Elsevier Interactive Patient Education © 2017 [employer]

## 2019-10-11 NOTE — CARE PLAN
Problem: Communication  Goal: The ability to communicate needs accurately and effectively will improve  Outcome: PROGRESSING AS EXPECTED  Intervention: Educate patient and significant other/support system about the plan of care, procedures, treatments, medications and allow for questions  Note:   Encourage patient and family to voice concerns and/or questions about plan of care, medications, procedures.      Problem: Pain Management  Goal: Pain level will decrease to patient's comfort goal  Outcome: PROGRESSING AS EXPECTED  Intervention: Educate and implement non-pharmacologic comfort measures. Examples: relaxation, distration, play therapy, activity therapy, massage, etc.  Note:   Educate and implement non-pharmacologic comfort measures and give PRN pain medication per MAR

## 2019-10-11 NOTE — DISCHARGE PLANNING
Spoke with [place] and Iris, daughter regarding [facility].  They are agreeable with an admission as well as his financial obligations which is $1364 a co-pay from [facility].

## 2019-10-11 NOTE — H&P
REHABILITATION HISTORY AND PHYSICAL/POST ADMISSION PHYSICAL EVALUATION    Date of Admission: 10/11/2019  4:47 PM  Milan Clark  [id number]/02    [facility] Code / Diagnosis to Support: 02.1 Non-Traumatic  Etiologic Diagnosis: Brain mass    CC: Headache, back pain, bilateral weakness    HPI:  Adapted from Dr. Deluca's consult on 10/10/19  The patient is a 74 y.o. right hand dominant male with a past medical history of lung cancer status post lobectomy and radiation 5 years ago;  who presented on 10/4/2019  2:51 PM with left-sided weakness and increased falls over the last 3 weeks.  Patient also complained of confusion, blank stares, left-sided weakness, left sided numbness, unstable gait, left-sided facial droop .  He presented to the [facility] initially and a CT scan showed an intracranial mass, and he was transferred to [facility] for further evaluation.  Review of CT at outside facility by Dr. Lacey of neurosurgery was significant for at least 2 separate metastatic lesions, one in the posterior fossa and a large right frontal lesion.  Patient was taken to the operating room by Dr. Lacey on 10/4/2019 for two-stage left suboccipital craniectomy, microscopic gross total excision of left superior cerebellar mass, and right frontotemporal craniotomy with gross total resection of large right frontal metastasis.  Pathology of these tumors are consistent with non-small cell lung cancer.  CT of the chest reveals 3.4 cm left upper lobe lung mass extending into the left suprahilar region and encasing the left upper lobe pulmonary arteries with questionable small amount of thrombus on the anterior branch consistent with malignancy.  Patient is scheduled to undergo whole brain radiation in the next couple of days by Dr. Stephens.  He will need outpatient PET scan, and if he chooses to move forward with treatment he will also need immunotherapy, chemotherapy, or a combination of both.  Treatment can begin after brain radiation.  Palliative  care has been consulted.     Patient tolerated transfer over. He reports he has a headache at times.  He also has back pain which is chronic. He reports his posterior staples hurt, particularly the lowest one towards his neck. He reports otherwise he has some changes in voice but thinks it is because his dentures are not fitting well. Denies fever or chills. Denies NVD. Denies SOB.     REVIEW OF SYSTEMS:     Comprehensive 14 point ROS was reviewed and all were negative except as noted elsewhere in this document.     PMH:  No past medical history on file.    PSH:  Past Surgical History:   Procedure Laterality Date   • CRANIOTOMY Right 10/6/2019    Procedure: Stage 2: Right frontal craniotomy and removal o right frontal tumor;  Surgeon: Gagan Lacey M.D.;  Location: SURGERY [facility];  Service: Neurosurgery   • CRANIECTOMY Left 10/6/2019    Procedure: Stage 1: Left suboccipital craniectomy and removal of left cerebellar tumor;  Surgeon: Gagan Lacey M.D.;  Location: SURGERY [facility];  Service: Neurosurgery       FAMILY HISTORY:  No family history on file.   No family history of metastatic lung cancer     MEDICATIONS:  Current Facility-Administered Medications   Medication Dose   • Respiratory Care per Protocol     • Pharmacy Consult Request ...Pain Management Review 1 Each  1 Each   • hydrALAZINE (APRESOLINE) tablet 25 mg  25 mg   • acetaminophen (TYLENOL) tablet 650 mg  650 mg   • senna-docusate (PERICOLACE or SENOKOT S) 8.6-50 MG per tablet 2 Tab  2 Tab    And   • polyethylene glycol/lytes (MIRALAX) PACKET 1 Packet  1 Packet    And   • magnesium hydroxide (MILK OF MAGNESIA) suspension 30 mL  30 mL    And   • bisacodyl (DULCOLAX) suppository 10 mg  10 mg   • artificial tears ophthalmic solution 1 Drop  1 Drop   • benzocaine-menthol (CEPACOL) lozenge 1 Lozenge  1 Lozenge   • mag hydrox-al hydrox-simeth (MAALOX PLUS ES or MYLANTA DS) suspension 20 mL  20 mL   • ondansetron (ZOFRAN ODT) dispertab 4 mg   4 mg    Or   • ondansetron (ZOFRAN) syringe/vial injection 4 mg  4 mg   • traZODone (DESYREL) tablet 50 mg  50 mg   • sodium chloride (OCEAN) 0.65 % nasal spray 2 Spray  2 Spray   • [START ON 10/12/2019] nicotine (NICODERM) 14 MG/24HR 14 mg  14 mg    And   • nicotine polacrilex (NICORETTE) 2 MG piece 2 mg  2 mg   • artificial tears (EYE LUBRICANT) ophth ointment 1 Application  1 Application   • dexamethasone (DECADRON) tablet 4 mg  4 mg    Followed by   • [START ON 10/12/2019] dexamethasone (DECADRON) tablet 4 mg  4 mg    Followed by   • [START ON 10/15/2019] dexamethasone (DECADRON) tablet 2 mg  2 mg    Followed by   • [START ON 10/18/2019] dexamethasone (DECADRON) tablet 2 mg  2 mg    Followed by   • [START ON 10/21/2019] dexamethasone (DECADRON) tablet 2 mg  2 mg   • famotidine (PEPCID) tablet 20 mg  20 mg   • HYDROcodone-acetaminophen (NORCO) 5-325 MG per tablet 1-2 Tab  1-2 Tab   • ipratropium-albuterol (DUONEB) nebulizer solution  3 mL   • levETIRAcetam (KEPPRA) tablet 500 mg  500 mg   • [START ON 10/12/2019] lisinopril (PRINIVIL) tablet 20 mg  20 mg   • phosphorus (K-PHOS-NEUTRAL, PHOSPHA 250 NEUTRAL) per tablet 1 Tab  1 Tab   • [START ON 10/12/2019] potassium chloride SA (Kdur) tablet 20 mEq  20 mEq   • simvastatin (ZOCOR) tablet 40 mg  40 mg   • [START ON 10/12/2019] tamsulosin (FLOMAX) capsule 0.4 mg  0.4 mg       ALLERGIES:  Patient has no known allergies.    PSYCHOSOCIAL HISTORY:  Housing / Facility: 2 Story House  Steps Into Home: 2  Steps In Home: 14(pt now has bedroom on the 1st floor)  Lives with - Patient's Self Care Capacity: Adult Children, Child Less than 18 Years of Age(pt lives with his daughter, son-in-law & 2 grandchildren )  Equipment Owned: Front-Wheel Walker, Hand Held Shower     Prior Level of Function / Living Situation:   Physical Therapy: Prior Services: Home-Independent  Housing / Facility: 2 Story House  Steps Into Home: 2  Steps In Home: 14(pt now has bedroom on the 1st  floor)  Rail: Right Rail  (Steps in Home)  Elevator: No  Bathroom Set up: Bathtub / Shower Combination  Equipment Owned: Front-Wheel Walker, Hand Held Shower  Lives with - Patient's Self Care Capacity: Adult Children, Child Less than 18 Years of Age(pt lives with his daughter, son-in-law & 2 grandchildren )  Bed Mobility: Independent  Transfer Status: Independent  Ambulation: Independent  Distance Ambulation (Feet): 300  Assistive Devices Used: None  Stairs: Independent  Current Level of Function:   Level Of Assist: Minimal Assist  Assistive Device: Front Wheel Walker  Distance (Feet): 150  Deviation: Decreased Base Of Support  Weight Bearing Status: no restrictions   Skilled Intervention: Verbal Cuing  Comments: Pt with slight lateral lean to the left but no overt LOB. Russell for balance and safety. Pt with slow irma and vc for redirection to task   Supine to Sit: Supervised  Sit to Supine: Minimal Assist  Scooting: Supervised  Rolling: Supervised  Skilled Intervention: Verbal Cuing  Comments: up with OT upon arrival. Assisted back to bed russell for trunk control   Sit to Stand: Minimal Assist  Bed, Chair, Wheelchair Transfer: Minimal Assist  Toilet Transfers: Minimal Assist  Transfer Method: Stand Pivot  Skilled Intervention: Verbal Cuing  Comments: with fww, vc to not pull up on fww for safety   Sitting in Chair: 45  Sitting Edge of Bed: 5  Standing: 10  Occupational Therapy:   Self Feeding: Independent  Grooming / Hygiene: Independent  Bathing: Independent  Dressing: Independent  Toileting: Independent  Medication Management: Independent  Laundry: Independent  Kitchen Mobility: Independent  Finances: Independent  Home Management: Requires Assist  Shopping: Requires Assist  Prior Level Of Mobility: Independent Without Device in Community  Prior Services: Home-Independent  Housing / Facility: [address]  Occupation (Pre-Hospital Vocational): Retired Due To Age  Current Level of Function:   Eating: Minimal  "Assist  Upper Body Dressing: Minimal Assist  Lower Body Dressing: Minimal Assist  Toileting: Minimal Assist  Skilled Intervention: Verbal Cuing  Speech Language Pathology:      Rehabilitation Prognosis/Potential: Good  Estimated Length of Stay: 10-14 days     CURRENT LEVEL OF FUNCTION:   Same as level of function prior to admission to [facility]    PHYSICAL EXAM:     VITAL SIGNS:   height is 1.803 m (5' 11\") and weight is 59.5 kg (131 lb 2.8 oz). His temporal temperature is 37.4 °C (99.3 °F). His blood pressure is 105/69 and his pulse is 66. His respiration is 17 and oxygen saturation is 92%.     GENERAL: No apparent distress  HEENT: EOMI and PERRL; right sided incision with staples intact, no erythema, left sided posterior incision with staples intact, no erythema  CARDIAC: Regular rate and rhythm, normal S1, S2   LUNGS: Clear to auscultation   ABDOMINAL: bowel sounds present, soft and nontender    EXTREMITIES: no contractures, spasticity, or edema.  No calf tenderness bilaterally  NEURO:  Mental status:  A&Ox4 (person, place, date, situation) answers questions appropriately  Speech: fluent, no aphasia or dysarthria  CRANIAL NERVES: CN 2-12 intact  Motor:  5/5 RUE, 4/5 R shoulder flexors, elbow flexors, elbow extensors otherwise 4+/5   4/5 bilateral hip flexors, ataxic in LE  4+/5 BLE otherwise  Sensory: intact to light touch through out  DTRs: 3+ in L biceps, 2+ in right    RADIOLOGY:        Results for orders placed during the hospital encounter of 10/04/19   MR-BRAIN-WITH & W/O    Impression 1.  Large right frontal and left cerebellar heterogeneously enhancing intra-axial masses concerning for metastatic disease.  2.  Right to left midline shift measuring 1 cm.  3.  Effacement of the fourth ventricle secondary to the cerebellar lesion without evidence of hydrocephalus.  4.  Mild diffuse cerebral substance loss.  5.  Mild microangiopathic ischemic change versus the myelination gliosis.         "                                                                                             CT head 10/7/19  Impression         1.  Postoperative changes of pneumocephalus, small quantity of hemorrhage along the surgical bed, and subarachnoid hemorrhage of right frontal lobe mass is seen. Within expected limits for typical postop changes.  2.  Pneumocephalus and small quantity of hemorrhage along the surgical bed of left cerebellar mass resection, within expected limits.  3.  Right to left midline shift measuring 5 mm.         LABS:  Recent Labs     10/09/19  0455   SODIUM 136   POTASSIUM 3.9   CHLORIDE 107   CO2 21   GLUCOSE 129*   BUN 26*   CREATININE 0.92   CALCIUM 8.6     Recent Labs     10/09/19  0455   WBC 9.8   RBC 4.03*   HEMOGLOBIN 12.6*   HEMATOCRIT 37.8*   MCV 93.8   MCH 31.3   MCHC 33.3*   RDW 49.3   PLATELETCT 270   MPV 9.8             PRIMARY REHAB DIAGNOSIS:    This patient is a 74 y.o. male admitted for acute inpatient rehabilitation with Brain mass.    IMPAIRMENTS:   Cognitive  ADLs/IADLs  Mobility  Speech    SECONDARY DIAGNOSIS/MEDICAL CO-MORBIDITIES AFFECTING FUNCTION:  Tobacco use  nonsmall cell Lung cancer  GERD      RELEVANT CHANGES SINCE PREADMISSION EVALUATION:    Status unchanged    The patient's rehabilitation potential is Fair  The patient's medical prognosis is fair    PLAN:   Discussion and Recommendations:   1. The patient requires an acute inpatient rehabilitation program with a coordinated program of care at an intensity and frequency not available at a lower level of care. This recommendation is substantiated by the patient's medical physicians who recommend that the patient's intervention and assessment of medical issues needs to be done at an acute level of care for patient's safety and maximum outcome.   2. A coordinated program of care will be supplied by an interdisciplinary team of physical therapy, occupational therapy, rehab physician, rehab nursing, and, if needed, speech  therapy and rehab psychology. Rehab team presents a patient-specific rehabilitation and education program concentrating on prevention of future problems related to accessibility, mobility, skin, bowel, bladder, sexuality, and psychosocial and medical/surgical problems.   3. Need for Rehabilitation Physician: The rehab physician will be evaluating the patient on a multi-weekly basis to help coordinate the program of care. The rehab physician communicates between medical physicians, therapists, and nurses to maximize the patient's potential outcome. Specific areas in which the rehab physician will be providing daily assessment include the following:   A. Assessing the patient's heart rate and blood pressure response (vitals monitoring) to activity and making adjustments in medications or conservative measures as needed.   B. The rehab physician will be assessing the frequency at which the program can be increased to allow the patient to reach optimal functional outcome.   C. The rehab physician will also provide assessments in daily skin care, especially in light of patient's impairments in mobility.   D. The rehab physician will provide special expertise in understanding how to work with functional impairment and recommend appropriate interventions, compensatory techniques, and education that will facilitate the patient's outcome.   4. Rehab R.N.   The rehab RN will be working with patient to carry over in room mobility and activities of daily living when the patient is not in 3 hours of skilled therapy. Rehab nursing will be working in conjunction with rehab physician to address all the medical issues above and continue to assess laboratory work and discuss abnormalities with the treating physicians, assess vitals, and response to activity, and discuss and report abnormalities with the rehab physician. Rehab RN will also continue daily skin care, supervise bladder/bowel program, instruct in medication  administration, and ensure patient safety.   5. Rehab Therapy: Therapies to treat at intensity and frequency of (may change after completion of evaluation by all therapeutic disciplines):       PT:  Physical therapy to address mobility, transfer, gait training and evaluation for adaptive equipment needs 1 hour/day at least 5 days/week for the duration of the ELOS (see below)       OT:  Occupational therapy to address ADLs, self-care, home management training, functional mobility/transfers and assistive device evaluation, and community re-integration 1  hour/day at least 5 days/week for the duration of the ELOS (see below).        ST/Dysphagia:  Speech therapy to address speech, language, and cognitive deficits as well as swallowing difficulties with retraining/dysphagia management and community re-integration with comprehension, expression, cognitive training 1  hour/day at least 5 days/week for the duration of the ELOS (see below).     Medical management / Rehabilitation Issues/ Adverse Potential as part of rehabilitation plan     REHABILITATION ISSUES/ADVERSE POTENTIAL:  1. Metastatic lung cancer to brain - Patient s/p resection of two lesions with Dr. Lacey on 10/4/19. Patient demonstrates functional deficits in strength, balance, coordination, and ADL's. Patient is admitted to [facility] for comprehensive rehabilitation therapy as described below.   Rehabilitation nursing monitors bowel and bladder control, educates on medication administration, co-morbidities and monitors patient safety.    2.  Neurostimulants: None at this time but continue to assess daily for need to initiate should status change.    3.  DVT prophylaxis:  Patient is high risk for bleed. Encourage OOB. Monitor daily for signs and symptoms of DVT including but not limited to swelling and pain to prevent the development of DVT that may interfere with therapies.    4.  GI prophylaxis:  On pepcid to prevent gastritis/dyspepsia  which may interfere with therapies.    5.  Pain: No issues with pain currently / Controlled with APAP/Tramadol    6.  Nutrition/Dysphagia: Dietician monitors nutrient intake, recommend supplements prn and provide nutrition education to pt/family to promote optimal nutrition for wound healing/recovery.     7.  Bladder/bowel:  Start bowel and bladder program as described below, to prevent constipation, urinary retention (which may lead to UTI), and urinary incontinence (which will impact upon pt's functional independence).   - Remove alejandra in AM. TV Q3h while awake with post void bladder scans, I&O cath for PVRs >400  - up to commode after meal     8.  Skin/dermal ulcer prophylaxis: Monitor for new skin conditions with q.2 h. turns as required to prevent the development of skin breakdown.     9.  Cognition/Behavior:  Psychologist Dr. Li provides adjustment counseling to illness and psychosocial barriers that may be potential barriers to rehabilitation.     10. Respiratory therapy: RT performs O2 management prn, breathing retraining, pulmonary hygiene and bronchospasm management prn to optimize participation in therapies.     MEDICAL CO-MORBIDITIES/ADVERSE POTENTIAL AFFECTING FUNCTION:  Metastatic lung cancer to brain - Patient s/p resection of two lesions with Dr. Lacey on 10/4/19.  Patient on steroid taper  -Continue steroid taper until 10/23/19. Per NSG continue Keppra for 1 month. Follow-up with NSG  -PT and OT for mobility and ADLs  -SLP for cognitive screen    Non-small cell lung cancer - Will need PET and simulation for possible treatment.  -Follow-up with Oncology    HTN - Patient on Lisinopril 20 mg daily     Tobacco dependence - Patient on patch on transfer. Will need counseling.     Hypokalemia - Patient on 20 mEq on transfer. Check AM CMP    HLD - Patient on Simvastatin 40 mg     Urinary retention - Patient with alejandra on transfer. Continue Flomax. Consider removal of Alejandra over weekend.     GI Ppx -  Patient on Pepcid on transfer. Continue while on steroids.     DVT Ppx - Patient high risk for bleed. Will monitor ambulatory status.     I personally performed a complete drug regimen review and no potential clinically significant medication issues were identified.     Pt was seen today for 78 min, and entire time spent in face-to-face contact was >50% in counseling and coordination of care as detailed in A/P above.        GOALS/EXPECTED LEVEL OF FUNCTION BASED ON CURRENT MEDICAL AND FUNCTIONAL STATUS (may change based on patient's medical status and rate of impairment recovery):  Transfers:   Independent  Mobility/Gait:   Independent  ADL's:   Independent  Cognition:  Ind    DISPOSITION: Discharge to pre-morbid independent living setting with the supportive care of patient's family.    ELOS: 10-14 days

## 2019-10-11 NOTE — PROGRESS NOTES
0725 Patient's sitting up in bed. Bedside report received from NOC RN (Lesli) at the beginning of the shift.    0755 ANGELA Ricci visited. POC discussed with the patient. Verbalized understanding.    0829 Patient's sitting up in bed, having breakfast. Fall Protocol in effect. Call light within reach. Reminded patient to call for assist. Assessment completed. No distress noted. Plan of care reviewed with the patient. Verbalized understanding.    0829 Medicated with tylenol (see MAR) for c/o's back pain, rates pain 4/10. No distress noted.    0930 Patient's in bed. No distress noted.    1035 [facility] Notiifed that patient will be transferred to [facility] today @1030.     8668 Report given to Samara Levy RN at [facility].    1106 Patient's in bed. Dr Smith visited. POC discussed with the patient.

## 2019-10-11 NOTE — PROGRESS NOTES
Surgery patient?: yes  Date of surgery: 10/06/19  Ambulated 50 ft on day of surgery? (N/A if today is not date of surgery): n/a  Number of times ambulated 50 feet or greater today: 2  Patient has been up to chair, edge of bed or HOB 90 degrees for all meals?: yes  Goal met? (goal is ambulating at least 50 feet 2 times on day shift, one time on night shift): yes  If patient did not meet mobility goal, why?:

## 2019-10-11 NOTE — PROGRESS NOTES
2050 Pt A&Ox4. Pt denies numbness and tingling. Pain 4/10, medication provided. Bed locked and in lowest position. Bed alarm refused, education provided. Pt educated to call for assistance. Treaded socks in place. Call light within reach. SCDs in use. Seizure precautions in place.

## 2019-10-11 NOTE — PROGRESS NOTES
0720 Patient's sitting up in bed. Bedside report received from NOC RN (Lesli) at the beginning of the shift.

## 2019-10-12 LAB
25(OH)D3 SERPL-MCNC: 21 NG/ML (ref 30–100)
ALBUMIN SERPL BCP-MCNC: 3.2 G/DL (ref 3.2–4.9)
ALBUMIN/GLOB SERPL: 1.1 G/DL
ALP SERPL-CCNC: 79 U/L (ref 30–99)
ALT SERPL-CCNC: 53 U/L (ref 2–50)
ANION GAP SERPL CALC-SCNC: 7 MMOL/L (ref 0–11.9)
AST SERPL-CCNC: 23 U/L (ref 12–45)
BASOPHILS # BLD AUTO: 0.1 % (ref 0–1.8)
BASOPHILS # BLD: 0.01 K/UL (ref 0–0.12)
BILIRUB SERPL-MCNC: 0.5 MG/DL (ref 0.1–1.5)
BUN SERPL-MCNC: 28 MG/DL (ref 8–22)
CALCIUM SERPL-MCNC: 8.4 MG/DL (ref 8.5–10.5)
CHLORIDE SERPL-SCNC: 102 MMOL/L (ref 96–112)
CO2 SERPL-SCNC: 21 MMOL/L (ref 20–33)
CREAT SERPL-MCNC: 0.78 MG/DL (ref 0.5–1.4)
EOSINOPHIL # BLD AUTO: 0 K/UL (ref 0–0.51)
EOSINOPHIL NFR BLD: 0 % (ref 0–6.9)
ERYTHROCYTE [DISTWIDTH] IN BLOOD BY AUTOMATED COUNT: 48.1 FL (ref 35.9–50)
EST. AVERAGE GLUCOSE BLD GHB EST-MCNC: 123 MG/DL
GLOBULIN SER CALC-MCNC: 2.9 G/DL (ref 1.9–3.5)
GLUCOSE SERPL-MCNC: 110 MG/DL (ref 65–99)
HBA1C MFR BLD: 5.9 % (ref 0–5.6)
HCT VFR BLD AUTO: 39.9 % (ref 42–52)
HGB BLD-MCNC: 13.1 G/DL (ref 14–18)
IMM GRANULOCYTES # BLD AUTO: 0.12 K/UL (ref 0–0.11)
IMM GRANULOCYTES NFR BLD AUTO: 1.5 % (ref 0–0.9)
LYMPHOCYTES # BLD AUTO: 0.98 K/UL (ref 1–4.8)
LYMPHOCYTES NFR BLD: 12.3 % (ref 22–41)
MCH RBC QN AUTO: 30.7 PG (ref 27–33)
MCHC RBC AUTO-ENTMCNC: 32.8 G/DL (ref 33.7–35.3)
MCV RBC AUTO: 93.4 FL (ref 81.4–97.8)
MONOCYTES # BLD AUTO: 0.49 K/UL (ref 0–0.85)
MONOCYTES NFR BLD AUTO: 6.1 % (ref 0–13.4)
NEUTROPHILS # BLD AUTO: 6.4 K/UL (ref 1.82–7.42)
NEUTROPHILS NFR BLD: 80 % (ref 44–72)
NRBC # BLD AUTO: 0 K/UL
NRBC BLD-RTO: 0 /100 WBC
PLATELET # BLD AUTO: 350 K/UL (ref 164–446)
PMV BLD AUTO: 9.2 FL (ref 9–12.9)
POTASSIUM SERPL-SCNC: 4.2 MMOL/L (ref 3.6–5.5)
PROT SERPL-MCNC: 6.1 G/DL (ref 6–8.2)
RBC # BLD AUTO: 4.27 M/UL (ref 4.7–6.1)
SODIUM SERPL-SCNC: 130 MMOL/L (ref 135–145)
TSH SERPL DL<=0.005 MIU/L-ACNC: 2.48 UIU/ML (ref 0.38–5.33)
WBC # BLD AUTO: 8 K/UL (ref 4.8–10.8)

## 2019-10-12 PROCEDURE — 700102 HCHG RX REV CODE 250 W/ 637 OVERRIDE(OP): Performed by: PHYSICAL MEDICINE & REHABILITATION

## 2019-10-12 PROCEDURE — 92523 SPEECH SOUND LANG COMPREHEN: CPT

## 2019-10-12 PROCEDURE — 84443 ASSAY THYROID STIM HORMONE: CPT

## 2019-10-12 PROCEDURE — 80053 COMPREHEN METABOLIC PANEL: CPT

## 2019-10-12 PROCEDURE — 36415 COLL VENOUS BLD VENIPUNCTURE: CPT

## 2019-10-12 PROCEDURE — 82306 VITAMIN D 25 HYDROXY: CPT

## 2019-10-12 PROCEDURE — 97535 SELF CARE MNGMENT TRAINING: CPT

## 2019-10-12 PROCEDURE — A9270 NON-COVERED ITEM OR SERVICE: HCPCS | Performed by: PHYSICAL MEDICINE & REHABILITATION

## 2019-10-12 PROCEDURE — 770010 HCHG ROOM/CARE - REHAB SEMI PRIVAT*

## 2019-10-12 PROCEDURE — 97166 OT EVAL MOD COMPLEX 45 MIN: CPT

## 2019-10-12 PROCEDURE — 94760 N-INVAS EAR/PLS OXIMETRY 1: CPT

## 2019-10-12 PROCEDURE — 85025 COMPLETE CBC W/AUTO DIFF WBC: CPT

## 2019-10-12 PROCEDURE — 97162 PT EVAL MOD COMPLEX 30 MIN: CPT

## 2019-10-12 PROCEDURE — 83036 HEMOGLOBIN GLYCOSYLATED A1C: CPT

## 2019-10-12 RX ADMIN — POTASSIUM CHLORIDE 20 MEQ: 1500 TABLET, EXTENDED RELEASE ORAL at 08:37

## 2019-10-12 RX ADMIN — TAMSULOSIN HYDROCHLORIDE 0.4 MG: 0.4 CAPSULE ORAL at 08:37

## 2019-10-12 RX ADMIN — DEXAMETHASONE 4 MG: 4 TABLET ORAL at 08:38

## 2019-10-12 RX ADMIN — SENNOSIDES, DOCUSATE SODIUM 2 TABLET: 50; 8.6 TABLET, FILM COATED ORAL at 20:00

## 2019-10-12 RX ADMIN — MINERAL OIL AND WHITE PETROLATUM 1 APPLICATION: 30; 940 OINTMENT OPHTHALMIC at 21:00

## 2019-10-12 RX ADMIN — DEXAMETHASONE 4 MG: 4 TABLET ORAL at 20:05

## 2019-10-12 RX ADMIN — DIBASIC SODIUM PHOSPHATE, MONOBASIC POTASSIUM PHOSPHATE AND MONOBASIC SODIUM PHOSPHATE 1 TABLET: 852; 155; 130 TABLET ORAL at 08:37

## 2019-10-12 RX ADMIN — MINERAL OIL AND WHITE PETROLATUM 1 APPLICATION: 30; 940 OINTMENT OPHTHALMIC at 14:15

## 2019-10-12 RX ADMIN — LISINOPRIL 20 MG: 20 TABLET ORAL at 08:37

## 2019-10-12 RX ADMIN — SIMVASTATIN 40 MG: 40 TABLET, FILM COATED ORAL at 20:00

## 2019-10-12 RX ADMIN — NICOTINE 14 MG: 14 PATCH, EXTENDED RELEASE TRANSDERMAL at 05:00

## 2019-10-12 RX ADMIN — FAMOTIDINE 20 MG: 20 TABLET ORAL at 20:00

## 2019-10-12 RX ADMIN — DIBASIC SODIUM PHOSPHATE, MONOBASIC POTASSIUM PHOSPHATE AND MONOBASIC SODIUM PHOSPHATE 1 TABLET: 852; 155; 130 TABLET ORAL at 20:00

## 2019-10-12 RX ADMIN — MINERAL OIL AND WHITE PETROLATUM 1 APPLICATION: 30; 940 OINTMENT OPHTHALMIC at 05:00

## 2019-10-12 RX ADMIN — TRAZODONE HYDROCHLORIDE 50 MG: 50 TABLET ORAL at 20:00

## 2019-10-12 RX ADMIN — LEVETIRACETAM 500 MG: 500 TABLET, FILM COATED ORAL at 08:37

## 2019-10-12 RX ADMIN — LEVETIRACETAM 500 MG: 500 TABLET, FILM COATED ORAL at 20:00

## 2019-10-12 RX ADMIN — SENNOSIDES, DOCUSATE SODIUM 2 TABLET: 50; 8.6 TABLET, FILM COATED ORAL at 08:37

## 2019-10-12 RX ADMIN — FAMOTIDINE 20 MG: 20 TABLET ORAL at 08:36

## 2019-10-12 ASSESSMENT — BALANCE ASSESSMENTS
LONG VERSION TOTAL SCORE (MAX 56): 34
STANDING UNSUPPORTED: 3
STANDING UNSUPPORTED WITH EYES CLOSED: 3
TRANSFERS: 2
REACHING FORWARD WITH OUTSTRETCHED ARM WHILE STANDING: 3
STANDING UNSUPPORTED WITH FEET TOGETHER: 3
LOOK OVER LEFT AND RIGHT SHOULDERS WHILE STANDING: 3
PICK UP OBJECT FROM THE FLOOR FROM A STANDING POSITION: 3
TURN 360 DEGREES: 0
SITTING TO STANDING: 3
STANDING ON ONE LEG: 1
STANDING UNSUPPORTED ONE FOOT IN FRONT: 2
LONG VERSION TOTAL SCORE (MAX 56): 34
PLACE ALTERNATE FOOT ON STEP OR STOOL WHILE STANDING UNSUPPORTED: 1
STANDING TO SITTING: 3
SITTING UNSUPPORTED: 4

## 2019-10-12 ASSESSMENT — BRIEF INTERVIEW FOR MENTAL STATUS (BIMS)
WHAT MONTH IS IT: ACCURATE WITHIN 5 DAYS
INITIAL REPETITION OF BED BLUE SOCK - FIRST ATTEMPT: 3
ASKED TO RECALL SOCK: YES, AFTER CUEING (SOMETHING TO WEAR")"
WHAT DAY OF THE WEEK IS IT: CORRECT
WHAT YEAR IS IT: CORRECT
ASKED TO RECALL BED: NO, COULD NOT RECALL
ASKED TO RECALL BLUE: YES, NO CUE REQUIRED
BIMS SUMMARY SCORE: 12

## 2019-10-12 ASSESSMENT — ACTIVITIES OF DAILY LIVING (ADL): TOILETING: INDEPENDENT

## 2019-10-12 ASSESSMENT — COPD QUESTIONNAIRES
DURING THE PAST 4 WEEKS HOW MUCH DID YOU FEEL SHORT OF BREATH: NONE/LITTLE OF THE TIME
DO YOU EVER COUGH UP ANY MUCUS OR PHLEGM?: NO/ONLY WITH OCCASIONAL COLDS OR INFECTIONS
COPD SCREENING SCORE: 4
HAVE YOU SMOKED AT LEAST 100 CIGARETTES IN YOUR ENTIRE LIFE: YES

## 2019-10-12 ASSESSMENT — PATIENT HEALTH QUESTIONNAIRE - PHQ9
1. LITTLE INTEREST OR PLEASURE IN DOING THINGS: NOT AT ALL
SUM OF ALL RESPONSES TO PHQ9 QUESTIONS 1 AND 2: 0
2. FEELING DOWN, DEPRESSED, IRRITABLE, OR HOPELESS: NOT AT ALL

## 2019-10-12 ASSESSMENT — LIFESTYLE VARIABLES: EVER_SMOKED: YES
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
## 2019-10-12 NOTE — PROGRESS NOTES
1915 received report from day shift nurse and assume patient care. Pt is stable and calm . Denies any pain at this time. No s/sx of distress. Safety precautions in place. Call light with in reach. Will continue to monitor.

## 2019-10-12 NOTE — THERAPY
Speech Language Pathology   Initial Assessment     Patient Name: Milan Clark  AGE:  74 y.o., SEX:  male  Medical Record #: [id number]  Today's Date: 10/12/2019     Subjective    Pt s/p removal of two metastatic lung cancer lesions in brain seen for speech language evaluation. Pt pleasant and cooperative.      Objective       10/12/19 0993   Prior Living Situation   Prior Services Home-Independent   Housing / Facility 2 Story House  (Pt does not need to use stairs on a regular basis)   Lives with - Patient's Self Care Capacity Adult Children  (Dtr- Iris )   Prior Level Of Function   Communication Within Functional Limits   Swallow Within Functional Limits   Dentition Edentulous   Dentures Uppers;Lowers;Partials   Hearing Within Functional Limits for Evaluation   Hearing Aid None   Vision Wears Corrective Lenses   Patient's Primary Language English   Occupation (Pre-Hospital Vocational) Retired Due To Age   Receptive Language / Auditory Comprehension   Receptive Language / Auditory Comprehension WDL   Expressive Language   Repeats Speech Accurately Within Functional Limits (6-7)   Automatic Language Appropriate Within Functional Limits (6-7)   Verbalizes Wants / Needs Within Functional Limits (6-7)   Sustain Dialogue Within Given Topic Within Functional Limits (6-7)   Formulates Complex / Abstract Language Supervision (5)   Word Finding Deficits Moderate (3)   Cognition   Cognitive-Linguistic (WDL) X   Attention to Task Supervision (5)   Complex Attention Supervision (5)   Visual Scanning / Cancellation Skills Supervision (5)   Simple Information Processing Within Functional Limits (6-7)   Simple Reasoning / Problem Solving Within Functional Limits (6-7)   Complex Reasoning  / Problem Solving Supervision (5)   Functional Math / Financial Management To Be Assessed   Medication Management  To Be Assessed   Clock Drawing Within Functional Limits   IRF-EDUARDA:  Cognitive Pattern Assessment   Cognitive Pattern Assessment  "Used BIMS   IRF-EDUARDA:  Brief Interview for Mental Status (BIMS)   Repetition of Three Words (First Attempt) 3   Temporal Orientation: Able to Report the Correct Year Correct   Temporal Orientation: Able to Report the Correct Month Accurate within 5 days   Temporal Orientation: Able to Report the Correct Day of the Week Correct   Able to Recall \"Sock\" Yes, after cueing (\"something to wear\")   Able to Recall \"Blue\" Yes, no cue required   Able to Recall \"Bed\" No, could not recall   BIMS Summary Score 12   Social / Pragmatic Communication   Social / Pragmatic Communication WDL   Tracheostomy   Tracheostomy No   Speech Mechanisms / Voice Production   Speech Mechanisms / Voice Production (WDL) WDL   Outcome Measures   Outcome Measures Utilized SCCAN   SCCAN (Scales of Cognitive and Communicative Ability for Neurorehabilitation)   Oral Expression - Raw Score 14   Oral Expression - Scale Performance Score 74   Orientation - Raw Score 12   Orientation - Scale Performance Score 100   Memory - Raw Score 14   Memory - Scale Performance Score 74   Speech Comprehension - Raw Score 12   Speech Comprehension - Scale Performance Score 92   Problem Solving - Raw Score 17   Problem Solving - Scale Performance Score 74   Problem List   Problem List Cognitive-Linguistic Deficits;Expressive Language Deficit;Memory Deficit   Current Discharge Plan   Current Discharge Plan Return to Prior Living Situation   Benefit   Therapy Benefit Patient would benefit from Inpatient Rehab Speech-Language Pathology to address above identified deficits.   Interdisciplinary Plan of Care Collaboration   IDT Collaboration with  Physical Therapist   Patient Position at End of Therapy Seated   Collaboration Comments DLIAN   Speech Language Pathologist Assigned   Assigned SLP / Pager # [phone]   SLP Total Time Spent   SLP Individual Total Time Spent (Mins) 60   SLP Charge Group   Charges Yes   SLP Speech Language Evaluation Speech Sound Language Comprehension "       FIM Eating Score:     Eating Description:       FIM Comprehension Score:  5 - Stand-by Prompting/Supervision or Set-up  Comprehension Description:  Glasses, Verbal cues    FIM Expression Score:  5 - Stand-by Prompting/Supervision or Set-up  Expression Description:  Verbal cueing(Cues for word finding)    FIM Social Interaction Score:  6 - Modified Independent  Social Interaction Description:  Increased time    FIM Problem Solving Score:  5 - Standby Prompting/Supervision or Set-up  Problem Solving Description:  Therapy schedule    FIM Memory Score:  4 - Minimal Assistance  Memory Description:  Verbal cueing, Therapy schedule, Bed/chair alarm    Assessment    Patient is 74 y.o. 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  Additional factors influencing patient status/progress (ie: cognitive factors, co-morbidities, social support, etc): pt lives with adult daughter Iris. Pt presents with word finding and memory deficits.    The Scales for Cognitive Communicative Ability for Neurorehabilitation was initiated. Pt presented with deficits in oral expression, memory, and problem solving. Pt presented with MOD deficits with word finding throughout evaluation. Pt also demonstrates good insight to his situation. Pt agreeable to ST addressing word finding and memory.     Plan  Recommend Speech Therapy 30-60 minutes per day 5-7 days per week for 2 weeks for the following treatments:  SLP Speech Language Treatment, SLP Self Care / ADL Training , SLP Cognitive Skill Development and SLP Group Treatment.    Goals:  Long term and short term goals have been discussed with patient and they are in agreement.    Speech Therapy Problems     Problem: Expression STGs     Dates: Start: 10/12/19       Description:     Goal: STG-Within one week, patient will     Dates: Start: 10/12/19       Description: 1) Individualized goal:  Describe common items by function, location, and features to be used as a word  finding strategy  with 80% accuracy and MIN A.   2) Interventions:  SLP Speech Language Treatment, SLP Self Care / ADL Training , SLP Cognitive Skill Development and SLP Group Treatment                   Problem: Memory STGs     Dates: Start: 10/12/19       Description:     Goal: STG-Within one week, patient will remember     Dates: Start: 10/12/19       Description: 1) Individualized goal:  Novel information related to RRH recovery using external memory aids with 90% accuracy and MIN A.   2) Interventions:  SLP Self Care / ADL Training , SLP Cognitive Skill Development and SLP Group Treatment                   Problem: Speech/Swallowing LTGs     Dates: Start: 10/12/19       Description:     Goal: LTG-By discharge, patient will express     Dates: Start: 10/12/19       Description: 1) Individualized goal:  Complex information using word finding strategies IND in >90% of opportunities.   2) Interventions:  SLP Treatment Individual, SLP Self Care / ADL Training , SLP Cognitive Skill Development and SLP Group Treatment             Goal: LTG-By discharge, patient will     Dates: Start: 10/12/19       Description: 1) Individualized goal:  Recall novel health/safety information using internal and/or external memory aids with 90% accuracy w/ MOD I for a safe D/C to PLOF.   2) Interventions:  SLP Self Care / ADL Training , SLP Cognitive Skill Development and SLP Group Treatment

## 2019-10-12 NOTE — CARE PLAN
Problem: Safety  Goal: Will remain free from injury  Outcome: PROGRESSING AS EXPECTED  Note:   Patient uses call light consistently and appropriately this shift.  Waits for assistance when needed and does not attempt self transfer.  Able to verbalize needs.  Will continue to monitor.       Problem: Infection  Goal: Will remain free from infection  Outcome: PROGRESSING AS EXPECTED  Note:   Patient remains free from s/s infection; afebrile.  IV site clean, dry and intact in the left forearm. Urine output from alejandra is yellow and clear. Will continue to monitor.

## 2019-10-12 NOTE — THERAPY
"Occupational Therapy   Initial Evaluation     Patient Name: Milan Clark  Age:  74 y.o., Sex:  male  Medical Record #: [id number]  Today's Date: 10/12/2019     Subjective    \"I've lost 15 pounds in 3 weeks.\"      Objective       10/12/19 1031   Precautions   Precautions Fall Risk   Pain 0 - 10 Group   Therapist Pain Assessment Post Activity Pain Same as Prior to Activity;0   Cognition    Cognition / Consciousness X   Speech/ Communication Slurred;Word Finding Impairment   Level of Consciousness Alert   Ability To Follow Commands 3 Step   Safety Awareness Impaired   Attention Impaired   Comments Pt reported poor sleep previous evening    Passive ROM Upper Body   Passive ROM Upper Body WDL   Active ROM Upper Body   Active ROM Upper Body  WDL   Dominant Hand Right   Comments Pt reports L manual inflamation in joints impairing pad to pad  opposition.  1-5 tabletop tap completed without sequencing difficulty.   Strength Upper Body   Upper Body Strength  X   Gross Strength Generalized Weakness, Equal Bilaterally.    Balance   Sitting Balance (Static) Good   Sitting Balance (Dynamic) Good   Standing Balance (Static) Fair   Standing Balance (Dynamic) Fair -   Weight Shift Sitting Good   Weight Shift Standing Fair   Bed Mobility    Supine to Sit Modified Independent   Sit to Supine Modified Independent   Scooting Modified Independent   Rolling Independent   Interdisciplinary Plan of Care Collaboration   IDT Collaboration with  Physical Therapist;Family / Caregiver   Patient Position at End of Therapy Seated;Family / Friend in Room   Collaboration Comments CLOF   OT Total Time Spent   OT Individual Total Time Spent (Mins) 60   OT Charge Group   Charges Yes   OT Self Care / ADL 1   OT Evaluation OT Evaluation Mod       FIM Eating Score:  6 - Modified Independent  Eating Description:  Increased time    FIM Grooming Score:  5 - Standby Prompting/Supervision or Set-up  Grooming Description:  Increased time(seated oral care at " sinkside )    FIM Bathing Score:  4 - Minimal Assistance  Bathing Description:  Hand held shower, Grab bar, Supervision for safety, Verbal cueing, Tub bench, Increased time(Verbal cues for use of grab bar/tub bench during showering tasks.  Incidental physical assist to wash iodine/blood from scalp)    FIM Upper Body Dressin - Standby Prompting/Supervision or Set-up  Upper Body Dressing Description:  Verbal cueing, Increased time(Cues for safety; seated vs. standing UB dressing secondary to dynamic standing balance concerns. )    FIM Lower Body Dressing Score:  3 - Moderate Assistance  Lower Body Dressing Description:  (Assist threading catheter)    FIM Toileting Body Dressing:     Toileting Description:       FIM Bed/Chair/Wheelchair Transfers Score:    Bed/Chair/Wheelchair Transfers Description:       FIM Toilet Transfer Score:  0 - Not tested, patient refused  Toilet Transfer Description:       FIM Tub/Shower Transfers Score:  4 - Minimal Assistance  Tub/Shower Transfers Description:  (CGA SPT with cues to utilize grab bar/tub bench )    FIM Comprehension Score:     Comprehension Description:       FIM Expression Score:     Expression Description:       FIM Social Interaction Score:     Social Interaction Description:       FIM Problem Solving Score:     Problem Solving Description:       Assessment  Patient is 74 y.o. male with a diagnosis of brain mass.  Additional factors influencing patient status / progress (ie: cognitive factors, co-morbidities, social support, etc): lung mass, history of lung cancer, urine retention.      Plan  Recommend Occupational Therapy  minutes per day 5-7 days per week for 10-14 days for the following treatments:  OT Self Care/ADL, OT Community Reintegration, OT Neuro Re-Ed/Balance, OT Therapeutic Activity, OT Aquatic Therapy, OT Evaluation and OT Therapeutic Exercise.    Goals:  Long term and short term goals have been discussed with patient and they are in  agreement.    Occupational Therapy Goals     Problem: Bathing     Dates: Start: 10/12/19       Description:     Goal: STG-Within one week, patient will bathe     Dates: Start: 10/12/19   Expected End: 10/19/19       Description: 1) Individualized Goal:  At a SUP level.   2) Interventions:  OT Self Care/ADL, OT Community Reintegration, OT Therapeutic Activity, OT Evaluation and OT Therapeutic Exercise                   Problem: Dressing     Dates: Start: 10/12/19       Description:     Goal: STG-Within one week, patient will dress LB     Dates: Start: 10/12/19   Expected End: 10/19/19       Description: 1) Individualized Goal:  At a SUP level with AE as needed.   2) Interventions:  OT Self Care/ADL, OT Neuro Re-Ed/Balance, OT Therapeutic Activity and OT Therapeutic Exercise                   Problem: IADL's     Dates: Start: 10/12/19       Description:     Goal: STG-Within one week, patient will prepare a meal     Dates: Start: 10/12/19   Expected End: 10/19/19       Description: 1) Individualized Goal:  With least restrictive AE at a SBA level.   2) Interventions:  OT Group Therapy, OT Self Care/ADL, OT Cognitive Skill Dev, OT Neuro Re-Ed/Balance, OT Therapeutic Activity and OT Therapeutic Exercise                   Problem: OT Long Term Goals     Dates: Start: 10/12/19       Description:     Goal: LTG-By discharge, patient will complete basic self care tasks     Dates: Start: 10/12/19   Expected End: 10/26/19       Description: 1) Individualized Goal:  At a Mod I level.   2) Interventions:  OT Self Care/ADL, OT Community Reintegration, OT Neuro Re-Ed/Balance, OT Therapeutic Activity, OT Aquatic Therapy and OT Therapeutic Exercise             Goal: LTG-By discharge, patient will perform bathroom transfers     Dates: Start: 10/12/19   Expected End: 10/26/19       Description: 1) Individualized Goal:  At a Mod I level.   2) Interventions:  OT Self Care/ADL, OT Community Reintegration, OT Neuro Re-Ed/Balance, OT  Therapeutic Activity, OT Aquatic Therapy and OT Evaluation             Goal: LTG-By discharge, patient will complete basic home management     Dates: Start: 10/12/19   Expected End: 10/26/19       Description: 1) Individualized Goal:  At a SUP level.   2) Interventions:  OT Self Care/ADL, OT Neuro Re-Ed/Balance, OT Therapeutic Activity, OT Aquatic Therapy and OT Evaluation                   Problem: Toileting     Dates: Start: 10/12/19       Description:     Goal: STG-Within one week, patient will complete toileting tasks     Dates: Start: 10/12/19   Expected End: 10/19/19       Description: 1) Individualized Goal:  At a SUP level with AE as needed.   2) Interventions:  OT Self Care/ADL, OT Neuro Re-Ed/Balance, OT Therapeutic Activity and OT Therapeutic Exercise

## 2019-10-13 PROBLEM — E87.1 HYPONATREMIA: Status: ACTIVE | Noted: 2019-10-13

## 2019-10-13 PROBLEM — E55.9 VITAMIN D INSUFFICIENCY: Status: ACTIVE | Noted: 2019-10-13

## 2019-10-13 PROBLEM — R79.89 AZOTEMIA: Status: ACTIVE | Noted: 2019-10-05

## 2019-10-13 PROCEDURE — G0515 COGNITIVE SKILLS DEVELOPMENT: HCPCS

## 2019-10-13 PROCEDURE — A9270 NON-COVERED ITEM OR SERVICE: HCPCS | Performed by: HOSPITALIST

## 2019-10-13 PROCEDURE — A9270 NON-COVERED ITEM OR SERVICE: HCPCS | Performed by: PHYSICAL MEDICINE & REHABILITATION

## 2019-10-13 PROCEDURE — 94760 N-INVAS EAR/PLS OXIMETRY 1: CPT

## 2019-10-13 PROCEDURE — 770010 HCHG ROOM/CARE - REHAB SEMI PRIVAT*

## 2019-10-13 PROCEDURE — 700102 HCHG RX REV CODE 250 W/ 637 OVERRIDE(OP): Performed by: PHYSICAL MEDICINE & REHABILITATION

## 2019-10-13 PROCEDURE — 94669 MECHANICAL CHEST WALL OSCILL: CPT

## 2019-10-13 PROCEDURE — 94667 MNPJ CHEST WALL 1ST: CPT

## 2019-10-13 PROCEDURE — 99223 1ST HOSP IP/OBS HIGH 75: CPT | Performed by: HOSPITALIST

## 2019-10-13 PROCEDURE — 700102 HCHG RX REV CODE 250 W/ 637 OVERRIDE(OP): Performed by: HOSPITALIST

## 2019-10-13 RX ADMIN — SENNOSIDES, DOCUSATE SODIUM 2 TABLET: 50; 8.6 TABLET, FILM COATED ORAL at 20:00

## 2019-10-13 RX ADMIN — FAMOTIDINE 20 MG: 20 TABLET ORAL at 08:41

## 2019-10-13 RX ADMIN — FAMOTIDINE 20 MG: 20 TABLET ORAL at 20:00

## 2019-10-13 RX ADMIN — DIBASIC SODIUM PHOSPHATE, MONOBASIC POTASSIUM PHOSPHATE AND MONOBASIC SODIUM PHOSPHATE 1 TABLET: 852; 155; 130 TABLET ORAL at 08:40

## 2019-10-13 RX ADMIN — LEVETIRACETAM 500 MG: 500 TABLET, FILM COATED ORAL at 20:00

## 2019-10-13 RX ADMIN — MINERAL OIL AND WHITE PETROLATUM 1 APPLICATION: 30; 940 OINTMENT OPHTHALMIC at 05:15

## 2019-10-13 RX ADMIN — MINERAL OIL AND WHITE PETROLATUM 1 APPLICATION: 30; 940 OINTMENT OPHTHALMIC at 22:00

## 2019-10-13 RX ADMIN — MINERAL OIL AND WHITE PETROLATUM 1 APPLICATION: 30; 940 OINTMENT OPHTHALMIC at 14:10

## 2019-10-13 RX ADMIN — DEXAMETHASONE 4 MG: 4 TABLET ORAL at 20:00

## 2019-10-13 RX ADMIN — ACETAMINOPHEN 650 MG: 325 TABLET, FILM COATED ORAL at 20:02

## 2019-10-13 RX ADMIN — NICOTINE 14 MG: 14 PATCH, EXTENDED RELEASE TRANSDERMAL at 05:15

## 2019-10-13 RX ADMIN — DEXAMETHASONE 4 MG: 4 TABLET ORAL at 08:40

## 2019-10-13 RX ADMIN — DIBASIC SODIUM PHOSPHATE, MONOBASIC POTASSIUM PHOSPHATE AND MONOBASIC SODIUM PHOSPHATE 1 TABLET: 852; 155; 130 TABLET ORAL at 20:00

## 2019-10-13 RX ADMIN — LEVETIRACETAM 500 MG: 500 TABLET, FILM COATED ORAL at 08:40

## 2019-10-13 RX ADMIN — VITAMIN D, TAB 1000IU (100/BT) 1000 UNITS: 25 TAB at 18:11

## 2019-10-13 RX ADMIN — SENNOSIDES, DOCUSATE SODIUM 2 TABLET: 50; 8.6 TABLET, FILM COATED ORAL at 08:40

## 2019-10-13 RX ADMIN — SIMVASTATIN 40 MG: 40 TABLET, FILM COATED ORAL at 20:00

## 2019-10-13 RX ADMIN — POTASSIUM CHLORIDE 20 MEQ: 1500 TABLET, EXTENDED RELEASE ORAL at 08:40

## 2019-10-13 RX ADMIN — TAMSULOSIN HYDROCHLORIDE 0.4 MG: 0.4 CAPSULE ORAL at 08:40

## 2019-10-13 ASSESSMENT — PATIENT HEALTH QUESTIONNAIRE - PHQ9
2. FEELING DOWN, DEPRESSED, IRRITABLE, OR HOPELESS: NOT AT ALL
SUM OF ALL RESPONSES TO PHQ9 QUESTIONS 1 AND 2: 0
1. LITTLE INTEREST OR PLEASURE IN DOING THINGS: NOT AT ALL

## 2019-10-13 NOTE — CARE PLAN
Problem: Communication  Goal: The ability to communicate needs accurately and effectively will improve  Outcome: PROGRESSING AS EXPECTED  Note:   Patient uses call light consistently and appropriately this shift.  Waits for assistance when needed and does not attempt self transfer, though expresses some frustration about having to do so.  Able to verbalize needs.  Will continue to monitor.      Problem: Pain Management  Goal: Pain level will decrease to patient's comfort goal  Outcome: PROGRESSING AS EXPECTED  Flowsheets (Taken 10/12/2019 7053)  Comfort Goal: Comfort at Rest;Comfort with Movement;Perform Activity;Stay Alert  Pain Rating Scale (NPRS): 0  Note:   Pt has no c/o pain; able to participate in all therapies and activities this shift. Will continue to monitor.

## 2019-10-13 NOTE — FLOWSHEET NOTE
10/12/19 1736   Type of Assessment   Assessment Yes   Patient History   Pulmonary Diagnosis None   Surgical Procedures Craniectomy for tumors   Home O2 No   Home Treatments/Frequency No   COPD Risk Screening   Do you have a history of COPD? No   COPD Population Screener   During the past 4 weeks, how much did you feel short of breath? 0   Do you ever cough up any mucus or phlegm? 0   In the past 12 months, you do less than you used to because of your breathing problems 0   Have you smoked at least 100 cigarettes in your entire life? 2   How old are you? 2   COPD Screening Score 4   Smoking History   Have you ever smoked Yes   Have you smoked in the last 12 months No   Confirm Quit Date 10/12/09   Level Of Consciousness   Level of Consciousness Alert   Respiratory WDL   Respiratory (WDL) X   Chest Exam   Respiration 16   Pulse 63   Breath Sounds   RUL Breath Sounds Diminished   RML Breath Sounds Diminished   RLL Breath Sounds Rhonchi   LOBO Breath Sounds Diminished   LLL Breath Sounds Rhonchi   Secretions   Cough Non Productive;Moist   Oximetry   #Pulse Oximetry (Single Determination) Yes   Oxygen   Home O2 Use Prior To Admission? No   Pulse Oximetry 93 %   O2 Daily Delivery Respiratory  Room Air with O2 Available   Protocol Pathways   Protocol Pathways Yes   Bronchopulmonary Hygiene Protocol   Bronchopulmonary Hygiene Indications Difficulty with Secretion Clearance w/ Sputum Production greater than 25 ml / day (3tbsp) / day;Ineffective cough and inability to clear secretions   Hx of Bronchiectasis, Cavitating Lung Disease or Cystic Fibrosis No - PEP/CPT QID and PRN

## 2019-10-13 NOTE — ASSESSMENT & PLAN NOTE
Mild  Na: 136 (10/9) --> 130 (10/12) --> 133 (10/14)  ? 2nd to Decadron (new med): tapering down thru 10/21  ? 2nd to Lisinopril (new med): can cause SIADH pattern  Cont to monitor

## 2019-10-13 NOTE — PROGRESS NOTES
1916 received report from day shift nurse and assume patient care. Pt is calm and stable . Denies any pain at this time. No s/sx of distress. Safety precautions in place. Call light with in reach. Will continue to monitor.

## 2019-10-13 NOTE — PROGRESS NOTES
Patient was given trazodone last night after complained of not being able to sleep the other night. Patient verbalized that he had a good night rest today. Will continue to monitor.   Congenital non-neoplastic nevus

## 2019-10-13 NOTE — CONSULTS
DATE OF SERVICE:  10/13/2019    REQUESTING PHYSICIAN:  Reid Moscoso DO    CHIEF COMPLAINT / REASON FOR CONSULTATION:   Hypertension  Hyponatremia  Azotemia    HISTORY OF PRESENT ILLNESS:  This is a 73 y/o male with a PMH significant for hypertension and history of lung cancer status post lobectomy and radiation 5 years ago who presented to the [facility] with left-sided weakness and increased falls over the last 3 weeks.  Patient also complained of confusion, blank stares, left-sided weakness, left sided numbness, unstable gait, left-sided facial droop.   An initial CT scan showed an intracranial mass.  He was transferred to [facility] for further evaluation.  Review of CT at outside facility by Dr. Lacey of neurosurgery was significant for at least 2 separate metastatic lesions, one in the posterior fossa and a large right frontal lesion.  Patient was taken to the operating room by Dr. Lacey on 10/4/2019 for two-stage left suboccipital craniectomy, microscopic gross total excision of left superior cerebellar mass, and right frontotemporal craniotomy with gross total resection of large right frontal metastasis.  Pathology of these tumors are consistent with non-small cell lung cancer.  CT of the chest revealed a 3.4 cm left upper lobe lung mass extending into the left suprahilar region and encasing the left upper lobe pulmonary arteries with questionable small amount of thrombus on the anterior branch consistent with malignancy.  Patient is scheduled to undergo whole brain radiation in the next couple of days by Dr. Stephens.  He will need outpatient PET scan, and if he chooses to move forward with treatment he will also need immunotherapy, chemotherapy, or a combination of both.  Treatment can begin after brain radiation.     Because of the patient's weakness and debility, Rehab was consulted, evaluated the patient, and was deemed a good Rehab candidate.  The patient was transferred over to the Rehab facility on  10/11/2019.      The patient denies fever, chills, nausea, vomiting, headaches, blurry vision, or chest pain.    REVIEW OF SYSTEMS: All review of systems are negative pre AMA and CMS criteria   except for that stated in the HPI.    PAST MEDICAL HISTORY:  No past medical history on file.    PAST SURGICAL HISTORY:  Past Surgical History:   Procedure Laterality Date   • CRANIOTOMY Right 10/6/2019    Procedure: Stage 2: Right frontal craniotomy and removal o right frontal tumor;  Surgeon: Gagan Lacey M.D.;  Location: SURGERY [facility];  Service: Neurosurgery   • CRANIECTOMY Left 10/6/2019    Procedure: Stage 1: Left suboccipital craniectomy and removal of left cerebellar tumor;  Surgeon: Gagan Lacey M.D.;  Location: SURGERY [facility];  Service: Neurosurgery       No Known Allergies    CURRENT MEDICATIONS:    Current Facility-Administered Medications:   •  Respiratory Care per Protocol  •  hydrALAZINE  •  acetaminophen  •  senna-docusate **AND** polyethylene glycol/lytes **AND** magnesium hydroxide **AND** bisacodyl  •  artificial tears  •  benzocaine-menthol  •  mag hydrox-al hydrox-simeth  •  ondansetron **OR** ondansetron  •  traZODone  •  sodium chloride  •  nicotine **AND** nicotine polacrilex  •  artificial tears  •  [COMPLETED] dexamethasone **FOLLOWED BY** dexamethasone **FOLLOWED BY** [START ON 10/15/2019] dexamethasone **FOLLOWED BY** [START ON 10/18/2019] dexamethasone **FOLLOWED BY** [START ON 10/21/2019] dexamethasone  •  famotidine  •  HYDROcodone-acetaminophen  •  ipratropium-albuterol  •  levETIRAcetam  •  lisinopril  •  phosphorus  •  potassium chloride SA  •  simvastatin  •  tamsulosin    Social History     Socioeconomic History   • Marital status: Single     Spouse name: Not on file   • Number of children: Not on file   • Years of education: Not on file   • Highest education level: Not on file   Occupational History   • Not on file   Social Needs   • Financial resource strain: Not on  file   • Food insecurity:     Worry: Not on file     Inability: Not on file   • Transportation needs:     Medical: Not on file     Non-medical: Not on file   Tobacco Use   • Smoking status: Current Every Day Smoker     Packs/day: 0.10   • Smokeless tobacco: Never Used   Substance and Sexual Activity   • Alcohol use: Yes     Frequency: Monthly or less     Drinks per session: 1 or 2   • Drug use: Never   • Sexual activity: Not on file   Lifestyle   • Physical activity:     Days per week: Not on file     Minutes per session: Not on file   • Stress: Not on file   Relationships   • Social connections:     Talks on phone: Not on file     Gets together: Not on file     Attends Yazidi service: Not on file     Active member of club or organization: Not on file     Attends meetings of clubs or organizations: Not on file     Relationship status: Not on file   • Intimate partner violence:     Fear of current or ex partner: Not on file     Emotionally abused: Not on file     Physically abused: Not on file     Forced sexual activity: Not on file   Other Topics Concern   • Not on file   Social History Narrative   • Not on file       FAMILY HISTORY:  was reviewed and is not pertinent to this consultation.    PHYSICAL EXAMINATION:  VITAL SIGNS:  Temp is 97.8, blood pressure is 132/78, heart rate is 78, respiratory rate is 18.  GENERAL:  Patient was lying in bed in no distress.  HEENT:  Pupils were equal, round and reactive to light and accomodation.  Oral mucosa was pink and moist.  NECK:  Soft.  Supple.  No JVD.  HEART:  Regular rate and rhythm.  Normal S1 and S2.  No murmurs were appreciated.  LUNGS:  Are clear to auscultation bilaterally.  ABDOMEN:  Soft, non tender, non distended.  Bowels sound were positive in all four quadrants.  EXTREMITIES:  No clubbing, cyanosis.  There was no lower extremity edema.  NEUROLOGIC:  Cranial nerves two through twelve were grossly intact.    LABS:  Lab Results   Component Value Date/Time     SODIUM 130 (L) 10/12/2019 05:47 AM    POTASSIUM 4.2 10/12/2019 05:47 AM    CHLORIDE 102 10/12/2019 05:47 AM    CO2 21 10/12/2019 05:47 AM    GLUCOSE 110 (H) 10/12/2019 05:47 AM    BUN 28 (H) 10/12/2019 05:47 AM    CREATININE 0.78 10/12/2019 05:47 AM      Lab Results   Component Value Date/Time    WBC 8.0 10/12/2019 05:47 AM    RBC 4.27 (L) 10/12/2019 05:47 AM    HEMOGLOBIN 13.1 (L) 10/12/2019 05:47 AM    HEMATOCRIT 39.9 (L) 10/12/2019 05:47 AM    MCV 93.4 10/12/2019 05:47 AM    MCH 30.7 10/12/2019 05:47 AM    MCHC 32.8 (L) 10/12/2019 05:47 AM    MPV 9.2 10/12/2019 05:47 AM    NEUTSPOLYS 80.00 (H) 10/12/2019 05:47 AM    LYMPHOCYTES 12.30 (L) 10/12/2019 05:47 AM    MONOCYTES 6.10 10/12/2019 05:47 AM    EOSINOPHILS 0.00 10/12/2019 05:47 AM    BASOPHILS 0.10 10/12/2019 05:47 AM      Lab Results   Component Value Date/Time    PROTHROMBTM 13.1 10/04/2019 03:25 PM    INR 0.98 10/04/2019 03:25 PM        Azotemia  Bun: 28 (10/12)  Encouraging fluid (water/juice) intake  Monitor    History of lung cancer  Recent CT chest show a 3.4 cm mass  With 2 B/L brain mets -- s/p 2 stage resection  On tapering Decadron  On Keppra for seizure prophalaxis  For out patient PET scan  Note: hx of smoking    Vitamin D insufficiency  Vit D: 21  Will start supplements    Hypokalemia  K+: 4.4  On KCL: 20 meq daily --> will d/c (10/13)  Monitor    Hypophosphatemia  PO4: 2.4 (10/4)  On supplements  Monitor    Essential hypertension  BP ok  On Lisinopril: 20 mg daily  Monitor    Hyponatremia  Mild  Na: 136 (10/9) --> 130 (10/12)  ? 2nd to Decadron (new med): tapering down thru 10/21  ? 2nd to Lisinopril (new med): can cause SIADH pattern  Will check level in the am  Consider changing Lisinopril to another hypertensive med  Cont to monitor      This case has been discussed with the attending Physiatrist.    Thank you for the consultation.  Will follow the patient with you.

## 2019-10-13 NOTE — ASSESSMENT & PLAN NOTE
Serially decreased Lisinopril for continued low blood pressures  Monitor for orthostatic hypotension on Flomax

## 2019-10-13 NOTE — FLOWSHEET NOTE
Respiratory Therapy Update    Interdisciplinary Plan of Care-Goals (Indications)  Hyperinflation Protocol Indications: Atelectasis Documented by Chest X-Ray (10/13/19 1010)  Interdisciplinary Plan of Care-Outcomes   Hyperinflation Protocol Goals/Outcome: Absences of or Improvement in Signs of Atelectasis;Stable Vital Capacity x24 hrs and Patient Understands / uses I.S. (10/13/19 1010)    #PEP/CPT (Manual) Initial: Initial (10/13/19 1010)          Cough: Non Productive;Moist (10/12/19 2100)                           O2 (LPM): 0 (10/13/19 1010)  O2 Daily Delivery Respiratory : Room Air with O2 Available (10/13/19 1010)    Breath Sounds  RUL Breath Sounds: Clear;Diminished (10/13/19 1010)  RML Breath Sounds: Clear;Diminished (10/13/19 1010)  RLL Breath Sounds: Diminished (10/13/19 1010)  LOBO Breath Sounds: Clear;Diminished (10/13/19 1010)  LLL Breath Sounds: Diminished (10/13/19 1010)        Events/Summary/Plan: PEP and IS done in fowlers with good effort.  Pt. tolerated well. (10/13/19 1010)

## 2019-10-13 NOTE — THERAPY
"Speech Language Pathology  Daily Treatment     Patient Name: Milan Clark  Age:  74 y.o., Sex:  male  Medical Record #: [id number]  Today's Date: 10/13/2019     Subjective    Pt pleasant and cooperative. Stated \"wow this is difficult for me\" during sustained and alternating attn tasks.      Objective       10/13/19 1431   SCCAN (Scales of Cognitive and Communicative Ability for Neurorehabilitation)   Oral Expression - Raw Score 14   Oral Expression - Scale Performance Score 74   Orientation - Raw Score 12   Orientation - Scale Performance Score 100   Memory - Raw Score 14   Memory - Scale Performance Score 74   Speech Comprehension - Raw Score 12   Speech Comprehension - Scale Performance Score 92   Reading Comprehension - Raw Score 9   Reading Comprehension - Scale Performance Score 75   Writing - Raw Score 5   Writing - Scale Performance Score 71   Attention - Raw Score 13   Attention - Scale Performance Score 81   Problem Solving - Raw Score 14   Problem Solving - Scale Performance Score 61   SCCAN Total Raw Score 76   SCCAN Degree of Severity Mild Impairment   Interdisciplinary Plan of Care Collaboration   IDT Collaboration with  Certified Nursing Assistant   Patient Position at End of Therapy In Bed;Call Light within Reach;Tray Table within Reach;Bed Alarm On   Collaboration Comments CLOF   SLP Total Time Spent   SLP Individual Total Time Spent (Mins) 60   Charge Group   Charges Yes   SLP Cognitive Skill Development 4       FIM Comprehension Score:  5 - Stand-by Prompting/Supervision or Set-up  Comprehension Description:  Glasses, Verbal cues    FIM Expression Score:  5 - Stand-by Prompting/Supervision or Set-up  Expression Description:  Verbal cueing    FIM Social Interaction Score:  6 - Modified Independent  Social Interaction Description:  Increased time    FIM Problem Solving Score:  5 - Standby Prompting/Supervision or Set-up  Problem Solving Description:  Verbal cueing, Therapy schedule    FIM Memory " Score:  4 - Minimal Assistance  Memory Description:  Verbal cueing, Therapy schedule, Bed/chair alarm      Assessment    SCCAN Completed - Reading Comp 75, Writing 71, Attention -81. Pt's attn seems to be a factor impacting reading comprehension. Initiated Daily Log for recall of events- MOD A req'd. Sustained attn- MAX A. Alternating attn- MAX-MOD A.     Plan    Add attention goal, continue to address memory.

## 2019-10-13 NOTE — ASSESSMENT & PLAN NOTE
Recent CT chest show a 3.4 cm mass  With bilat brain mets s/p 2 stage resection  On Decadron taper  On Keppra for seizure prophylaxis  For outpt PET scan

## 2019-10-13 NOTE — CARE PLAN
Problem: Venous Thromboembolism (VTW)/Deep Vein Thrombosis (DVT) Prevention:  Goal: Patient will participate in Venous Thrombosis (VTE)/Deep Vein Thrombosis (DVT)Prevention Measures  Outcome: PROGRESSING AS EXPECTED  Flowsheets (Taken 10/13/2019 1000)  Risk Assessment Score: 2  VTE RISK: Moderate  Pharmacologic Prophylaxis Used: Contraindicated per MD  Note:   Per MD, pt does not require DVT prophylaxis d/t high risk for bleeding. Pt able to ambulate; no s/s of DVT or edema noted. Will continue to monitor.     Problem: Skin Integrity  Goal: Risk for impaired skin integrity will decrease  Outcome: PROGRESSING AS EXPECTED  Note:   Pt has one incision to right lateral head and one to left posterior head, both stapled and YESENIA. Pt also has redness to coccyx; Mepilex in place. Will continue to monitor.

## 2019-10-13 NOTE — CARE PLAN
Problem: Communication  Goal: The ability to communicate needs accurately and effectively will improve  Outcome: PROGRESSING AS EXPECTED  Note:   Patient was encouraged to expressed his concerns and needs during his stay. Patient was able to make his needs known. Will continue to monitor.     Problem: Safety  Goal: Will remain free from falls  Outcome: PROGRESSING AS EXPECTED  Note:   Patient uses call light consistently and appropriately this shift.  Waits for assistance when needed and does not attempt self transfer.  Able to verbalize needs.  Will continue to monitor.

## 2019-10-14 ENCOUNTER — HOSPITAL ENCOUNTER (OUTPATIENT)
Dept: RADIATION ONCOLOGY | Facility: MEDICAL CENTER | Age: 74
End: 2019-10-31
Attending: RADIOLOGY
Payer: COMMERCIAL

## 2019-10-14 LAB
ANION GAP SERPL CALC-SCNC: 5 MMOL/L (ref 0–11.9)
BUN SERPL-MCNC: 32 MG/DL (ref 8–22)
CALCIUM SERPL-MCNC: 8.2 MG/DL (ref 8.5–10.5)
CHLORIDE SERPL-SCNC: 104 MMOL/L (ref 96–112)
CO2 SERPL-SCNC: 24 MMOL/L (ref 20–33)
CREAT SERPL-MCNC: 0.81 MG/DL (ref 0.5–1.4)
GLUCOSE SERPL-MCNC: 112 MG/DL (ref 65–99)
MAGNESIUM SERPL-MCNC: 1.9 MG/DL (ref 1.5–2.5)
PHOSPHATE SERPL-MCNC: 2.5 MG/DL (ref 2.5–4.5)
POTASSIUM SERPL-SCNC: 4.2 MMOL/L (ref 3.6–5.5)
SODIUM SERPL-SCNC: 133 MMOL/L (ref 135–145)

## 2019-10-14 PROCEDURE — 99232 SBSQ HOSP IP/OBS MODERATE 35: CPT | Performed by: HOSPITALIST

## 2019-10-14 PROCEDURE — A9270 NON-COVERED ITEM OR SERVICE: HCPCS | Performed by: PHYSICAL MEDICINE & REHABILITATION

## 2019-10-14 PROCEDURE — 99232 SBSQ HOSP IP/OBS MODERATE 35: CPT | Performed by: PHYSICAL MEDICINE & REHABILITATION

## 2019-10-14 PROCEDURE — 80048 BASIC METABOLIC PNL TOTAL CA: CPT

## 2019-10-14 PROCEDURE — 94668 MNPJ CHEST WALL SBSQ: CPT

## 2019-10-14 PROCEDURE — 97110 THERAPEUTIC EXERCISES: CPT

## 2019-10-14 PROCEDURE — 770010 HCHG ROOM/CARE - REHAB SEMI PRIVAT*

## 2019-10-14 PROCEDURE — 700102 HCHG RX REV CODE 250 W/ 637 OVERRIDE(OP): Performed by: PHYSICAL MEDICINE & REHABILITATION

## 2019-10-14 PROCEDURE — 94760 N-INVAS EAR/PLS OXIMETRY 1: CPT

## 2019-10-14 PROCEDURE — 97116 GAIT TRAINING THERAPY: CPT

## 2019-10-14 PROCEDURE — 94669 MECHANICAL CHEST WALL OSCILL: CPT

## 2019-10-14 PROCEDURE — 83735 ASSAY OF MAGNESIUM: CPT

## 2019-10-14 PROCEDURE — 36415 COLL VENOUS BLD VENIPUNCTURE: CPT

## 2019-10-14 PROCEDURE — 700102 HCHG RX REV CODE 250 W/ 637 OVERRIDE(OP): Performed by: HOSPITALIST

## 2019-10-14 PROCEDURE — 97535 SELF CARE MNGMENT TRAINING: CPT

## 2019-10-14 PROCEDURE — A9270 NON-COVERED ITEM OR SERVICE: HCPCS | Performed by: HOSPITALIST

## 2019-10-14 PROCEDURE — 84100 ASSAY OF PHOSPHORUS: CPT

## 2019-10-14 PROCEDURE — G0515 COGNITIVE SKILLS DEVELOPMENT: HCPCS

## 2019-10-14 PROCEDURE — 97530 THERAPEUTIC ACTIVITIES: CPT

## 2019-10-14 RX ADMIN — SENNOSIDES, DOCUSATE SODIUM 2 TABLET: 50; 8.6 TABLET, FILM COATED ORAL at 20:35

## 2019-10-14 RX ADMIN — FAMOTIDINE 20 MG: 20 TABLET ORAL at 08:29

## 2019-10-14 RX ADMIN — DEXAMETHASONE 4 MG: 4 TABLET ORAL at 08:29

## 2019-10-14 RX ADMIN — MINERAL OIL AND WHITE PETROLATUM 1 APPLICATION: 30; 940 OINTMENT OPHTHALMIC at 20:39

## 2019-10-14 RX ADMIN — FAMOTIDINE 20 MG: 20 TABLET ORAL at 20:35

## 2019-10-14 RX ADMIN — MINERAL OIL AND WHITE PETROLATUM 1 APPLICATION: 30; 940 OINTMENT OPHTHALMIC at 15:14

## 2019-10-14 RX ADMIN — SIMVASTATIN 40 MG: 40 TABLET, FILM COATED ORAL at 20:35

## 2019-10-14 RX ADMIN — MINERAL OIL AND WHITE PETROLATUM 1 APPLICATION: 30; 940 OINTMENT OPHTHALMIC at 05:05

## 2019-10-14 RX ADMIN — VITAMIN D, TAB 1000IU (100/BT) 1000 UNITS: 25 TAB at 08:28

## 2019-10-14 RX ADMIN — ACETAMINOPHEN 650 MG: 325 TABLET, FILM COATED ORAL at 20:35

## 2019-10-14 RX ADMIN — LEVETIRACETAM 500 MG: 500 TABLET, FILM COATED ORAL at 20:35

## 2019-10-14 RX ADMIN — TAMSULOSIN HYDROCHLORIDE 0.4 MG: 0.4 CAPSULE ORAL at 08:38

## 2019-10-14 RX ADMIN — DIBASIC SODIUM PHOSPHATE, MONOBASIC POTASSIUM PHOSPHATE AND MONOBASIC SODIUM PHOSPHATE 1 TABLET: 852; 155; 130 TABLET ORAL at 20:35

## 2019-10-14 RX ADMIN — SENNOSIDES, DOCUSATE SODIUM 2 TABLET: 50; 8.6 TABLET, FILM COATED ORAL at 08:30

## 2019-10-14 RX ADMIN — DIBASIC SODIUM PHOSPHATE, MONOBASIC POTASSIUM PHOSPHATE AND MONOBASIC SODIUM PHOSPHATE 1 TABLET: 852; 155; 130 TABLET ORAL at 08:29

## 2019-10-14 RX ADMIN — LEVETIRACETAM 500 MG: 500 TABLET, FILM COATED ORAL at 08:29

## 2019-10-14 RX ADMIN — NICOTINE 14 MG: 14 PATCH, EXTENDED RELEASE TRANSDERMAL at 05:04

## 2019-10-14 RX ADMIN — DEXAMETHASONE 4 MG: 4 TABLET ORAL at 20:35

## 2019-10-14 ASSESSMENT — ENCOUNTER SYMPTOMS
NAUSEA: 0
SHORTNESS OF BREATH: 0
VOMITING: 0
ABDOMINAL PAIN: 0
CHILLS: 0
NERVOUS/ANXIOUS: 0
DIARRHEA: 0
FEVER: 0

## 2019-10-14 NOTE — THERAPY
"Occupational Therapy  Daily Treatment     Patient Name: Milan Clark  Age:  74 y.o., Sex:  male  Medical Record #: [id number]  Today's Date: 10/14/2019     Precautions  Precautions: Fall Risk         Subjective  \"I've got to use the toilet\"     Objective       10/14/19 0831   Precautions   Precautions Fall Risk   Vitals   O2 (LPM) 0   O2 Delivery None (Room Air)   Pain   Intervention Declines   Pain 0 - 10 Group   Therapist Pain Assessment 0   Non Verbal Descriptors   Non Verbal Scale  Calm   Cognition    Level of Consciousness Alert   Sitting Upper Body Exercises   Upper Extremity Bike Level 2 Resistance  (FluidoBike, Level 2, 15 mins, rest break x 1, 2.176km)   Interdisciplinary Plan of Care Collaboration   IDT Collaboration with  Nursing   Patient Position at End of Therapy In Bed;Call Light within Reach;Tray Table within Reach;Phone within Reach   OT Total Time Spent   OT Individual Total Time Spent (Mins) 60   OT Charge Group   OT Self Care / ADL 2   OT Therapeutic Exercise  2       FIM Eating Score:  6 - Modified Independent  Eating Description:  Increased time, Insert dentures    FIM Grooming Score:  6 - Modified Independent  Grooming Description:  Increased time(WC level at sink side for groom/hygiene/oral care)    FIM Toiletin - Standby Prompting/Supervision or Set-up  Toileting Description:  Grab bar, Increased time, Supervision for safety    FIM Toilet Transfer Score:  5 - Standby Prompting/Supervision or Set-up  Toilet Transfer Description:  Supervision for safety, Grab bar      Assessment    Pt was alert and cooperative w/ tx.  Sup/SBA for commode transfer + toileting, Mod Indep Groom/Hygiene and denture care.  Pt tolerated cardio/endur via FluidoBike,.  Noted L hand weakness, maintained grasp throughout 15 mins. Limiters include generalized weakness (L>R), limited endurance.        Plan    Cont'd OT for strength, endurance and safet/environmental awareness for safe ADL's and transfers.    "

## 2019-10-14 NOTE — PROGRESS NOTES
[facility] Medicine Daily Progress Note    Date of Service  10/14/2019    Chief Complaint:  Hypertension  Hyponatremia  Azotemia    Interval History:  No significant events or changes since last visit    Review of Systems  Review of Systems   Constitutional: Negative for chills and fever.   Respiratory: Negative for shortness of breath.    Cardiovascular: Negative for chest pain.   Gastrointestinal: Negative for abdominal pain, diarrhea, nausea and vomiting.   Psychiatric/Behavioral: The patient is not nervous/anxious.         Physical Exam  Temp:  [36.4 °C (97.6 °F)-36.9 °C (98.4 °F)] 36.4 °C (97.6 °F)  Pulse:  [58-70] 58  Resp:  [18-20] 20  BP: (106-120)/(77-80) 120/79  SpO2:  [92 %] 92 %    Physical Exam   Constitutional: He is oriented to person, place, and time. He appears well-nourished.   HENT:   Head: Atraumatic.   Eyes: Pupils are equal, round, and reactive to light. Conjunctivae are normal.   Neck: Normal range of motion. Neck supple.   Cardiovascular: Normal rate, regular rhythm, S1 normal and S2 normal.   No murmur heard.  Pulmonary/Chest: Effort normal. He has no wheezes. He has no rhonchi. He has no rales.   Abdominal: Soft. He exhibits no distension. There is no tenderness.   Musculoskeletal: He exhibits no edema.   Neurological: He is alert and oriented to person, place, and time. No sensory deficit.   Skin: Skin is warm and dry. No rash noted. No cyanosis.   Psychiatric: He has a normal mood and affect. His behavior is normal.   Nursing note and vitals reviewed.      Fluids    Intake/Output Summary (Last 24 hours) at 10/14/2019 1127  Last data filed at 10/14/2019 1000  Gross per 24 hour   Intake 420 ml   Output 1400 ml   Net -980 ml       Laboratory  Recent Labs     10/12/19  0547   WBC 8.0   RBC 4.27*   HEMOGLOBIN 13.1*   HEMATOCRIT 39.9*   MCV 93.4   MCH 30.7   MCHC 32.8*   RDW 48.1   PLATELETCT 350   MPV 9.2     Recent Labs     10/12/19  0547 10/14/19  0539   SODIUM 130* 133*   POTASSIUM 4.2 4.2    CHLORIDE 102 104   CO2 21 24   GLUCOSE 110* 112*   BUN 28* 32*   CREATININE 0.78 0.81   CALCIUM 8.4* 8.2*                   Imaging      Assessment/Plan  History of lung cancer- (present on admission)  Assessment & Plan  Recent CT chest show a 3.4 cm mass  With 2 B/L brain mets -- s/p 2 stage resection  On tapering Decadron  On Keppra for seizure prophalaxis  For out patient PET scan  Note: hx of smoking    Essential hypertension- (present on admission)  Assessment & Plan  BP ok  On Lisinopril: 20 mg daily  Monitor    Hypophosphatemia- (present on admission)  Assessment & Plan  PO4: 2.4 (10/4) --> 2.5 (10/14)  On supplements  Monitor    Hypokalemia- (present on admission)  Assessment & Plan  K+: 4.4 --> 4.2 (10/14)  Off K+ supplements (10/13)  Monitor    Azotemia- (present on admission)  Assessment & Plan  Bun: 28 (10/12) --> 32 (10/14)  Will re-encouraging fluid (water/juice) intake  Monitor    Hyponatremia  Assessment & Plan  Mild  Na: 136 (10/9) --> 130 (10/12) --> 133 (10/14)  ? 2nd to Decadron (new med): tapering down thru 10/21  ? 2nd to Lisinopril (new med): can cause SIADH pattern  Cont to monitor    Vitamin D insufficiency  Assessment & Plan  Vit D: 21  On supplements

## 2019-10-14 NOTE — FLOWSHEET NOTE
10/14/19 1021   Type of Assessment   Reassessment Yes  (correction)   Patient History   Pulmonary Diagnosis COPD, lung cancer

## 2019-10-14 NOTE — THERAPY
"Speech Language Pathology  Daily Treatment     Patient Name: Milan Clark  Age:  74 y.o., Sex:  male  Medical Record #: [id number]  Today's Date: 10/14/2019     Subjective    Pt was pleasant and cooperative during this ST session      Objective       10/14/19 1001   SLP Total Time Spent   SLP Individual Total Time Spent (Mins) 60   Charge Group   SLP Cognitive Skill Development 4       Assessment    Pt completed a functional checkbook management task with mod cues required for organization and attention.  Pt demonstrated difficulty copying numbers correctly into the checkbook register, subtracting the correct numbers and following directions throughout this task.  Pt stated \"I'm usually really good with numbers, this shouldn't be this hard\".      Plan    Pt will continue checkbook management task     "

## 2019-10-14 NOTE — FLOWSHEET NOTE
10/14/19 1358   Events/Summary/Plan   Events/Summary/Plan IS, 02 spot check   Interdisciplinary Plan of Care-Goals (Indications)   Hyperinflation Protocol Indications Atelectasis Documented by Chest X-Ray   Interdisciplinary Plan of Care-Outcomes    Hyperinflation Protocol Goals/Outcome Absences of or Improvement in Signs of Atelectasis   Education   Education Yes - Pt. / Family has been Instructed in use of Respiratory Equipment   PEP/CPT Group   #PEP/CPT (Manual) Subsequent Subsequent   #CPT (Mechanical) Yes   PEP/CPT Method Flutter Valve   Incentive Spirometry Group   Breathing Exercises Yes   Incentive Spirometer Volume 2000 mL   Respiratory WDL   Respiratory (WDL) X   Chest Exam   Respiration 18   Pulse 62   Secretions   Cough Congested;Non Productive   Oximetry   #Pulse Oximetry (Single Determination) Yes   Oxygen   Home O2 Use Prior To Admission? No   Pulse Oximetry 94 %   O2 Daily Delivery Respiratory  Room Air with O2 Available

## 2019-10-14 NOTE — DISCHARGE PLANNING
"CASE MANAGEMENT INITIAL ASSESSMENT    Admit Date:  10/11/2019     I met with patient to discuss role of case management / discharge planning / team conference.   Patient is a  74 y.o. male transferred from [facility] 10.11.19 s/p left craniectomy w/ cranioplasty & right craniotomy for brain mets.  PMHx: lung cancer s/p lobectomy, chronic back pain, +smoker.  PLOF: lives w/ daughter, LANRE & 2 grandkids in [place], 2SH w/ 1 THOMAS, lives on 1st floor w/ bedroom & full bath, independent, active lifestyle (golfs).  States \"my daughter is very supportive & informed. She is working out the transportation details for radiation therapy. No issues w/ going home w/ family.\"     [facility] pain [facility]: Dr. Ward  NSGY: Dr. Gagan Lacey  XRT: Dr. Keiry Stephens  ONC ([facility]): Dr. Rian Adrian ([facility]t)    Admitting MD: Dr. Constantine Allen    Diagnosis: Non-Traumatic  Lung cancer metastatic to brain (HCC)    Co-morbidities:   Patient Active Problem List    Diagnosis Date Noted   • Lung mass 10/05/2019     Priority: High   • Brain mass 10/05/2019     Priority: High   • History of lung cancer 10/05/2019     Priority: High   • Urine retention 10/06/2019     Priority: Medium   • Essential hypertension 10/06/2019     Priority: Medium   • Azotemia 10/05/2019     Priority: Medium   • Hypokalemia 10/05/2019     Priority: Medium   • Hypophosphatemia 10/05/2019     Priority: Medium   • Tobacco abuse 10/05/2019     Priority: Low   • Vitamin D insufficiency 10/13/2019   • Hyponatremia 10/13/2019   • COPD (chronic obstructive pulmonary disease) (HCC) 10/06/2019     Prior Living Situation:  Housing / Facility: 2 Story House(lives on 1st floor w/ bedroom & full bath)  Lives with - Patient's Self Care Capacity: Adult Children, Child Less than 18 Years of Age(daughter, LANRE & grandchildren)    Prior Level of Function:  Medication Management: Independent  Finances: Independent  Home Management: " Independent  Shopping: Independent  Prior Level Of Mobility: Independent With Device in Community  Driving / Transportation: Driving Independent    Support Systems:  Primary : Iris Clark daughter [phone]  Other support systems: Enrique Clark son [phone]  Advance Directives: Yes  Power of  (Name & Phone): Iris souza [phone]    Previous Services Utilized:   Equipment Owned: 4-Wheel Walker  Prior Services: Home-Independent    Other Information:  Occupation (Pre-Hospital Vocational): Retired Due To Age  Ph[facility]acy[facility]  Primary Payor Source: Medicare A, Medicare B  Secondary Payor Source: Supplemental Insurance([facility])  Other MDs: Dr. Gagan Lacey NSGY, Dr. Keiry Stephens XRT, Dr. Merlin Adrian ONC    Patient / Family Goal:  Patient / Family Goal: To get back to golfing, To live & enjoy life, To improve my strength    Additional Case Management Questions:  Have you ever received case management services for yourself or a family member? I don't believe so    Do you feel you have and an understanding of what services  provide? Yes w/ your explanation    Do you have any additional questions regarding case management? Not at this time.          CASE MANAGEMENT PLAN OF CARE   Individualized Goals:   1. To get back to golfing  2. To improve strength  3. [facility]    Barriers:   1. Functional mobility deficits  2. Limited services available [place]      Plan:  1. Continue to follow patient through hospitalization and provide discharge planning in collaboration with patient, family, physicians and ancillary services.     2. Utilize community resources to ensure a safe discharge.

## 2019-10-14 NOTE — PROGRESS NOTES
"Rehab Progress Note     Encounter Date: 10/14/2019    CC: Brain metastatic disease, left sided weakness    Interval Events (Subjective)  Patient sitting up in room. He reports he did very well over the weekend. He has questions when the staples can come out. Discussed that incision looks good and has been 10 days. Will remove staples later this afternoon. Otherwise patient still has alejandra, discussed would like to remove and monitor his voiding.  Will remove alejandra later today after therapies. Otherwise denies NVD.     Objective:  VITAL SIGNS: /52   Pulse 87   Temp 36.6 °C (97.8 °F) (Oral)   Resp 18   Ht 1.803 m (5' 11\")   Wt 55.7 kg (122 lb 12.7 oz)   SpO2 97%   BMI 17.13 kg/m²   Gen: NAD  Psych: Mood and affect appropriate  CV: RRR, no edema  Resp: CTAB, no upper airway sounds  Abd: NTND  Neuro: AOx4, following simple commands, dysarthric     Recent Results (from the past 72 hour(s))   CBC with Differential    Collection Time: 10/12/19  5:47 AM   Result Value Ref Range    WBC 8.0 4.8 - 10.8 K/uL    RBC 4.27 (L) 4.70 - 6.10 M/uL    Hemoglobin 13.1 (L) 14.0 - 18.0 g/dL    Hematocrit 39.9 (L) 42.0 - 52.0 %    MCV 93.4 81.4 - 97.8 fL    MCH 30.7 27.0 - 33.0 pg    MCHC 32.8 (L) 33.7 - 35.3 g/dL    RDW 48.1 35.9 - 50.0 fL    Platelet Count 350 164 - 446 K/uL    MPV 9.2 9.0 - 12.9 fL    Neutrophils-Polys 80.00 (H) 44.00 - 72.00 %    Lymphocytes 12.30 (L) 22.00 - 41.00 %    Monocytes 6.10 0.00 - 13.40 %    Eosinophils 0.00 0.00 - 6.90 %    Basophils 0.10 0.00 - 1.80 %    Immature Granulocytes 1.50 (H) 0.00 - 0.90 %    Nucleated RBC 0.00 /100 WBC    Neutrophils (Absolute) 6.40 1.82 - 7.42 K/uL    Lymphs (Absolute) 0.98 (L) 1.00 - 4.80 K/uL    Monos (Absolute) 0.49 0.00 - 0.85 K/uL    Eos (Absolute) 0.00 0.00 - 0.51 K/uL    Baso (Absolute) 0.01 0.00 - 0.12 K/uL    Immature Granulocytes (abs) 0.12 (H) 0.00 - 0.11 K/uL    NRBC (Absolute) 0.00 K/uL   Comp Metabolic Panel (CMP)    Collection Time: 10/12/19  5:47 AM "   Result Value Ref Range    Sodium 130 (L) 135 - 145 mmol/L    Potassium 4.2 3.6 - 5.5 mmol/L    Chloride 102 96 - 112 mmol/L    Co2 21 20 - 33 mmol/L    Anion Gap 7.0 0.0 - 11.9    Glucose 110 (H) 65 - 99 mg/dL    Bun 28 (H) 8 - 22 mg/dL    Creatinine 0.78 0.50 - 1.40 mg/dL    Calcium 8.4 (L) 8.5 - 10.5 mg/dL    AST(SGOT) 23 12 - 45 U/L    ALT(SGPT) 53 (H) 2 - 50 U/L    Alkaline Phosphatase 79 30 - 99 U/L    Total Bilirubin 0.5 0.1 - 1.5 mg/dL    Albumin 3.2 3.2 - 4.9 g/dL    Total Protein 6.1 6.0 - 8.2 g/dL    Globulin 2.9 1.9 - 3.5 g/dL    A-G Ratio 1.1 g/dL   HEMOGLOBIN A1C    Collection Time: 10/12/19  5:47 AM   Result Value Ref Range    Glycohemoglobin 5.9 (H) 0.0 - 5.6 %    Est Avg Glucose 123 mg/dL   TSH with Reflex to FT4    Collection Time: 10/12/19  5:47 AM   Result Value Ref Range    TSH 2.480 0.380 - 5.330 uIU/mL   Vitamin D, 25-hydroxy (blood)    Collection Time: 10/12/19  5:47 AM   Result Value Ref Range    25-Hydroxy   Vitamin D 25 21 (L) 30 - 100 ng/mL   ESTIMATED GFR    Collection Time: 10/12/19  5:47 AM   Result Value Ref Range    GFR If African American >60 >60 mL/min/1.73 m 2    GFR If Non African American >60 >60 mL/min/1.73 m 2   Basic Metabolic Panel    Collection Time: 10/14/19  5:39 AM   Result Value Ref Range    Sodium 133 (L) 135 - 145 mmol/L    Potassium 4.2 3.6 - 5.5 mmol/L    Chloride 104 96 - 112 mmol/L    Co2 24 20 - 33 mmol/L    Glucose 112 (H) 65 - 99 mg/dL    Bun 32 (H) 8 - 22 mg/dL    Creatinine 0.81 0.50 - 1.40 mg/dL    Calcium 8.2 (L) 8.5 - 10.5 mg/dL    Anion Gap 5.0 0.0 - 11.9   MAGNESIUM    Collection Time: 10/14/19  5:39 AM   Result Value Ref Range    Magnesium 1.9 1.5 - 2.5 mg/dL   PHOSPHORUS    Collection Time: 10/14/19  5:39 AM   Result Value Ref Range    Phosphorus 2.5 2.5 - 4.5 mg/dL   ESTIMATED GFR    Collection Time: 10/14/19  5:39 AM   Result Value Ref Range    GFR If African American >60 >60 mL/min/1.73 m 2    GFR If Non African American >60 >60 mL/min/1.73 m 2        Current Facility-Administered Medications   Medication Frequency   • vitamin D (cholecalciferol) tablet 1,000 Units DAILY   • Respiratory Care per Protocol Continuous RT   • hydrALAZINE (APRESOLINE) tablet 25 mg Q8HRS PRN   • acetaminophen (TYLENOL) tablet 650 mg Q4HRS PRN   • senna-docusate (PERICOLACE or SENOKOT S) 8.6-50 MG per tablet 2 Tab BID    And   • polyethylene glycol/lytes (MIRALAX) PACKET 1 Packet QDAY PRN    And   • magnesium hydroxide (MILK OF MAGNESIA) suspension 30 mL QDAY PRN    And   • bisacodyl (DULCOLAX) suppository 10 mg QDAY PRN   • artificial tears ophthalmic solution 1 Drop PRN   • benzocaine-menthol (CEPACOL) lozenge 1 Lozenge Q2HRS PRN   • mag hydrox-al hydrox-simeth (MAALOX PLUS ES or MYLANTA DS) suspension 20 mL Q2HRS PRN   • ondansetron (ZOFRAN ODT) dispertab 4 mg 4X/DAY PRN    Or   • ondansetron (ZOFRAN) syringe/vial injection 4 mg 4X/DAY PRN   • traZODone (DESYREL) tablet 50 mg QHS PRN   • sodium chloride (OCEAN) 0.65 % nasal spray 2 Spray PRN   • nicotine (NICODERM) 14 MG/24HR 14 mg Daily-0600    And   • nicotine polacrilex (NICORETTE) 2 MG piece 2 mg Q HOUR PRN   • artificial tears (EYE LUBRICANT) ophth ointment 1 Application Q8HRS   • dexamethasone (DECADRON) tablet 4 mg Q12HRS    Followed by   • [START ON 10/15/2019] dexamethasone (DECADRON) tablet 2 mg Q8HRS    Followed by   • [START ON 10/18/2019] dexamethasone (DECADRON) tablet 2 mg Q12HRS    Followed by   • [START ON 10/21/2019] dexamethasone (DECADRON) tablet 2 mg DAILY   • famotidine (PEPCID) tablet 20 mg BID   • HYDROcodone-acetaminophen (NORCO) 5-325 MG per tablet 1-2 Tab Q6HRS PRN   • ipratropium-albuterol (DUONEB) nebulizer solution Q4H PRN (RT)   • levETIRAcetam (KEPPRA) tablet 500 mg BID   • lisinopril (PRINIVIL) tablet 20 mg DAILY   • phosphorus (K-PHOS-NEUTRAL, PHOSPHA 250 NEUTRAL) per tablet 1 Tab BID   • simvastatin (ZOCOR) tablet 40 mg Q EVENING   • tamsulosin (FLOMAX) capsule 0.4 mg AFTER BREAKFAST        Orders Placed This Encounter   Procedures   • Diet Order Regular     Standing Status:   Standing     Number of Occurrences:   1     Order Specific Question:   Diet:     Answer:   Regular [1]       Assessment:  Active Hospital Problems    Diagnosis   • History of lung cancer   • Essential hypertension   • Azotemia   • Hypophosphatemia   • Hypokalemia   • Vitamin D insufficiency   • Hyponatremia       Medical Decision Making and Plan:  Metastatic lung cancer to brain - Patient s/p resection of two lesions with Dr. Lacey on 10/4/19.  Patient on steroid taper  -Continue steroid taper until 10/23/19. Per NSG continue Keppra for 1 month. Follow-up with NSG. Staples removed 10/14/19  -PT and OT for mobility and ADLs  -SLP for cognitive screen  -Plan for treatment possibly starting next week. Will need to discharge prior to starting.      Non-small cell lung cancer - Will need PET and simulation for possible treatment.  -Follow-up with Oncology     HTN - Patient on Lisinopril 20 mg daily      Tobacco dependence - Patient on patch on transfer. Will need counseling.   -Counseling performed on 10/14/19     Hypokalemia - Patient on 20 mEq on transfer. Check AM CMP. Consulted hospitalist     Hyponatremia - 130 on admission labs. Consulted hospitalist    HLD - Patient on Simvastatin 40 mg      Urinary retention - Patient with alejandra on transfer. Continue Flomax. Consider removal of Alejandra over weekend.      Vitamin D - 21 on admission, start 1000 U    GI Ppx - Patient on Pepcid on transfer. Continue while on steroids.      DVT Ppx - Patient high risk for bleed. Will monitor ambulatory status.      Total time:  29 minutes.  I spent greater than 50% of the time for patient care and coordination on this date, including unit/floor time, and face-to-face time with the patient as per assessment and plan above.  Discussion included staple removal, discharge planning prior to treatment, and improved hyponatremia.     Cynthia Fitch  HAL Allen.

## 2019-10-14 NOTE — REHAB-DIETARY IDT TEAM NOTE
"Dietary   Nutrition  Dietary Problems (Active)      There are no active problems.            Nutrition services: Day 3 of admit.  Milan Clark is a 74 y.o. 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    Consult received for low BMI      Assessment:  Height: 180.3 cm (5' 11\")  Weight: 55.7 kg (122 lb 12.7 oz)  Body mass index is 17.13 kg/m²., BMI classification: underweight  Diet/Intake: regular/% of meals with avg of 78%.    Evaluation:   1. Pt not available when RD attempted to visit. Current PO intake is adequate. Dietary is visiting pt daily for meal orders/preferences. Per RD documentation at acute, pt was drinking Ensure supplements at home. Continuation of supplements would be appropriate with order placed for supplements per RD.   2. Per RD documentation at acute, pt's UBW was 150 lb one month ago. Significant weight loss noted of 27 lb (18%) x 1 month.  3. Skin: incision on his head  4. Labs: BUN=32 with dx of azotemia and fluids encouraged.  5. Meds: decadron, phosphorus, vitamin D.       Malnutrition Risk: From RD progress note at acute: Pt with severe malnutrition in the context of chronic disease related malnutrition related to hx of lung cancer and brain mass as evidenced by a severe 12% wt loss over the past month and signs of severe muscle loss seen at the zygomatic arch and severe fat loss seen at the temple region.     Recommendations/Plan:  1. Provide diet as ordered  2. Add supplement to meals TID   3. Encourage intake of >50%  4. Document intake of all meals and supplements as % taken in ADL's to provide interdisciplinary communication across all shifts.   5. Monitor weight.  6. Nutrition rep will continue to see patient for ongoing meal and snack preferences.   7. RD will monitor weekly.            Section completed by:  Mone Richard R.D.     "

## 2019-10-14 NOTE — PROGRESS NOTES
1911 received report from day shift nurse and assume patient care. Pt is calm and stable . Denies any pain at this time. No s/sx of distress. Safety precautions in place. Call light with in reach. Will continue to monitor.

## 2019-10-14 NOTE — THERAPY
Physical Therapy   Daily Treatment     Patient Name: Milan Clark  Age:  74 y.o., Sex:  male  Medical Record #: [id number]  Today's Date: 10/14/2019     Precautions  Precautions: Fall Risk  Comments: Burns cath, safety    Subjective    Patient agreeable to PT.  Reports LE stiffness disappears with ambulation.     Objective       10/14/19 1431   Precautions   Precautions Fall Risk   Comments Burns cath, safety   Vitals   Pulse 87   Pulse Oximetry 97 %   O2 (LPM) 0   O2 Delivery None (Room Air)   Bed Mobility    Sit to Stand Stand by Assist  (setup, FWW, cueing)   Interdisciplinary Plan of Care Collaboration   IDT Collaboration with  Nursing   Collaboration Comments CLOF, ambulation instead of w/c, roomboard updated   PT Total Time Spent   PT Individual Total Time Spent (Mins) 60   PT Charge Group   PT Gait Training 3   PT Therapeutic Activities 1     Worked on sit<>stands with 1 rep at mat, 4 reps at different gym/cafeteria chairs, 2 reps at w/c, and 2 reps at standard room chair; safety education, cueing, SBA, FWW, setup for Burns.  Discussed call light usage and not mod I at this time.  RN present for meds admin during session.    FIM Walking Score:  5 - Standby Prompting/Supervision or Set-up  Walking Description:  (SBA, FWW, setup for Burns management, cueing for way finding/ safety/ and problem solving, 3 indoor reps of 400-650 feet.  Pt declined outdoors ambulation today.)    FIM Wheelchair Score:     Wheelchair Description:         Assessment    Patient has improving ambulation endurance and activity tolerance; good motivation; improving verbalization with conversation; impaired problem solving, safety, and way finding; mildly impaired balance and coordination.    Plan    Ambulation with/without FWW; outdoor ambulation; stairs; ther ex for endurance and strengthening; standing balance and safety.

## 2019-10-15 DIAGNOSIS — C79.31 SECONDARY MALIGNANT NEOPLASM OF BRAIN (HCC): ICD-10-CM

## 2019-10-15 PROCEDURE — 99232 SBSQ HOSP IP/OBS MODERATE 35: CPT | Performed by: PHYSICAL MEDICINE & REHABILITATION

## 2019-10-15 PROCEDURE — A9270 NON-COVERED ITEM OR SERVICE: HCPCS | Performed by: HOSPITALIST

## 2019-10-15 PROCEDURE — 700102 HCHG RX REV CODE 250 W/ 637 OVERRIDE(OP): Performed by: PHYSICAL MEDICINE & REHABILITATION

## 2019-10-15 PROCEDURE — A9270 NON-COVERED ITEM OR SERVICE: HCPCS | Performed by: PHYSICAL MEDICINE & REHABILITATION

## 2019-10-15 PROCEDURE — 97530 THERAPEUTIC ACTIVITIES: CPT

## 2019-10-15 PROCEDURE — 97112 NEUROMUSCULAR REEDUCATION: CPT

## 2019-10-15 PROCEDURE — 94760 N-INVAS EAR/PLS OXIMETRY 1: CPT

## 2019-10-15 PROCEDURE — 97110 THERAPEUTIC EXERCISES: CPT

## 2019-10-15 PROCEDURE — G0515 COGNITIVE SKILLS DEVELOPMENT: HCPCS

## 2019-10-15 PROCEDURE — 97116 GAIT TRAINING THERAPY: CPT

## 2019-10-15 PROCEDURE — 700102 HCHG RX REV CODE 250 W/ 637 OVERRIDE(OP): Performed by: HOSPITALIST

## 2019-10-15 PROCEDURE — 99232 SBSQ HOSP IP/OBS MODERATE 35: CPT | Performed by: HOSPITALIST

## 2019-10-15 PROCEDURE — 770010 HCHG ROOM/CARE - REHAB SEMI PRIVAT*

## 2019-10-15 RX ORDER — TAMSULOSIN HYDROCHLORIDE 0.4 MG/1
0.8 CAPSULE ORAL
Status: DISCONTINUED | OUTPATIENT
Start: 2019-10-16 | End: 2019-10-17

## 2019-10-15 RX ADMIN — LISINOPRIL 20 MG: 20 TABLET ORAL at 08:37

## 2019-10-15 RX ADMIN — MINERAL OIL AND WHITE PETROLATUM 1 APPLICATION: 30; 940 OINTMENT OPHTHALMIC at 14:18

## 2019-10-15 RX ADMIN — FAMOTIDINE 20 MG: 20 TABLET ORAL at 08:37

## 2019-10-15 RX ADMIN — FAMOTIDINE 20 MG: 20 TABLET ORAL at 21:37

## 2019-10-15 RX ADMIN — NICOTINE 14 MG: 14 PATCH, EXTENDED RELEASE TRANSDERMAL at 04:50

## 2019-10-15 RX ADMIN — LEVETIRACETAM 500 MG: 500 TABLET, FILM COATED ORAL at 21:37

## 2019-10-15 RX ADMIN — DEXAMETHASONE 2 MG: 1 TABLET ORAL at 21:37

## 2019-10-15 RX ADMIN — SENNOSIDES, DOCUSATE SODIUM 2 TABLET: 50; 8.6 TABLET, FILM COATED ORAL at 08:37

## 2019-10-15 RX ADMIN — DIBASIC SODIUM PHOSPHATE, MONOBASIC POTASSIUM PHOSPHATE AND MONOBASIC SODIUM PHOSPHATE 1 TABLET: 852; 155; 130 TABLET ORAL at 08:37

## 2019-10-15 RX ADMIN — SENNOSIDES, DOCUSATE SODIUM 2 TABLET: 50; 8.6 TABLET, FILM COATED ORAL at 21:36

## 2019-10-15 RX ADMIN — MINERAL OIL AND WHITE PETROLATUM 1 APPLICATION: 30; 940 OINTMENT OPHTHALMIC at 04:50

## 2019-10-15 RX ADMIN — MINERAL OIL AND WHITE PETROLATUM 1 APPLICATION: 30; 940 OINTMENT OPHTHALMIC at 21:36

## 2019-10-15 RX ADMIN — DEXAMETHASONE 2 MG: 1 TABLET ORAL at 14:18

## 2019-10-15 RX ADMIN — SIMVASTATIN 40 MG: 40 TABLET, FILM COATED ORAL at 21:37

## 2019-10-15 RX ADMIN — DEXAMETHASONE 2 MG: 1 TABLET ORAL at 08:37

## 2019-10-15 RX ADMIN — ACETAMINOPHEN 650 MG: 325 TABLET, FILM COATED ORAL at 21:37

## 2019-10-15 RX ADMIN — DIBASIC SODIUM PHOSPHATE, MONOBASIC POTASSIUM PHOSPHATE AND MONOBASIC SODIUM PHOSPHATE 1 TABLET: 852; 155; 130 TABLET ORAL at 21:37

## 2019-10-15 RX ADMIN — TAMSULOSIN HYDROCHLORIDE 0.4 MG: 0.4 CAPSULE ORAL at 08:37

## 2019-10-15 RX ADMIN — VITAMIN D, TAB 1000IU (100/BT) 1000 UNITS: 25 TAB at 08:37

## 2019-10-15 RX ADMIN — LEVETIRACETAM 500 MG: 500 TABLET, FILM COATED ORAL at 08:37

## 2019-10-15 ASSESSMENT — ENCOUNTER SYMPTOMS
FEVER: 0
SHORTNESS OF BREATH: 0
NAUSEA: 0
VOMITING: 0
ABDOMINAL PAIN: 0
CHILLS: 0

## 2019-10-15 NOTE — PROGRESS NOTES
Attending RN (Addie CARTAGENA) did a full skin check on this patient to search for a sacral wound documented on at the main hospital.  One red spot found, blancheable, on sacrum.  Patient stated this is from when he fell previous to this hospital admission, it's an old bruise.  No wound flowsheet opened.

## 2019-10-15 NOTE — THERAPY
Speech Language Pathology  Daily Treatment     Patient Name: Milan Clark  Age:  74 y.o., Sex:  male  Medical Record #: [id number]  Today's Date: 10/15/2019     Subjective    Patient was in room at time of ST. Was willing to participate.      Objective       10/15/19 1302   Cognition   Functional Math / Financial Management Supervision (5)   Medication Management  Moderate (3)   SLP Total Time Spent   SLP Individual Total Time Spent (Mins) 60   Charge Group   SLP Cognitive Skill Development 4       FIM Eating Score:     Eating Description:       FIM Comprehension Score:  5 - Stand-by Prompting/Supervision or Set-up  Comprehension Description:       FIM Expression Score:  5 - Stand-by Prompting/Supervision or Set-up  Expression Description:       FIM Social Interaction Score:  6 - Modified Independent  Social Interaction Description:       FIM Problem Solving Score:  5 - Standby Prompting/Supervision or Set-up  Problem Solving Description:       FIM Memory Score:  4 - Minimal Assistance  Memory Description:         Assessment    Patient completed balancing task from previous session, independently with 100% accuracy. Able to demonstrate organization and appropriate math. Created medication list. Mod cues needed for med purposes. Willing to learn pill sort.     Plan    Medication sorting tasks

## 2019-10-15 NOTE — THERAPY
"Physical Therapy   Daily Treatment     Patient Name: Milan Clark  Age:  74 y.o., Sex:  male  Medical Record #: [id number]  Today's Date: 10/15/2019     Precautions  Precautions: Fall Risk  Comments: Burns cath, safety    Subjective    Pt in bed resting, agreeable to PT       Objective       10/15/19 1101   Precautions   Precautions Fall Risk   Comments Burns cath, safety   Standing Lower Body Exercises   Hamstring Curl 1 set of 15   Hip Extension 1 set of 15   Hip Abduction 1 set of 15   Marching 1 set of 15   Heel Rise 1 set of 15   Toe Rise 1 set of 15   Mini Squat 1 set of 15   Other Exercises Above exercises completed in // bars with BUE support and spv   Interdisciplinary Plan of Care Collaboration   IDT Collaboration with  Physical Therapist   Patient Position at End of Therapy Seated;Edge of Bed;Call Light within Reach;Tray Table within Reach   Collaboration Comments primary PT re: CLOF and POC   PT Total Time Spent   PT Individual Total Time Spent (Mins) 60   PT Charge Group   PT Therapeutic Exercise 1   PT Neuromuscular Re-Education / Balance 1     Obstacle course for balance and coordination: agility ladder, bolster, air-ex, mat for uneven surface, and 8\" step; 3x each direction with differing patterns through agility ladder: high stepping, lateral stepping, and retro stepping; no UE support and SBA- CGA throughout activity.     FIM Walking Score:  5 - Standby Prompting/Supervision or Set-up  Walking Description:  Walker, Supervision for safety(room <> gym, spv with FWW)      Assessment    Pt able to demonstrate good balance with obstacle course, intermittent LOB with CGA for recovery.     Plan    Ambulation with/without FWW; outdoor ambulation; stairs; ther ex for endurance and strengthening; standing balance and safety.    "

## 2019-10-15 NOTE — THERAPY
Physical Therapy   Daily Treatment     Patient Name: Milan Clark  Age:  74 y.o., Sex:  male  Medical Record #: [id number]  Today's Date: 10/15/2019     Precautions  Precautions: Fall Risk  Comments: Katy manzanares, safety    Subjective    Patient agreeable to PT.     Objective       10/15/19 0901   Bed Mobility    Sit to Stand Stand by Assist  (without FWW, SPV with FWW.  Cueing prn)   5x STS (Five Times Sit to Stand Test)   Height of Sitting Surface (inches) 17   5x Sit to STand (seconds) 23   Score Definition Fall Risk   Sit to Stand Comments SBA-SPV, no UE, steady controlled pacing   Interdisciplinary Plan of Care Collaboration   IDT Collaboration with  Physical Therapist   Collaboration Comments CLOF and pt goals   PT Total Time Spent   PT Individual Total Time Spent (Mins) 30   PT Charge Group   PT Gait Training 2     Reviewed safety, progress, and call light usage.    FIM Walking Score:  5 - Standby Prompting/Supervision or Set-up  Walking Description:  (SPV with FWW for 1x 900 feet indoors; pt declined outdoors due to cold temperature.  close SBA without FWW for 2x 170 feet indoors, limited by fatigue, with four small self-corrected LOB laterally.  Cueing for way finding.)    FIM Wheelchair Score:     Wheelchair Description:         Assessment    Patient has improving endurance; small self-corrected LOB when ambulating without BUEs; good participation.    Plan    Ambulation with/without FWW; outdoor ambulation; stairs; ther ex for endurance and strengthening; standing balance and safety.

## 2019-10-15 NOTE — PROGRESS NOTES
"Rehab Progress Note     Encounter Date: 10/15/2019    CC: Brain metastatic disease, left sided weakness    Interval Events (Subjective)  Patient sitting up in therapy gym. He reports he is doing very well with therapy. He reports he tolerated staple removal yesterday. He was surprised how many staples were placed. Patient required straight catheterization overnight but was then able to void this morning with elevated PVRs. Elevated SBP this AM as well. Denies headache. Denies dizziness or NVD.     Objective:  VITAL SIGNS: /86   Pulse (!) 56   Temp 36.8 °C (98.2 °F) (Oral)   Resp 20   Ht 1.803 m (5' 11\")   Wt 55.7 kg (122 lb 12.7 oz)   SpO2 95%   BMI 17.13 kg/m²   Gen: NAD  Psych: Mood and affect appropriate  CV: RRR, no edema  Resp: CTAB, no upper airway sounds  Abd: NTND  Neuro: AOx4, following simple commands, dysarthric   Unchanged from 10/15/19 except skin: staples removed from posterior and anterior incisions    Recent Results (from the past 72 hour(s))   Basic Metabolic Panel    Collection Time: 10/14/19  5:39 AM   Result Value Ref Range    Sodium 133 (L) 135 - 145 mmol/L    Potassium 4.2 3.6 - 5.5 mmol/L    Chloride 104 96 - 112 mmol/L    Co2 24 20 - 33 mmol/L    Glucose 112 (H) 65 - 99 mg/dL    Bun 32 (H) 8 - 22 mg/dL    Creatinine 0.81 0.50 - 1.40 mg/dL    Calcium 8.2 (L) 8.5 - 10.5 mg/dL    Anion Gap 5.0 0.0 - 11.9   MAGNESIUM    Collection Time: 10/14/19  5:39 AM   Result Value Ref Range    Magnesium 1.9 1.5 - 2.5 mg/dL   PHOSPHORUS    Collection Time: 10/14/19  5:39 AM   Result Value Ref Range    Phosphorus 2.5 2.5 - 4.5 mg/dL   ESTIMATED GFR    Collection Time: 10/14/19  5:39 AM   Result Value Ref Range    GFR If African American >60 >60 mL/min/1.73 m 2    GFR If Non African American >60 >60 mL/min/1.73 m 2       Current Facility-Administered Medications   Medication Frequency   • vitamin D (cholecalciferol) tablet 1,000 Units DAILY   • Respiratory Care per Protocol Continuous RT   • " hydrALAZINE (APRESOLINE) tablet 25 mg Q8HRS PRN   • acetaminophen (TYLENOL) tablet 650 mg Q4HRS PRN   • senna-docusate (PERICOLACE or SENOKOT S) 8.6-50 MG per tablet 2 Tab BID    And   • polyethylene glycol/lytes (MIRALAX) PACKET 1 Packet QDAY PRN    And   • magnesium hydroxide (MILK OF MAGNESIA) suspension 30 mL QDAY PRN    And   • bisacodyl (DULCOLAX) suppository 10 mg QDAY PRN   • artificial tears ophthalmic solution 1 Drop PRN   • benzocaine-menthol (CEPACOL) lozenge 1 Lozenge Q2HRS PRN   • mag hydrox-al hydrox-simeth (MAALOX PLUS ES or MYLANTA DS) suspension 20 mL Q2HRS PRN   • ondansetron (ZOFRAN ODT) dispertab 4 mg 4X/DAY PRN    Or   • ondansetron (ZOFRAN) syringe/vial injection 4 mg 4X/DAY PRN   • traZODone (DESYREL) tablet 50 mg QHS PRN   • sodium chloride (OCEAN) 0.65 % nasal spray 2 Spray PRN   • nicotine (NICODERM) 14 MG/24HR 14 mg Daily-0600    And   • nicotine polacrilex (NICORETTE) 2 MG piece 2 mg Q HOUR PRN   • artificial tears (EYE LUBRICANT) ophth ointment 1 Application Q8HRS   • dexamethasone (DECADRON) tablet 2 mg Q8HRS    Followed by   • [START ON 10/18/2019] dexamethasone (DECADRON) tablet 2 mg Q12HRS    Followed by   • [START ON 10/21/2019] dexamethasone (DECADRON) tablet 2 mg DAILY   • famotidine (PEPCID) tablet 20 mg BID   • HYDROcodone-acetaminophen (NORCO) 5-325 MG per tablet 1-2 Tab Q6HRS PRN   • ipratropium-albuterol (DUONEB) nebulizer solution Q4H PRN (RT)   • levETIRAcetam (KEPPRA) tablet 500 mg BID   • lisinopril (PRINIVIL) tablet 20 mg DAILY   • phosphorus (K-PHOS-NEUTRAL, PHOSPHA 250 NEUTRAL) per tablet 1 Tab BID   • simvastatin (ZOCOR) tablet 40 mg Q EVENING   • tamsulosin (FLOMAX) capsule 0.4 mg AFTER BREAKFAST       Orders Placed This Encounter   Procedures   • Diet Order Regular     Standing Status:   Standing     Number of Occurrences:   1     Order Specific Question:   Diet:     Answer:   Regular [1]       Assessment:  Active Hospital Problems    Diagnosis   • History of lung  cancer   • Essential hypertension   • Azotemia   • Hypophosphatemia   • Hypokalemia   • Vitamin D insufficiency   • Hyponatremia       Medical Decision Making and Plan:  Metastatic lung cancer to brain - Patient s/p resection of two lesions with Dr. Lacey on 10/4/19.  Patient on steroid taper  -Continue steroid taper until 10/23/19. Per NSG continue Keppra for 1 month. Follow-up with NSG. Staples removed 10/14/19  -PT and OT for mobility and ADLs  -SLP for cognitive screen  -Plan for treatment possibly starting next week. Will need to discharge prior to starting.      Non-small cell lung cancer - Will need PET and simulation for possible treatment.  -Follow-up with Oncology     HTN - Patient on Lisinopril 20 mg daily. Elevated on 10/15/19, may be related to steroids. Discussed with hospitalist     Tobacco dependence - Patient on patch on transfer. Will need counseling.   -Counseling performed on 10/14/19     Hypokalemia - Patient on 20 mEq on transfer. Check AM CMP. Consulted hospitalist  -Off K supplement     Hyponatremia - 130 on admission labs. Consulted hospitalist  -Improved to 133     HLD - Patient on Simvastatin 40 mg      Urinary retention - Patient with alejandra on transfer. Continue Flomax. Alejandra removed 10/14/19, required CIC over night. PVRs elevated. Will increase Flomax     Vitamin D - 21 on admission, start 1000 U    GI Ppx - Patient on Pepcid on transfer. Continue while on steroids.      DVT Ppx - Patient high risk for bleed. Will monitor ambulatory status.      Total time:  28 minutes.  I spent greater than 50% of the time for patient care, counseling, and coordination on this date, including unit/floor time, and face-to-face time with the patient as per interval events and assessment and plan above. Topics discussed included alejandra removed overnight, CIC during night but now voiding. Elevated PVRs, will increase Flomax.     Cynthia Aleln M.D.

## 2019-10-15 NOTE — PROGRESS NOTES
Pt able to void a small amt when up to the toilet, bladder scan PVR shows 524 mls.  Pt straight cathed using aseptic technique, #14 Polish catheter, had 400 mls of clear yellow urine.  Pt tolerated well.  Will continue to monitor patient.

## 2019-10-15 NOTE — PROGRESS NOTES
Received bedside shift report from Addie SHIRLEY RN regarding patient and assumed care. Patient awake, calm and stable, currently positioned in bed for comfort and safety; call light within reach. Denies pain or discomfort at this time. Will continue to monitor.

## 2019-10-15 NOTE — THERAPY
"Occupational Therapy  Daily Treatment     Patient Name: Milan Clark  Age:  74 y.o., Sex:  male  Medical Record #: [id number]  Today's Date: 10/15/2019     Precautions  Precautions: Fall Risk  Comments: Katy manzanares, safety         Subjective       Objective       10/15/19 0931   Precautions   Precautions Fall Risk   Comments Katy manzanares, safety   Vitals   O2 Delivery None (Room Air)   Pain   Intervention Declines   Pain 0 - 10 Group   Therapist Pain Assessment 0   Non Verbal Descriptors   Non Verbal Scale  Calm   Cognition    Level of Consciousness Alert   Standing Upper Body Exercises   Other Exercises Standing at FluidoBike, 20 mins, Level 2, 3.750km   Balance   Comments 1) Ladder ball/Parallel bars (II bar) activity:  Pt standing at one end of II bar w/ ladder 6' behind pt, 10' @ opposite end of II bars 10 ladder balls placed.  Between 10' span of II bars two 4\" high bolsters evenly spaced apart.  Pt instructed to ambulate to opposite end of II bars (10' + stepping over bolsters x 2) hands free, retrieve one ladder ball at a time and return to starting position (10' stepping over bolsters x 2) hands free, toss ladder ball at ladder.  1 ladder ball = 10' x 2 = 20' steppng over bolsters 2x2 = 4x's.  Pt complete 3x10 = 20' x 30 =  600'  + stepping over bolsters 4 x 30 = 120x's.  SBA throughout activity, hands free, no LOB.    2)  Functional ambulation w/ FWW from main gym to speech therapy, back to main gym to bed room = 609' w/ SBA , LOB x 1 w/ therapist assisted correction.   Bed Mobility    Sit to Supine Modified Independent   Interdisciplinary Plan of Care Collaboration   IDT Collaboration with  Certified Nursing Assistant   Patient Position at End of Therapy In Bed;Tray Table within Reach;Phone within Reach;Family / Friend in Room   OT Total Time Spent   OT Individual Total Time Spent (Mins) 60   OT Charge Group   OT Therapy Activity 2   OT Therapeutic Exercise  2         Assessment    Pt was alert and " "cooperative w/ tx.  Integrated LUE as a functional and gross assist independently.  Limiters include generalized weakness (L > R), safety awareness, balance and endurance.  Although balanced had improved noted 1 LOB w/ therapist assisted correction w/ FWW ambulation. (pt distracted).  Refer to notes above in comments of \"Balance\" section    Plan    Cont'd OT for strength, endurance and safet/environmental awareness for safe ADL's and transfers.    "

## 2019-10-15 NOTE — CARE PLAN
Problem: Urinary Elimination:  Goal: Ability to reestablish a normal urinary elimination pattern will improve  Outcome: PROGRESSING SLOWER THAN EXPECTED   Alejandra cath removed at 1630 without difficulty. Urine in alejandra was clear yellow.

## 2019-10-15 NOTE — CARE PLAN
Problem: Skin Integrity  Goal: Risk for impaired skin integrity will decrease  Outcome: PROGRESSING AS EXPECTED   Staples to crani site on head and back of head (2 incisions) removed without difficulty.

## 2019-10-15 NOTE — CARE PLAN
Problem: Communication  Goal: The ability to communicate needs accurately and effectively will improve  Note:   Makes needs known to staff.  Encouraged to use call light for staff assist.      Problem: Safety  Goal: Will remain free from falls  Note:   Call light kept within reach and encouraged to use for assistance and with toileting needs. Encouraged to call staff and to wait for staff before transfers. Call light kept within reach and encouraged to use for assistance and with toileting needs. Encouraged to call staff and to wait for staff before transfers.

## 2019-10-15 NOTE — FLOWSHEET NOTE
10/15/19 1605   Events/Summary/Plan   Events/Summary/Plan 02 spot check, IS   Interdisciplinary Plan of Care-Goals (Indications)   Hyperinflation Protocol Indications Atelectasis Documented by Chest X-Ray   Interdisciplinary Plan of Care-Outcomes    Hyperinflation Protocol Goals/Outcome Absences of or Improvement in Signs of Atelectasis;Stable Vital Capacity x24 hrs and Patient Understands / uses I.S.   Education   Education Yes - Pt. / Family has been Instructed in use of Respiratory Equipment   Incentive Spirometry Group   Breathing Exercises Yes   Incentive Spirometer Volume 2250 mL   Respiratory WDL   Respiratory (WDL) X   Chest Exam   Respiration 18   Pulse 68   Secretions   Cough Non Productive   Oximetry   #Pulse Oximetry (Single Determination) Yes   Oxygen   Home O2 Use Prior To Admission? No   Pulse Oximetry 93 %   O2 Daily Delivery Respiratory  Room Air with O2 Available

## 2019-10-16 PROCEDURE — 99232 SBSQ HOSP IP/OBS MODERATE 35: CPT | Performed by: PHYSICAL MEDICINE & REHABILITATION

## 2019-10-16 PROCEDURE — 97535 SELF CARE MNGMENT TRAINING: CPT

## 2019-10-16 PROCEDURE — 700102 HCHG RX REV CODE 250 W/ 637 OVERRIDE(OP): Performed by: HOSPITALIST

## 2019-10-16 PROCEDURE — G0515 COGNITIVE SKILLS DEVELOPMENT: HCPCS

## 2019-10-16 PROCEDURE — A9270 NON-COVERED ITEM OR SERVICE: HCPCS | Performed by: HOSPITALIST

## 2019-10-16 PROCEDURE — 97110 THERAPEUTIC EXERCISES: CPT

## 2019-10-16 PROCEDURE — 99232 SBSQ HOSP IP/OBS MODERATE 35: CPT | Performed by: HOSPITALIST

## 2019-10-16 PROCEDURE — 770010 HCHG ROOM/CARE - REHAB SEMI PRIVAT*

## 2019-10-16 PROCEDURE — 97112 NEUROMUSCULAR REEDUCATION: CPT

## 2019-10-16 PROCEDURE — 700102 HCHG RX REV CODE 250 W/ 637 OVERRIDE(OP): Performed by: PHYSICAL MEDICINE & REHABILITATION

## 2019-10-16 PROCEDURE — A9270 NON-COVERED ITEM OR SERVICE: HCPCS | Performed by: PHYSICAL MEDICINE & REHABILITATION

## 2019-10-16 RX ADMIN — DIBASIC SODIUM PHOSPHATE, MONOBASIC POTASSIUM PHOSPHATE AND MONOBASIC SODIUM PHOSPHATE 1 TABLET: 852; 155; 130 TABLET ORAL at 21:55

## 2019-10-16 RX ADMIN — TAMSULOSIN HYDROCHLORIDE 0.8 MG: 0.4 CAPSULE ORAL at 08:50

## 2019-10-16 RX ADMIN — LEVETIRACETAM 500 MG: 500 TABLET, FILM COATED ORAL at 21:56

## 2019-10-16 RX ADMIN — FAMOTIDINE 20 MG: 20 TABLET ORAL at 10:02

## 2019-10-16 RX ADMIN — DEXAMETHASONE 2 MG: 1 TABLET ORAL at 21:57

## 2019-10-16 RX ADMIN — SENNOSIDES, DOCUSATE SODIUM 2 TABLET: 50; 8.6 TABLET, FILM COATED ORAL at 21:56

## 2019-10-16 RX ADMIN — FAMOTIDINE 20 MG: 20 TABLET ORAL at 21:56

## 2019-10-16 RX ADMIN — DIBASIC SODIUM PHOSPHATE, MONOBASIC POTASSIUM PHOSPHATE AND MONOBASIC SODIUM PHOSPHATE 1 TABLET: 852; 155; 130 TABLET ORAL at 10:02

## 2019-10-16 RX ADMIN — DEXAMETHASONE 2 MG: 1 TABLET ORAL at 14:26

## 2019-10-16 RX ADMIN — MINERAL OIL AND WHITE PETROLATUM 1 APPLICATION: 30; 940 OINTMENT OPHTHALMIC at 14:26

## 2019-10-16 RX ADMIN — LISINOPRIL 20 MG: 20 TABLET ORAL at 10:02

## 2019-10-16 RX ADMIN — SENNOSIDES, DOCUSATE SODIUM 2 TABLET: 50; 8.6 TABLET, FILM COATED ORAL at 10:02

## 2019-10-16 RX ADMIN — MINERAL OIL AND WHITE PETROLATUM 1 APPLICATION: 30; 940 OINTMENT OPHTHALMIC at 04:12

## 2019-10-16 RX ADMIN — LEVETIRACETAM 500 MG: 500 TABLET, FILM COATED ORAL at 10:03

## 2019-10-16 RX ADMIN — VITAMIN D, TAB 1000IU (100/BT) 1000 UNITS: 25 TAB at 10:02

## 2019-10-16 RX ADMIN — SIMVASTATIN 40 MG: 40 TABLET, FILM COATED ORAL at 22:22

## 2019-10-16 RX ADMIN — MINERAL OIL AND WHITE PETROLATUM 1 APPLICATION: 30; 940 OINTMENT OPHTHALMIC at 21:55

## 2019-10-16 RX ADMIN — NICOTINE 14 MG: 14 PATCH, EXTENDED RELEASE TRANSDERMAL at 04:12

## 2019-10-16 RX ADMIN — DEXAMETHASONE 2 MG: 1 TABLET ORAL at 04:12

## 2019-10-16 NOTE — THERAPY
"Occupational Therapy  Daily Treatment     Patient Name: Milan Clark  Age:  74 y.o., Sex:  male  Medical Record #: [id number]  Today's Date: 10/16/2019     Precautions  Precautions: Fall Risk  Comments: Safety         Subjective    \"Oh yeah\"  Pt forgetfull/impaired STM - Pt redirected to task  X 3 during mid task (oral hygiene, toileting, shower activities).     Objective       10/16/19 0831   Precautions   Precautions Fall Risk   Comments Safety   Vitals   O2 Delivery None (Room Air)   Pain   Intervention Declines   Pain 0 - 10 Group   Therapist Pain Assessment 0   Non Verbal Descriptors   Non Verbal Scale  Calm   Cognition    Safety Awareness Impaired   Attention Impaired   Comments Noted frqnt forgetfullness mid activity - VQ's to reorient/redirect mid task for oral hygiene, toileting and shower activities   Interdisciplinary Plan of Care Collaboration   Patient Position at End of Therapy In Bed;Call Light within Reach;Tray Table within Reach;Phone within Reach   OT Total Time Spent   OT Individual Total Time Spent (Mins) 60   OT Charge Group   OT Self Care / ADL 4       FIM Eating Score:  6 - Modified Independent  Eating Description:  Insert dentures    FIM Grooming Score:  5 - Standby Prompting/Supervision or Set-up  Grooming Description:  Increased time, Supervision for safety(SBA in standing at sink side for groom/hygiene/oral and denture care - VQ's to re-orient (impaired STM))    FIM Bathing Score:  5 - Standby Prompting/Supervision or Set-up  Bathing Description:       FIM Upper Body Dressin - Modified Independent  Upper Body Dressing Description:  Increased time(don/doff pull over shirt)    FIM Lower Body Dressing Score:  5 - Standby Prompting/Supervsion or Set-up  Lower Body Dressing Description:  Increased time, Supervision for safety, Verbal cueing, Set-up of equipment(Pt presented w/ impaired STM - multiple VQ's to redirect to task.  SBA in stand to manage pants via grab bar, Mod Indep to " don/doff socks, thread feet through pants/briefs.)    FIM Toiletin - Standby Prompting/Supervision or Set-up  Toileting Description:  Grab bar, Increased time, Verbal cueing, Supervision for safety    FIM Toilet Transfer Score:  4 - Minimal Assistance  Toilet Transfer Description:  Grab bar, Supervision for safety, Verbal cueing(Noted LOB in transition from walker to commode, therapist assisted correction)    FIM Tub/Shower Transfers Score:  4 - Minimal Assistance  Tub/Shower Transfers Description:  Grab bar, Increased time, Supervision for safety, Verbal cueing, Set-up of equipment, Initial preparation for task(CGA for SPT to/from FWW and shower chair)      Assessment  Pt was alert and agreeable to tx.  Noted impaired STM - required VQ's mid activity to redirect back to activities.  Noted LOB x 1 for commode transfer w/ therapist assisted correction.  Additional limiters include generalized weakness, impaired endurance and balance.      Plan    Cont'd OT for strength, endurance and safety/environmental awareness for safe ADL's and transfers.

## 2019-10-16 NOTE — THERAPY
"Speech Language Pathology  Daily Treatment     Patient Name: Milan Clark  Age:  74 y.o., Sex:  male  Medical Record #: [id number]  Today's Date: 10/16/2019     Subjective    Pt was pleasant and cooperative during this ST session      Objective       10/16/19 1331   SLP Total Time Spent   SLP Individual Total Time Spent (Mins) 60   Charge Group   SLP Cognitive Skill Development 4       Assessment    Pt was able to locate the ST office from his room without cues required.  Reviewed medications from yesterdays session, pt was able to recall the purpose of 2/12 medications when given the names.  Pt completed a pill sorting task using his current medications and required min A initially to problem solve where to sort medications (Pt has never used a pill sorter before).  After initial cues were given pt was able to sort his medications with multiple errors on medications that are scheduled to be sorted \"2 times daily\".  Pt was able to sort 3 medications that were either \"1 time daily- Breakfast\" or \"1 time daily-Bedtime\" independently with 100% accuracy.  Reviewed when to sort medications scheduled \"2 times daily\" and pt was able to sort these on his second attempt with 1 error (1 missed pill) which required mod cues to locate and correct d/t poor attention.  Pt verbalized at the end of this session that he was glad he lives with his daughter so she can assist with managing medications when he returns home.       Plan    Continue targeting medication sorting task     "

## 2019-10-16 NOTE — DISCHARGE PLANNING
Future Appointments   Date Time Provider Department Center   10/21/2019  1:00 PM RADIATION THERAPY [facility] None   11/8/2019  1:00 PM [facility] ARY Ramachandran   11/8/2019  4:00 PM [facility]Y Ramachandran

## 2019-10-16 NOTE — PROGRESS NOTES
Received bedside shift report from Petty SHIRLEY RN regarding patient and assumed care. Patient awake, calm and stable, currently positioned in bed for comfort and safety; call light within reach. Denies pain or discomfort at this time. Will continue to monitor.

## 2019-10-16 NOTE — PROGRESS NOTES
Hospital Medicine Daily Progress Note      Chief Complaint:  Hypertension  Hyponatremia  Azotemia    Interval History:  Pt resting in bed comfortably, denies complaints.    Review of Systems  Review of Systems   Constitutional: Negative for chills and fever.   Eyes: Negative.    Respiratory: Negative for cough and shortness of breath.    Cardiovascular: Negative for chest pain and palpitations.   Gastrointestinal: Negative for abdominal pain, nausea and vomiting.   Musculoskeletal:        Wound pain   Skin: Negative for itching and rash.        Physical Exam  Temp:  [36.5 °C (97.7 °F)-37.2 °C (99 °F)] 36.5 °C (97.7 °F)  Pulse:  [55-72] 72  Resp:  [15-18] 15  BP: ()/(51-74) 91/56  SpO2:  [93 %-96 %] 93 %    Physical Exam   Constitutional: He is oriented to person, place, and time. No distress.   HENT:   Right Ear: External ear normal.   Left Ear: External ear normal.   Nose: Nose normal.   Crani incision C/D/I   Eyes: Conjunctivae and EOM are normal. Right eye exhibits no discharge. Left eye exhibits no discharge.   Neck: Normal range of motion. Neck supple. No tracheal deviation present.   Cardiovascular: Normal rate, regular rhythm, S1 normal and S2 normal.   Pulmonary/Chest: No stridor. No respiratory distress. He has no wheezes. He has no rhonchi.   Decreased BS   Abdominal: Soft. Bowel sounds are normal. He exhibits no distension. There is no tenderness.   Musculoskeletal: He exhibits no edema or tenderness.   Neurological: He is alert and oriented to person, place, and time. No sensory deficit.   Skin: Skin is warm and dry. He is not diaphoretic. No cyanosis.   Vitals reviewed.      Fluids    Intake/Output Summary (Last 24 hours) at 10/17/2019 1707  Last data filed at 10/17/2019 1101  Gross per 24 hour   Intake 1050 ml   Output 950 ml   Net 100 ml       Laboratory  Recent Labs     10/17/19  0602   WBC 11.3*   RBC 4.12*   HEMOGLOBIN 12.6*   HEMATOCRIT 38.9*   MCV 94.4   MCH 30.6   MCHC 32.4*   RDW 50.5*    PLATELETCT 361   MPV 8.5*     Recent Labs     10/17/19  0602   SODIUM 131*   POTASSIUM 4.3   CHLORIDE 103   CO2 22   GLUCOSE 89   BUN 32*   CREATININE 0.95   CALCIUM 8.2*                   Assessment/Plan  History of lung cancer- (present on admission)  Assessment & Plan  Recent CT chest show a 3.4 cm mass  With bilat brain mets s/p 2 stage resection  On Decadron taper  On Keppra for seizure prophylaxis  For outpt PET scan    Essential hypertension- (present on admission)  Assessment & Plan  Observe blood pressure trends on Lisinopril  Monitor for orthostatic hypotension on Flomax    Hypophosphatemia- (present on admission)  Assessment & Plan  Continue Neutra-Phos    Azotemia- (present on admission)  Assessment & Plan  Suspect 2/2 steroids  Encourage PO fluids    Dyslipidemia  Assessment & Plan  On Zocor    Vitamin D insufficiency  Assessment & Plan  Vit D level 21  On supplementation    Full Code

## 2019-10-16 NOTE — REHAB-PHARMACY IDT TEAM NOTE
Pharmacy   Pharmacy  Antibiotics/Antifungals/Antivirals:  Medication:      Active Orders (From admission, onward)    None        Route:         None  Stop Date:  N/A  Reason:   Antihypertensives/Cardiac:  Medication:    Orders (72h ago, onward)     Start     Ordered    10/12/19 0900  lisinopril (PRINIVIL) tablet 20 mg  DAILY      10/11/19 1305    10/11/19 2100  simvastatin (ZOCOR) tablet 40 mg  EVERY EVENING      10/11/19 1305    10/11/19 1305  hydrALAZINE (APRESOLINE) tablet 25 mg  EVERY 8 HOURS PRN      10/11/19 1305              Patient Vitals for the past 24 hrs:   BP Pulse   10/16/19 1400 101/60 73   10/16/19 1002 115/80 --   10/16/19 0700 114/74 64   10/15/19 1855 116/74 77     Anticoagulation:  Medication:  Not applicable  INR:      Other key medications:      A review of the medication list reveals no issues at this time. Patient is currently on antihypertensive(s). Recommend home blood pressure monitoring/recording if antihypertensive(s) regimen(s) continue.    Section completed by:  Bear Garza [facility]

## 2019-10-16 NOTE — PROGRESS NOTES
Pt up to the toilet to void.  Had PVR results of 380 mls at this time.  Will continue to monitor patient.

## 2019-10-16 NOTE — FLOWSHEET NOTE
10/16/19 1552   Events/Summary/Plan   Events/Summary/Plan IS, cough is improved   Interdisciplinary Plan of Care-Goals (Indications)   Hyperinflation Protocol Indications Atelectasis Documented by Chest X-Ray   Interdisciplinary Plan of Care-Outcomes    Hyperinflation Protocol Goals/Outcome Absences of or Improvement in Signs of Atelectasis   Education   Education Yes - Pt. / Family has been Instructed in use of Respiratory Equipment   Incentive Spirometry Group   Breathing Exercises Yes   Incentive Spirometer Volume 2750 mL  (x1, then 2500)   Respiratory WDL   Respiratory (WDL) X   Chest Exam   Respiration 18   Secretions   Cough Non Productive   Oxygen   O2 Daily Delivery Respiratory  Room Air with O2 Available

## 2019-10-16 NOTE — THERAPY
"Physical Therapy   Daily Treatment     Patient Name: Milan Clark  Age:  74 y.o., Sex:  male  Medical Record #: [id number]  Today's Date: 10/16/2019     Precautions  Precautions: (P) Fall Risk  Comments: (P) safety    Subjective    Pt seated EOB, finishing am medication with RN present     Objective       10/16/19 1001   Precautions   Precautions Fall Risk   Comments safety   Standing Lower Body Exercises   Standing Lower Body Exercises Yes  (#2.5BLE)   Hamstring Curl 2 sets of 10;Bilateral    Hip Flexion 2 sets of 10;Bilateral   Hip Extension 2 sets of 10;Bilateral    Hip Abduction 2 sets of 10;Bilateral   Marching 2 sets of 10   Heel Rise 2 sets of 10;Bilateral   Toe Rise 2 sets of 10;Bilateral   Mini Squat Partial;2 sets of 10   Bed Mobility    Sit to Stand Stand by Assist   Neuro-Muscular Treatments   Neuro-Muscular Treatments Weight Shift Left;Weight Shift Right;Verbal Cuing;Sequencing;Tactile Cuing;Postural Changes   Comments see note   PT Total Time Spent   PT Individual Total Time Spent (Mins) 60   PT Charge Group   PT Therapeutic Exercise 2   PT Neuromuscular Re-Education / Balance 2   Supervising Physical Therapist Alfredo Delatorre     Neuro-re ed-  Static standing balance toe taps in frontal plane min UE support on // 2 sets x 10 B, 2 sets x 10 no UE support and 2 sets x 10 alt toe taps, all to 8\" step. A/p weight shifting with narrow KAYLEIGH, standing on 1/2 foam roller with CGA and minimal UE support. Standing core activity D1/D2 flexion/extension with #2lb weighted ball    FIM Walking Score:  5 - Standby Prompting/Supervision or Set-up  Walking Description:  Walker, Supervision for safety    Assessment    Pt demonstrated emprovements in static and dynamic balance as well as improved endurance this session. Pt stood for a total of 20minutes and took 2 rest breaks during the 2 sets of standing exercises and tolerated #2.5lb weights on his LE's.     Plan    Ambulation with/without FWW; outdoor ambulation; stairs; " ther ex for endurance and strengthening; standing balance and safety.

## 2019-10-17 PROBLEM — D72.829 LEUKOCYTOSIS: Status: ACTIVE | Noted: 2019-10-17

## 2019-10-17 PROBLEM — E78.5 DYSLIPIDEMIA: Status: ACTIVE | Noted: 2019-10-17

## 2019-10-17 PROBLEM — D64.9 ANEMIA: Status: ACTIVE | Noted: 2019-10-17

## 2019-10-17 LAB
ANION GAP SERPL CALC-SCNC: 6 MMOL/L (ref 0–11.9)
BUN SERPL-MCNC: 32 MG/DL (ref 8–22)
CALCIUM SERPL-MCNC: 8.2 MG/DL (ref 8.5–10.5)
CHLORIDE SERPL-SCNC: 103 MMOL/L (ref 96–112)
CO2 SERPL-SCNC: 22 MMOL/L (ref 20–33)
CREAT SERPL-MCNC: 0.95 MG/DL (ref 0.5–1.4)
ERYTHROCYTE [DISTWIDTH] IN BLOOD BY AUTOMATED COUNT: 50.5 FL (ref 35.9–50)
GLUCOSE SERPL-MCNC: 89 MG/DL (ref 65–99)
HCT VFR BLD AUTO: 38.9 % (ref 42–52)
HGB BLD-MCNC: 12.6 G/DL (ref 14–18)
MCH RBC QN AUTO: 30.6 PG (ref 27–33)
MCHC RBC AUTO-ENTMCNC: 32.4 G/DL (ref 33.7–35.3)
MCV RBC AUTO: 94.4 FL (ref 81.4–97.8)
PHOSPHATE SERPL-MCNC: 2.5 MG/DL (ref 2.5–4.5)
PLATELET # BLD AUTO: 361 K/UL (ref 164–446)
PMV BLD AUTO: 8.5 FL (ref 9–12.9)
POTASSIUM SERPL-SCNC: 4.3 MMOL/L (ref 3.6–5.5)
RBC # BLD AUTO: 4.12 M/UL (ref 4.7–6.1)
SODIUM SERPL-SCNC: 131 MMOL/L (ref 135–145)
WBC # BLD AUTO: 11.3 K/UL (ref 4.8–10.8)

## 2019-10-17 PROCEDURE — 97110 THERAPEUTIC EXERCISES: CPT

## 2019-10-17 PROCEDURE — 97530 THERAPEUTIC ACTIVITIES: CPT

## 2019-10-17 PROCEDURE — 36415 COLL VENOUS BLD VENIPUNCTURE: CPT

## 2019-10-17 PROCEDURE — 97116 GAIT TRAINING THERAPY: CPT

## 2019-10-17 PROCEDURE — 97535 SELF CARE MNGMENT TRAINING: CPT

## 2019-10-17 PROCEDURE — 700105 HCHG RX REV CODE 258: Performed by: HOSPITALIST

## 2019-10-17 PROCEDURE — G0515 COGNITIVE SKILLS DEVELOPMENT: HCPCS

## 2019-10-17 PROCEDURE — 85027 COMPLETE CBC AUTOMATED: CPT

## 2019-10-17 PROCEDURE — 94668 MNPJ CHEST WALL SBSQ: CPT

## 2019-10-17 PROCEDURE — 700102 HCHG RX REV CODE 250 W/ 637 OVERRIDE(OP): Performed by: PHYSICAL MEDICINE & REHABILITATION

## 2019-10-17 PROCEDURE — A9270 NON-COVERED ITEM OR SERVICE: HCPCS | Performed by: HOSPITALIST

## 2019-10-17 PROCEDURE — 770010 HCHG ROOM/CARE - REHAB SEMI PRIVAT*

## 2019-10-17 PROCEDURE — 97112 NEUROMUSCULAR REEDUCATION: CPT

## 2019-10-17 PROCEDURE — 80048 BASIC METABOLIC PNL TOTAL CA: CPT

## 2019-10-17 PROCEDURE — 84100 ASSAY OF PHOSPHORUS: CPT

## 2019-10-17 PROCEDURE — 700102 HCHG RX REV CODE 250 W/ 637 OVERRIDE(OP): Performed by: HOSPITALIST

## 2019-10-17 PROCEDURE — 99233 SBSQ HOSP IP/OBS HIGH 50: CPT | Performed by: PHYSICAL MEDICINE & REHABILITATION

## 2019-10-17 PROCEDURE — 99232 SBSQ HOSP IP/OBS MODERATE 35: CPT | Performed by: HOSPITALIST

## 2019-10-17 PROCEDURE — 94669 MECHANICAL CHEST WALL OSCILL: CPT

## 2019-10-17 PROCEDURE — A9270 NON-COVERED ITEM OR SERVICE: HCPCS | Performed by: PHYSICAL MEDICINE & REHABILITATION

## 2019-10-17 RX ORDER — FAMOTIDINE 20 MG/1
20 TABLET, FILM COATED ORAL 2 TIMES DAILY
Qty: 60 TAB | Refills: 2 | Status: SHIPPED | OUTPATIENT
Start: 2019-10-17

## 2019-10-17 RX ORDER — TAMSULOSIN HYDROCHLORIDE 0.4 MG/1
0.8 CAPSULE ORAL
Status: DISCONTINUED | OUTPATIENT
Start: 2019-10-18 | End: 2019-10-21 | Stop reason: HOSPADM

## 2019-10-17 RX ORDER — LEVETIRACETAM 500 MG/1
500 TABLET ORAL 2 TIMES DAILY
Qty: 60 TAB | Refills: 2 | Status: SHIPPED | OUTPATIENT
Start: 2019-10-17 | End: 2020-01-01

## 2019-10-17 RX ORDER — TAMSULOSIN HYDROCHLORIDE 0.4 MG/1
0.4 CAPSULE ORAL
Qty: 60 CAP | Refills: 2 | Status: ON HOLD | OUTPATIENT
Start: 2019-10-17 | End: 2019-12-03 | Stop reason: SDUPTHER

## 2019-10-17 RX ORDER — SIMVASTATIN 40 MG
40 TABLET ORAL EVERY EVENING
Qty: 30 TAB | Refills: 11 | Status: SHIPPED | OUTPATIENT
Start: 2019-10-17 | End: 2020-01-01

## 2019-10-17 RX ORDER — DEXAMETHASONE 2 MG/1
2 TABLET ORAL DAILY
Qty: 3 TAB | Refills: 0 | Status: SHIPPED | OUTPATIENT
Start: 2019-10-21 | End: 2019-10-24

## 2019-10-17 RX ORDER — LISINOPRIL 20 MG/1
20 TABLET ORAL DAILY
Qty: 30 TAB | Refills: 2 | Status: SHIPPED | OUTPATIENT
Start: 2019-10-17 | End: 2019-12-02

## 2019-10-17 RX ORDER — SODIUM CHLORIDE 9 MG/ML
1000 INJECTION, SOLUTION INTRAVENOUS ONCE
Status: COMPLETED | OUTPATIENT
Start: 2019-10-17 | End: 2019-10-17

## 2019-10-17 RX ADMIN — LEVETIRACETAM 500 MG: 500 TABLET, FILM COATED ORAL at 10:29

## 2019-10-17 RX ADMIN — VITAMIN D, TAB 1000IU (100/BT) 1000 UNITS: 25 TAB at 10:28

## 2019-10-17 RX ADMIN — SODIUM CHLORIDE 1000 ML: 9 INJECTION, SOLUTION INTRAVENOUS at 18:30

## 2019-10-17 RX ADMIN — TAMSULOSIN HYDROCHLORIDE 0.8 MG: 0.4 CAPSULE ORAL at 10:28

## 2019-10-17 RX ADMIN — NICOTINE 14 MG: 14 PATCH, EXTENDED RELEASE TRANSDERMAL at 05:48

## 2019-10-17 RX ADMIN — DEXAMETHASONE 2 MG: 1 TABLET ORAL at 15:06

## 2019-10-17 RX ADMIN — MINERAL OIL AND WHITE PETROLATUM 1 APPLICATION: 30; 940 OINTMENT OPHTHALMIC at 22:14

## 2019-10-17 RX ADMIN — FAMOTIDINE 20 MG: 20 TABLET ORAL at 22:08

## 2019-10-17 RX ADMIN — SENNOSIDES, DOCUSATE SODIUM 2 TABLET: 50; 8.6 TABLET, FILM COATED ORAL at 10:29

## 2019-10-17 RX ADMIN — DIBASIC SODIUM PHOSPHATE, MONOBASIC POTASSIUM PHOSPHATE AND MONOBASIC SODIUM PHOSPHATE 1 TABLET: 852; 155; 130 TABLET ORAL at 10:29

## 2019-10-17 RX ADMIN — SIMVASTATIN 40 MG: 40 TABLET, FILM COATED ORAL at 22:07

## 2019-10-17 RX ADMIN — DIBASIC SODIUM PHOSPHATE, MONOBASIC POTASSIUM PHOSPHATE AND MONOBASIC SODIUM PHOSPHATE 1 TABLET: 852; 155; 130 TABLET ORAL at 22:07

## 2019-10-17 RX ADMIN — LEVETIRACETAM 500 MG: 500 TABLET, FILM COATED ORAL at 22:07

## 2019-10-17 RX ADMIN — FAMOTIDINE 20 MG: 20 TABLET ORAL at 10:29

## 2019-10-17 RX ADMIN — SENNOSIDES, DOCUSATE SODIUM 2 TABLET: 50; 8.6 TABLET, FILM COATED ORAL at 22:08

## 2019-10-17 RX ADMIN — DEXAMETHASONE 2 MG: 1 TABLET ORAL at 22:07

## 2019-10-17 RX ADMIN — DEXAMETHASONE 2 MG: 1 TABLET ORAL at 05:48

## 2019-10-17 ASSESSMENT — BALANCE ASSESSMENTS
LOOK OVER LEFT AND RIGHT SHOULDERS WHILE STANDING: 4
STANDING UNSUPPORTED WITH FEET TOGETHER: 4
SITTING TO STANDING: 4
STANDING UNSUPPORTED ONE FOOT IN FRONT: 3
TRANSFERS: 4
PLACE ALTERNATE FOOT ON STEP OR STOOL WHILE STANDING UNSUPPORTED: 1
STANDING UNSUPPORTED: 4
STANDING ON ONE LEG: 2
PICK UP OBJECT FROM THE FLOOR FROM A STANDING POSITION: 3
REACHING FORWARD WITH OUTSTRETCHED ARM WHILE STANDING: 4
SITTING UNSUPPORTED: 4
LONG VERSION TOTAL SCORE (MAX 56): 47
TURN 360 DEGREES: 2
STANDING TO SITTING: 4
STANDING UNSUPPORTED WITH EYES CLOSED: 4
LONG VERSION TOTAL SCORE (MAX 56): 47

## 2019-10-17 ASSESSMENT — ENCOUNTER SYMPTOMS
EYES NEGATIVE: 1
COUGH: 0
CHILLS: 0
NAUSEA: 0
ABDOMINAL PAIN: 0
VOMITING: 0
SHORTNESS OF BREATH: 0
FEVER: 0
PALPITATIONS: 0

## 2019-10-17 NOTE — REHAB-SLP IDT TEAM NOTE
Speech Therapy   Cognitive Linquistic Functions  Comprehension Initial:  5 - Stand-by Prompting/Supervision or Set-up  Comprehension Current:  6 - Modified Independent   Comprehension Description:  Glasses  Expression Initial:  5 - Stand-by Prompting/Supervision or Set-up  Expression Current:  7 - Independent   Expression Description:  Verbal cueing  Social Interaction Initial:  6 - Modified Independent  Social Interaction Current:  7 - Independent   Social Interaction Description:  Increased time  Problem Solving Initial:  5 - Standby Prompting/Supervision or Set-up  Problem Solving Current:  6 - Modified Independent   Problem Solving Description:  Verbal cueing, Therapy schedule, Increased time  Memory Initial:  4 - Minimal Assistance  Memory Current:  5 - Standby Prompting/Supervision or Set-up   Memory Description:  Verbal cueing, Therapy schedule  Executive Functioning / Organization Initial:   NA  Executive Functioning / Organization Current:   Min A   Executive Functioning Desciption:  Verbal cues, extra time   Swallowing  Swallowing Status:  Not following for dysphagia management   Orders Placed This Encounter   Procedures   • Diet Order Regular     Standing Status:   Standing     Number of Occurrences:   1     Order Specific Question:   Diet:     Answer:   Regular [1]     Behavior Modification Plan  Redirect to task/topic and Analyze tasks (break down into smaller steps)  Assistive Technology  Memory aides: and Low tech: Calendar, planner, schedule, alarms/timers, pill organizer, post-it notes, lists  Family Training/Education:  Not completed   DC Recommendations:   Outpatient ST, pt would benefit from spv with medications and finances when he returns home (daughter can provide)   Strengths:  Able to follow instructions, Alert and oriented, Effective communication skills, Good insight into deficits/needs, Independent PLOF, Motivated for self care and independence, Pleasant and cooperative, Supportive family  and Willingly participates in therapeutic activities  Barriers:  Poor carryover of learning and Other: complex attention   # of short term goals set=3  # of short term goals met=2  Speech Therapy Problems     Problem: Problem Solving STGs     Dates: Start: 10/13/19       Description:     Goal: STG-Within one week, patient will     Dates: Start: 10/13/19       Description: 1) Individualized goal:  Complete sustained and alternating attn tasks w/ 80% acc and MIN A.   2) Interventions:  SLP Self Care / ADL Training , SLP Cognitive Skill Development and SLP Group Treatment       Note:     Goal Note filed on 10/17/19 1125 by Leland Brown MS,CCC-SLP    Mod A to complete alternating attention tasks.                          Problem: Speech/Swallowing LTGs     Dates: Start: 10/12/19       Description:     Goal: LTG-By discharge, patient will express     Dates: Start: 10/12/19       Description: 1) Individualized goal:  Complex information using word finding strategies IND in >90% of opportunities.   2) Interventions:  SLP Treatment Individual, SLP Self Care / ADL Training , SLP Cognitive Skill Development and SLP Group Treatment             Goal: LTG-By discharge, patient will     Dates: Start: 10/12/19       Description: 1) Individualized goal:  Recall novel health/safety information using internal and/or external memory aids with 90% accuracy w/ MOD I for a safe D/C to PLOF.   2) Interventions:  SLP Self Care / ADL Training , SLP Cognitive Skill Development and SLP Group Treatment                          Section completed by:  Leland Brown MS,CCC-SLP

## 2019-10-17 NOTE — THERAPY
Occupational Therapy  Daily Treatment     Patient Name: Milan Clark  Age:  74 y.o., Sex:  male  Medical Record #: [id number]  Today's Date: 10/17/2019     Precautions  Precautions: Fall Risk  Comments: Safety, memory         Subjective    Patient received seated in dining room, agreeable to OT, reports he has been working on his balance     Objective       10/17/19 0831   Precautions   Precautions Fall Risk   Comments Safety, memory   Standing Upper Body Exercises   Standing Upper Body Exercises Yes   Chest Press 2 sets of 15  (6# medicine ball)   Shoulder Press 2 sets of 15  (6# medicine ball)   Other Exercises standing core twists 2x15 each side with 6# medicine ball   Comments therex completed with RLE on 2 inch step to facilitate increased activation in LLE   Sitting Lower Body Exercises   Sitting Lower Body Exercises Yes   Sit to Stand 2 sets of 10  (RLE on 2 in step for increased LLE activation)   OT Total Time Spent   OT Individual Total Time Spent (Mins) 30   OT Charge Group   OT Therapy Activity 2     Assessment    Patient seen for progression of standing tolerance and balance w/o UE support for increased safety during functional standing tasks. Initially demonstrating tendency to rely on RLE for stability, able to engage LLE well with forced use, placement of 2 in step under RLE. SBA for UE therex and sit to stands w/o UE support, required one rest break.     Plan    Cont'd OT for strength, endurance and safety/environmental awareness for safe ADL's and transfers.

## 2019-10-17 NOTE — FLOWSHEET NOTE
10/17/19 0932   Events/Summary/Plan   Events/Summary/Plan PEP, IS   Interdisciplinary Plan of Care-Goals (Indications)   Hyperinflation Protocol Indications Atelectasis Documented by Chest X-Ray   Interdisciplinary Plan of Care-Outcomes    Hyperinflation Protocol Goals/Outcome Absences of or Improvement in Signs of Atelectasis;Greater Than 60% of Predicted I.S. Volume x 24 hrs;Improvement in Repeat CXR   Education   Education Yes - Pt. / Family has been Instructed in use of Respiratory Equipment   PEP/CPT Group   PEP/CPT/Airway Clearance Therapy Yes   #PEP/CPT (Manual) Subsequent Subsequent  (Excellent technique and effort.)   #CPT (Mechanical) Yes   PEP/CPT Method Flutter Valve   Incentive Spirometry Group   Breathing Exercises Yes   Incentive Spirometer Volume 2500 mL  (Excellent technique and effort.)   Breath Sounds   Pre/Post Intervention Post Intervention Assessment   RUL Breath Sounds Clear   RML Breath Sounds Clear   RLL Breath Sounds Clear   LOBO Breath Sounds Clear   LLL Breath Sounds Clear  (No signs of continuing atelectasis)   Secretions   Cough Non Productive   Oxygen   O2 Daily Delivery Respiratory  Room Air with O2 Available

## 2019-10-17 NOTE — REHAB-COLLABORATIVE ONGOING IDT TEAM NOTE
Weekly Interdisciplinary Team Conference Note    Weekly Interdisciplinary Team Conference # 1  Date:  10/17/2019    Clinicians present and reporting at team conference include the following:   MD: MAUREEN Allen MD    RN:  Elvia Jones RN    PT:   Guerrero Delatorre PT, DPT  OT:  Jose Harley MS, OTR/L    ST:  Leland Brown MS, CCC-SLP  CM:  Darcy Montgomery RN [facility]  REC:  Not Applicable  RT:  Kevin Olivera, KALLIE  RPh:  Renzo Garcia [facility]  All reporting clinicians have a working knowledge of this patient's plan of care.    Targeted DC Date:  10.21.19     Medical    Patient Active Problem List    Diagnosis Date Noted   • Lung mass 10/05/2019     Priority: High   • Brain mass 10/05/2019     Priority: High   • History of lung cancer 10/05/2019     Priority: High   • Urine retention 10/06/2019     Priority: Medium   • Essential hypertension 10/06/2019     Priority: Medium   • Azotemia 10/05/2019     Priority: Medium   • Hypokalemia 10/05/2019     Priority: Medium   • Hypophosphatemia 10/05/2019     Priority: Medium   • Tobacco abuse 10/05/2019     Priority: Low   • Vitamin D insufficiency 10/13/2019   • Hyponatremia 10/13/2019   • COPD (chronic obstructive pulmonary disease) (HCC) 10/06/2019     Results     Procedure Component Value Ref Range Date/Time    ESTIMATED GFR [[id number]] Collected:  10/17/19 0602    Order Status:  Completed Lab Status:  Final result Updated:  10/17/19 0652     GFR If African American >60 >60 mL/min/1.73 m 2      GFR If Non African American >60 >60 mL/min/1.73 m 2     Basic Metabolic Panel [[id number]]  (Abnormal) Collected:  10/17/19 0602    Order Status:  Completed Lab Status:  Final result Updated:  10/17/19 0652    Specimen:  Blood      Sodium 131 135 - 145 mmol/L      Potassium 4.3 3.6 - 5.5 mmol/L      Chloride 103 96 - 112 mmol/L      Co2 22 20 - 33 mmol/L      Glucose 89 65 - 99 mg/dL      Bun 32 8 - 22 mg/dL      Creatinine 0.95 0.50 - 1.40 mg/dL      Calcium 8.2 8.5 - 10.5 mg/dL      Anion  Gap 6.0 0.0 - 11.9     PHOSPHORUS [[id number]] Collected:  10/17/19 0602    Order Status:  Completed Lab Status:  Final result Updated:  10/17/19 0652    Specimen:  Blood      Phosphorus 2.5 2.5 - 4.5 mg/dL     CBC WITHOUT DIFFERENTIAL [[id number]]  (Abnormal) Collected:  10/17/19 0602    Order Status:  Completed Lab Status:  Final result Updated:  10/17/19 0626    Specimen:  Blood      WBC 11.3 4.8 - 10.8 K/uL      RBC 4.12 4.70 - 6.10 M/uL      Hemoglobin 12.6 14.0 - 18.0 g/dL      Hematocrit 38.9 42.0 - 52.0 %      MCV 94.4 81.4 - 97.8 fL      MCH 30.6 27.0 - 33.0 pg      MCHC 32.4 33.7 - 35.3 g/dL      RDW 50.5 35.9 - 50.0 fL      Platelet Count 361 164 - 446 K/uL      MPV 8.5 9.0 - 12.9 fL         Nursing  Diet/Nutrition:  Regular and Thin Liquids  Medication Administration:  Whole with Liquid Wash  % consumed at meals in last 24 hours:  Consumed % of meals per documentation.  Meal/Snack Consumption for the past 24 hrs:   Oral Nutrition   10/16/19 1200 Between % Consumed;Lunch   10/16/19 0800 Breakfast;Between % Consumed     Snack schedule:  None  Supplement: all meals per active order  Appetite:  Good  Admit Weight:  Weight: 59.5 kg (131 lb 2.8 oz)  Weight Last 7 Days   [55.7 kg (122 lb 12.7 oz)-59.5 kg (131 lb 2.8 oz)] 55.7 kg (122 lb 12.7 oz) (10/13 0900)    Pain Issues:    Location:  --  --         Severity:  Denies   Scheduled pain medications:  None     PRN pain medications used in last 24 hours:  None   Non Pharmacologic Interventions:  distraction, emotional support, relaxation, repositioned and rest  Effectiveness of pain management plan:  good=patient states acceptable comfort after interventions    Bowel:    Bowel Assist Initial Score:  5 - Standby Prompting/Supervision or Set-up  Bowel Assist Current Score:  5 - Standby Prompting/Supervision or Set-up  Bowl Accidents in last 7 days:  0  Last bowel movement: 10/16/19  Stool Description: Large, Brown, Soft     Usual bowel pattern:   every other day  Scheduled bowel medications:  senna-docusate (PERICOLACE or SENOKOT S)   PRN bowel medications used in last 24 hours:  None  Nursing Interventions:  Increased time, Scheduled medication, Verbal cueing, Set-up(NOT WITNESSED - entry based off chart review)  Effectiveness of bowel program:   good=regular, predictable, controlled emptying of bowel     Bladder:    Bladder Assist Initial Score:  1 - Total Assistance  Bladder Assist Current Score:  2 - Max Assistance  Bladder Accidents in last 7 days:  0  PVR range for past 24-48 hours:  [220-538]  ()  Intermittent Catheterization:  yes  Medications affecting bladder:  Flomax    Interventions:  Increased time, Medication, Verbal cueing, Emptying of device, Urinal, Intermittent straight catheterization, High PVR  Effectiveness of bladder training:  Poor=PVR > 200 cc    Wound:       Patient Lines/Drains/Airways Status    Active Current Wounds     Name: Placement date: Placement time: Site: Days:    Wound 10/11/19 Incision Head  10/11/19   1300   --  5                   Interventions:  Monitoring for s/s of infection  Effectiveness of intervention:  wound is improving     Sleep/wake cycle:   Average hours slept:  Sleeps 3-4 hours without waking  Sleep medication usage:  None    Patient/Family Training/Education:  Bladder Management/Training, Fall Prevention, General Self Care, Medication Management, Safe Transfers, Safety and Skin Care  Discharge Supply Recommendations:  Catheter Supplies and Blood Pressure Monitor  Strengths: Alert and oriented, Willingly participates in therapeutic activities, Able to follow instructions, Pleasant and cooperative, Effective communication skills and Manages pain appropriately   Barriers:   Bladder retention and Generalized weakness       Nursing Problems     Problem: Bowel/Gastric:     Description:     Goal: Normal bowel function is maintained or improved     Description:           Goal: Will not experience complications  related to bowel motility     Description:                 Problem: Communication     Description:     Goal: The ability to communicate needs accurately and effectively will improve     Description:                 Problem: Discharge Barriers/Planning     Description:     Goal: Patient's continuum of care needs will be met     Description:                 Problem: Infection     Description:     Goal: Will remain free from infection     Description:                 Problem: Knowledge Deficit     Description:     Goal: Knowledge of disease process/condition, treatment plan, diagnostic tests, and medications will improve     Description:           Goal: Knowledge of the prescribed therapeutic regimen will improve     Description:                 Problem: Pain Management     Description:     Goal: Pain level will decrease to patient's comfort goal     Description:     Flowsheet:     Taken at 10/12/19 0838    Pain Rating Scale (NPRS) 0 - No Pain by  Breanna Delvalle R.N.    Comfort Goal Comfort at Rest;Comfort with Movement;Perform Activity;Stay Alert by  Breanna Delvalle R.N.                      Problem: Respiratory:     Description:     Goal: Respiratory status will improve     Description:                 Problem: Safety     Description:     Goal: Will remain free from injury     Description:           Goal: Will remain free from falls     Description:                 Problem: Urinary Elimination:     Description:     Goal: Ability to reestablish a normal urinary elimination pattern will improve     Description:                 Problem: Venous Thromboembolism (VTW)/Deep Vein Thrombosis (DVT) Prevention:     Description:     Goal: Patient will participate in Venous Thrombosis (VTE)/Deep Vein Thrombosis (DVT)Prevention Measures     Description:     Flowsheet:     Taken at 10/13/19 1000    Pharmacologic Prophylaxis Used Contraindicated per MD by  Breanna Delvalle R.N.    Risk Assessment Score 2 (calculated) by  Breanna CASTILLO  NATAN Delvalle    VTE RISK Moderate (calculated) by  Breanna Delvalle R.N.                             Long Term Goals:   At discharge patient will be able to function safely at home and in the community with support.    Section completed by:  Catalina Meadows R.N.        Respiratory Therapy    Breath sounds:  Clear and equal in all lung fields.  Incentive Spirometry:  Predicted 3000cc, 60% of predicted 1800cc, actual vol 2500cc.  Pt well over the 60% goal.  PEP and Flutter valve:  Excellent technique and effort. Non productive, dry cough.  Pt understands importance of continuing IS and Aerobika use and, agrees to continue on his own initiative.  Pt can justifiably be DC'd from RT visits.    Section completed by:  Kevin Olivera, RRT    Mobility  Bed mobility:   Mod I  Bed /Chair/Wheelchair Transfer Initial:  6 - Modified Independent  Bed /Chair/Wheelchair Transfer Current:  6 - Modified Independent   Bed/Chair/Wheelchair Transfer Description:  (extra time needed for management of equipment at this time. Pt rolls right to left independent, suipne to sit and sit to supine independent )  Walk Initial:  4 - Minimal Assistance  Walk Current:  5 - Standby Prompting/Supervision or Set-up   Walk Description:  Walker, Supervision for safety  Wheelchair Initial:  6 - Modified Independent  Wheelchair Current:  6 - Modified Independent   Wheelchair Description:  (level surface, several changes of direction, obstacle negotitation, mod I )  Stairs Initial:  4 - Minimal Assistance  Stairs Current: 4 - Minimal Assistance   Stairs Description: Assist device/equipment(ascends/descends 12 steps with single railing min A to stabilize, step over pattern )  Patient/Family Training/Education:  Ongoing with fair carryover  DME/DC Recommendations:  FWW at this time, Home health PT, Intermittent SPV  Strengths:  Able to follow instructions, Adequate strength, Effective communication skills, Independent PLOF, Manages pain appropriately,  Motivated for self care and independence, Pleasant and cooperative and Willingly participates in therapeutic activities  Barriers:   Decreased endurance, Home accessibility and Poor balance  # of short term goals set= 3  # of short term goals met= 0 (and 1 partially met)  Physical Therapy Problems     Problem: Balance     Dates: Start: 10/12/19       Description:     Goal: STG-Within one week, patient will     Dates: Start: 10/12/19       Description: Improve IRAHETA balance by 8 point (MCID) demonstrating improving balance        Note:     Goal Note filed on 10/17/19 0845 by Guerrero Delatorre, PT    Iraheta Balance Scale planned for this afternoon, just prior to conference.                        Problem: Mobility     Dates: Start: 10/12/19       Description:     Goal: STG-Within one week, patient will     Dates: Start: 10/12/19       Description: Pt will ambulate outdoors over grass with no device SBA simulating pt's goal to return to playing golf., 150ft or greater      Note:     Goal Note filed on 10/17/19 0845 by Guerrero Delatorre, PT    Partially Met: Patient ambulating up to 900 feet but using FWW with SPV; SBA x170 feet without FWW                        Problem: Mobility Transfers     Dates: Start: 10/12/19       Description:     Goal: STG-Within one week, patient will transfer bed to chair     Dates: Start: 10/12/19       Description: Pt will perform stand pivot transfer from bed to w/c mod I.        Note:     Goal Note filed on 10/17/19 0845 by Guerrero Delatorre, PT    Patient requires additional A at this time, uses setup and FWW.                        Problem: PT-Long Term Goals     Dates: Start: 10/12/19       Description:     Goal: LTG-By discharge, patient will ambulate     Dates: Start: 10/12/19       Description: Pt will ambulate with no device level and uneven surfaces community distances modified independent on due to extra time/decreased gait speed           Goal: LTG-By discharge,  patient will transfer one surface to another     Dates: Start: 10/12/19       Description: Pt will transfer to car, bed, various surfaces, stand from floor independent              Goal: LTG-By discharge, patient will     Dates: Start: 10/12/19       Description: Pt will demonstrates a low fall risk level on IRAHETA 45 or greater and 5 time sit to stand within normal values. If ceilings on IRAHETA pt will be able to demonstrate low fall risk on FGA                          Section completed by:  Guerrero Delatorre, PT    Activities of Daily Living  Eating Initial:  6 - Modified Independent  Eating Current:  6 - Modified Independent   Eating Description:  Insert dentures  Grooming Initial:  5 - Standby Prompting/Supervision or Set-up  Grooming Current:  5 - Standby Prompting/Supervision or Set-up   Grooming Description:  Increased time, Supervision for safety(SBA in standing at sink side for groom/hygiene/oral and denture care - VQ's to re-orient (impaired STM))  Bathing Initial:  4 - Minimal Assistance  Bathing Current:  5 - Standby Prompting/Supervision or Set-up   Bathing Description:  Grab bar, Tub bench, Long handled bath tool, Hand held shower, Increased time, Supervision for safety, Verbal cueing, Set-up of equipment(Sup/SBA in standing via grab bar for daron.)  Upper Body Dressing Initial:  5 - Standby Prompting/Supervision or Set-up  Upper Body Dressing Current:  6 - Modified Independent   Upper Body Dressing Description:  Increased time(don/doff pull over shirt)  Lower Body Dressing Initial:  3 - Moderate Assistance  Lower Body Dressing Current:  5 - Standby Prompting/Supervsion or Set-up   Lower Body Dressing Description:  5 - Standby Prompting/Supervsion or Set-up  Toileting Initial:  5 - Standby Prompting/Supervision or Set-up  Toileting Current:  5 - Standby Prompting/Supervision or Set-up   Toileting Description:  Increased time, Grab bar, Supervision for safety  Toilet Transfer Initial:  0 - Not tested,  patient refused  Toilet Transfer Current:  4 - Minimal Assistance   Toilet Transfer Description:  4 - Minimal Assistance  Tub / Shower Transfer Initial:  4 - Minimal Assistance  Tub / Shower Transfer Current:  4 - Minimal Assistance   Tub / Shower Transfer Description:  Grab bar, Increased time, Supervision for safety, Verbal cueing, Set-up of equipment, Initial preparation for task(CGA for SPT to/from FWW and shower chair)  IADL:  TBD   Family Training/Education:  TBD   DME/DC Recommendations:  TBD     Strengths:  Making steady progress towards goals, Motivated for self care and independence, Pleasant and cooperative and Willingly participates in therapeutic activities  Barriers:  Decreased endurance, Generalized weakness and Poor carryover of learning     # of short term goals set= 4    # of short term goals met= 3     Occupational Therapy Goals     Problem: Dressing     Dates: Start: 10/17/19       Description:     Goal: STG-Within one week, patient will dress LB     Dates: Start: 10/17/19       Description: 1) Individualized Goal:  Mod Indep for LB clothing manage w/ AE/DME PRN  2) Interventions:  OT Self Care/ADL, OT Cognitive Skill Dev, OT Neuro Re-Ed/Balance, OT Therapeutic Activity, OT Evaluation and OT Therapeutic Exercise                   Problem: Functional Transfers     Dates: Start: 10/17/19       Description:     Goal: STG-Within one week, patient will transfer to toilet     Dates: Start: 10/17/19       Description: 1) Individualized Goal:  Mod Indep for commode transfer w/ DME PRN  2) Interventions:   OT Self Care/ADL, OT Cognitive Skill Dev, OT Neuro Re-Ed/Balance, OT Therapeutic Activity, OT Evaluation and OT Therapeutic Exercise                   Problem: IADL's     Dates: Start: 10/12/19       Description:     Goal: STG-Within one week, patient will prepare a meal     Dates: Start: 10/12/19   Expected End: 10/19/19       Description: 1) Individualized Goal:  With least restrictive AE at a SBA  level.   2) Interventions:  OT Group Therapy, OT Self Care/ADL, OT Cognitive Skill Dev, OT Neuro Re-Ed/Balance, OT Therapeutic Activity and OT Therapeutic Exercise       Note:     Goal Note filed on 10/17/19 1201 by Jose Harley MS,OTR/L    To be conducted                        Problem: OT Long Term Goals     Dates: Start: 10/12/19       Description:     Goal: LTG-By discharge, patient will complete basic self care tasks     Dates: Start: 10/12/19   Expected End: 10/26/19       Description: 1) Individualized Goal:  At a Mod I level.   2) Interventions:  OT Self Care/ADL, OT Community Reintegration, OT Neuro Re-Ed/Balance, OT Therapeutic Activity, OT Aquatic Therapy and OT Therapeutic Exercise             Goal: LTG-By discharge, patient will perform bathroom transfers     Dates: Start: 10/12/19   Expected End: 10/26/19       Description: 1) Individualized Goal:  At a Mod I level.   2) Interventions:  OT Self Care/ADL, OT Community Reintegration, OT Neuro Re-Ed/Balance, OT Therapeutic Activity, OT Aquatic Therapy and OT Evaluation             Goal: LTG-By discharge, patient will complete basic home management     Dates: Start: 10/12/19   Expected End: 10/26/19       Description: 1) Individualized Goal:  At a SUP level.   2) Interventions:  OT Self Care/ADL, OT Neuro Re-Ed/Balance, OT Therapeutic Activity, OT Aquatic Therapy and OT Evaluation                   Problem: Toileting     Dates: Start: 10/17/19       Description:     Goal: STG-Within one week, patient will complete toileting tasks     Dates: Start: 10/17/19       Description: 1) Individualized Goal:  Mod Indep for toileting task (LB clothing management + yoilet hygiene) via DME PRN  2) Interventions:  OT Self Care/ADL, OT Cognitive Skill Dev, OT Neuro Re-Ed/Balance, OT Therapeutic Activity, OT Evaluation and OT Therapeutic Exercise                         Section completed by:  Jose Harley MS,OTR/L    Cognitive Linquistic Functions  Comprehension Initial:   5 - Stand-by Prompting/Supervision or Set-up  Comprehension Current:  6 - Modified Independent   Comprehension Description:  Glasses  Expression Initial:  5 - Stand-by Prompting/Supervision or Set-up  Expression Current:  7 - Independent   Expression Description:  Verbal cueing  Social Interaction Initial:  6 - Modified Independent  Social Interaction Current:  7 - Independent   Social Interaction Description:  Increased time  Problem Solving Initial:  5 - Standby Prompting/Supervision or Set-up  Problem Solving Current:  6 - Modified Independent   Problem Solving Description:  Verbal cueing, Therapy schedule, Increased time  Memory Initial:  4 - Minimal Assistance  Memory Current:  5 - Standby Prompting/Supervision or Set-up   Memory Description:  Verbal cueing, Therapy schedule  Executive Functioning / Organization Initial:   NA  Executive Functioning / Organization Current:   Min A   Executive Functioning Desciption:  Verbal cues, extra time   Swallowing  Swallowing Status:  Not following for dysphagia management   Orders Placed This Encounter   Procedures   • Diet Order Regular     Standing Status:   Standing     Number of Occurrences:   1     Order Specific Question:   Diet:     Answer:   Regular [1]     Behavior Modification Plan  Redirect to task/topic and Analyze tasks (break down into smaller steps)  Assistive Technology  Memory aides: and Low tech: Calendar, planner, schedule, alarms/timers, pill organizer, post-it notes, lists  Family Training/Education:  Not completed   DC Recommendations:   Outpatient ST, pt would benefit from spv with medications and finances when he returns home (daughter can provide)   Strengths:  Able to follow instructions, Alert and oriented, Effective communication skills, Good insight into deficits/needs, Independent PLOF, Motivated for self care and independence, Pleasant and cooperative, Supportive family and Willingly participates in therapeutic activities  Barriers:  Poor  "carryover of learning and Other: complex attention   # of short term goals set=3  # of short term goals met=2  Speech Therapy Problems     Problem: Problem Solving STGs     Dates: Start: 10/13/19       Description:     Goal: STG-Within one week, patient will     Dates: Start: 10/13/19       Description: 1) Individualized goal:  Complete sustained and alternating attn tasks w/ 80% acc and MIN A.   2) Interventions:  SLP Self Care / ADL Training , SLP Cognitive Skill Development and SLP Group Treatment       Note:     Goal Note filed on 10/17/19 1125 by Leland Brown MS,CCC-SLP    Mod A to complete alternating attention tasks.                          Problem: Speech/Swallowing LTGs     Dates: Start: 10/12/19       Description:     Goal: LTG-By discharge, patient will express     Dates: Start: 10/12/19       Description: 1) Individualized goal:  Complex information using word finding strategies IND in >90% of opportunities.   2) Interventions:  SLP Treatment Individual, SLP Self Care / ADL Training , SLP Cognitive Skill Development and SLP Group Treatment             Goal: LTG-By discharge, patient will     Dates: Start: 10/12/19       Description: 1) Individualized goal:  Recall novel health/safety information using internal and/or external memory aids with 90% accuracy w/ MOD I for a safe D/C to PLOF.   2) Interventions:  SLP Self Care / ADL Training , SLP Cognitive Skill Development and SLP Group Treatment                          Section completed by:  Leland Brown MS,CCC-SLP       Nutrition  Dietary Problems (Active)      There are no active problems.            Nutrition services: Day 3 of admit.  Milan Clark is a 74 y.o. 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    Consult received for low BMI      Assessment:  Height: 180.3 cm (5' 11\")  Weight: 55.7 kg (122 lb 12.7 oz)  Body mass index is 17.13 kg/m²., BMI classification: underweight  Diet/Intake: regular/% of meals " with avg of 78%.    Evaluation:   1. Pt not available when RD attempted to visit. Current PO intake is adequate. Dietary is visiting pt daily for meal orders/preferences. Per RD documentation at acute, pt was drinking Ensure supplements at home. Continuation of supplements would be appropriate with order placed for supplements per RD.   2. Per RD documentation at acute, pt's UBW was 150 lb one month ago. Significant weight loss noted of 27 lb (18%) x 1 month.  3. Skin: incision on his head  4. Labs: BUN=32 with dx of azotemia and fluids encouraged.  5. Meds: decadron, phosphorus, vitamin D.       Malnutrition Risk: From RD progress note at acute: Pt with severe malnutrition in the context of chronic disease related malnutrition related to hx of lung cancer and brain mass as evidenced by a severe 12% wt loss over the past month and signs of severe muscle loss seen at the zygomatic arch and severe fat loss seen at the temple region.     Recommendations/Plan:  1. Provide diet as ordered  2. Add supplement to meals TID   3. Encourage intake of >50%  4. Document intake of all meals and supplements as % taken in ADL's to provide interdisciplinary communication across all shifts.   5. Monitor weight.  6. Nutrition rep will continue to see patient for ongoing meal and snack preferences.   7. RD will monitor weekly.            Section completed by:  Mone Richard R.D.    REHAB-Pharmacy IDT Team Note by Bear Garza RPH at 10/16/2019  4:41 PM  Version 1 of 1    Author:  Bear Garza RPH Service:  -- Author Type:  Pharmacist    Filed:  10/16/2019  4:42 PM Date of Service:  10/16/2019  4:41 PM Status:  Signed    :  Bear Garza RPH (Pharmacist)         Pharmacy   Pharmacy  Antibiotics/Antifungals/Antivirals:  Medication:      Active Orders (From admission, onward)    None        Route:         None  Stop Date:  N/A  Reason:   Antihypertensives/Cardiac:  Medication:    Orders (72h ago, onward)      Start     Ordered    10/12/19 0900  lisinopril (PRINIVIL) tablet 20 mg  DAILY      10/11/19 1305    10/11/19 2100  simvastatin (ZOCOR) tablet 40 mg  EVERY EVENING      10/11/19 1305    10/11/19 1305  hydrALAZINE (APRESOLINE) tablet 25 mg  EVERY 8 HOURS PRN      10/11/19 1305              Patient Vitals for the past 24 hrs:   BP Pulse   10/16/19 1400 101/60 73   10/16/19 1002 115/80 --   10/16/19 0700 114/74 64   10/15/19 1855 116/74 77     Anticoagulation:  Medication:  Not applicable  INR:      Other key medications:      A review of the medication list reveals no issues at this time. Patient is currently on antihypertensive(s). Recommend home blood pressure monitoring/recording if antihypertensive(s) regimen(s) continue.    Section completed by:  Bear Garza RPH[WR.1]        Attribution Meza     WR.1 - Bear Garza RPH on 10/16/2019  4:41 PM                    DC Planning    DC destination/dispostion:  Home w/ supportive family to [facility], 2SH w/ 2 THOMAS, lives on 1st floor (bedroom & full bath).    Referrals: TBD.    DC Needs: DME for patient safety & mobility. [facility] f[facility]u: PCP & onc. XRT treatment plan.    Barriers to discharge: Functional mobility deficits. Transportation to/from [facility] & [place] for XRT.     Strengths: Motivation. Supportive family & friends. PLOF independent. [place] on life.    Section completed by:  Darcy Montgomery R.N.    Summary: good pain mgmt, ambulating w/ gait belt, narrow KAYLEIGH, poor learning carryover, poor safety awareness, LOB w/ distraction, good awareness of deficits, poor ability to monitor.    Physician Summary  MAUREEN Allen MD  participated and led team conference discussion.

## 2019-10-17 NOTE — REHAB-PT IDT TEAM NOTE
Physical Therapy   Mobility  Bed mobility:   Mod I  Bed /Chair/Wheelchair Transfer Initial:  6 - Modified Independent  Bed /Chair/Wheelchair Transfer Current:  6 - Modified Independent   Bed/Chair/Wheelchair Transfer Description:  (extra time needed for management of equipment at this time. Pt rolls right to left independent, suipne to sit and sit to supine independent )  Walk Initial:  4 - Minimal Assistance  Walk Current:  5 - Standby Prompting/Supervision or Set-up   Walk Description:  Walker, Supervision for safety  Wheelchair Initial:  6 - Modified Independent  Wheelchair Current:  6 - Modified Independent   Wheelchair Description:  (level surface, several changes of direction, obstacle negotitation, mod I )  Stairs Initial:  4 - Minimal Assistance  Stairs Current: 4 - Minimal Assistance   Stairs Description: Assist device/equipment(ascends/descends 12 steps with single railing min A to stabilize, step over pattern )  Patient/Family Training/Education:  Ongoing with fair carryover  DME/DC Recommendations:  FWW at this time, Home health PT, Intermittent SPV  Strengths:  Able to follow instructions, Adequate strength, Effective communication skills, Independent PLOF, Manages pain appropriately, Motivated for self care and independence, Pleasant and cooperative and Willingly participates in therapeutic activities  Barriers:   Decreased endurance, Home accessibility and Poor balance  # of short term goals set= 3  # of short term goals met= 0 (and 1 partially met)  Physical Therapy Problems     Problem: Balance     Dates: Start: 10/12/19       Description:     Goal: STG-Within one week, patient will     Dates: Start: 10/12/19       Description: Improve IRAHETA balance by 8 point (MCID) demonstrating improving balance        Note:     Goal Note filed on 10/17/19 3810 by Guerrero Delatorre, PT    Iraheta Balance Scale planned for this afternoon, just prior to conference.                        Problem: Mobility      Dates: Start: 10/12/19       Description:     Goal: STG-Within one week, patient will     Dates: Start: 10/12/19       Description: Pt will ambulate outdoors over grass with no device SBA simulating pt's goal to return to playing golf., 150ft or greater      Note:     Goal Note filed on 10/17/19 0845 by Guerrero Delatorre, PT    Partially Met: Patient ambulating up to 900 feet but using FWW with SPV; SBA x170 feet without FWW                        Problem: Mobility Transfers     Dates: Start: 10/12/19       Description:     Goal: STG-Within one week, patient will transfer bed to chair     Dates: Start: 10/12/19       Description: Pt will perform stand pivot transfer from bed to w/c mod I.        Note:     Goal Note filed on 10/17/19 0845 by Guerrero Delatorre, PT    Patient requires additional A at this time, uses setup and FWW.                        Problem: PT-Long Term Goals     Dates: Start: 10/12/19       Description:     Goal: LTG-By discharge, patient will ambulate     Dates: Start: 10/12/19       Description: Pt will ambulate with no device level and uneven surfaces community distances modified independent on due to extra time/decreased gait speed           Goal: LTG-By discharge, patient will transfer one surface to another     Dates: Start: 10/12/19       Description: Pt will transfer to car, bed, various surfaces, stand from floor independent              Goal: LTG-By discharge, patient will     Dates: Start: 10/12/19       Description: Pt will demonstrates a low fall risk level on IRAHETA 45 or greater and 5 time sit to stand within normal values. If ceilings on IRAHETA pt will be able to demonstrate low fall risk on FGA                          Section completed by:  Guerrero Delatorre, PT

## 2019-10-17 NOTE — THERAPY
"Occupational Therapy  Daily Treatment     Patient Name: Milan Clark  Age:  74 y.o., Sex:  male  Medical Record #: [id number]  Today's Date: 10/17/2019     Precautions  Precautions: Fall Risk  Comments: Safety, memory         Subjective  \"My daughter is coming to visit me this afternoon\"     Objective       10/17/19 1101   Precautions   Precautions Fall Risk   Comments Safety, memory   Vitals   O2 Delivery None (Room Air)   Pain   Intervention Declines   Pain 0 - 10 Group   Therapist Pain Assessment 0   Non Verbal Descriptors   Non Verbal Scale  Calm   Cognition    Level of Consciousness Alert   Standing Upper Body Exercises   Comments Standing at Rebounder w/ 4# ball 2x100 w'/ seated rest break x 2, no LOB, 3 dropped balls., standing on 3\" foam pad 1x100 w/ LOB x 2 (therapist corrected via gait belt), dropped balls x 1.   Interdisciplinary Plan of Care Collaboration   Patient Position at End of Therapy Seated   Collaboration Comments Pt escorted to dining room for lunch   OT Total Time Spent   OT Individual Total Time Spent (Mins) 30   OT Charge Group   OT Self Care / ADL 1   OT Therapeutic Exercise  1       FIM Toiletin - Standby Prompting/Supervision or Set-up  Toileting Description:  Increased time, Grab bar, Supervision for safety    FIM Toilet Transfer Score:  4 - Minimal Assistance  Toilet Transfer Description:  Grab bar, Increased time, Supervision for safety, Verbal cueing, Set-up of equipment, Initial preparation for task(CGA for commode transfer to/from FWW)      Assessment    Pt was alert and cooperative w/ tx.  Limiters include impaired memory, safety awareness, therapist assisted LOB x's 1 w/ rebounder activity.    Plan    Cont'd OT for strength, endurance and safety/environmental awareness for safe ADL's and transfers.    "

## 2019-10-17 NOTE — PROGRESS NOTES
"Rehab Progress Note     Encounter Date: 10/16/2019    CC: Brain metastatic disease, left sided weakness    Interval Events (Subjective)  Patient sitting up in therapy gym. He reports therapy is going very well. He is ambulating further. Discussed will have IDT tomorrow to discuss discharge planning but most likely will be ready for discharge soon. Discussed with CM and plan for CT simulation early next week. Discussed most likely discharge prior.     Objective:  VITAL SIGNS: BP (!) 98/51   Pulse 64   Temp 37.1 °C (98.7 °F) (Temporal)   Resp 18   Ht 1.803 m (5' 11\")   Wt 55.7 kg (122 lb 12.7 oz)   SpO2 94%   BMI 17.13 kg/m²   Gen: NAD  Psych: Mood and affect appropriate  CV: RRR, no edema  Resp: CTAB, no upper airway sounds  Abd: NTND  Neuro: AOx4, normal gait with FWW    Recent Results (from the past 72 hour(s))   Basic Metabolic Panel    Collection Time: 10/14/19  5:39 AM   Result Value Ref Range    Sodium 133 (L) 135 - 145 mmol/L    Potassium 4.2 3.6 - 5.5 mmol/L    Chloride 104 96 - 112 mmol/L    Co2 24 20 - 33 mmol/L    Glucose 112 (H) 65 - 99 mg/dL    Bun 32 (H) 8 - 22 mg/dL    Creatinine 0.81 0.50 - 1.40 mg/dL    Calcium 8.2 (L) 8.5 - 10.5 mg/dL    Anion Gap 5.0 0.0 - 11.9   MAGNESIUM    Collection Time: 10/14/19  5:39 AM   Result Value Ref Range    Magnesium 1.9 1.5 - 2.5 mg/dL   PHOSPHORUS    Collection Time: 10/14/19  5:39 AM   Result Value Ref Range    Phosphorus 2.5 2.5 - 4.5 mg/dL   ESTIMATED GFR    Collection Time: 10/14/19  5:39 AM   Result Value Ref Range    GFR If African American >60 >60 mL/min/1.73 m 2    GFR If Non African American >60 >60 mL/min/1.73 m 2       Current Facility-Administered Medications   Medication Frequency   • tamsulosin (FLOMAX) capsule 0.8 mg AFTER BREAKFAST   • vitamin D (cholecalciferol) tablet 1,000 Units DAILY   • Respiratory Care per Protocol Continuous RT   • hydrALAZINE (APRESOLINE) tablet 25 mg Q8HRS PRN   • acetaminophen (TYLENOL) tablet 650 mg Q4HRS PRN   • " senna-docusate (PERICOLACE or SENOKOT S) 8.6-50 MG per tablet 2 Tab BID    And   • polyethylene glycol/lytes (MIRALAX) PACKET 1 Packet QDAY PRN    And   • magnesium hydroxide (MILK OF MAGNESIA) suspension 30 mL QDAY PRN    And   • bisacodyl (DULCOLAX) suppository 10 mg QDAY PRN   • artificial tears ophthalmic solution 1 Drop PRN   • benzocaine-menthol (CEPACOL) lozenge 1 Lozenge Q2HRS PRN   • mag hydrox-al hydrox-simeth (MAALOX PLUS ES or MYLANTA DS) suspension 20 mL Q2HRS PRN   • ondansetron (ZOFRAN ODT) dispertab 4 mg 4X/DAY PRN    Or   • ondansetron (ZOFRAN) syringe/vial injection 4 mg 4X/DAY PRN   • traZODone (DESYREL) tablet 50 mg QHS PRN   • sodium chloride (OCEAN) 0.65 % nasal spray 2 Spray PRN   • nicotine (NICODERM) 14 MG/24HR 14 mg Daily-0600    And   • nicotine polacrilex (NICORETTE) 2 MG piece 2 mg Q HOUR PRN   • artificial tears (EYE LUBRICANT) ophth ointment 1 Application Q8HRS   • dexamethasone (DECADRON) tablet 2 mg Q8HRS    Followed by   • [START ON 10/18/2019] dexamethasone (DECADRON) tablet 2 mg Q12HRS    Followed by   • [START ON 10/21/2019] dexamethasone (DECADRON) tablet 2 mg DAILY   • famotidine (PEPCID) tablet 20 mg BID   • HYDROcodone-acetaminophen (NORCO) 5-325 MG per tablet 1-2 Tab Q6HRS PRN   • ipratropium-albuterol (DUONEB) nebulizer solution Q4H PRN (RT)   • levETIRAcetam (KEPPRA) tablet 500 mg BID   • lisinopril (PRINIVIL) tablet 20 mg DAILY   • phosphorus (K-PHOS-NEUTRAL, PHOSPHA 250 NEUTRAL) per tablet 1 Tab BID   • simvastatin (ZOCOR) tablet 40 mg Q EVENING       Orders Placed This Encounter   Procedures   • Diet Order Regular     Standing Status:   Standing     Number of Occurrences:   1     Order Specific Question:   Diet:     Answer:   Regular [1]       Assessment:  Active Hospital Problems    Diagnosis   • History of lung cancer   • Essential hypertension   • Azotemia   • Hypophosphatemia   • Hypokalemia   • Vitamin D insufficiency   • Hyponatremia       Medical Decision Making  and Plan:  Metastatic lung cancer to brain - Patient s/p resection of two lesions with Dr. Lacey on 10/4/19.  Patient on steroid taper  -Continue steroid taper until 10/23/19. Per NSG continue Keppra for 1 month. Follow-up with NSG. Staples removed 10/14/19  -PT and OT for mobility and ADLs  -SLP for cognitive screen  -Plan for treatment possibly starting next week. Will need to discharge prior to starting.      Non-small cell lung cancer - Will need PET and simulation for possible treatment.  -Follow-up with Oncology.  Simulation planning next week.      HTN - Patient on Lisinopril 20 mg daily. Elevated on 10/15/19, may be related to steroids.     Tobacco dependence - Patient on patch on transfer. Will need counseling.   -Counseling performed on 10/14/19     Hypokalemia - Patient on 20 mEq on transfer. Check AM CMP. Consulted hospitalist  -Off K supplement     Hyponatremia - 130 on admission labs. Consulted hospitalist  -Improved to 133. Recheck CMP    HLD - Patient on Simvastatin 40 mg      Urinary retention - Patient with alejandra on transfer. Continue Flomax. Alejandra removed 10/14/19, required CIC over night. PVRs elevated. Will increase Flomax  -Improved voiding, required CIC x1 overnight.       Vitamin D - 21 on admission, start 1000 U    GI Ppx - Patient on Pepcid on transfer. Continue while on steroids.      DVT Ppx - Patient high risk for bleed. Will monitor ambulatory status.      Total time:  25 minutes.  I spent greater than 50% of the time for patient care, counseling, and coordination on this date, including unit/floor time, and face-to-face time with the patient as per interval events and assessment and plan above. Topics discussed included improving urinary voiding, discussed oncology follow-up and discharge planning for later this week.     Cynthia Allen M.D.

## 2019-10-17 NOTE — CARE PLAN
Problem: Urinary Elimination:  Goal: Ability to reestablish a normal urinary elimination pattern will improve  Note:   Pt able to void with urinary retention, bladder scan done, straight cath drained  Intervention: Assess and monitor for signs and symptoms of urinary retention  Note:   Pt voided, post void residual 538 ml. Straight cath done, drained 550 ml yellow colored urine.

## 2019-10-17 NOTE — REHAB-CM IDT TEAM NOTE
Case Management      DC Planning    DC destination/dispostion:  Home w/ supportive family to [place], 2SH w/ 2 THOMAS, lives on 1st floor (bedroom & full bath).    Referrals: TBD.    DC Needs: DME for patient safety & mobility. [facility] f/u: PCP & onc. XRT treatment plan.    Barriers to discharge: Functional mobility deficits. Transportation to/from [facility] & [facility] for XRT.     Strengths: Motivation. Supportive family & friends. PLOF independent. [place] on life.    Section completed by:  Darcy Montgomery R.N.

## 2019-10-17 NOTE — REHAB-OT IDT TEAM NOTE
Occupational Therapy   Activities of Daily Living  Eating Initial:  6 - Modified Independent  Eating Current:  6 - Modified Independent   Eating Description:  Insert dentures  Grooming Initial:  5 - Standby Prompting/Supervision or Set-up  Grooming Current:  5 - Standby Prompting/Supervision or Set-up   Grooming Description:  Increased time, Supervision for safety(SBA in standing at sink side for groom/hygiene/oral and denture care - VQ's to re-orient (impaired STM))  Bathing Initial:  4 - Minimal Assistance  Bathing Current:  5 - Standby Prompting/Supervision or Set-up   Bathing Description:  Grab bar, Tub bench, Long handled bath tool, Hand held shower, Increased time, Supervision for safety, Verbal cueing, Set-up of equipment(Sup/SBA in standing via grab bar for daron.)  Upper Body Dressing Initial:  5 - Standby Prompting/Supervision or Set-up  Upper Body Dressing Current:  6 - Modified Independent   Upper Body Dressing Description:  Increased time(don/doff pull over shirt)  Lower Body Dressing Initial:  3 - Moderate Assistance  Lower Body Dressing Current:  5 - Standby Prompting/Supervsion or Set-up   Lower Body Dressing Description:  5 - Standby Prompting/Supervsion or Set-up  Toileting Initial:  5 - Standby Prompting/Supervision or Set-up  Toileting Current:  5 - Standby Prompting/Supervision or Set-up   Toileting Description:  Increased time, Grab bar, Supervision for safety  Toilet Transfer Initial:  0 - Not tested, patient refused  Toilet Transfer Current:  4 - Minimal Assistance   Toilet Transfer Description:  4 - Minimal Assistance  Tub / Shower Transfer Initial:  4 - Minimal Assistance  Tub / Shower Transfer Current:  4 - Minimal Assistance   Tub / Shower Transfer Description:  Grab bar, Increased time, Supervision for safety, Verbal cueing, Set-up of equipment, Initial preparation for task(CGA for SPT to/from FWW and shower chair)  IADL:  TBD   Family Training/Education:  TBD   DME/DC Recommendations:   TBD     Strengths:  Making steady progress towards goals, Motivated for self care and independence, Pleasant and cooperative and Willingly participates in therapeutic activities  Barriers:  Decreased endurance, Generalized weakness and Poor carryover of learning     # of short term goals set= 4    # of short term goals met= 3     Occupational Therapy Goals     Problem: Dressing     Dates: Start: 10/17/19       Description:     Goal: STG-Within one week, patient will dress LB     Dates: Start: 10/17/19       Description: 1) Individualized Goal:  Mod Indep for LB clothing manage w/ AE/DME PRN  2) Interventions:  OT Self Care/ADL, OT Cognitive Skill Dev, OT Neuro Re-Ed/Balance, OT Therapeutic Activity, OT Evaluation and OT Therapeutic Exercise                   Problem: Functional Transfers     Dates: Start: 10/17/19       Description:     Goal: STG-Within one week, patient will transfer to toilet     Dates: Start: 10/17/19       Description: 1) Individualized Goal:  Mod Indep for commode transfer w/ DME PRN  2) Interventions:   OT Self Care/ADL, OT Cognitive Skill Dev, OT Neuro Re-Ed/Balance, OT Therapeutic Activity, OT Evaluation and OT Therapeutic Exercise                   Problem: IADL's     Dates: Start: 10/12/19       Description:     Goal: STG-Within one week, patient will prepare a meal     Dates: Start: 10/12/19   Expected End: 10/19/19       Description: 1) Individualized Goal:  With least restrictive AE at a SBA level.   2) Interventions:  OT Group Therapy, OT Self Care/ADL, OT Cognitive Skill Dev, OT Neuro Re-Ed/Balance, OT Therapeutic Activity and OT Therapeutic Exercise       Note:     Goal Note filed on 10/17/19 1201 by Jose Harley MS,OTR/L    To be conducted                        Problem: OT Long Term Goals     Dates: Start: 10/12/19       Description:     Goal: LTG-By discharge, patient will complete basic self care tasks     Dates: Start: 10/12/19   Expected End: 10/26/19       Description: 1)  Individualized Goal:  At a Mod I level.   2) Interventions:  OT Self Care/ADL, OT Community Reintegration, OT Neuro Re-Ed/Balance, OT Therapeutic Activity, OT Aquatic Therapy and OT Therapeutic Exercise             Goal: LTG-By discharge, patient will perform bathroom transfers     Dates: Start: 10/12/19   Expected End: 10/26/19       Description: 1) Individualized Goal:  At a Mod I level.   2) Interventions:  OT Self Care/ADL, OT Community Reintegration, OT Neuro Re-Ed/Balance, OT Therapeutic Activity, OT Aquatic Therapy and OT Evaluation             Goal: LTG-By discharge, patient will complete basic home management     Dates: Start: 10/12/19   Expected End: 10/26/19       Description: 1) Individualized Goal:  At a SUP level.   2) Interventions:  OT Self Care/ADL, OT Neuro Re-Ed/Balance, OT Therapeutic Activity, OT Aquatic Therapy and OT Evaluation                   Problem: Toileting     Dates: Start: 10/17/19       Description:     Goal: STG-Within one week, patient will complete toileting tasks     Dates: Start: 10/17/19       Description: 1) Individualized Goal:  Mod Indep for toileting task (LB clothing management + yoilet hygiene) via DME PRN  2) Interventions:  OT Self Care/ADL, OT Cognitive Skill Dev, OT Neuro Re-Ed/Balance, OT Therapeutic Activity, OT Evaluation and OT Therapeutic Exercise                         Section completed by:  Jose Harley MS,OTR/L

## 2019-10-17 NOTE — CARE PLAN
Problem: Safety  Goal: Will remain free from injury  Note:   Safety precautions observed, bed at lowest level, call light and bedside table within easy reach, needs anticipated. OOB to bathroom via walker with stand by assist.  Hourly rounding done. Pt free from fall and injury.     Problem: Infection  Goal: Will remain free from infection  Note:   Afebrile ,  BP- 116/67. P- 64. Denies sob with ease in breathing . No s/s of infection noted.     Problem: Skin Integrity  Goal: Risk for impaired skin integrity will decrease  Note:   Incision line to head intact and well approximated .

## 2019-10-17 NOTE — DISCHARGE PLANNING
Met w/ patient & friend after IDT conference. Patient gave permission to discuss team recommendations & targeted DC date w/ friend in room. Targeted DC date 10.21.19 prior to XRT mapping appointment at 1:00pm. Patient looking forward to going home. Discussed team ongoing treatment for memory & attention, good awareness of deficits however poor monitoring, poor safety awareness, +LOB w/ distraction, poor carryover of learning. Patient verbalizes understanding of deficits. Discussed HH referral for PT/OT/SLP. Has FWW at home. Asking questions about what to expect w/ XRT treatments. Questions answered. Emotional support provided.

## 2019-10-17 NOTE — CARE PLAN
Problem: Problem Solving STGs  Goal: STG-Within one week, patient will  Description  1) Individualized goal:  Complete sustained and alternating attn tasks w/ 80% acc and MIN A.   2) Interventions:  SLP Self Care / ADL Training , SLP Cognitive Skill Development and SLP Group Treatment     Outcome: NOT MET  Note:   Mod A to complete alternating attention tasks.       Problem: Expression STGs  Goal: STG-Within one week, patient will  Description  1) Individualized goal:  Describe common items by function, location, and features to be used as a word finding strategy  with 80% accuracy and MIN A.   2) Interventions:  SLP Speech Language Treatment, SLP Self Care / ADL Training , SLP Cognitive Skill Development and SLP Group Treatment     Outcome: MET  Note:   Pt able to verbalize information without word finding difficulty      Problem: Memory STGs  Goal: STG-Within one week, patient will remember  Description  1) Individualized goal:  Novel information related to [facility]H recovery using external memory aids with 90% accuracy and MIN A.   2) Interventions:  SLP Self Care / ADL Training , SLP Cognitive Skill Development and SLP Group Treatment     Outcome: MET  Note:   Pt is able to recall new information with min A

## 2019-10-17 NOTE — REHAB-NURSING IDT TEAM NOTE
Nursing   Nursing  Diet/Nutrition:  Regular and Thin Liquids  Medication Administration:  Whole with Liquid Wash  % consumed at meals in last 24 hours:  Consumed % of meals per documentation.  Meal/Snack Consumption for the past 24 hrs:   Oral Nutrition   10/16/19 1200 Between % Consumed;Lunch   10/16/19 0800 Breakfast;Between % Consumed     Snack schedule:  None  Supplement: all meals per active order  Appetite:  Good  Admit Weight:  Weight: 59.5 kg (131 lb 2.8 oz)  Weight Last 7 Days   [55.7 kg (122 lb 12.7 oz)-59.5 kg (131 lb 2.8 oz)] 55.7 kg (122 lb 12.7 oz) (10/13 0900)    Pain Issues:    Location:  --  --         Severity:  Denies   Scheduled pain medications:  None     PRN pain medications used in last 24 hours:  None   Non Pharmacologic Interventions:  distraction, emotional support, relaxation, repositioned and rest  Effectiveness of pain management plan:  good=patient states acceptable comfort after interventions    Bowel:    Bowel Assist Initial Score:  5 - Standby Prompting/Supervision or Set-up  Bowel Assist Current Score:  5 - Standby Prompting/Supervision or Set-up  Bowl Accidents in last 7 days:  0  Last bowel movement: 10/16/19  Stool Description: Large, Brown, Soft     Usual bowel pattern:  every other day  Scheduled bowel medications:  senna-docusate (PERICOLACE or SENOKOT S)   PRN bowel medications used in last 24 hours:  None  Nursing Interventions:  Increased time, Scheduled medication, Verbal cueing, Set-up(NOT WITNESSED - entry based off chart review)  Effectiveness of bowel program:   good=regular, predictable, controlled emptying of bowel     Bladder:    Bladder Assist Initial Score:  1 - Total Assistance  Bladder Assist Current Score:  2 - Max Assistance  Bladder Accidents in last 7 days:  0  PVR range for past 24-48 hours:  [220-538]  ()  Intermittent Catheterization:  yes  Medications affecting bladder:  Flomax    Interventions:  Increased time, Medication, Verbal  cueing, Emptying of device, Urinal, Intermittent straight catheterization, High PVR  Effectiveness of bladder training:  Poor=PVR > 200 cc    Wound:       Patient Lines/Drains/Airways Status    Active Current Wounds     Name: Placement date: Placement time: Site: Days:    Wound 10/11/19 Incision Head  10/11/19   1300   --  5                   Interventions:  Monitoring for s/s of infection  Effectiveness of intervention:  wound is improving     Sleep/wake cycle:   Average hours slept:  Sleeps 3-4 hours without waking  Sleep medication usage:  None    Patient/Family Training/Education:  Bladder Management/Training, Fall Prevention, General Self Care, Medication Management, Safe Transfers, Safety and Skin Care  Discharge Supply Recommendations:  Catheter Supplies and Blood Pressure Monitor  Strengths: Alert and oriented, Willingly participates in therapeutic activities, Able to follow instructions, Pleasant and cooperative, Effective communication skills and Manages pain appropriately   Barriers:   Bladder retention and Generalized weakness       Nursing Problems     Problem: Bowel/Gastric:     Description:     Goal: Normal bowel function is maintained or improved     Description:           Goal: Will not experience complications related to bowel motility     Description:                 Problem: Communication     Description:     Goal: The ability to communicate needs accurately and effectively will improve     Description:                 Problem: Discharge Barriers/Planning     Description:     Goal: Patient's continuum of care needs will be met     Description:                 Problem: Infection     Description:     Goal: Will remain free from infection     Description:                 Problem: Knowledge Deficit     Description:     Goal: Knowledge of disease process/condition, treatment plan, diagnostic tests, and medications will improve     Description:           Goal: Knowledge of the prescribed therapeutic  regimen will improve     Description:                 Problem: Pain Management     Description:     Goal: Pain level will decrease to patient's comfort goal     Description:     Flowsheet:     Taken at 10/12/19 0838    Pain Rating Scale (NPRS) 0 - No Pain by  Breanna Delvalle R.N.    Comfort Goal Comfort at Rest;Comfort with Movement;Perform Activity;Stay Alert by  Breanna Delvalle R.N.                      Problem: Respiratory:     Description:     Goal: Respiratory status will improve     Description:                 Problem: Safety     Description:     Goal: Will remain free from injury     Description:           Goal: Will remain free from falls     Description:                 Problem: Urinary Elimination:     Description:     Goal: Ability to reestablish a normal urinary elimination pattern will improve     Description:                 Problem: Venous Thromboembolism (VTW)/Deep Vein Thrombosis (DVT) Prevention:     Description:     Goal: Patient will participate in Venous Thrombosis (VTE)/Deep Vein Thrombosis (DVT)Prevention Measures     Description:     Flowsheet:     Taken at 10/13/19 1000    Pharmacologic Prophylaxis Used Contraindicated per MD by  Breanna Delvalle R.N.    Risk Assessment Score 2 (calculated) by  Breanna Delvalle R.N.    VTE RISK Moderate (calculated) by  Breanna Delvalle R.N.                             Long Term Goals:   At discharge patient will be able to function safely at home and in the community with support.    Section completed by:  Catalina Meadows R.N.

## 2019-10-17 NOTE — CARE PLAN
Problem: Balance  Goal: STG-Within one week, patient will  Description  Improve IRAHETA balance by 8 point (MCID) demonstrating improving balance      Outcome: NOT MET  Note:   Iraheta Balance Scale planned for this afternoon, just prior to conference.     Problem: Mobility  Goal: STG-Within one week, patient will  Description  Pt will ambulate outdoors over grass with no device SBA simulating pt's goal to return to playing golf., 150ft or greater    Outcome: NOT MET  Note:   Partially Met: Patient ambulating up to 900 feet but using FWW with SPV; SBA x170 feet without FWW     Problem: Mobility Transfers  Goal: STG-Within one week, patient will transfer bed to chair  Description  Pt will perform stand pivot transfer from bed to w/c mod I.      Outcome: NOT MET  Note:   Patient requires additional A at this time, uses setup and FWW.

## 2019-10-17 NOTE — PROGRESS NOTES
"Rehab Progress Note     Encounter Date: 10/17/2019    CC: Brain metastatic disease, left sided weakness    Interval Events (Subjective)  Patient sitting up in room. He reports he is doing well. He reports he does have some losses of balance when he gets distracted. He reports he is trying to be more task oriented. Discussed will have IDT later today. Discussed AM labs including mild leukocytosis on steroids is expected.  Denies NVD. Denies headache.     IDT Team Meeting 10/17/2019    ICynthia M.D., was present and led the interdisciplinary team conference on 10/17/2019.  I led the IDT conference and agree with the IDT conference documentation and plan of care as noted below.     RN:  Diet Regular   % Meal     Pain None   Sleep    Bowel Continent   Bladder Continent   In's & Out's    Walking with walker    PT:  Bed Mobility    Transfers    Mobility CGA without equipment   Stairs Armando   47/56 on balance  Difficulty with head turns and narrow base of support    OT: 3 of 4 goals  Eating    Grooming    Bathing    UB Dressing    LB Dressing    Toileting Armando   Shower & Transfer Armando   Poor safety awareness  Poor carry over  Has loss of balance with distraction    SLP:  Memory and attention biggest barriers  Has insight into some deficits  Difficult for concentrations  Armando for executive function  Improving accuracy    CM:  Continues to work on disposition and DME needs.    Walker     DC/Disposition:  10/21/19    Objective:  VITAL SIGNS: /74   Pulse (!) 55   Temp 37.2 °C (99 °F) (Temporal)   Resp 16   Ht 1.803 m (5' 11\")   Wt 57.5 kg (126 lb 11.2 oz)   SpO2 96%   BMI 17.67 kg/m²   Gen: NAD  Psych: Mood and affect appropriate  CV: RRR, no edema  Resp: CTAB, no upper airway sounds  Abd: NTND  Neuro: AOx4, normal gait with FWW  Unchanged from 10/16/19    Recent Results (from the past 72 hour(s))   CBC WITHOUT DIFFERENTIAL    Collection Time: 10/17/19  6:02 AM   Result Value Ref Range    WBC " 11.3 (H) 4.8 - 10.8 K/uL    RBC 4.12 (L) 4.70 - 6.10 M/uL    Hemoglobin 12.6 (L) 14.0 - 18.0 g/dL    Hematocrit 38.9 (L) 42.0 - 52.0 %    MCV 94.4 81.4 - 97.8 fL    MCH 30.6 27.0 - 33.0 pg    MCHC 32.4 (L) 33.7 - 35.3 g/dL    RDW 50.5 (H) 35.9 - 50.0 fL    Platelet Count 361 164 - 446 K/uL    MPV 8.5 (L) 9.0 - 12.9 fL   Basic Metabolic Panel    Collection Time: 10/17/19  6:02 AM   Result Value Ref Range    Sodium 131 (L) 135 - 145 mmol/L    Potassium 4.3 3.6 - 5.5 mmol/L    Chloride 103 96 - 112 mmol/L    Co2 22 20 - 33 mmol/L    Glucose 89 65 - 99 mg/dL    Bun 32 (H) 8 - 22 mg/dL    Creatinine 0.95 0.50 - 1.40 mg/dL    Calcium 8.2 (L) 8.5 - 10.5 mg/dL    Anion Gap 6.0 0.0 - 11.9   PHOSPHORUS    Collection Time: 10/17/19  6:02 AM   Result Value Ref Range    Phosphorus 2.5 2.5 - 4.5 mg/dL   ESTIMATED GFR    Collection Time: 10/17/19  6:02 AM   Result Value Ref Range    GFR If African American >60 >60 mL/min/1.73 m 2    GFR If Non African American >60 >60 mL/min/1.73 m 2       Current Facility-Administered Medications   Medication Frequency   • tamsulosin (FLOMAX) capsule 0.8 mg AFTER BREAKFAST   • vitamin D (cholecalciferol) tablet 1,000 Units DAILY   • Respiratory Care per Protocol Continuous RT   • hydrALAZINE (APRESOLINE) tablet 25 mg Q8HRS PRN   • acetaminophen (TYLENOL) tablet 650 mg Q4HRS PRN   • senna-docusate (PERICOLACE or SENOKOT S) 8.6-50 MG per tablet 2 Tab BID    And   • polyethylene glycol/lytes (MIRALAX) PACKET 1 Packet QDAY PRN    And   • magnesium hydroxide (MILK OF MAGNESIA) suspension 30 mL QDAY PRN    And   • bisacodyl (DULCOLAX) suppository 10 mg QDAY PRN   • artificial tears ophthalmic solution 1 Drop PRN   • benzocaine-menthol (CEPACOL) lozenge 1 Lozenge Q2HRS PRN   • mag hydrox-al hydrox-simeth (MAALOX PLUS ES or MYLANTA DS) suspension 20 mL Q2HRS PRN   • ondansetron (ZOFRAN ODT) dispertab 4 mg 4X/DAY PRN    Or   • ondansetron (ZOFRAN) syringe/vial injection 4 mg 4X/DAY PRN   • traZODone  (DESYREL) tablet 50 mg QHS PRN   • sodium chloride (OCEAN) 0.65 % nasal spray 2 Spray PRN   • nicotine (NICODERM) 14 MG/24HR 14 mg Daily-0600    And   • nicotine polacrilex (NICORETTE) 2 MG piece 2 mg Q HOUR PRN   • artificial tears (EYE LUBRICANT) ophth ointment 1 Application Q8HRS   • dexamethasone (DECADRON) tablet 2 mg Q8HRS    Followed by   • [START ON 10/18/2019] dexamethasone (DECADRON) tablet 2 mg Q12HRS    Followed by   • [START ON 10/21/2019] dexamethasone (DECADRON) tablet 2 mg DAILY   • famotidine (PEPCID) tablet 20 mg BID   • HYDROcodone-acetaminophen (NORCO) 5-325 MG per tablet 1-2 Tab Q6HRS PRN   • ipratropium-albuterol (DUONEB) nebulizer solution Q4H PRN (RT)   • levETIRAcetam (KEPPRA) tablet 500 mg BID   • lisinopril (PRINIVIL) tablet 20 mg DAILY   • phosphorus (K-PHOS-NEUTRAL, PHOSPHA 250 NEUTRAL) per tablet 1 Tab BID   • simvastatin (ZOCOR) tablet 40 mg Q EVENING       Orders Placed This Encounter   Procedures   • Diet Order Regular     Standing Status:   Standing     Number of Occurrences:   1     Order Specific Question:   Diet:     Answer:   Regular [1]       Assessment:  Active Hospital Problems    Diagnosis   • History of lung cancer   • Essential hypertension   • Azotemia   • Hypophosphatemia   • Hypokalemia   • Vitamin D insufficiency   • Hyponatremia       Medical Decision Making and Plan:  Metastatic lung cancer to brain - Patient s/p resection of two lesions with Dr. Lacey on 10/4/19.  Patient on steroid taper  -Continue steroid taper until 10/23/19. Per NSG continue Keppra for 1 month. Follow-up with NSG. Staples removed 10/14/19  -PT and OT for mobility and ADLs  -SLP for cognitive screen  -Plan for treatment possibly starting next week. Plan for discharge on Monday prior to simulation.      Non-small cell lung cancer - Will need PET and simulation for possible treatment.  -Follow-up with Oncology.  Simulation planning next week.      HTN - Patient on Lisinopril 20 mg daily. Elevated  on 10/15/19, may be related to steroids.     Tobacco dependence - Patient on patch on transfer. Will need counseling.   -Counseling performed on 10/14/19. Reduce patch to 7 mg     Hypokalemia - Patient on 20 mEq on transfer. Check AM CMP. Consulted hospitalist  -Off K supplement     Hyponatremia - 130 on admission labs. Consulted hospitalist  -Improved to 133. Recheck CMP    HLD - Patient on Simvastatin 40 mg      Urinary retention - Patient with alejandra on transfer. Continue Flomax. Alejandra removed 10/14/19, required CIC over night. PVRs elevated. Will increase Flomax  -Improved voiding, required CIC x1 overnight.       Vitamin D - 21 on admission, start 1000 U    GI Ppx - Patient on Pepcid on transfer. Continue while on steroids.      DVT Ppx - Patient high risk for bleed. Will monitor ambulatory status.      Dispo - Plan for discharge Monday prior to simulation.     Total time:  35 minutes.  I spent greater than 50% of the time for patient care, counseling, and coordination on this date, including unit/floor time, and face-to-face time with the patient as per interval events and assessment and plan above. Topics discussed included discharge planning, improved headache, and reduce Nicotine patch. Patient was discussed separately in IDT today; please see details above.    Cynthia Allen M.D.

## 2019-10-17 NOTE — REHAB-RESPIRATORY IDT TEAM NOTE
Respiratory Therapy   Respiratory Therapy    Breath sounds:  Clear and equal in all lung fields.  Incentive Spirometry:  Predicted 3000cc, 60% of predicted 1800cc, actual vol 2500cc.  Pt well over the 60% goal.  PEP and Flutter valve:  Excellent technique and effort. Non productive, dry cough.  Pt understands importance of continuing IS and Aerobika use and, agrees to continue on his own initiative.  Pt can justifiably be DC'd from RT visits.    Section completed by:  Kevin Olivera, RRT

## 2019-10-17 NOTE — THERAPY
"Speech Language Pathology  Daily Treatment     Patient Name: Milan Clark  Age:  74 y.o., Sex:  male  Medical Record #: [id number]  Today's Date: 10/17/2019     Subjective    Pt was pleasant and cooperative during this ST session      Objective       10/17/19 0931   SLP Total Time Spent   SLP Individual Total Time Spent (Mins) 60   Charge Group   SLP Cognitive Skill Development 4       FIM Comprehension Score:  6 - Modified Independent  Comprehension Description:  Glasses    FIM Expression Score:  7 - Independent  Expression Description:       FIM Social Interaction Score:  7 - Independent  Social Interaction Description:       FIM Problem Solving Score:  6 - Modified Independent  Problem Solving Description:  Verbal cueing, Therapy schedule, Increased time    FIM Memory Score:  5 - Standby Prompting/Supervision or Set-up  Memory Description:  Verbal cueing, Therapy schedule      Assessment    Pt completed a medication sorting task independently with 100% accuracy, pt stated \"I think my brain is working a little better today\".  Pt also completed a checkbook management task and was able to independently organize the information correctly; however made several calculation errors when balancing the checkbook.      Plan    Target attention with checkbook balancing task     "

## 2019-10-18 ENCOUNTER — APPOINTMENT (OUTPATIENT)
Dept: RADIOLOGY | Facility: REHABILITATION | Age: 74
DRG: 055 | End: 2019-10-18
Attending: HOSPITALIST
Payer: MEDICARE

## 2019-10-18 LAB
AMORPH CRY #/AREA URNS HPF: PRESENT /HPF
APPEARANCE UR: ABNORMAL
BACTERIA #/AREA URNS HPF: ABNORMAL /HPF
BILIRUB UR QL STRIP.AUTO: NEGATIVE
COLOR UR: YELLOW
EPI CELLS #/AREA URNS HPF: NEGATIVE /HPF
GLUCOSE UR STRIP.AUTO-MCNC: NEGATIVE MG/DL
HYALINE CASTS #/AREA URNS LPF: ABNORMAL /LPF
KETONES UR STRIP.AUTO-MCNC: NEGATIVE MG/DL
LEUKOCYTE ESTERASE UR QL STRIP.AUTO: NEGATIVE
MICRO URNS: ABNORMAL
NITRITE UR QL STRIP.AUTO: NEGATIVE
PH UR STRIP.AUTO: 6.5 [PH] (ref 5–8)
PROT UR QL STRIP: NEGATIVE MG/DL
RBC # URNS HPF: ABNORMAL /HPF
RBC UR QL AUTO: NEGATIVE
SP GR UR STRIP.AUTO: 1.01
UROBILINOGEN UR STRIP.AUTO-MCNC: 0.2 MG/DL
WBC #/AREA URNS HPF: ABNORMAL /HPF

## 2019-10-18 PROCEDURE — 97530 THERAPEUTIC ACTIVITIES: CPT

## 2019-10-18 PROCEDURE — 97110 THERAPEUTIC EXERCISES: CPT

## 2019-10-18 PROCEDURE — 700102 HCHG RX REV CODE 250 W/ 637 OVERRIDE(OP): Performed by: PHYSICAL MEDICINE & REHABILITATION

## 2019-10-18 PROCEDURE — 71045 X-RAY EXAM CHEST 1 VIEW: CPT

## 2019-10-18 PROCEDURE — 97116 GAIT TRAINING THERAPY: CPT

## 2019-10-18 PROCEDURE — A9270 NON-COVERED ITEM OR SERVICE: HCPCS | Performed by: HOSPITALIST

## 2019-10-18 PROCEDURE — G0515 COGNITIVE SKILLS DEVELOPMENT: HCPCS

## 2019-10-18 PROCEDURE — 99232 SBSQ HOSP IP/OBS MODERATE 35: CPT | Performed by: PHYSICAL MEDICINE & REHABILITATION

## 2019-10-18 PROCEDURE — 97112 NEUROMUSCULAR REEDUCATION: CPT

## 2019-10-18 PROCEDURE — A9270 NON-COVERED ITEM OR SERVICE: HCPCS | Performed by: PHYSICAL MEDICINE & REHABILITATION

## 2019-10-18 PROCEDURE — 700102 HCHG RX REV CODE 250 W/ 637 OVERRIDE(OP): Performed by: HOSPITALIST

## 2019-10-18 PROCEDURE — 94668 MNPJ CHEST WALL SBSQ: CPT

## 2019-10-18 PROCEDURE — 81001 URINALYSIS AUTO W/SCOPE: CPT

## 2019-10-18 PROCEDURE — 97535 SELF CARE MNGMENT TRAINING: CPT

## 2019-10-18 PROCEDURE — 770010 HCHG ROOM/CARE - REHAB SEMI PRIVAT*

## 2019-10-18 PROCEDURE — 99232 SBSQ HOSP IP/OBS MODERATE 35: CPT | Performed by: HOSPITALIST

## 2019-10-18 RX ORDER — LISINOPRIL 5 MG/1
10 TABLET ORAL DAILY
Status: DISCONTINUED | OUTPATIENT
Start: 2019-10-19 | End: 2019-10-20

## 2019-10-18 RX ADMIN — SENNOSIDES, DOCUSATE SODIUM 2 TABLET: 50; 8.6 TABLET, FILM COATED ORAL at 20:56

## 2019-10-18 RX ADMIN — SIMVASTATIN 40 MG: 40 TABLET, FILM COATED ORAL at 20:56

## 2019-10-18 RX ADMIN — MINERAL OIL AND WHITE PETROLATUM 1 APPLICATION: 30; 940 OINTMENT OPHTHALMIC at 20:57

## 2019-10-18 RX ADMIN — LEVETIRACETAM 500 MG: 500 TABLET, FILM COATED ORAL at 09:39

## 2019-10-18 RX ADMIN — NICOTINE 7 MG: 7 PATCH, EXTENDED RELEASE TRANSDERMAL at 05:28

## 2019-10-18 RX ADMIN — FAMOTIDINE 20 MG: 20 TABLET ORAL at 20:56

## 2019-10-18 RX ADMIN — LEVETIRACETAM 500 MG: 500 TABLET, FILM COATED ORAL at 20:56

## 2019-10-18 RX ADMIN — DEXAMETHASONE 2 MG: 1 TABLET ORAL at 20:56

## 2019-10-18 RX ADMIN — MINERAL OIL AND WHITE PETROLATUM 1 APPLICATION: 30; 940 OINTMENT OPHTHALMIC at 05:28

## 2019-10-18 RX ADMIN — FAMOTIDINE 20 MG: 20 TABLET ORAL at 09:38

## 2019-10-18 RX ADMIN — DEXAMETHASONE 2 MG: 1 TABLET ORAL at 09:38

## 2019-10-18 RX ADMIN — DIBASIC SODIUM PHOSPHATE, MONOBASIC POTASSIUM PHOSPHATE AND MONOBASIC SODIUM PHOSPHATE 1 TABLET: 852; 155; 130 TABLET ORAL at 09:38

## 2019-10-18 RX ADMIN — SENNOSIDES, DOCUSATE SODIUM 2 TABLET: 50; 8.6 TABLET, FILM COATED ORAL at 09:39

## 2019-10-18 RX ADMIN — TAMSULOSIN HYDROCHLORIDE 0.8 MG: 0.4 CAPSULE ORAL at 20:55

## 2019-10-18 RX ADMIN — VITAMIN D, TAB 1000IU (100/BT) 1000 UNITS: 25 TAB at 09:38

## 2019-10-18 RX ADMIN — DIBASIC SODIUM PHOSPHATE, MONOBASIC POTASSIUM PHOSPHATE AND MONOBASIC SODIUM PHOSPHATE 1 TABLET: 852; 155; 130 TABLET ORAL at 20:56

## 2019-10-18 ASSESSMENT — ENCOUNTER SYMPTOMS
CHILLS: 0
EYES NEGATIVE: 1
FEVER: 0
NAUSEA: 0
PALPITATIONS: 0
SHORTNESS OF BREATH: 0
COUGH: 0
VOMITING: 0
ABDOMINAL PAIN: 0

## 2019-10-18 NOTE — THERAPY
Physical Therapy   Daily Treatment     Patient Name: Milan Clark  Age:  74 y.o., Sex:  male  Medical Record #: [id number]  Today's Date: 10/18/2019     Precautions  Precautions: Fall Risk  Comments: Safety, memory    Subjective    Patient agreeable to PT.     Objective       10/17/19 1231   Vitals   O2 (LPM) 0   O2 Delivery None (Room Air)   Bed Mobility    Supine to Sit Modified Independent   Sit to Supine Modified Independent   Sit to Stand Supervised   Scooting Independent   Rolling Independent   Wolf Balance Scale   Sitting Unsupported (Score 0-4) 4   Change Of Positon: Sitting To Standing (Score 0-4) 4   Change Of Positon: Standing To Sitting (Score 0-4) 4   Transfers (Score 0-4) 4   Standing Unsupported (Score 0-4) 4   Standing With Eyes Closed (Score 0-4) 4   Standing With Feet Together (Score 0-4) 4   Tandem Standing (Score 0-4) 3   Standing On One Leg (Score 0-4) 2   Turning Trunk (Feet Fixed) (Score 0-4) 4   Retrieving Objects From Floor (Score 0-4) 3   Turning 360 Degrees (Score 0-4) 2   Stool Stepping (Score 0-4) 1   Reaching Forward While Standing (Score 0-4) 4   Wolf Balance Total Score (0-56) 47   Interdisciplinary Plan of Care Collaboration   IDT Collaboration with    (IDT team)   Collaboration Comments roomboard updated.  IDT conference: pt to d/c on Monday 10/21/19 before appt for chemo brain mapping; recommend FWW and SPV for safety.   PT Total Time Spent   PT Individual Total Time Spent (Mins) 60   PT Charge Group   PT Gait Training 2   PT Neuromuscular Re-Education / Balance 2       Reviewed call light safety, POC and use of FWW.  Discussed balance deficits and pt's progress.    FIM Bed/Chair/Wheelchair Transfers Score: 6 - Modified Independent  Bed/Chair/Wheelchair Transfers Description:   (Mod I to IND, no AD, 1 rep at flat hospital bed)    FIM Walking Score:  5 - Standby Prompting/Supervision or Set-up  Walking Description:   (SBA-SPV, way finding cues, no AD, 2 reps indoors up to 850 feet;  limited outdoors with level and ramp surfaces at SPV level today but limited by pt's report of being cold outside; discussed alistair llight usage, safety, and FWW use if going home this weekend)    FIM Wheelchair Score:     Wheelchair Description:       FIM Stairs Score:  5 - Standby Prompting/Supervision or Set-up  Stairs Description:   (B railings, SPV, 1 set of 15 stairs of mixed heights)      Assessment    Patient continues to improve balance but will have to be Mod I for limited times at home beginning on Monday; will recommend a FWW for improved safety and balance when standing or walking.    Plan    Ambulation with/without FWW; outdoor ambulation; stairs; ther ex for endurance and strengthening; standing balance and safety.  D/C on Monday 10/21/19.

## 2019-10-18 NOTE — PROGRESS NOTES
"Rehab Progress Note     Encounter Date: 10/18/2019    CC: Brain metastatic disease, left sided weakness    Interval Events (Subjective)  Patient sitting up in room. He reports he is doing well. He has questions about medications, discussed will only have to take steroids for a few days post-discharge. Discussed with CM and difficulty finding home health that will take his insurance.  [facility], no [facility] yet.  Denies NVD. Denies SOB.      IDT Team Meeting 10/17/2019  DC/Disposition:  10/21/19    Objective:  VITAL SIGNS: /68   Pulse 64   Temp 36.7 °C (98.1 °F) (Temporal)   Resp 20   Ht 1.803 m (5' 11\")   Wt 57.5 kg (126 lb 11.2 oz)   SpO2 93%   BMI 17.67 kg/m²   Gen: NAD  Psych: Mood and affect appropriate  CV: RRR, no edema  Resp: CTAB, no upper airway sounds  Abd: NTND  Neuro: AOx4, 5/5 BUE    Recent Results (from the past 72 hour(s))   CBC WITHOUT DIFFERENTIAL    Collection Time: 10/17/19  6:02 AM   Result Value Ref Range    WBC 11.3 (H) 4.8 - 10.8 K/uL    RBC 4.12 (L) 4.70 - 6.10 M/uL    Hemoglobin 12.6 (L) 14.0 - 18.0 g/dL    Hematocrit 38.9 (L) 42.0 - 52.0 %    MCV 94.4 81.4 - 97.8 fL    MCH 30.6 27.0 - 33.0 pg    MCHC 32.4 (L) 33.7 - 35.3 g/dL    RDW 50.5 (H) 35.9 - 50.0 fL    Platelet Count 361 164 - 446 K/uL    MPV 8.5 (L) 9.0 - 12.9 fL   Basic Metabolic Panel    Collection Time: 10/17/19  6:02 AM   Result Value Ref Range    Sodium 131 (L) 135 - 145 mmol/L    Potassium 4.3 3.6 - 5.5 mmol/L    Chloride 103 96 - 112 mmol/L    Co2 22 20 - 33 mmol/L    Glucose 89 65 - 99 mg/dL    Bun 32 (H) 8 - 22 mg/dL    Creatinine 0.95 0.50 - 1.40 mg/dL    Calcium 8.2 (L) 8.5 - 10.5 mg/dL    Anion Gap 6.0 0.0 - 11.9   PHOSPHORUS    Collection Time: 10/17/19  6:02 AM   Result Value Ref Range    Phosphorus 2.5 2.5 - 4.5 mg/dL   ESTIMATED GFR    Collection Time: 10/17/19  6:02 AM   Result Value Ref Range    GFR If African American >60 >60 mL/min/1.73 m 2    GFR If Non  >60 >60 " mL/min/1.73 m 2   URINALYSIS    Collection Time: 10/18/19  3:45 AM   Result Value Ref Range    Color Yellow     Character Cloudy (A)     Specific Gravity 1.013 <1.035    Ph 6.5 5.0 - 8.0    Glucose Negative Negative mg/dL    Ketones Negative Negative mg/dL    Protein Negative Negative mg/dL    Bilirubin Negative Negative    Urobilinogen, Urine 0.2 Negative    Nitrite Negative Negative    Leukocyte Esterase Negative Negative    Occult Blood Negative Negative    Micro Urine Req Microscopic    URINE MICROSCOPIC (W/UA)    Collection Time: 10/18/19  3:45 AM   Result Value Ref Range    WBC 0-2 (A) /hpf    RBC 0-2 (A) /hpf    Bacteria Few (A) None /hpf    Epithelial Cells Negative /hpf    Amorphous Crystal Present /hpf    Hyaline Cast 0-2 /lpf       Current Facility-Administered Medications   Medication Frequency   • nicotine (NICODERM) 7 MG/24HR 7 mg Daily-0600    And   • nicotine polacrilex (NICORETTE) 2 MG piece 2 mg Q HOUR PRN   • tamsulosin (FLOMAX) capsule 0.8 mg QHS   • vitamin D (cholecalciferol) tablet 1,000 Units DAILY   • Respiratory Care per Protocol Continuous RT   • hydrALAZINE (APRESOLINE) tablet 25 mg Q8HRS PRN   • acetaminophen (TYLENOL) tablet 650 mg Q4HRS PRN   • senna-docusate (PERICOLACE or SENOKOT S) 8.6-50 MG per tablet 2 Tab BID    And   • polyethylene glycol/lytes (MIRALAX) PACKET 1 Packet QDAY PRN    And   • magnesium hydroxide (MILK OF MAGNESIA) suspension 30 mL QDAY PRN    And   • bisacodyl (DULCOLAX) suppository 10 mg QDAY PRN   • artificial tears ophthalmic solution 1 Drop PRN   • benzocaine-menthol (CEPACOL) lozenge 1 Lozenge Q2HRS PRN   • mag hydrox-al hydrox-simeth (MAALOX PLUS ES or MYLANTA DS) suspension 20 mL Q2HRS PRN   • ondansetron (ZOFRAN ODT) dispertab 4 mg 4X/DAY PRN    Or   • ondansetron (ZOFRAN) syringe/vial injection 4 mg 4X/DAY PRN   • traZODone (DESYREL) tablet 50 mg QHS PRN   • sodium chloride (OCEAN) 0.65 % nasal spray 2 Spray PRN   • artificial tears (EYE LUBRICANT) ophth  ointment 1 Application Q8HRS   • dexamethasone (DECADRON) tablet 2 mg Q12HRS    Followed by   • [START ON 10/21/2019] dexamethasone (DECADRON) tablet 2 mg DAILY   • famotidine (PEPCID) tablet 20 mg BID   • HYDROcodone-acetaminophen (NORCO) 5-325 MG per tablet 1-2 Tab Q6HRS PRN   • ipratropium-albuterol (DUONEB) nebulizer solution Q4H PRN (RT)   • levETIRAcetam (KEPPRA) tablet 500 mg BID   • lisinopril (PRINIVIL) tablet 20 mg DAILY   • phosphorus (K-PHOS-NEUTRAL, PHOSPHA 250 NEUTRAL) per tablet 1 Tab BID   • simvastatin (ZOCOR) tablet 40 mg Q EVENING       Orders Placed This Encounter   Procedures   • Diet Order Regular     Standing Status:   Standing     Number of Occurrences:   1     Order Specific Question:   Diet:     Answer:   Regular [1]       Assessment:  Active Hospital Problems    Diagnosis   • History of lung cancer   • Essential hypertension   • Azotemia   • Hypophosphatemia   • Hypokalemia   • Vitamin D insufficiency   • Hyponatremia       Medical Decision Making and Plan:  Metastatic lung cancer to brain - Patient s/p resection of two lesions with Dr. Lacey on 10/4/19.  Patient on steroid taper  -Continue steroid taper until 10/23/19. Per NSG continue Keppra for 1 month. Follow-up with NSG. Staples removed 10/14/19  -PT and OT for mobility and ADLs  -SLP for cognitive screen  -Plan for treatment possibly starting next week. Plan for discharge on Monday prior to simulation.      Non-small cell lung cancer - Will need PET and simulation for possible treatment.  -Follow-up with Oncology.  Simulation planning next week.      HTN - Patient on Lisinopril 20 mg daily. Elevated on 10/15/19, may be related to steroids.     Tobacco dependence - Patient on patch on transfer. Will need counseling.   -Counseling performed on 10/14/19. Reduce patch to 7 mg     Hypokalemia - Patient on 20 mEq on transfer. Check AM CMP. Consulted hospitalist  -Off K supplement     Hyponatremia - 130 on admission labs. Consulted  hospitalist  -Improved to 133. Recheck CMP    HLD - Patient on Simvastatin 40 mg      Urinary retention - Patient with alejandra on transfer. Continue Flomax. Alejandra removed 10/14/19, required CIC over night. PVRs elevated. Will increase Flomax  -Improved voiding, no CIC in past 24 hours.      Vitamin D - 21 on admission, start 1000 U    GI Ppx - Patient on Pepcid on transfer. Continue while on steroids.      DVT Ppx - Patient high risk for bleed. Will monitor ambulatory status.      Dispo - Plan for discharge Monday prior to simulation.     Total time:  25 minutes.  I spent greater than 50% of the time for patient care, counseling, and coordination on this date, including unit/floor time, and face-to-face time with the patient as per interval events and assessment and plan above. Topics discussed included discharge planning, improved voiding and ongoing work on home health vs outpatient therapies.     Cynthia Allen M.D.

## 2019-10-18 NOTE — THERAPY
"Occupational Therapy  Daily Treatment     Patient Name: Milan Clark  Age:  74 y.o., Sex:  male  Medical Record #: [id number]  Today's Date: 10/18/2019     Precautions  Precautions: Fall Risk  Comments: Safety, Memory         Subjective       Objective       10/18/19 1301   Precautions   Precautions Fall Risk   Comments Safety, Memory   Vitals   O2 Delivery None (Room Air)   Non Verbal Descriptors   Non Verbal Scale  Calm   Cognition    Level of Consciousness Alert   Comments See notes -    Standing Upper Body Exercises   Other Exercises Standing at FluidoBike, Level 3, 20 mins, 3.120km   Interdisciplinary Plan of Care Collaboration   IDT Collaboration with  Certified Nursing Assistant   Patient Position at End of Therapy In Bed;Call Light within Reach;Tray Table within Reach;Phone within Reach   OT Total Time Spent   OT Individual Total Time Spent (Mins) 60   OT Charge Group   OT Self Care / ADL 1   OT Therapy Activity 1   OT Therapeutic Exercise  2       FIM Grooming Score:  6 - Modified Independent  Grooming Description:  Increased time(Standing at sinkside)    FIM Toiletin - Standby Prompting/Supervision or Set-up  Toileting Description:  Grab bar, Increased time, Supervision for safety    FIM Toilet Transfer Score:  5 - Standby Prompting/Supervision or Set-up  Toilet Transfer Description:  Grab bar, Supervision for safety, Verbal cueing    Standing on 3\" foam pad for standing balance (CGA-No LOB) for 20 random letter on wall , pt to place in alphabetical order - pt unable to sort last 8 letters correctly - required 5 VQ's, difficulty identifying where the errors were, therapist reviewed alphabet, pt incorrect last 10 letter w/ misplaced 3 letters - pt unable to self correct.    Assessment    Pt was alert and cooperative w/ tx.  Refer to notes above (alphabet).  Limiters include safety awareness, memory, problem solving.    Plan    Cont'd OT for strength, endurance and safety/environmental awareness for " safe ADL's and transfers.

## 2019-10-18 NOTE — THERAPY
Physical Therapy   Daily Treatment     Patient Name: Milan Clark  Age:  74 y.o., Sex:  male  Medical Record #: [id number]  Today's Date: 10/18/2019     Precautions  Precautions: Fall Risk  Comments: Safety, Memory    Subjective    Patient agreeable to PT.     Objective       10/18/19 1431   Bed Mobility    Supine to Sit Independent   Sit to Supine Independent   Sit to Stand Modified Independent   Scooting Independent   Rolling Independent   Neuro-Muscular Treatments   Comments Standing balance for 2 reps of 10 minutes with balloon table tennis, SPV & setup, cueing initially; 2nd person for opposing participation.  1 set of 15 minutes on Wii Fit balance board, FWW present but rarely used, min cueing, SPV.   Interdisciplinary Plan of Care Collaboration   IDT Collaboration with  Other (See Comments)  (student observer)   Collaboration Comments 2nd person for balloon table tennis balance/coordination activity   PT Total Time Spent   PT Individual Total Time Spent (Mins) 60   PT Charge Group   PT Gait Training 1   PT Neuromuscular Re-Education / Balance 2   PT Therapeutic Activities 1     Discussed call light safety and likelihood of Mod I bed<>toilet trial; discussed FWW usage, d/c, and POC.    FIM Bed/Chair/Wheelchair Transfers Score: 6 - Modified Independent  Bed/Chair/Wheelchair Transfers Description:  (Mod I, FWW today to increase safety and balance in standing)    FIM Walking Score:  5 - Standby Prompting/Supervision or Set-up  Walking Description:  (SPV without AD, Setup-Mod I with FWW, still requires cueing for way finding for nearly all indoor locations, 2 reps up to 950 feet, indoors per pt preference, no LOB)    FIM Wheelchair Score:     Wheelchair Description:       FIM Toiletin - Modified Independent  Toileting Description:  Grab bar, Increased time(with/without FWW)    FIM Toilet Transfer Score:  6 - Modified Independent  Toilet Transfer Description:  Grab bar, Increased time(Mod I,  FWW)      Assessment    Patient has improving balance and endurance; poor memory with way finding; setup and cueing prn for sequencing; very pleasant and great motivation; great call light usage.    Plan    FIMs, IRF-Thi, and home safety.  D/C home and appointment on Monday 10/21/19.

## 2019-10-18 NOTE — PROGRESS NOTES
Hospital Medicine Daily Progress Note      Chief Complaint:  Hypertension  Hyponatremia  Azotemia    Interval History:  Pt feels like he's getting stronger everyday.  No new problems.    Review of Systems  Review of Systems   Constitutional: Negative for chills and fever.   Eyes: Negative.    Respiratory: Negative for cough and shortness of breath.    Cardiovascular: Negative for chest pain and palpitations.   Gastrointestinal: Negative for abdominal pain, nausea and vomiting.   Musculoskeletal:        Wound pain   Skin: Negative for itching and rash.        Physical Exam  Temp:  [36.5 °C (97.7 °F)-37.2 °C (99 °F)] 36.5 °C (97.7 °F)  Pulse:  [55-72] 72  Resp:  [15-18] 15  BP: ()/(51-74) 91/56  SpO2:  [93 %-96 %] 93 %    Physical Exam   Constitutional: He is oriented to person, place, and time. No distress.   HENT:   Right Ear: External ear normal.   Left Ear: External ear normal.   Nose: Nose normal.   Crani incision C/D/I   Eyes: Conjunctivae and EOM are normal. Right eye exhibits no discharge. Left eye exhibits no discharge.   Neck: Normal range of motion. Neck supple. No tracheal deviation present.   Cardiovascular: Normal rate, regular rhythm, S1 normal and S2 normal.   Pulmonary/Chest: No stridor. No respiratory distress. He has no wheezes. He has no rhonchi.   Decreased BS   Abdominal: Soft. Bowel sounds are normal. He exhibits no distension. There is no tenderness.   Musculoskeletal: He exhibits no edema or tenderness.   Neurological: He is alert and oriented to person, place, and time. No sensory deficit.   Skin: Skin is warm and dry. He is not diaphoretic. No cyanosis.   Vitals reviewed.      Fluids    Intake/Output Summary (Last 24 hours) at 10/17/2019 1715  Last data filed at 10/17/2019 1101  Gross per 24 hour   Intake 1050 ml   Output 950 ml   Net 100 ml       Laboratory  Recent Labs     10/17/19  0602   WBC 11.3*   RBC 4.12*   HEMOGLOBIN 12.6*   HEMATOCRIT 38.9*   MCV 94.4   MCH 30.6   MCHC 32.4*    RDW 50.5*   PLATELETCT 361   MPV 8.5*     Recent Labs     10/17/19  0602   SODIUM 131*   POTASSIUM 4.3   CHLORIDE 103   CO2 22   GLUCOSE 89   BUN 32*   CREATININE 0.95   CALCIUM 8.2*                   Assessment/Plan  History of lung cancer- (present on admission)  Assessment & Plan  Recent CT chest show a 3.4 cm mass  With bilat brain mets s/p 2 stage resection  On Decadron taper  On Keppra for seizure prophylaxis  For outpt PET scan    Essential hypertension- (present on admission)  Assessment & Plan  Observe blood pressure trends on Lisinopril  Monitor for orthostatic hypotension on Flomax    Hypophosphatemia- (present on admission)  Assessment & Plan  Continue Neutra-Phos  Check F/U labs in am    Azotemia- (present on admission)  Assessment & Plan  Suspect 2/2 steroids  Encourage PO fluids  Check F/U labs in am    Anemia  Assessment & Plan  Follow H/H  Check F/U labs in am    Dyslipidemia  Assessment & Plan  On Zocor    Vitamin D insufficiency  Assessment & Plan  Vit D level 21  On supplementation    Full Code

## 2019-10-18 NOTE — THERAPY
"Speech Language Pathology  Daily Treatment     Patient Name: Milan Clark  Age:  74 y.o., Sex:  male  Medical Record #: [id number]  Today's Date: 10/18/2019     Subjective    Pt was pleasant and cooperative during this ST session      Objective       10/18/19 0831   SLP Total Time Spent   SLP Individual Total Time Spent (Mins) 60   Charge Group   SLP Cognitive Skill Development 4       Assessment    Pt initially confused about his schedule stating \"When is Leland coming in to get me?\".  Pt was able to recall tasks completed in ST yesterday and verbalized tasks he had difficulty with independently.  Pt stated \"I go too fast sometimes and that messes me up\".  While walking to ST pt stated \"I'm trying to widen my stance while I walk so I can get better balance\".  Once in ST pt completed the balancing portion of the checkbook task from yesterday with cues to slow down and make sure to double check his work before moving into the next calculation.  Pt completed this task with 2 calculation errors d/t decreased attention.      Plan    Continue targeting complex attention and medication management     "

## 2019-10-18 NOTE — DISCHARGE PLANNING
Per Janna at [facility], referral declined as they do not accept [facility].  CM notified.

## 2019-10-18 NOTE — DISCHARGE PLANNING
Met w/ patient to discuss HH referral to [facility]  declining referral related to not accepting [facility] physician. Discussed OP therapy referral w/ [facility] instead if patient can arrange transportation to/from therapy appts. Discussed benefit of continuing therapy post acute to continue progress toward goals. Patient requests to consider over the w/e & let me know on Monday. Questions answered. Will f/u Monday for decision. Updated Dr. Allen.

## 2019-10-18 NOTE — PROGRESS NOTES
[facility] Medicine Daily Progress Note      Chief Complaint:  Hypertension  Hyponatremia  Azotemia    Interval History:  Pt seen and examined in dining room.  Labs reviewed.    Review of Systems  Review of Systems   Constitutional: Negative for chills and fever.   Eyes: Negative.    Respiratory: Negative for cough and shortness of breath.    Cardiovascular: Negative for chest pain and palpitations.   Gastrointestinal: Negative for abdominal pain, nausea and vomiting.   Musculoskeletal:        Wound pain   Skin: Negative for itching and rash.        Physical Exam  Temp:  [36.5 °C (97.7 °F)-37.2 °C (99 °F)] 36.5 °C (97.7 °F)  Pulse:  [55-72] 72  Resp:  [15-18] 15  BP: ()/(51-74) 91/56  SpO2:  [93 %-96 %] 93 %    Physical Exam   Constitutional: He is oriented to person, place, and time. No distress.   HENT:   Right Ear: External ear normal.   Left Ear: External ear normal.   Nose: Nose normal.   Crani incision C/D/I   Eyes: Conjunctivae and EOM are normal. Right eye exhibits no discharge. Left eye exhibits no discharge.   Neck: Normal range of motion. Neck supple. No tracheal deviation present.   Cardiovascular: Normal rate, regular rhythm, S1 normal and S2 normal.   Pulmonary/Chest: No stridor. No respiratory distress. He has no wheezes. He has no rhonchi.   Decreased BS   Abdominal: Soft. Bowel sounds are normal. He exhibits no distension. There is no tenderness.   Musculoskeletal: He exhibits no edema or tenderness.   Neurological: He is alert and oriented to person, place, and time. No sensory deficit.   Skin: Skin is warm and dry. He is not diaphoretic. No cyanosis.   Vitals reviewed.      Fluids    Intake/Output Summary (Last 24 hours) at 10/17/2019 1720  Last data filed at 10/17/2019 1101  Gross per 24 hour   Intake 1050 ml   Output 950 ml   Net 100 ml       Laboratory  Recent Labs     10/17/19  0602   WBC 11.3*   RBC 4.12*   HEMOGLOBIN 12.6*   HEMATOCRIT 38.9*   MCV 94.4   MCH 30.6   MCHC 32.4*   RDW 50.5*    PLATELETCT 361   MPV 8.5*     Recent Labs     10/17/19  0602   SODIUM 131*   POTASSIUM 4.3   CHLORIDE 103   CO2 22   GLUCOSE 89   BUN 32*   CREATININE 0.95   CALCIUM 8.2*                   Assessment/Plan  History of lung cancer- (present on admission)  Assessment & Plan  Recent CT chest show a 3.4 cm mass  With bilat brain mets s/p 2 stage resection  On Decadron taper  On Keppra for seizure prophylaxis  For outpt PET scan    Essential hypertension- (present on admission)  Assessment & Plan  Observe blood pressure trends on Lisinopril  Monitor for orthostatic hypotension on Flomax    Hypophosphatemia- (present on admission)  Assessment & Plan  F/U levels still borderline low  Continue Neutra-Phos    Azotemia- (present on admission)  Assessment & Plan  Suspect 2/2 steroids  Renal function not improved w/ PO fluids  Will start IVF w/ NS (which should also help w/ hyponatremia)    Leukocytosis  Assessment & Plan  Suspect 2/2 steroids but WBC acutely worse  Will check UA and CXR R/O infection    Anemia  Assessment & Plan  H/H stable    Dyslipidemia  Assessment & Plan  On Zocor    Vitamin D insufficiency  Assessment & Plan  Vit D level 21  On supplementation    Full Code

## 2019-10-18 NOTE — ASSESSMENT & PLAN NOTE
Most likely 2/2 steroids as pt afebrile and non-toxic appearing  UA and CXR negative for infection  WBC now resolved

## 2019-10-18 NOTE — FLOWSHEET NOTE
10/18/19 1146   Events/Summary/Plan   Events/Summary/Plan PEP, IS importance of use education.   PEP/CPT Group   #PEP/CPT (Manual) Subsequent Subsequent   PEP/CPT Method Flutter Valve  (Pt agrees to use flutter valve on on initiative.)   Incentive Spirometry Group   Breathing Exercises Yes   Incentive Spirometer Volume 2500 mL  (Pt agrees to use IS on own initiative.)

## 2019-10-19 LAB
ANION GAP SERPL CALC-SCNC: 4 MMOL/L (ref 0–11.9)
BUN SERPL-MCNC: 28 MG/DL (ref 8–22)
CALCIUM SERPL-MCNC: 8.3 MG/DL (ref 8.5–10.5)
CHLORIDE SERPL-SCNC: 103 MMOL/L (ref 96–112)
CO2 SERPL-SCNC: 24 MMOL/L (ref 20–33)
CREAT SERPL-MCNC: 0.84 MG/DL (ref 0.5–1.4)
ERYTHROCYTE [DISTWIDTH] IN BLOOD BY AUTOMATED COUNT: 50.7 FL (ref 35.9–50)
GLUCOSE SERPL-MCNC: 92 MG/DL (ref 65–99)
HCT VFR BLD AUTO: 36.7 % (ref 42–52)
HGB BLD-MCNC: 12 G/DL (ref 14–18)
MCH RBC QN AUTO: 30.8 PG (ref 27–33)
MCHC RBC AUTO-ENTMCNC: 32.7 G/DL (ref 33.7–35.3)
MCV RBC AUTO: 94.3 FL (ref 81.4–97.8)
PHOSPHATE SERPL-MCNC: 2.6 MG/DL (ref 2.5–4.5)
PLATELET # BLD AUTO: 352 K/UL (ref 164–446)
PMV BLD AUTO: 8.5 FL (ref 9–12.9)
POTASSIUM SERPL-SCNC: 4.4 MMOL/L (ref 3.6–5.5)
RBC # BLD AUTO: 3.89 M/UL (ref 4.7–6.1)
SODIUM SERPL-SCNC: 131 MMOL/L (ref 135–145)
WBC # BLD AUTO: 10 K/UL (ref 4.8–10.8)

## 2019-10-19 PROCEDURE — 84100 ASSAY OF PHOSPHORUS: CPT

## 2019-10-19 PROCEDURE — 36415 COLL VENOUS BLD VENIPUNCTURE: CPT

## 2019-10-19 PROCEDURE — 99231 SBSQ HOSP IP/OBS SF/LOW 25: CPT | Performed by: PHYSICAL MEDICINE & REHABILITATION

## 2019-10-19 PROCEDURE — 85027 COMPLETE CBC AUTOMATED: CPT

## 2019-10-19 PROCEDURE — A9270 NON-COVERED ITEM OR SERVICE: HCPCS | Performed by: HOSPITALIST

## 2019-10-19 PROCEDURE — A9270 NON-COVERED ITEM OR SERVICE: HCPCS | Performed by: PHYSICAL MEDICINE & REHABILITATION

## 2019-10-19 PROCEDURE — 700102 HCHG RX REV CODE 250 W/ 637 OVERRIDE(OP): Performed by: PHYSICAL MEDICINE & REHABILITATION

## 2019-10-19 PROCEDURE — 80048 BASIC METABOLIC PNL TOTAL CA: CPT

## 2019-10-19 PROCEDURE — 770010 HCHG ROOM/CARE - REHAB SEMI PRIVAT*

## 2019-10-19 PROCEDURE — 99232 SBSQ HOSP IP/OBS MODERATE 35: CPT | Performed by: HOSPITALIST

## 2019-10-19 PROCEDURE — 700102 HCHG RX REV CODE 250 W/ 637 OVERRIDE(OP): Performed by: HOSPITALIST

## 2019-10-19 RX ADMIN — VITAMIN D, TAB 1000IU (100/BT) 1000 UNITS: 25 TAB at 08:48

## 2019-10-19 RX ADMIN — DEXAMETHASONE 2 MG: 1 TABLET ORAL at 21:55

## 2019-10-19 RX ADMIN — SENNOSIDES, DOCUSATE SODIUM 2 TABLET: 50; 8.6 TABLET, FILM COATED ORAL at 08:49

## 2019-10-19 RX ADMIN — LEVETIRACETAM 500 MG: 500 TABLET, FILM COATED ORAL at 21:53

## 2019-10-19 RX ADMIN — DIBASIC SODIUM PHOSPHATE, MONOBASIC POTASSIUM PHOSPHATE AND MONOBASIC SODIUM PHOSPHATE 1 TABLET: 852; 155; 130 TABLET ORAL at 21:54

## 2019-10-19 RX ADMIN — LEVETIRACETAM 500 MG: 500 TABLET, FILM COATED ORAL at 08:49

## 2019-10-19 RX ADMIN — DIBASIC SODIUM PHOSPHATE, MONOBASIC POTASSIUM PHOSPHATE AND MONOBASIC SODIUM PHOSPHATE 1 TABLET: 852; 155; 130 TABLET ORAL at 08:48

## 2019-10-19 RX ADMIN — NICOTINE 7 MG: 7 PATCH, EXTENDED RELEASE TRANSDERMAL at 05:05

## 2019-10-19 RX ADMIN — DEXAMETHASONE 2 MG: 1 TABLET ORAL at 08:49

## 2019-10-19 RX ADMIN — FAMOTIDINE 20 MG: 20 TABLET ORAL at 08:49

## 2019-10-19 RX ADMIN — SENNOSIDES, DOCUSATE SODIUM 2 TABLET: 50; 8.6 TABLET, FILM COATED ORAL at 21:54

## 2019-10-19 RX ADMIN — TAMSULOSIN HYDROCHLORIDE 0.8 MG: 0.4 CAPSULE ORAL at 21:54

## 2019-10-19 RX ADMIN — SIMVASTATIN 40 MG: 40 TABLET, FILM COATED ORAL at 21:56

## 2019-10-19 RX ADMIN — FAMOTIDINE 20 MG: 20 TABLET ORAL at 21:53

## 2019-10-19 RX ADMIN — MINERAL OIL AND WHITE PETROLATUM 1 APPLICATION: 30; 940 OINTMENT OPHTHALMIC at 21:58

## 2019-10-19 NOTE — PROGRESS NOTES
Hospital Medicine Daily Progress Note      Chief Complaint:  Hypertension  Hyponatremia  Azotemia    Interval History:  No new complaints.  Labs and imaging reviewed.    Review of Systems  Review of Systems   Constitutional: Negative for chills and fever.   Eyes: Negative.    Respiratory: Negative for cough and shortness of breath.    Cardiovascular: Negative for chest pain and palpitations.   Gastrointestinal: Negative for abdominal pain, nausea and vomiting.   Musculoskeletal:        Wound pain   Skin: Negative for itching and rash.        Physical Exam  Temp:  [36.6 °C (97.8 °F)-36.9 °C (98.4 °F)] 36.7 °C (98.1 °F)  Pulse:  [52-73] 73  Resp:  [17-18] 18  BP: (108-146)/(65-86) 110/70  SpO2:  [96 %] 96 %    Physical Exam   Constitutional: He is oriented to person, place, and time. No distress.   HENT:   Right Ear: External ear normal.   Left Ear: External ear normal.   Nose: Nose normal.   Crani incision C/D/I   Eyes: Conjunctivae and EOM are normal. Right eye exhibits no discharge. Left eye exhibits no discharge.   Neck: Normal range of motion. Neck supple. No tracheal deviation present.   Cardiovascular: Normal rate, regular rhythm, S1 normal and S2 normal.   Pulmonary/Chest: No stridor. No respiratory distress. He has no wheezes. He has no rhonchi.   Decreased BS   Abdominal: Soft. Bowel sounds are normal. He exhibits no distension. There is no tenderness.   Musculoskeletal: He exhibits no edema or tenderness.   Neurological: He is alert and oriented to person, place, and time. No sensory deficit.   Skin: Skin is warm and dry. He is not diaphoretic. No cyanosis.   Vitals reviewed.      Fluids    Intake/Output Summary (Last 24 hours) at 10/20/2019 1755  Last data filed at 10/20/2019 1225  Gross per 24 hour   Intake 240 ml   Output --   Net 240 ml       Laboratory  Recent Labs     10/19/19  0540   WBC 10.0   RBC 3.89*   HEMOGLOBIN 12.0*   HEMATOCRIT 36.7*   MCV 94.3   MCH 30.8   MCHC 32.7*   RDW 50.7*   PLATELETCT  352   MPV 8.5*     Recent Labs     10/19/19  0540   SODIUM 131*   POTASSIUM 4.4   CHLORIDE 103   CO2 24   GLUCOSE 92   BUN 28*   CREATININE 0.84   CALCIUM 8.3*                   Assessment/Plan  History of lung cancer- (present on admission)  Assessment & Plan  Recent CT chest show a 3.4 cm mass  With bilat brain mets s/p 2 stage resection  On Decadron taper  On Keppra for seizure prophylaxis  For outpt PET scan    Essential hypertension- (present on admission)  Assessment & Plan  Decrease Lisinopril for low blood pressures  Monitor for orthostatic hypotension on Flomax    Hypophosphatemia- (present on admission)  Assessment & Plan  F/U levels still borderline low  Continue Neutra-Phos    Azotemia- (present on admission)  Assessment & Plan  Suspect 2/2 steroids  Renal function not improved w/ PO fluids  S/P NS x 1 liter    Leukocytosis  Assessment & Plan  Most likely 2/2 steroids as pt afebrile and non-toxic appearing  UA and CXR negative for infection    Anemia  Assessment & Plan  H/H stable    Dyslipidemia  Assessment & Plan  On Zocor    Vitamin D insufficiency  Assessment & Plan  Vit D level 21  On supplementation    Full Code

## 2019-10-19 NOTE — PROGRESS NOTES
"Rehab Progress Note     Date of Service: 10/19/2019  Chief Complaint: Follow-up metastatic cancer to the brain    Interval Events (Subjective)    Patient seen and examined in his room today.  He denies any pain or headache.  He reports his left-sided weakness and incoordination he has had since admission has greatly improved.  He is eager for discharge and to start brain radiation.    Objective:  VITAL SIGNS: /71   Pulse 62   Temp 36.6 °C (97.9 °F) (Temporal)   Resp 18   Ht 1.803 m (5' 11\")   Wt 57.5 kg (126 lb 11.2 oz)   SpO2 96%   BMI 17.67 kg/m²   Gen: alert, no apparent distress  HEENT: well healed incision on right head  CV: regular rate and rhythm, no murmurs, no peripheral edema  Resp: clear to ascultation bilaterally, normal respiratory effort  GI: soft, non-tender abdomen, bowel sounds present    Recent Results (from the past 72 hour(s))   CBC WITHOUT DIFFERENTIAL    Collection Time: 10/17/19  6:02 AM   Result Value Ref Range    WBC 11.3 (H) 4.8 - 10.8 K/uL    RBC 4.12 (L) 4.70 - 6.10 M/uL    Hemoglobin 12.6 (L) 14.0 - 18.0 g/dL    Hematocrit 38.9 (L) 42.0 - 52.0 %    MCV 94.4 81.4 - 97.8 fL    MCH 30.6 27.0 - 33.0 pg    MCHC 32.4 (L) 33.7 - 35.3 g/dL    RDW 50.5 (H) 35.9 - 50.0 fL    Platelet Count 361 164 - 446 K/uL    MPV 8.5 (L) 9.0 - 12.9 fL   Basic Metabolic Panel    Collection Time: 10/17/19  6:02 AM   Result Value Ref Range    Sodium 131 (L) 135 - 145 mmol/L    Potassium 4.3 3.6 - 5.5 mmol/L    Chloride 103 96 - 112 mmol/L    Co2 22 20 - 33 mmol/L    Glucose 89 65 - 99 mg/dL    Bun 32 (H) 8 - 22 mg/dL    Creatinine 0.95 0.50 - 1.40 mg/dL    Calcium 8.2 (L) 8.5 - 10.5 mg/dL    Anion Gap 6.0 0.0 - 11.9   PHOSPHORUS    Collection Time: 10/17/19  6:02 AM   Result Value Ref Range    Phosphorus 2.5 2.5 - 4.5 mg/dL   ESTIMATED GFR    Collection Time: 10/17/19  6:02 AM   Result Value Ref Range    GFR If African American >60 >60 mL/min/1.73 m 2    GFR If Non African American >60 >60 mL/min/1.73 " m 2   URINALYSIS    Collection Time: 10/18/19  3:45 AM   Result Value Ref Range    Color Yellow     Character Cloudy (A)     Specific Gravity 1.013 <1.035    Ph 6.5 5.0 - 8.0    Glucose Negative Negative mg/dL    Ketones Negative Negative mg/dL    Protein Negative Negative mg/dL    Bilirubin Negative Negative    Urobilinogen, Urine 0.2 Negative    Nitrite Negative Negative    Leukocyte Esterase Negative Negative    Occult Blood Negative Negative    Micro Urine Req Microscopic    URINE MICROSCOPIC (W/UA)    Collection Time: 10/18/19  3:45 AM   Result Value Ref Range    WBC 0-2 (A) /hpf    RBC 0-2 (A) /hpf    Bacteria Few (A) None /hpf    Epithelial Cells Negative /hpf    Amorphous Crystal Present /hpf    Hyaline Cast 0-2 /lpf   CBC WITHOUT DIFFERENTIAL    Collection Time: 10/19/19  5:40 AM   Result Value Ref Range    WBC 10.0 4.8 - 10.8 K/uL    RBC 3.89 (L) 4.70 - 6.10 M/uL    Hemoglobin 12.0 (L) 14.0 - 18.0 g/dL    Hematocrit 36.7 (L) 42.0 - 52.0 %    MCV 94.3 81.4 - 97.8 fL    MCH 30.8 27.0 - 33.0 pg    MCHC 32.7 (L) 33.7 - 35.3 g/dL    RDW 50.7 (H) 35.9 - 50.0 fL    Platelet Count 352 164 - 446 K/uL    MPV 8.5 (L) 9.0 - 12.9 fL   Basic Metabolic Panel    Collection Time: 10/19/19  5:40 AM   Result Value Ref Range    Sodium 131 (L) 135 - 145 mmol/L    Potassium 4.4 3.6 - 5.5 mmol/L    Chloride 103 96 - 112 mmol/L    Co2 24 20 - 33 mmol/L    Glucose 92 65 - 99 mg/dL    Bun 28 (H) 8 - 22 mg/dL    Creatinine 0.84 0.50 - 1.40 mg/dL    Calcium 8.3 (L) 8.5 - 10.5 mg/dL    Anion Gap 4.0 0.0 - 11.9   PHOSPHORUS    Collection Time: 10/19/19  5:40 AM   Result Value Ref Range    Phosphorus 2.6 2.5 - 4.5 mg/dL   ESTIMATED GFR    Collection Time: 10/19/19  5:40 AM   Result Value Ref Range    GFR If African American >60 >60 mL/min/1.73 m 2    GFR If Non African American >60 >60 mL/min/1.73 m 2       Current Facility-Administered Medications   Medication Frequency   • lisinopril (PRINIVIL) tablet 10 mg DAILY   • nicotine  (NICODERM) 7 MG/24HR 7 mg Daily-0600    And   • nicotine polacrilex (NICORETTE) 2 MG piece 2 mg Q HOUR PRN   • tamsulosin (FLOMAX) capsule 0.8 mg QHS   • vitamin D (cholecalciferol) tablet 1,000 Units DAILY   • Respiratory Care per Protocol Continuous RT   • hydrALAZINE (APRESOLINE) tablet 25 mg Q8HRS PRN   • acetaminophen (TYLENOL) tablet 650 mg Q4HRS PRN   • senna-docusate (PERICOLACE or SENOKOT S) 8.6-50 MG per tablet 2 Tab BID    And   • polyethylene glycol/lytes (MIRALAX) PACKET 1 Packet QDAY PRN    And   • magnesium hydroxide (MILK OF MAGNESIA) suspension 30 mL QDAY PRN    And   • bisacodyl (DULCOLAX) suppository 10 mg QDAY PRN   • artificial tears ophthalmic solution 1 Drop PRN   • benzocaine-menthol (CEPACOL) lozenge 1 Lozenge Q2HRS PRN   • mag hydrox-al hydrox-simeth (MAALOX PLUS ES or MYLANTA DS) suspension 20 mL Q2HRS PRN   • ondansetron (ZOFRAN ODT) dispertab 4 mg 4X/DAY PRN    Or   • ondansetron (ZOFRAN) syringe/vial injection 4 mg 4X/DAY PRN   • traZODone (DESYREL) tablet 50 mg QHS PRN   • sodium chloride (OCEAN) 0.65 % nasal spray 2 Spray PRN   • artificial tears (EYE LUBRICANT) ophth ointment 1 Application Q8HRS   • dexamethasone (DECADRON) tablet 2 mg Q12HRS    Followed by   • [START ON 10/21/2019] dexamethasone (DECADRON) tablet 2 mg DAILY   • famotidine (PEPCID) tablet 20 mg BID   • HYDROcodone-acetaminophen (NORCO) 5-325 MG per tablet 1-2 Tab Q6HRS PRN   • ipratropium-albuterol (DUONEB) nebulizer solution Q4H PRN (RT)   • levETIRAcetam (KEPPRA) tablet 500 mg BID   • phosphorus (K-PHOS-NEUTRAL, PHOSPHA 250 NEUTRAL) per tablet 1 Tab BID   • simvastatin (ZOCOR) tablet 40 mg Q EVENING       Orders Placed This Encounter   Procedures   • Diet Order Regular     Standing Status:   Standing     Number of Occurrences:   1     Order Specific Question:   Diet:     Answer:   Regular [1]       Assessment:  Active Hospital Problems    Diagnosis   • *Brain mass   • History of lung cancer   • Essential  hypertension   • Azotemia   • Hypophosphatemia   • Hypokalemia   • Dyslipidemia   • Anemia   • Leukocytosis   • Vitamin D insufficiency   • Hyponatremia       Medical Decision Making and Plan:    Metastatic lung cancer to the brain  S/p resection    Continue full rehab program.    Outpatient follow-up with oncology    Patient on track for discharge home on Monday.    Total time:  15 minutes.  I spent greater than 50% of the time for patient care, counseling, and coordination on this date, including patient face-to face time, unit/floor time with review of records/pertinent lab data and studies, as well as discussing diagnostic evaluation/work up, planned therapeutic interventions, and future disposition of care, as per the interval events/subjective and the assessment and plan as noted above.      Genet Marley M.D.

## 2019-10-19 NOTE — CARE PLAN
Problem: Safety  Goal: Will remain free from injury  Outcome: PROGRESSING AS EXPECTED     Problem: Infection  Goal: Will remain free from infection  Outcome: PROGRESSING AS EXPECTED     Problem: Bowel/Gastric:  Goal: Normal bowel function is maintained or improved  Outcome: PROGRESSING AS EXPECTED

## 2019-10-20 PROCEDURE — 97110 THERAPEUTIC EXERCISES: CPT

## 2019-10-20 PROCEDURE — 97530 THERAPEUTIC ACTIVITIES: CPT

## 2019-10-20 PROCEDURE — G0515 COGNITIVE SKILLS DEVELOPMENT: HCPCS

## 2019-10-20 PROCEDURE — 700102 HCHG RX REV CODE 250 W/ 637 OVERRIDE(OP): Performed by: PHYSICAL MEDICINE & REHABILITATION

## 2019-10-20 PROCEDURE — A9270 NON-COVERED ITEM OR SERVICE: HCPCS | Performed by: PHYSICAL MEDICINE & REHABILITATION

## 2019-10-20 PROCEDURE — 97116 GAIT TRAINING THERAPY: CPT

## 2019-10-20 PROCEDURE — A9270 NON-COVERED ITEM OR SERVICE: HCPCS | Performed by: HOSPITALIST

## 2019-10-20 PROCEDURE — 97535 SELF CARE MNGMENT TRAINING: CPT

## 2019-10-20 PROCEDURE — 99231 SBSQ HOSP IP/OBS SF/LOW 25: CPT | Performed by: HOSPITALIST

## 2019-10-20 PROCEDURE — 99231 SBSQ HOSP IP/OBS SF/LOW 25: CPT | Performed by: PHYSICAL MEDICINE & REHABILITATION

## 2019-10-20 PROCEDURE — 700102 HCHG RX REV CODE 250 W/ 637 OVERRIDE(OP): Performed by: HOSPITALIST

## 2019-10-20 PROCEDURE — 770010 HCHG ROOM/CARE - REHAB SEMI PRIVAT*

## 2019-10-20 RX ORDER — LISINOPRIL 5 MG/1
5 TABLET ORAL DAILY
Status: DISCONTINUED | OUTPATIENT
Start: 2019-10-20 | End: 2019-10-21 | Stop reason: HOSPADM

## 2019-10-20 RX ADMIN — MINERAL OIL AND WHITE PETROLATUM 1 APPLICATION: 30; 940 OINTMENT OPHTHALMIC at 21:24

## 2019-10-20 RX ADMIN — LISINOPRIL 5 MG: 5 TABLET ORAL at 08:38

## 2019-10-20 RX ADMIN — NICOTINE 7 MG: 7 PATCH, EXTENDED RELEASE TRANSDERMAL at 05:37

## 2019-10-20 RX ADMIN — FAMOTIDINE 20 MG: 20 TABLET ORAL at 08:39

## 2019-10-20 RX ADMIN — TAMSULOSIN HYDROCHLORIDE 0.8 MG: 0.4 CAPSULE ORAL at 21:20

## 2019-10-20 RX ADMIN — DIBASIC SODIUM PHOSPHATE, MONOBASIC POTASSIUM PHOSPHATE AND MONOBASIC SODIUM PHOSPHATE 1 TABLET: 852; 155; 130 TABLET ORAL at 08:38

## 2019-10-20 RX ADMIN — LEVETIRACETAM 500 MG: 500 TABLET, FILM COATED ORAL at 21:21

## 2019-10-20 RX ADMIN — SIMVASTATIN 40 MG: 40 TABLET, FILM COATED ORAL at 21:22

## 2019-10-20 RX ADMIN — DEXAMETHASONE 2 MG: 1 TABLET ORAL at 08:39

## 2019-10-20 RX ADMIN — SENNOSIDES, DOCUSATE SODIUM 2 TABLET: 50; 8.6 TABLET, FILM COATED ORAL at 08:38

## 2019-10-20 RX ADMIN — FAMOTIDINE 20 MG: 20 TABLET ORAL at 21:21

## 2019-10-20 RX ADMIN — VITAMIN D, TAB 1000IU (100/BT) 1000 UNITS: 25 TAB at 08:39

## 2019-10-20 RX ADMIN — MINERAL OIL AND WHITE PETROLATUM 1 APPLICATION: 30; 940 OINTMENT OPHTHALMIC at 05:36

## 2019-10-20 RX ADMIN — DEXAMETHASONE 2 MG: 1 TABLET ORAL at 21:21

## 2019-10-20 RX ADMIN — SENNOSIDES, DOCUSATE SODIUM 2 TABLET: 50; 8.6 TABLET, FILM COATED ORAL at 21:22

## 2019-10-20 RX ADMIN — LEVETIRACETAM 500 MG: 500 TABLET, FILM COATED ORAL at 08:39

## 2019-10-20 RX ADMIN — MINERAL OIL AND WHITE PETROLATUM 1 APPLICATION: 30; 940 OINTMENT OPHTHALMIC at 14:50

## 2019-10-20 RX ADMIN — DIBASIC SODIUM PHOSPHATE, MONOBASIC POTASSIUM PHOSPHATE AND MONOBASIC SODIUM PHOSPHATE 1 TABLET: 852; 155; 130 TABLET ORAL at 21:20

## 2019-10-20 RX ADMIN — DIBASIC SODIUM PHOSPHATE, MONOBASIC POTASSIUM PHOSPHATE AND MONOBASIC SODIUM PHOSPHATE 1 TABLET: 852; 155; 130 TABLET ORAL at 14:48

## 2019-10-20 ASSESSMENT — ENCOUNTER SYMPTOMS
EYES NEGATIVE: 1
ABDOMINAL PAIN: 0
COUGH: 0
SHORTNESS OF BREATH: 0
FEVER: 0
PALPITATIONS: 0
NAUSEA: 0
CHILLS: 0
VOMITING: 0

## 2019-10-20 ASSESSMENT — PATIENT HEALTH QUESTIONNAIRE - PHQ9
2. FEELING DOWN, DEPRESSED, IRRITABLE, OR HOPELESS: NOT AT ALL
1. LITTLE INTEREST OR PLEASURE IN DOING THINGS: NOT AT ALL
SUM OF ALL RESPONSES TO PHQ9 QUESTIONS 1 AND 2: 0

## 2019-10-20 ASSESSMENT — ACTIVITIES OF DAILY LIVING (ADL)
TOILETING_LEVEL_OF_ASSIST: ABLE TO COMPLETE TOILETING WITHOUT ASSIST
TOILET_TRANSFER_LEVEL_OF_ASSIST: ABLE TO COMPLETE TOILET TRANSFER WITHOUT ASSIST
SHOWER_TRANSFER_LEVEL_OF_ASSIST: ABLE TO COMPLETE SHOWER TRANSFER WITHOUT ASSIST

## 2019-10-20 NOTE — CARE PLAN
Problem: Bowel/Gastric:  Goal: Normal bowel function is maintained or improved  Intervention: Educate patient and significant other/support system about diet, fluid intake, medications and activity to promote bowel function  Note:   Scheduled bowel meds administered at hs. No s/s of distress.     Problem: Pain Management  Goal: Pain level will decrease to patient's comfort goal  Intervention: Follow pain managment plan developed in collaboration with patient and Interdisciplinary Team  Note:   No c/o pain, no request for PRN pain med at hs.

## 2019-10-20 NOTE — PROGRESS NOTES
"Rehab Progress Note     Date of Service: 10/20/2019  Chief Complaint: Follow-up metastatic cancer to the brain    Interval Events (Subjective)    Patient seen and examined in his room today.  He denies any pain or headache.  He has no complaints.  He feels ready for discharge tomorrow.    Objective:  VITAL SIGNS: /86   Pulse (!) 52   Temp 36.6 °C (97.8 °F) (Oral)   Resp 18   Ht 1.803 m (5' 11\")   Wt 57.5 kg (126 lb 11.2 oz)   SpO2 96%   BMI 17.67 kg/m²   Gen: alert, no apparent distress, thin  CV: regular rate and rhythm, no murmurs, no peripheral edema  Resp: clear to ascultation bilaterally, normal respiratory effort  GI: soft, non-tender abdomen, bowel sounds present    Recent Results (from the past 72 hour(s))   URINALYSIS    Collection Time: 10/18/19  3:45 AM   Result Value Ref Range    Color Yellow     Character Cloudy (A)     Specific Gravity 1.013 <1.035    Ph 6.5 5.0 - 8.0    Glucose Negative Negative mg/dL    Ketones Negative Negative mg/dL    Protein Negative Negative mg/dL    Bilirubin Negative Negative    Urobilinogen, Urine 0.2 Negative    Nitrite Negative Negative    Leukocyte Esterase Negative Negative    Occult Blood Negative Negative    Micro Urine Req Microscopic    URINE MICROSCOPIC (W/UA)    Collection Time: 10/18/19  3:45 AM   Result Value Ref Range    WBC 0-2 (A) /hpf    RBC 0-2 (A) /hpf    Bacteria Few (A) None /hpf    Epithelial Cells Negative /hpf    Amorphous Crystal Present /hpf    Hyaline Cast 0-2 /lpf   CBC WITHOUT DIFFERENTIAL    Collection Time: 10/19/19  5:40 AM   Result Value Ref Range    WBC 10.0 4.8 - 10.8 K/uL    RBC 3.89 (L) 4.70 - 6.10 M/uL    Hemoglobin 12.0 (L) 14.0 - 18.0 g/dL    Hematocrit 36.7 (L) 42.0 - 52.0 %    MCV 94.3 81.4 - 97.8 fL    MCH 30.8 27.0 - 33.0 pg    MCHC 32.7 (L) 33.7 - 35.3 g/dL    RDW 50.7 (H) 35.9 - 50.0 fL    Platelet Count 352 164 - 446 K/uL    MPV 8.5 (L) 9.0 - 12.9 fL   Basic Metabolic Panel    Collection Time: 10/19/19  5:40 AM "   Result Value Ref Range    Sodium 131 (L) 135 - 145 mmol/L    Potassium 4.4 3.6 - 5.5 mmol/L    Chloride 103 96 - 112 mmol/L    Co2 24 20 - 33 mmol/L    Glucose 92 65 - 99 mg/dL    Bun 28 (H) 8 - 22 mg/dL    Creatinine 0.84 0.50 - 1.40 mg/dL    Calcium 8.3 (L) 8.5 - 10.5 mg/dL    Anion Gap 4.0 0.0 - 11.9   PHOSPHORUS    Collection Time: 10/19/19  5:40 AM   Result Value Ref Range    Phosphorus 2.6 2.5 - 4.5 mg/dL   ESTIMATED GFR    Collection Time: 10/19/19  5:40 AM   Result Value Ref Range    GFR If African American >60 >60 mL/min/1.73 m 2    GFR If Non African American >60 >60 mL/min/1.73 m 2       Current Facility-Administered Medications   Medication Frequency   • phosphorus (K-PHOS-NEUTRAL, PHOSPHA 250 NEUTRAL) per tablet 1 Tab TID   • lisinopril (PRINIVIL) tablet 5 mg DAILY   • nicotine (NICODERM) 7 MG/24HR 7 mg Daily-0600    And   • nicotine polacrilex (NICORETTE) 2 MG piece 2 mg Q HOUR PRN   • tamsulosin (FLOMAX) capsule 0.8 mg QHS   • vitamin D (cholecalciferol) tablet 1,000 Units DAILY   • Respiratory Care per Protocol Continuous RT   • hydrALAZINE (APRESOLINE) tablet 25 mg Q8HRS PRN   • acetaminophen (TYLENOL) tablet 650 mg Q4HRS PRN   • senna-docusate (PERICOLACE or SENOKOT S) 8.6-50 MG per tablet 2 Tab BID    And   • polyethylene glycol/lytes (MIRALAX) PACKET 1 Packet QDAY PRN    And   • magnesium hydroxide (MILK OF MAGNESIA) suspension 30 mL QDAY PRN    And   • bisacodyl (DULCOLAX) suppository 10 mg QDAY PRN   • artificial tears ophthalmic solution 1 Drop PRN   • benzocaine-menthol (CEPACOL) lozenge 1 Lozenge Q2HRS PRN   • mag hydrox-al hydrox-simeth (MAALOX PLUS ES or MYLANTA DS) suspension 20 mL Q2HRS PRN   • ondansetron (ZOFRAN ODT) dispertab 4 mg 4X/DAY PRN    Or   • ondansetron (ZOFRAN) syringe/vial injection 4 mg 4X/DAY PRN   • traZODone (DESYREL) tablet 50 mg QHS PRN   • sodium chloride (OCEAN) 0.65 % nasal spray 2 Spray PRN   • artificial tears (EYE LUBRICANT) ophth ointment 1 Application Q8HRS    • dexamethasone (DECADRON) tablet 2 mg Q12HRS    Followed by   • [START ON 10/21/2019] dexamethasone (DECADRON) tablet 2 mg DAILY   • famotidine (PEPCID) tablet 20 mg BID   • HYDROcodone-acetaminophen (NORCO) 5-325 MG per tablet 1-2 Tab Q6HRS PRN   • ipratropium-albuterol (DUONEB) nebulizer solution Q4H PRN (RT)   • levETIRAcetam (KEPPRA) tablet 500 mg BID   • simvastatin (ZOCOR) tablet 40 mg Q EVENING       Orders Placed This Encounter   Procedures   • Diet Order Regular     Standing Status:   Standing     Number of Occurrences:   1     Order Specific Question:   Diet:     Answer:   Regular [1]       Assessment:  Active Hospital Problems    Diagnosis   • *Brain mass   • History of lung cancer   • Essential hypertension   • Azotemia   • Hypophosphatemia   • Hypokalemia   • Dyslipidemia   • Anemia   • Leukocytosis   • Vitamin D insufficiency   • Hyponatremia       Medical Decision Making and Plan:    Metastatic lung cancer to the brain  S/p resection    Continue full rehab program.    Therapy update 10/20/2019  Modified Independent eating  Modified Independent grooming  Supervision bathing  Modified Independent upper body dressing  Supervision lower body dressing  Modified Independent toileting  Max assistbladder  Supervision bowel  Modified Independent bed/chair transfer  Modified Independent toilet transfer  Modified Independent tub/shower transfer  Supervision ambulation  Modified Independent wheelchair propulsion  Supervision stairs  Modified Independent comprehension  Independent expression  Independent social interaction  Modified Independent problem solving  Modified Independent memory      Outpatient follow-up with oncology    Patient on track for discharge home on Monday.    Total time:  16 minutes.  I spent greater than 50% of the time for patient care, counseling, and coordination on this date, including patient face-to face time, unit/floor time with review of records/pertinent lab data and studies, as  well as discussing diagnostic evaluation/work up, planned therapeutic interventions, and future disposition of care, as per the interval events/subjective and the assessment and plan as noted above.        Genet Marley M.D.   Physical Medicine and Rehabilitation

## 2019-10-20 NOTE — PROGRESS NOTES
Received patient during shift change, report rec'd from day shift RN. Resting in bed, VS stable on room air. Per report continent of B&B, stand by assist for transfers. A&O x 4, able to make needs known. Bed in low position, call light within reach.

## 2019-10-20 NOTE — PROGRESS NOTES
DATE OF SERVICE:  10/18/2019    The patient is doing fairly well at followup.  He said he has gained some   strength and endurance.  He reported his appetite is good.  He talked about   returning home and starting his cancer treatments.       ____________________________________     PAULINA COLLIER, PHD    GUEVARA / JOHN    DD:  10/20/2019 12:44:02  DT:  10/20/2019 15:14:01    D#:  [id number]  Job#:  [id number]

## 2019-10-20 NOTE — THERAPY
Speech Language Pathology  Daily Treatment     Patient Name: Milan Clark  Age:  74 y.o., Sex:  male  Medical Record #: [id number]  Today's Date: 10/20/2019     Subjective    Pt pleasant and cooperative during ST session.      Objective       10/20/19 1031   Cognition   Complex Attention Supervision (5)   Complex Reasoning  / Problem Solving Minimal (4)   Medication Management  Minimal (4)   Interdisciplinary Plan of Care Collaboration   IDT Collaboration with  Certified Nursing Assistant   Patient Position at End of Therapy Seated   Collaboration Comments In dining room for lunch   SLP Total Time Spent   SLP Individual Total Time Spent (Mins) 60   Charge Group   SLP Cognitive Skill Development 4       FIM Comprehension Score:  6 - Modified Independent  Comprehension Description:  Glasses    FIM Expression Score:  7 - Independent  Expression Description:       FIM Social Interaction Score:  7 - Independent  Social Interaction Description:       FIM Problem Solving Score:  6 - Modified Independent  Problem Solving Description:  Verbal cueing, Increased time    FIM Memory Score:  5 - Standby Prompting/Supervision or Set-up  Memory Description:  Verbal cueing, Therapy schedule      Assessment    Pt completed funxl med sort w/ MIN cues and 75% acc. Acc increased to 100% w/ MOD cues. Pt completed complex planning task- [employer] w/ 100% acc MIN-SPV and furniture delivery schedule w/ MAX-MOD cues and 75% acc.     Plan    Pt education, D/C planning.

## 2019-10-20 NOTE — THERAPY
"Occupational Therapy  Daily Treatment     Patient Name: Milan Clark  Age:  74 y.o., Sex:  male  Medical Record #: [id number]  Today's Date: 10/20/2019     Precautions  Precautions: (P) Fall Risk  Comments: (P) Safety awareness, memory         Subjective    \"I'm getting my brain scan tomorrow. I just don't know which time I need to tell my daughter to come and get me.\"       Objective       10/20/19 0701   Precautions   Precautions Fall Risk   Comments Safety awareness, memory   Pain 0 - 10 Group   Therapist Pain Assessment 0   Non Verbal Descriptors   Non Verbal Scale  Calm   Cognition    Level of Consciousness Alert   Bed Mobility    Supine to Sit Modified Independent   Sit to Supine Modified Independent   Interdisciplinary Plan of Care Collaboration   IDT Collaboration with  Nursing   Patient Position at End of Therapy In Bed;Call Light within Reach;Tray Table within Reach;Phone within Reach   Collaboration Comments re: discharge tomorrow   OT Total Time Spent   OT Individual Total Time Spent (Mins) 60   OT Charge Group   Charges Yes   OT Self Care / ADL 3   OT Therapy Activity 1       FIM Eating Score:  6 - Modified Independent  Eating Description:  Increased time    FIM Grooming Score:  6 - Modified Independent  Grooming Description:  Increased time    FIM Bathing Score:  5 - Standby Prompting/Supervision or Set-up  Bathing Description:       FIM Upper Body Dressin - Modified Independent  Upper Body Dressing Description:       FIM Lower Body Dressing Score:  5 - Standby Prompting/Supervsion or Set-up  Lower Body Dressing Description:  Increased time, Supervision for safety(Assist donning compression socks)    FIM Toileting Body Dressing:     Toileting Description:       FIM Bed/Chair/Wheelchair Transfers Score: 6 - Modified Independent  Bed/Chair/Wheelchair Transfers Description:  (Mod I with FWW )    FIM Toilet Transfer Score:  0 - Not tested, patient refused  Toilet Transfer Description:       FIM " Tub/Shower Transfers Score:  6 - Modified Independent  Tub/Shower Transfers Description:  Increased time(FWW <> shower bench )      Assessment    Pt agreeable to morning ADL routine with fair safety awareness and no LOB with use of FWW.  Supervision with showering and IADL tasks recommended upon discharge secondary to mild problem solving deficits.      Plan    D/C anticipated Monday.

## 2019-10-20 NOTE — THERAPY
Physical Therapy   Daily Treatment     Patient Name: Milan Clark  Age:  74 y.o., Sex:  male  Medical Record #: [id number]  Today's Date: 10/20/2019     Precautions  Precautions: Other (See Comments)  Comments: Safety    Subjective    Patient received supine in bed; talking with two visitors at 1430 but agreeable to PT at 1500.     Objective       10/20/19 1501   Precautions   Precautions Other (See Comments)   Comments Safety   Vitals   O2 (LPM) 0   O2 Delivery None (Room Air)   Standing Lower Body Exercises   Standing Lower Body Exercises   (at tabletop, SPV and setup, cueing for HEP learning, 1 rests)   Hamstring Curl 1 set of 10   Hip Extension 1 set of 15   Hip Abduction 1 set of 15   Marching 1 set of 15   Heel Rise 1 set of 15   Toe Rise 1 set of 15   Mini Squat 1 set of 10   Bed Mobility    Supine to Sit Independent   Sit to Supine Independent   Sit to Stand Supervised   Scooting Independent   Rolling Independent   PT Total Time Spent   PT Individual Total Time Spent (Mins) 60   PT Charge Group   PT Gait Training 2   PT Therapeutic Exercise 1   PT Therapeutic Activities 1     Worked on standing HEP (issued), Fall facts handout (issued), floor<>mat recovery at Mod I level, and completed mobility IRF-Thi and FIMs; discussed POC, FWW use with SPV, and current strengths and barriers.    FIM Bed/Chair/Wheelchair Transfers Score: 5 - Standby Prompting/Supervision or Set-up  Bed/Chair/Wheelchair Transfers Description:  Supervision for safety, Set-up of equipment(SPV-Mod I with FWW, SBA without FWW)    FIM Walking Score:  5 - Standby Prompting/Supervision or Set-up  Walking Description:  (close SBA without FWW; focus on FWW use today at SPV level indoors/outdoors for 3 reps up to 450 feet twice, SPV with FWW with setup and cueing for way finding)    FIM Wheelchair Score:     Wheelchair Description:       FIM Stairs Score:  6 - Modified Independent  Stairs Description:  (Mod I, B railings, 1 set of 40 stairs of both  standard and adaptive heights, reciprocal pattern)    FIM Toiletin - Modified Independent  Toileting Description:  Grab bar, Increased time    FIM Toilet Transfer Score:  6 - Modified Independent  Toilet Transfer Description:  Grab bar, Increased time      Assessment    Patient is ready for d/c home with SPV at FWW level and next level of care.    Plan    D/c home tomorrow with SPV at FWW level

## 2019-10-21 ENCOUNTER — HOSPITAL ENCOUNTER (OUTPATIENT)
Dept: RADIATION ONCOLOGY | Facility: MEDICAL CENTER | Age: 74
End: 2019-10-21

## 2019-10-21 VITALS
DIASTOLIC BLOOD PRESSURE: 83 MMHG | TEMPERATURE: 97.7 F | HEIGHT: 71 IN | OXYGEN SATURATION: 93 % | RESPIRATION RATE: 20 BRPM | HEART RATE: 63 BPM | BODY MASS INDEX: 17.81 KG/M2 | SYSTOLIC BLOOD PRESSURE: 120 MMHG | WEIGHT: 127.21 LBS

## 2019-10-21 PROBLEM — R79.89 AZOTEMIA: Status: RESOLVED | Noted: 2019-10-05 | Resolved: 2019-10-21

## 2019-10-21 PROBLEM — E83.39 HYPOPHOSPHATEMIA: Status: RESOLVED | Noted: 2019-10-05 | Resolved: 2019-10-21

## 2019-10-21 PROCEDURE — 77334 RADIATION TREATMENT AID(S): CPT | Mod: 26 | Performed by: RADIOLOGY

## 2019-10-21 PROCEDURE — 700102 HCHG RX REV CODE 250 W/ 637 OVERRIDE(OP): Performed by: HOSPITALIST

## 2019-10-21 PROCEDURE — 77334 RADIATION TREATMENT AID(S): CPT | Performed by: RADIOLOGY

## 2019-10-21 PROCEDURE — A9270 NON-COVERED ITEM OR SERVICE: HCPCS | Performed by: HOSPITALIST

## 2019-10-21 PROCEDURE — 99231 SBSQ HOSP IP/OBS SF/LOW 25: CPT | Performed by: HOSPITALIST

## 2019-10-21 PROCEDURE — 700102 HCHG RX REV CODE 250 W/ 637 OVERRIDE(OP): Performed by: PHYSICAL MEDICINE & REHABILITATION

## 2019-10-21 PROCEDURE — 77290 THER RAD SIMULAJ FIELD CPLX: CPT | Mod: 26 | Performed by: RADIOLOGY

## 2019-10-21 PROCEDURE — 99239 HOSP IP/OBS DSCHRG MGMT >30: CPT | Performed by: PHYSICAL MEDICINE & REHABILITATION

## 2019-10-21 PROCEDURE — 77290 THER RAD SIMULAJ FIELD CPLX: CPT | Performed by: RADIOLOGY

## 2019-10-21 PROCEDURE — 77263 THER RADIOLOGY TX PLNG CPLX: CPT | Performed by: RADIOLOGY

## 2019-10-21 PROCEDURE — A9270 NON-COVERED ITEM OR SERVICE: HCPCS | Performed by: PHYSICAL MEDICINE & REHABILITATION

## 2019-10-21 RX ADMIN — LEVETIRACETAM 500 MG: 500 TABLET, FILM COATED ORAL at 08:34

## 2019-10-21 RX ADMIN — LISINOPRIL 5 MG: 5 TABLET ORAL at 08:33

## 2019-10-21 RX ADMIN — MINERAL OIL AND WHITE PETROLATUM 1 APPLICATION: 30; 940 OINTMENT OPHTHALMIC at 05:27

## 2019-10-21 RX ADMIN — DIBASIC SODIUM PHOSPHATE, MONOBASIC POTASSIUM PHOSPHATE AND MONOBASIC SODIUM PHOSPHATE 1 TABLET: 852; 155; 130 TABLET ORAL at 08:34

## 2019-10-21 RX ADMIN — DEXAMETHASONE 2 MG: 1 TABLET ORAL at 08:33

## 2019-10-21 RX ADMIN — NICOTINE 7 MG: 7 PATCH, EXTENDED RELEASE TRANSDERMAL at 05:27

## 2019-10-21 RX ADMIN — VITAMIN D, TAB 1000IU (100/BT) 1000 UNITS: 25 TAB at 08:34

## 2019-10-21 RX ADMIN — FAMOTIDINE 20 MG: 20 TABLET ORAL at 08:34

## 2019-10-21 RX ADMIN — SENNOSIDES, DOCUSATE SODIUM 2 TABLET: 50; 8.6 TABLET, FILM COATED ORAL at 08:34

## 2019-10-21 ASSESSMENT — ENCOUNTER SYMPTOMS
EYES NEGATIVE: 1
PALPITATIONS: 0
CHILLS: 0
ABDOMINAL PAIN: 0
COUGH: 0
VOMITING: 0
FEVER: 0
NAUSEA: 0
SHORTNESS OF BREATH: 0

## 2019-10-21 ASSESSMENT — PATIENT HEALTH QUESTIONNAIRE - PHQ9
SUM OF ALL RESPONSES TO PHQ9 QUESTIONS 1 AND 2: 0
1. LITTLE INTEREST OR PLEASURE IN DOING THINGS: NOT AT ALL
2. FEELING DOWN, DEPRESSED, IRRITABLE, OR HOPELESS: NOT AT ALL

## 2019-10-21 NOTE — CARE PLAN
Problem: Safety  Goal: Will remain free from injury  Outcome: PROGRESSING AS EXPECTED   Pt uses call light consistently and appropriately. Waits for assistance does not attempt self transfer this shift. Able to verbalize needs.   Problem: Infection  Goal: Will remain free from infection  Outcome: PROGRESSING AS EXPECTED   Patient remains free of infection as evidenced by normal vital signs and breath sounds. Will continue monitoring.   Problem: Skin Integrity  Goal: Risk for impaired skin integrity will decrease  Outcome: PROGRESSING AS EXPECTED   Patient's skin remains intact and free from new or accidental injury this shift.  Will continue to monitor.

## 2019-10-21 NOTE — DISCHARGE PLANNING
Case Management Discharge Instructions        Discharge Location: Home    If you decide you want outpatient therapies, you will need to get referral from Primary Care Provider: Dr. Lisa Lee.     DME Provider / Phone: no additional medical equipment required     Follow-up Information:    Keiry Stephens M.D. Radiation Oncology    [address] [facility] [address]    [phone]    Monday 10.21.2019, mapping scheduled at 1:00pm      Gagan Lacey M.D. Neuro-surgery    [address] [facility] [address]    [phone]    Please call to schedule follow up appointment      Dr. Lisa Lee Primary Care Provider    [facility]    [phone]    Please call to schedule follow up appointment   Will need [facility] referral for [facility] from PCP

## 2019-10-21 NOTE — DISCHARGE SUMMARY
Rehab Discharge Summary    Admission Date: 10/11/2019    Discharge Date: 10/21/2019    Attending Provider: Cynthia Allen MD/PhD    Admission Diagnosis:   Active Hospital Problems    Diagnosis   • *Brain mass   • History of lung cancer   • Essential hypertension   • Hypokalemia   • Dyslipidemia   • Anemia   • Leukocytosis   • Vitamin D insufficiency   • Hyponatremia       Discharge Diagnosis:  Active Hospital Problems    Diagnosis   • *Brain mass   • History of lung cancer   • Essential hypertension   • Hypokalemia   • Dyslipidemia   • Anemia   • Leukocytosis   • Vitamin D insufficiency   • Hyponatremia       HPI per H&P:  The patient is a 74 y.o. right hand dominant male with a past medical history of lung cancer status post lobectomy and radiation 5 years ago;  who presented on 10/4/2019  2:51 PM with left-sided weakness and increased falls over the last 3 weeks.  Patient also complained of confusion, blank stares, left-sided weakness, left sided numbness, unstable gait, left-sided facial droop .  He presented to the [facility] initially and a CT scan showed an intracranial mass, and he was transferred to [facility] for further evaluation.  Review of CT at outside facility by Dr. Lacey of neurosurgery was significant for at least 2 separate metastatic lesions, one in the posterior fossa and a large right frontal lesion.  Patient was taken to the operating room by Dr. Lacey on 10/4/2019 for two-stage left suboccipital craniectomy, microscopic gross total excision of left superior cerebellar mass, and right frontotemporal craniotomy with gross total resection of large right frontal metastasis.  Pathology of these tumors are consistent with non-small cell lung cancer.  CT of the chest reveals 3.4 cm left upper lobe lung mass extending into the left suprahilar region and encasing the left upper lobe pulmonary arteries with questionable small amount of thrombus on the anterior branch consistent with malignancy.   Patient is scheduled to undergo whole brain radiation in the next couple of days by Dr. Stephens.  He will need outpatient PET scan, and if he chooses to move forward with treatment he will also need immunotherapy, chemotherapy, or a combination of both.  Treatment can begin after brain radiation.  Palliative care has been consulted.    Patient was admitted to [facility] on 10/11/2019.     Hospital Course by Problem List:  Metastatic lung cancer to brain - Patient s/p resection of two lesions with Dr. Lacey on 10/4/19.  Patient on steroid taper. Continue steroid taper until 10/23/19. Per NSG continue Keppra for 1 month. Follow-up with NSG. Staples removed 10/14/19.  Patient underwent acute inpatient rehabilitation from 10/11/19 to 10/21/19 with good improvement in mobility ADLs and transfers.  -Plan for treatment possibly starting next week. Plan for discharge on Monday prior to simulation.       Non-small cell lung cancer - Will need PET and simulation for possible treatment.  -Follow-up with Oncology.  Simulation planning day of discharge.      HTN - Patient on Lisinopril 20 mg daily. Elevated on 10/15/19, may be related to steroids.      Tobacco dependence - Patient on patch on transfer. Will need counseling.   -Counseling performed on 10/14/19. Reduce patch to 7 mg     Hypokalemia - Patient on 20 mEq on transfer. Check AM CMP. Consulted hospitalist  -Off K supplement     Hyponatremia - 130 on admission labs. Consulted hospitalist  -Improved to 133. Recheck CMP     HLD - Patient on Simvastatin 40 mg      Urinary retention - Patient with alejandra on transfer. Continue Flomax. Alejandra removed 10/14/19, required CIC over night. PVRs elevated. Will increase Flomax  -Improved voiding, no CIC in past 24 hours.      Vitamin D - 21 on admission, start 1000 U     GI Ppx - Patient on Pepcid on transfer. Continue while on steroids.      DVT Ppx - Patient high risk for bleed. Will monitor ambulatory  status.      Dispo - Plan for discharge Monday prior to simulation.        Functional Status at Discharge  Eatin - Modified Independent  Eating Description:  Increased time  Groomin - Modified Independent  Grooming Description:  Increased time  Bathin - Standby Prompting/Supervision or Set-up  Bathing Description:  Tub bench, Supervision for safety, Grab bar(Hot water management assist )  Upper Body Dressin - Modified Independent  Upper Body Dressing Description:  Increased time(don/doff pull over shirt)  Lower Body Dressin - Standby Prompting/Supervsion or Set-up  Lower Body Dressing Description:  5 - Standby Prompting/Supervsion or Set-up  Discharge Location : Home  Patient Discharging with Assist of: Family   Level of Supervision Required: Intermittent Supervision  Recommended Equipment for Discharge: Front-Wheeled Walker;Shower Chair  Recommended Services Upon Discharge: Home Health Occupational Therapy  Long Term Goals Met: 2  Long Term Goals Not Met: 1  Reason(s) for Goals Not Met: Intermittent cues required to problem solve basic home management tasks (laundry)   Criteria for Termination of Services: Maximum Function Achieved for Inpatient Rehabilitation  Comments: It's been a pleasure working with you, Milan.  Thank you for your hard work and best of luck going forward!  Walk:  5 - Standby Prompting/Supervision or Set-up  Distance Walked:  Walks a minimum of 150 feet  Walk Description:  (close SBA without FWW; focus on FWW use today at SPV level indoors/outdoors for 3 reps up to 450 feet twice, SPV with FWW with setup and cueing for way finding)  Wheelchair:  6 - Modified Independent  Distance Propelled:  Propels a minimum of 150 feet   Wheelchair Description:  (level surface, several changes of direction, obstacle negotitation, mod I )  Stairs 6 - Modified Independent  Stairs Description(Mod I, B railings, 1 set of 40 stairs of both standard and adaptive heights, reciprocal  pattern)  Discharge Location: Home  Patient Discharging with Assist of: Spouse / Significant Other;Family  Level of Supervision Required Upon Discharge: Intermittent Supervision  Recommended Equipment for Discharge: Front-Wheeled Walker  Recommeded Services Upon Discharge: Home Health Physical Therapy;Outpatient Physical Therapy  Long Term Goals Met: 0  Long Term Goals Not Met: 3  Reason(s) for Goals Not Met: Patient is using a device to improve safety and balance; requires cueing for safety and setup with transfers.  Pt did have a 45 or greater on the Wolf Balance Scale but did not achieve a normal score on the 5x5STS test, which is okay as safety insight was more of a concern/focus during the stay.  Criteria for Termination of Services: Maximum Function Achieved for Inpatient Rehabilitation  Comprehension Mode:  Both  Comprehension:  6 - Modified Independent  Comprehension Description:  Glasses  Expression Mode:  Both  Expression:  7 - Independent  Expression Description:  Verbal cueing  Social Interaction:  7 - Independent  Social Interaction Description:  Increased time  Problem Solvin - Modified Independent  Problem Solving Description:  Verbal cueing, Increased time  Memory:  5 - Standby Prompting/Supervision or Set-up  Memory Description:  Verbal cueing, Therapy schedule       I, Cynthia Allen M.D., personally performed a complete drug regimen review and no potential clinically significant medication issues were identified.   Discharge Medication:     Medication List      CHANGE how you take these medications      Instructions   dexamethasone 2 MG tablet  What changed:  Another medication with the same name was removed. Continue taking this medication, and follow the directions you see here.  Commonly known as:  DECADRON   Take 1 Tab by mouth every day for 3 days.  Dose:  2 mg        CONTINUE taking these medications      Instructions   famotidine 20 MG Tabs  Commonly known as:  PEPCID   Take  1 Tab by mouth 2 Times a Day.  Dose:  20 mg     levETIRAcetam 500 MG Tabs  Commonly known as:  KEPPRA   Take 1 Tab by mouth 2 Times a Day.  Dose:  500 mg     lisinopril 20 MG Tabs  Commonly known as:  PRINIVIL   Take 1 Tab by mouth every day.  Dose:  20 mg     simvastatin 40 MG Tabs  Commonly known as:  ZOCOR   Take 1 Tab by mouth every evening.  Dose:  40 mg     tamsulosin 0.4 MG capsule  Commonly known as:  FLOMAX   Take 1 Cap by mouth ONE-HALF HOUR AFTER BREAKFAST.  Dose:  0.4 mg        STOP taking these medications    acetaminophen 325 MG Tabs  Commonly known as:  TYLENOL     bisacodyl 10 MG Supp  Commonly known as:  DULCOLAX     HYDROcodone-acetaminophen 5-325 MG Tabs per tablet  Commonly known as:  NORCO     ipratropium-albuterol 0.5-2.5 (3) MG/3ML nebulizer solution  Commonly known as:  DUONEB     losartan 25 MG Tabs  Commonly known as:  COZAAR     nicotine 14 MG/24HR Pt24  Commonly known as:  NICODERM     ondansetron 4 MG Tbdp  Commonly known as:  ZOFRAN ODT     polyethylene glycol/lytes Pack  Commonly known as:  MIRALAX     potassium chloride SA 20 MEQ Tbcr  Commonly known as:  Kdur     senna-docusate 8.6-50 MG Tabs  Commonly known as:  PERICOLACE or SENOKOT S            Discharge Diet:  Regular    Discharge Activity:  As tolerated     Disposition:  Patient to discharge home with family support and community resources.     Equipment:  Walker    Follow-up & Discharge Instructions:  Follow up with your primary care provider (PCP) within 7-10 days of discharge to review your medications and take over your care.     If you develop chest pain, fever, chills, change in neurologic function (weakness, sensation changes, vision changes), or other concerning sxs, seek immediate medical attention or call 911.      Condition on Discharge:  Good    More than 33 minutes was spent on discharging this patient, including face-to-face time, prescription management, and the dictation of this note.    Cynthia Allen  M.D.    Date of Service: 10/21/2019

## 2019-10-21 NOTE — PROGRESS NOTES
Hospital Medicine Daily Progress Note      Chief Complaint:  Hypertension  Hyponatremia  Azotemia    Interval History:  Doing well, denies complaints.    Review of Systems  Review of Systems   Constitutional: Negative for chills and fever.   Eyes: Negative.    Respiratory: Negative for cough and shortness of breath.    Cardiovascular: Negative for chest pain and palpitations.   Gastrointestinal: Negative for abdominal pain, nausea and vomiting.   Musculoskeletal:        Wound pain   Skin: Negative for itching and rash.        Physical Exam  Temp:  [36.6 °C (97.8 °F)-36.9 °C (98.4 °F)] 36.7 °C (98.1 °F)  Pulse:  [52-73] 73  Resp:  [17-18] 18  BP: (108-146)/(65-86) 110/70  SpO2:  [96 %] 96 %    Physical Exam   Constitutional: He is oriented to person, place, and time. No distress.   HENT:   Right Ear: External ear normal.   Left Ear: External ear normal.   Nose: Nose normal.   Crani incision C/D/I   Eyes: Conjunctivae and EOM are normal. Right eye exhibits no discharge. Left eye exhibits no discharge.   Neck: Normal range of motion. Neck supple. No tracheal deviation present.   Cardiovascular: Normal rate, regular rhythm, S1 normal and S2 normal.   Pulmonary/Chest: No stridor. No respiratory distress. He has no wheezes. He has no rhonchi.   Decreased BS   Abdominal: Soft. Bowel sounds are normal. He exhibits no distension. There is no tenderness.   Musculoskeletal: He exhibits no edema or tenderness.   Neurological: He is alert and oriented to person, place, and time. No sensory deficit.   Skin: Skin is warm and dry. He is not diaphoretic. No cyanosis.   Vitals reviewed.      Fluids    Intake/Output Summary (Last 24 hours) at 10/20/2019 1802  Last data filed at 10/20/2019 1225  Gross per 24 hour   Intake 240 ml   Output --   Net 240 ml       Laboratory  Recent Labs     10/19/19  0540   WBC 10.0   RBC 3.89*   HEMOGLOBIN 12.0*   HEMATOCRIT 36.7*   MCV 94.3   MCH 30.8   MCHC 32.7*   RDW 50.7*   PLATELETCT 352   MPV 8.5*      Recent Labs     10/19/19  0540   SODIUM 131*   POTASSIUM 4.4   CHLORIDE 103   CO2 24   GLUCOSE 92   BUN 28*   CREATININE 0.84   CALCIUM 8.3*                   Assessment/Plan  History of lung cancer- (present on admission)  Assessment & Plan  Recent CT chest show a 3.4 cm mass  With bilat brain mets s/p 2 stage resection  On Decadron taper  On Keppra for seizure prophylaxis  For outpt PET scan    Essential hypertension- (present on admission)  Assessment & Plan  Serially decreased Lisinopril for continued low blood pressures  Monitor for orthostatic hypotension on Flomax    Hypophosphatemia- (present on admission)  Assessment & Plan  F/U levels still borderline low  Continue Neutra-Phos    Azotemia- (present on admission)  Assessment & Plan  Suspect 2/2 steroids  Renal function improved w/ IVF    Leukocytosis  Assessment & Plan  Most likely 2/2 steroids as pt afebrile and non-toxic appearing  UA and CXR negative for infection  WBC now resolved    Anemia  Assessment & Plan  H/H stable    Dyslipidemia  Assessment & Plan  On Zocor    Vitamin D insufficiency  Assessment & Plan  Vit D level 21  On supplementation    Full Code

## 2019-10-21 NOTE — CARE PLAN
Problem: Safety  Goal: Will remain free from injury  Intervention: Educate patient and significant other/support system about adaptive mobility strategies and safe transfers  Note:   Using call light as needed for assist. Bed in low position, call light within reach.     Problem: Pain Management  Goal: Pain level will decrease to patient's comfort goal  Intervention: Follow pain managment plan developed in collaboration with patient and Interdisciplinary Team  Note:   Denies pain, no request for PRN pain med at hs.

## 2019-10-21 NOTE — PROGRESS NOTES
Discharge instructions provided to patient & family member & both demonstrated good understanding.  Left via own car at 10:15

## 2019-10-21 NOTE — PROGRESS NOTES
Received patient during shift change, report rec'd from day shift RN. Resting in bed, VS stable on room air. Continent of B&B, stand by assist for transfers. A&O x 4, able to make needs known. Bed in low position, call light within reach.

## 2019-10-21 NOTE — CONSULTS
DATE OF SERVICE:  10/15/2019    BEHAVIORAL MEDICINE EVALUATION    BRIEF HISTORY OF PRESENTING COMPLAINTS:  The patient is a 74-year-old white    male who is referred for a behavioral medicine evaluation by Dr. Allen.  The patient was transferred to rehab from [facility] where he presented   to [facility] on 10/04/2019 with left-sided weakness and recent falls in the past 3   weeks.  He also complained of confusion, blank stares, numbness and unsteady   gait.  The patient presented to the [facility] initially.  A CT scan showed   intracranial mass.  The patient has a past history of lung cancer.  The   patient underwent a surgical excision of metastatic lesions.  The patient was   stabilized postoperatively.  He was then sent to rehab to address his general   debility.    PAST MEDICAL HISTORY:  Significant for lung cancer, status post lobectomy.    PSYCHOLOGICAL STATUS:  MENTAL STATUS EXAMINATION:  The patient is a frail-appearing elderly male of   medium to tall stature who appeared older than his stated age of 74.  At   presentation, the patient was alert.  He was sitting upright in his bed when   approached.  The patient oriented well to my presence.  He was kempt in   appearance.  He was dressed in a hospital garb.  His appearance was remarkable   for shaved head and obvious surgical lesions on his scalp.  The patient's   manner of presentation was cooperative and friendly.    The patient was grossly oriented to time, place, and person.  His language was   logical and goal oriented, his speech was normal for rate and rhythm.  The   patient's concentration and memory functioning seemed slightly diminished.    The patient's affect was somewhat constricted, stable, and mildly intense.  He   related well.  His mood appeared euthymic and appropriate to the context.    There was no evidence of delusional or perceptual disturbance.  Also, the   patient showed no unusual pain or motor behavior during the  interview.    SPECIFIC BEHAVIORAL COMPLAINTS:  The patient denied any clinically significant   symptoms of a negative mood.  He reported no strong feelings of depression,   anxiety or anger.  The patient said since the surgery, he is feeling markedly   better.  He said his thinking is good and he is beginning to feel strong and   much more alert.    The patient also reported that his sleep is sound.  He said his energy level   is coming back and his appetite has been strong.  The patient reported some,   although mild neck pain.    The patient denied any interpersonal discord or discomfort, family disharmony   or problems managing his day-to-day stressors prior to his hospitalization.    The patient reported no ETOH or other drug abuse.  The patient also reported   no tobacco use.  He is a former smoker, however.  He said he occasionally will   smoke cigars.    PSYCHIATRIC HISTORY:  The patient denied any history significant for   psychiatric disturbance or treatment including in or outpatient care.    PSYCHOMETRIC TESTING:  The patient was administered 2 psychometric tests and 2   screening instruments.  The PS/PC-R revealed no clinically significant   symptoms of a negative mood.  His CDR survey showed no problems with level of   consciousness or significant problems with attention.  The patient's thinking   was normal for the most part, but somewhat limited in scope.  The patient   revealed no perceptual deficits or problems with his speech.  His memory   functioning seemed slightly diminished.  The patient admitted to deficits in   behavioral activation and basic self-care.  He denied any mood disturbance.    He also reported slight loss of vigor, but he said his energy level is   returning.  The patient admitted to mild neck pain.  He said his appetite is   good.    The patient was screened for any elder abuse or risk of suicide.  There is no   strong evidence for either problem.    SOCIAL HISTORY:  The patient  is  and retired.  He lives with his   daughter in [place].    IMPRESSION:  Mild adjustment difficulties.    RECOMMENDATIONS:  The patient will be followed for status and supportive care.       ____________________________________     PAULINA COLLIER, PHD    GUEVARA / JOHN    DD:  10/20/2019 17:45:38  DT:  10/20/2019 18:01:52    D#:  [id number]  Job#:  [id number]

## 2019-10-21 NOTE — DISCHARGE INSTRUCTIONS
[facility] NURSING DISCHARGE INSTRUCTIONS    Blood Pressure :   Weight: 57.7 kg (127 lb 3.3 oz)  Nursing recommendations for Milan Clark at time of discharge are as follows:  Client and Family Member verbalized understanding of all discharge instructions and prescriptions.     Review all your home medications and newly ordered medications with your doctor and/or pharmacist. Follow medication instructions as directed by your doctor and/or pharmacist.    Pain Management:   Discharge Pain Medication Instructions:  Comfort Goal: Comfort at Rest, Perform Activity, Sleep Comfortably  Notify your primary care provider if pain is unrelieved with these measures, if the pain is new, or increased in intensity.    Discharge Skin Characteristics:    Discharge Skin Exam:    Wound 10/11/19 Incision Head (Active)   Site Assessment Clean;Dry;Intact 10/20/2019  9:20 PM   Shreya-wound Assessment Clean;Dry 10/20/2019  9:20 PM   Margins Attached edges 10/20/2019  9:20 PM   Closure Approximated;Open to air 10/20/2019  9:20 PM   Drainage Amount None 10/20/2019  9:20 PM   Cleansing Not Applicable 10/20/2019  7:20 AM   Periwound Protectant Not Applicable 10/20/2019  7:20 AM   Dressing Options Open to Air 10/20/2019  9:20 PM   Dressing Cleansing/Solutions Not Applicable 10/20/2019  7:20 AM   NEXT Weekly Photo (Inpatient Only) 10/19/19 10/13/2019 10:00 AM     Skin / Wound Care Instructions: Please contact your primary care physician for any change in skin integrity.     If You Have Surgical Incisions / Wounds:  Monitor surgical site(s) for signs of increased swelling, redness or symptoms of drainage from the site or fever as this could indicate signs and symptoms of infection. If these symptoms are noted, notifiy your primary care provider.      Discharge Safety Instructions: Should Have ADULT SUPERVISION     Discharge Safety Concerns: Unsteady Gait, Weakness  The interdisciplinary team has made recommendation that  you should have adult supervision in the house due to weakness and unsteady gait  Anti-embolic stockings are required during the day and off at night to increase circulation to the lower extremities.        Fall Prevention in the Home  Introduction  Falls can cause injuries. They can happen to people of all ages. There are many things you can do to make your home safe and to help prevent falls.  What can I do on the outside of my home?  · Regularly fix the edges of walkways and driveways and fix any cracks.  · Remove anything that might make you trip as you walk through a door, such as a raised step or threshold.  · Trim any bushes or trees on the path to your home.  · Use bright outdoor lighting.  · Clear any walking paths of anything that might make someone trip, such as rocks or tools.  · Regularly check to see if handrails are loose or broken. Make sure that both sides of any steps have handrails.  · Any raised decks and porches should have guardrails on the edges.  · Have any leaves, snow, or ice cleared regularly.  · Use sand or salt on walking paths during winter.  · Clean up any spills in your garage right away. This includes oil or grease spills.  What can I do in the bathroom?  · Use night lights.  · Install grab bars by the toilet and in the tub and shower. Do not use towel bars as grab bars.  · Use non-skid mats or decals in the tub or shower.  · If you need to sit down in the shower, use a plastic, non-slip stool.  · Keep the floor dry. Clean up any water that spills on the floor as soon as it happens.  · Remove soap buildup in the tub or shower regularly.  · Attach bath mats securely with double-sided non-slip rug tape.  · Do not have throw rugs and other things on the floor that can make you trip.  What can I do in the bedroom?  · Use night lights.  · Make sure that you have a light by your bed that is easy to reach.  · Do not use any sheets or blankets that are too big for your bed. They should not  hang down onto the floor.  · Have a firm chair that has side arms. You can use this for support while you get dressed.  · Do not have throw rugs and other things on the floor that can make you trip.  What can I do in the kitchen?  · Clean up any spills right away.  · Avoid walking on wet floors.  · Keep items that you use a lot in easy-to-reach places.  · If you need to reach something above you, use a strong step stool that has a grab bar.  · Keep electrical cords out of the way.  · Do not use floor polish or wax that makes floors slippery. If you must use wax, use non-skid floor wax.  · Do not have throw rugs and other things on the floor that can make you trip.  What can I do with my stairs?  · Do not leave any items on the stairs.  · Make sure that there are handrails on both sides of the stairs and use them. Fix handrails that are broken or loose. Make sure that handrails are as long as the stairways.  · Check any carpeting to make sure that it is firmly attached to the stairs. Fix any carpet that is loose or worn.  · Avoid having throw rugs at the top or bottom of the stairs. If you do have throw rugs, attach them to the floor with carpet tape.  · Make sure that you have a light switch at the top of the stairs and the bottom of the stairs. If you do not have them, ask someone to add them for you.  What else can I do to help prevent falls?  · Wear shoes that:  ¨ Do not have high heels.  ¨ Have rubber bottoms.  ¨ Are comfortable and fit you well.  ¨ Are closed at the toe. Do not wear sandals.  · If you use a stepladder:  ¨ Make sure that it is fully opened. Do not climb a closed stepladder.  ¨ Make sure that both sides of the stepladder are locked into place.  ¨ Ask someone to hold it for you, if possible.  · Clearly salome and make sure that you can see:  ¨ Any grab bars or handrails.  ¨ First and last steps.  ¨ Where the edge of each step is.  · Use tools that help you move around (mobility aids) if they are  needed. These include:  ¨ Canes.  ¨ Walkers.  ¨ Scooters.  ¨ Crutches.  · Turn on the lights when you go into a dark area. Replace any light bulbs as soon as they burn out.  · Set up your furniture so you have a clear path. Avoid moving your furniture around.  · If any of your floors are uneven, fix them.  · If there are any pets around you, be aware of where they are.  · Review your medicines with your doctor. Some medicines can make you feel dizzy. This can increase your chance of falling.  Ask your doctor what other things that you can do to help prevent falls.  This information is not intended to replace advice given to you by your health care provider. Make sure you discuss any questions you have with your health care provider.  Document Released: 10/14/2010 Document Revised: 05/25/2017 Document Reviewed: 01/22/2016  © 2017 Elsevier      Discharge Diet: Regular     Discharge Liquids: Thin  Discharge Bowel Function: Continent  Please contact your primary care physician for any changes in bowel habits.  Discharge Bowel Program:    Discharge Bladder Function: Continent  Discharge Urinary Devices: None      Acute Urinary Retention, Male  Acute urinary retention is when you are unable to pee (urinate). Acute urinary retention is common in older men. Prostates can get bigger, which blocks the flow of pee.  Follow these instructions at home:  · Drink enough fluids to keep your pee clear or pale yellow.  · If you are sent home with a tube that drains the bladder (catheter), there will be a drainage bag attached to it. There are two types of bags. One is big that you can wear at night without having to empty it. One is smaller and needs to be emptied more often.  ¨ Keep the drainage bag empty.  ¨ Keep the drainage bag lower than your catheter.  · Only take medicine as told by your doctor.  Contact a doctor if:  · You have a low-grade fever.  · You have spasms or you are leaking pee when you have spasms.  Get help right  away if:  · You have chills or a fever.  · Your catheter stops draining pee.  · Your catheter falls out.  · You have increased bleeding that does not stop after you have rested and increased the amount of fluids you had been drinking.  This information is not intended to replace advice given to you by your health care provider. Make sure you discuss any questions you have with your health care provider.  Document Released: 06/05/2009 Document Revised: 05/25/2017 Document Reviewed: 05/29/2014  Elsevier Interactive Patient Education © 2017 [employer]        Nursing Discharge Plan:   Influenza Vaccine Indication: Not indicated: Previously immunized this influenza season and > 8 years of age    Suicidal Feelings: How to Help Yourself  Suicide is the taking of one's own life. If you feel as though life is getting too tough to handle and are thinking about suicide, get help right away. To get help:  · Call your local emergency services (911 in the [place]  · Call a suicide hotline to speak with a trained counselor who understands how you are feeling. The following is a list of suicide hotlines in the [place]. For a list of hotlines in [place], visit www.suicide.org/hotlines/international/dzpuoq-ehztlmw-fmcjfvtg.html.  ¨ 4-111-419-TALK (1-451.816.5058).  ¨ 4-437-TSRLWHV (1-551.218.8318).  ¨ 1-947.901.8577. This is a hotline for Irish speakers.  ¨ 8-367-874-4TTY (1-903.178.1160). This is a hotline for TTY users.  ¨ 7-377-2-U-SALONI (1-463.739.3715). This is a hotline for lesbian, doan, bisexual, transgender, or questioning youth.  · Contact a crisis center or a local suicide prevention center. To find a crisis center or suicide prevention center:  ¨ Call your local hospital, clinic, community service organization, mental health center, social service provider, or health department. Ask for assistance in connecting to a crisis center.  ¨ Visit www.suicidepreventionlifeline.org/getinvolved/ for a list of crisis  centers in the [place], or visit www.suicideprevention.ca/xkoaejad-wrsru-wwxhbbk/find-a-crisis-centre for a list of centers in [place].  · Visit the following websites:  ¨ National Suicide Prevention Lifeline: www.suicidepreventionlifeline.org  ¨ Hopeline: www.hopeline.com  ¨ American Foundation for Suicide Prevention: www.afsp.org  ¨ The Jorge Project (for lesbian, doan, bisexual, transgender, or questioning youth): www.thetrevorproject.org  How can I help myself feel better?  · Promise yourself that you will not do anything drastic when you have suicidal feelings. Remember, there is hope. Many people have gotten through suicidal thoughts and feelings, and you will, too. You may have gotten through them before, and this proves that you can get through them again.  · Let family, friends, teachers, or counselors know how you are feeling. Try not to isolate yourself from those who care about you. Remember, they will want to help you. Talk with someone every day, even if you do not feel sociable. Face-to-face conversation is best.  · Call a mental health professional and see one regularly.  · Visit your primary health care provider every year.  · Eat a well-balanced diet, and space your meals so you eat regularly.  · Get plenty of rest.  · Avoid alcohol and drugs, and remove them from your home. They will only make you feel worse.  · If you are thinking of taking a lot of medicine, give your medicine to someone who can give it to you one day at a time. If you are on antidepressants and are concerned you will overdose, let your health care provider know so he or she can give you safer medicines. Ask your mental health professional about the possible side effects of any medicines you are taking.  · Remove weapons, poisons, knives, and anything else that could harm you from your home.  · Try to stick to routines. Follow a schedule every day. Put self-care on your schedule.  · Make a list of realistic goals, and cross  them off when you achieve them. Accomplishments give a sense of worth.  · Wait until you are feeling better before doing the things you find difficult or unpleasant.  · Exercise if you are able. You will feel better if you exercise for even a half hour each day.  · Go out in the sun or into nature. This will help you recover from depression faster. If you have a favorite place to walk, go there.  · Do the things that have always given you pleasure. Play your favorite music, read a good book, paint a picture, play your favorite instrument, or do anything else that takes your mind off your depression if it is safe to do.  · Keep your living space well lit.  · When you are feeling well, write yourself a letter about tips and support that you can read when you are not feeling well.  · Remember that life’s difficulties can be sorted out with help. Conditions can be treated. You can work on thoughts and strategies that serve you well.  This information is not intended to replace advice given to you by your health care provider. Make sure you discuss any questions you have with your health care provider.  Document Released: 06/23/2004 Document Revised: 08/16/2017 Document Reviewed: 04/14/2015  [employer] Interactive Patient Education © 2017 [employer]      Case Management Discharge Instructions:   Discharge Location:    Agency Name/Address/Phone:    Home Health:    Outpatient Services:    DME Provider/Phone:    Medical Equipment Ordered:    Prescription Faxed to:        Discharge Medication Instructions:  Below are the medications your physician expects you to take upon discharge:    Initial in-home supervision recommended with meal prep and showering tasks secondary to safety concerns. Enrique demonstrates strong motivation and capacity to continue to address self-care tasks with independence and safety.        Physical Therapy Discharge Instructions for Milan Clark    10/21/2019    Level of Assist Required for  Ambulation: Supervision on Flat Surfaces, Supervision on Curbs, Supervision on Stairs  Distance Patient May Ambulate: community distances as tolerated  Device Recommended for Ambulation: Front-Wheeled Walker  Level of Assist Required for Transfers: Supervision  Device Recommended for Transfers: Front-Wheeled Walker  Home Exercise Program: Refer to Home Exercise Program Handout for Details(standing exercises, at table/counter, with chair next to you)  Prosthesis / Orthosis Recommendation / Location: No Prosthesis  or Orthosis Recommended    Milan, it was a pleasure working with you.  Please use your walker to help your balance and continue to increase your safety over this next week.  Have someone with you for safety when you are standing, walking, stepping up/down, and getting in/out or on/off things.  Please continue focusing on increased strength this week before beginning the next round of treatment.  We wish you nothing but the best and will miss you!  --Alfredo, PT      Occupational Therapy Discharge Instructions for Milan Clark    10/21/2019    Level of Assist Required for Eating: Able to Complete Eating without Assist  Level of Assist Required for Grooming: Able to Complete Grooming without Assist  Level of Assist Required for Dressing: Able to Complete Dressing without Assist  Level of Assist Required for Toileting: Able to Complete Toileting without Assist  Level of Assist Required for Toilet Transfer: Able to Complete Toilet Transfer without Assist  Level of Assist Required for Bathing: Able to Complete Bathing without Assist  Equipment for Bathing: Shower Chair  Level of Assist Required for Shower Transfer: Able to Complete Shower Transfer without Assist  Equipment for Shower Transfer: Shower Chair  Level of Assist Required for Home Mgmt: Able to Complete Home Management without Assist  Level of Assist Required for Meal Prep: Requires Supervision with Meal Preparation  Driving: Please Contact Physician  Prior to Driving  Home Exercise Program: None Issued

## 2019-10-21 NOTE — PROGRESS NOTES
Hospital Medicine Daily Progress Note      Chief Complaint:  Hypertension  Hyponatremia  Azotemia    Interval History:  No overnight events.  Labs reviewed.    Review of Systems  Review of Systems   Constitutional: Negative for chills and fever.   Eyes: Negative.    Respiratory: Negative for cough and shortness of breath.    Cardiovascular: Negative for chest pain and palpitations.   Gastrointestinal: Negative for abdominal pain, nausea and vomiting.   Musculoskeletal:        Wound pain   Skin: Negative for itching and rash.        Physical Exam  Temp:  [36.6 °C (97.8 °F)-36.9 °C (98.4 °F)] 36.7 °C (98.1 °F)  Pulse:  [52-73] 73  Resp:  [17-18] 18  BP: (108-146)/(65-86) 110/70  SpO2:  [96 %] 96 %    Physical Exam   Constitutional: He is oriented to person, place, and time. No distress.   HENT:   Right Ear: External ear normal.   Left Ear: External ear normal.   Nose: Nose normal.   Crani incision C/D/I   Eyes: Conjunctivae and EOM are normal. Right eye exhibits no discharge. Left eye exhibits no discharge.   Neck: Normal range of motion. Neck supple. No tracheal deviation present.   Cardiovascular: Normal rate, regular rhythm, S1 normal and S2 normal.   Pulmonary/Chest: No stridor. No respiratory distress. He has no wheezes. He has no rhonchi.   Decreased BS   Abdominal: Soft. Bowel sounds are normal. He exhibits no distension. There is no tenderness.   Musculoskeletal: He exhibits no edema or tenderness.   Neurological: He is alert and oriented to person, place, and time. No sensory deficit.   Skin: Skin is warm and dry. He is not diaphoretic. No cyanosis.   Vitals reviewed.      Fluids    Intake/Output Summary (Last 24 hours) at 10/20/2019 5959  Last data filed at 10/20/2019 1225  Gross per 24 hour   Intake 240 ml   Output --   Net 240 ml       Laboratory  Recent Labs     10/19/19  0540   WBC 10.0   RBC 3.89*   HEMOGLOBIN 12.0*   HEMATOCRIT 36.7*   MCV 94.3   MCH 30.8   MCHC 32.7*   RDW 50.7*   PLATELETCT 352   MPV  8.5*     Recent Labs     10/19/19  0540   SODIUM 131*   POTASSIUM 4.4   CHLORIDE 103   CO2 24   GLUCOSE 92   BUN 28*   CREATININE 0.84   CALCIUM 8.3*                   Assessment/Plan  History of lung cancer- (present on admission)  Assessment & Plan  Recent CT chest show a 3.4 cm mass  With bilat brain mets s/p 2 stage resection  On Decadron taper  On Keppra for seizure prophylaxis  For outpt PET scan    Essential hypertension- (present on admission)  Assessment & Plan  Decrease Lisinopril further for continued low blood pressures  Monitor for orthostatic hypotension on Flomax    Hypophosphatemia- (present on admission)  Assessment & Plan  F/U levels still borderline low  Increase Neutra-Phos    Azotemia- (present on admission)  Assessment & Plan  Suspect 2/2 steroids  Renal function improved w/ IVF    Leukocytosis  Assessment & Plan  Most likely 2/2 steroids as pt afebrile and non-toxic appearing  UA and CXR negative for infection  WBC now resolving    Anemia  Assessment & Plan  H/H stable    Dyslipidemia  Assessment & Plan  On Zocor    Vitamin D insufficiency  Assessment & Plan  Vit D level 21  On supplementation    Full Code

## 2019-10-21 NOTE — CARE PLAN
Problem: Balance  Goal: STG-Within one week, patient will  Description  Improve IRAHETA balance by 8 point (MCID) demonstrating improving balance      Outcome: MET     Problem: Mobility  Goal: STG-Within one week, patient will  Description  Pt will ambulate outdoors over grass with no device SBA simulating pt's goal to return to playing golf., 150ft or greater    Outcome: DISCHARGED-GOAL NOT MET     Problem: Mobility Transfers  Goal: STG-Within one week, patient will transfer bed to chair  Description  Pt will perform stand pivot transfer from bed to w/c mod I.      Outcome: DISCHARGED-GOAL NOT MET     Problem: PT-Long Term Goals  Goal: LTG-By discharge, patient will ambulate  Description  Pt will ambulate with no device level and uneven surfaces community distances modified independent on due to extra time/decreased gait speed   Outcome: DISCHARGED-GOAL NOT MET  Goal: LTG-By discharge, patient will transfer one surface to another  Description  Pt will transfer to car, bed, various surfaces, stand from floor independent      Outcome: DISCHARGED-GOAL NOT MET  Goal: LTG-By discharge, patient will  Description  Pt will demonstrates a low fall risk level on IRAHETA 45 or greater and 5 time sit to stand within normal values. If ceilings on IRAHETA pt will be able to demonstrate low fall risk on FGA    Outcome: DISCHARGED-GOAL NOT MET

## 2019-10-21 NOTE — PROGRESS NOTES
Hospital Medicine Daily Progress Note      Chief Complaint:  Hypertension  Hyponatremia  Azotemia    Interval History:  Looking forward to going home today; no complaints.    Review of Systems  Review of Systems   Constitutional: Negative for chills and fever.   HENT: Negative.    Eyes: Negative.    Respiratory: Negative for cough and shortness of breath.    Cardiovascular: Negative for chest pain and palpitations.   Gastrointestinal: Negative for abdominal pain, nausea and vomiting.   Skin: Negative for itching and rash.        Physical Exam  Temp:  [36.5 °C (97.7 °F)-36.7 °C (98.1 °F)] 36.5 °C (97.7 °F)  Pulse:  [61-73] 63  Resp:  [18-20] 20  BP: (110-126)/(70-83) 120/83  SpO2:  [93 %-96 %] 93 %    Physical Exam   Constitutional: He is oriented to person, place, and time. No distress.   HENT:   Right Ear: External ear normal.   Left Ear: External ear normal.   Nose: Nose normal.   Crani incision C/D/I   Eyes: Conjunctivae and EOM are normal. Right eye exhibits no discharge. Left eye exhibits no discharge.   Neck: Normal range of motion. Neck supple. No tracheal deviation present.   Cardiovascular: Normal rate, regular rhythm, S1 normal and S2 normal.   Pulmonary/Chest: No stridor. No respiratory distress. He has no wheezes. He has no rhonchi.   Decreased BS   Abdominal: Soft. Bowel sounds are normal. He exhibits no distension. There is no tenderness.   Musculoskeletal: He exhibits no edema or tenderness.   Neurological: He is alert and oriented to person, place, and time. No sensory deficit.   Skin: Skin is warm and dry. He is not diaphoretic. No cyanosis.   Vitals reviewed.      Fluids    Intake/Output Summary (Last 24 hours) at 10/21/2019 1021  Last data filed at 10/21/2019 0900  Gross per 24 hour   Intake 600 ml   Output 468 ml   Net 132 ml       Laboratory  Recent Labs     10/19/19  0540   WBC 10.0   RBC 3.89*   HEMOGLOBIN 12.0*   HEMATOCRIT 36.7*   MCV 94.3   MCH 30.8   MCHC 32.7*   RDW 50.7*   PLATELETCT 352    MPV 8.5*     Recent Labs     10/19/19  0540   SODIUM 131*   POTASSIUM 4.4   CHLORIDE 103   CO2 24   GLUCOSE 92   BUN 28*   CREATININE 0.84   CALCIUM 8.3*                   Assessment/Plan  History of lung cancer- (present on admission)  Assessment & Plan  Recent CT chest show a 3.4 cm mass  With bilat brain mets s/p 2 stage resection  On Decadron taper  On Keppra for seizure prophylaxis  For outpt PET scan    Essential hypertension- (present on admission)  Assessment & Plan  Serially decreased Lisinopril for continued low blood pressures  Monitor for orthostatic hypotension on Flomax    Leukocytosis  Assessment & Plan  Most likely 2/2 steroids as pt afebrile and non-toxic appearing  UA and CXR negative for infection  WBC now resolved    Anemia  Assessment & Plan  H/H stable    Dyslipidemia  Assessment & Plan  On Zocor    Vitamin D insufficiency  Assessment & Plan  Vit D level 21  On supplementation    Hypophosphatemia- (present on admission)  Assessment & Plan  F/U levels still borderline low  Continue Neutra-Phos     Azotemia- (present on admission)  Assessment & Plan  Suspect 2/2 steroids  Renal function improved w/ IVF    Full Code    Patient seen and examined.  Chart reviewed.  Assessment and Plan as above.  No changes today.

## 2019-10-21 NOTE — DISCHARGE PLANNING
Case management Summary:   Met with patient prior to discharge.   Reviewed all follow up appointments: XRT. Please contact [facility] to set up f/u appt. [facility].   Miguel MILTON declined referral.   Patient declined referral for OP therapy. Instructed to contact PCP if he decides he would like OP therapy at later date.    During hospitalization, I have provided support and education and have been available for questions and information during hours of operation, communicated with therapy team and MD along with providing links/resources  to outside services.    Patient verbalizes agreement with all plans and has an understanding of the next steps within the post acute services.     Individualized Goals:   1. To get back to golfing  2. To improve strength  3. [facility] post acute mgmt    Outcome:   1. Goal not met: this is a long term goal for patient post acute.  2. Goal met & completed: patient has made good progress toward goal during rehab hospitalization. It is anticipated he will continue to improve. Declined OP therapy referral at this time.  3. Goal not met: message left for [facility] primary team w/ no return call back. Medical information faxed to [facility] PCP. Patient & daughter instructed to contact [facility] for P[facility] f/u appt & referral [facility] at [facility].

## 2019-10-22 ENCOUNTER — PATIENT OUTREACH (OUTPATIENT)
Dept: OTHER | Facility: MEDICAL CENTER | Age: 74
End: 2019-10-22

## 2019-10-22 NOTE — LETTER
[facility]   [address]  [place] [address]  Phone: [phone] - Fax: [phone]            Milan Bowers [address] [address]  [facility] [address]     Date: 10/22/19      Dear Milan,    On behalf of your entire care team at [facility], I wish you a speedy recovery as you heal from your recent surgery. I wanted to let you know about the services our entire care team at [facility] have to help you during your treatment.  As Cancer Nurse Navigator, I am a certified oncology nurse, and can address any needs you may have with education, guidance and support. I am available to you and your family through your treatment at [facility].       I am available to address your needs during your journey with the following services:     Care Coordination  I can assist you in facilitating communication between your cancer care treatment team to ensure timely treatment and follow-up.  I can also assist with transition of care back to your primary care provider, or other specialist, as needed.  My goal is to bridge gaps for you throughout the course of your active treatment.       Education Services  Understanding the recommended treatment course by your physician is key. I can provide educational resources personalized to your cancer diagnosis to help you understand your diagnosis and treatment. Please let me know if you would like to receive information about your diagnosis and treatment plan.  I am here to help.     Support Services/Resource Information  [facility] we offer a full scope of support services.  I can assist you with referral information to:  · Cancer Clinical Trials & Research  · Nutrition counseling  · Support groups  · Complementary Therapies such as Healing Touch and Mind-Body Techniques Meditation  · Patient Financial Advocates  ·   · [facility], an [employer] affiliate office, our volunteers can assist you with accessing  our [facility] library, support services information, head coverings and comfort items  · Community and national resources, included eligibility based clarissa assistance and pharmaceutical access programs, if you are in need of additional information.     [facility] offers services that include:  · Behavioral Health  · Genetic counseling & testing  · Acupuncture  · Lymphedema prevention/treatment program  · Palliative care services.         I hope you have an excellent patient experience.  Please feel free to share with me your comments regarding the care you have received- we value your feedback.    Sincerely,         Bre Chow R.N.  Cancer Nurse Navigator    Main: [phone]   Office:  [phone]  Email:  [email or web address]

## 2019-10-23 PROCEDURE — 77295 3-D RADIOTHERAPY PLAN: CPT | Performed by: RADIOLOGY

## 2019-10-23 PROCEDURE — 77334 RADIATION TREATMENT AID(S): CPT | Mod: 26 | Performed by: RADIOLOGY

## 2019-10-23 PROCEDURE — 77300 RADIATION THERAPY DOSE PLAN: CPT | Mod: 26 | Performed by: RADIOLOGY

## 2019-10-23 PROCEDURE — 77334 RADIATION TREATMENT AID(S): CPT | Performed by: RADIOLOGY

## 2019-10-23 PROCEDURE — 77300 RADIATION THERAPY DOSE PLAN: CPT | Performed by: RADIOLOGY

## 2019-10-23 PROCEDURE — 77295 3-D RADIOTHERAPY PLAN: CPT | Mod: 26 | Performed by: RADIOLOGY

## 2019-10-28 ENCOUNTER — HOSPITAL ENCOUNTER (OUTPATIENT)
Dept: RADIATION ONCOLOGY | Facility: MEDICAL CENTER | Age: 74
End: 2019-10-28

## 2019-10-28 LAB
CHEMOTHERAPY INFUSION START DATE: NORMAL
CHEMOTHERAPY RECORDS: 3
CHEMOTHERAPY RECORDS: 3000
CHEMOTHERAPY RECORDS: NORMAL
CHEMOTHERAPY RX CANCER: NORMAL
DATE 1ST CHEMO CANCER: NORMAL
RAD ONC ARIA COURSE LAST TREATMENT DATE: NORMAL
RAD ONC ARIA COURSE TREATMENT ELAPSED DAYS: NORMAL
RAD ONC ARIA REFERENCE POINT DOSAGE GIVEN TO DATE: 3
RAD ONC ARIA REFERENCE POINT DOSAGE GIVEN TO DATE: 3
RAD ONC ARIA REFERENCE POINT ID: NORMAL
RAD ONC ARIA REFERENCE POINT ID: NORMAL
RAD ONC ARIA REFERENCE POINT SESSION DOSAGE GIVEN: 3
RAD ONC ARIA REFERENCE POINT SESSION DOSAGE GIVEN: 3

## 2019-10-28 PROCEDURE — 77280 THER RAD SIMULAJ FIELD SMPL: CPT | Performed by: RADIOLOGY

## 2019-10-28 PROCEDURE — 77412 RADIATION TX DELIVERY LVL 3: CPT | Performed by: RADIOLOGY

## 2019-10-28 PROCEDURE — 77280 THER RAD SIMULAJ FIELD SMPL: CPT | Mod: 26 | Performed by: RADIOLOGY

## 2019-10-29 ENCOUNTER — HOSPITAL ENCOUNTER (OUTPATIENT)
Dept: RADIATION ONCOLOGY | Facility: MEDICAL CENTER | Age: 74
End: 2019-10-29

## 2019-10-29 LAB
CHEMOTHERAPY INFUSION START DATE: NORMAL
CHEMOTHERAPY RECORDS: 3
CHEMOTHERAPY RECORDS: 3000
CHEMOTHERAPY RECORDS: NORMAL
CHEMOTHERAPY RX CANCER: NORMAL
DATE 1ST CHEMO CANCER: NORMAL
RAD ONC ARIA COURSE LAST TREATMENT DATE: NORMAL
RAD ONC ARIA COURSE TREATMENT ELAPSED DAYS: NORMAL
RAD ONC ARIA REFERENCE POINT DOSAGE GIVEN TO DATE: 6
RAD ONC ARIA REFERENCE POINT DOSAGE GIVEN TO DATE: 6
RAD ONC ARIA REFERENCE POINT ID: NORMAL
RAD ONC ARIA REFERENCE POINT ID: NORMAL
RAD ONC ARIA REFERENCE POINT SESSION DOSAGE GIVEN: 3
RAD ONC ARIA REFERENCE POINT SESSION DOSAGE GIVEN: 3

## 2019-10-29 PROCEDURE — 77412 RADIATION TX DELIVERY LVL 3: CPT | Performed by: RADIOLOGY

## 2019-10-30 ENCOUNTER — APPOINTMENT (OUTPATIENT)
Dept: RADIATION ONCOLOGY | Facility: MEDICAL CENTER | Age: 74
End: 2019-10-30
Attending: RADIOLOGY
Payer: COMMERCIAL

## 2019-10-30 ENCOUNTER — HOSPITAL ENCOUNTER (OUTPATIENT)
Dept: RADIATION ONCOLOGY | Facility: MEDICAL CENTER | Age: 74
End: 2019-10-30

## 2019-10-30 LAB
CHEMOTHERAPY INFUSION START DATE: NORMAL
CHEMOTHERAPY RECORDS: 3
CHEMOTHERAPY RECORDS: 3000
CHEMOTHERAPY RECORDS: NORMAL
CHEMOTHERAPY RX CANCER: NORMAL
DATE 1ST CHEMO CANCER: NORMAL
RAD ONC ARIA COURSE LAST TREATMENT DATE: NORMAL
RAD ONC ARIA COURSE TREATMENT ELAPSED DAYS: NORMAL
RAD ONC ARIA REFERENCE POINT DOSAGE GIVEN TO DATE: 9
RAD ONC ARIA REFERENCE POINT DOSAGE GIVEN TO DATE: 9.01
RAD ONC ARIA REFERENCE POINT ID: NORMAL
RAD ONC ARIA REFERENCE POINT ID: NORMAL
RAD ONC ARIA REFERENCE POINT SESSION DOSAGE GIVEN: 3
RAD ONC ARIA REFERENCE POINT SESSION DOSAGE GIVEN: 3

## 2019-10-30 PROCEDURE — 77412 RADIATION TX DELIVERY LVL 3: CPT | Performed by: RADIOLOGY

## 2019-10-30 PROCEDURE — 77336 RADIATION PHYSICS CONSULT: CPT | Performed by: RADIOLOGY

## 2019-10-31 ENCOUNTER — HOSPITAL ENCOUNTER (OUTPATIENT)
Dept: RADIATION ONCOLOGY | Facility: MEDICAL CENTER | Age: 74
End: 2019-10-31

## 2019-10-31 LAB
CHEMOTHERAPY INFUSION START DATE: NORMAL
CHEMOTHERAPY RECORDS: 3
CHEMOTHERAPY RECORDS: 3000
CHEMOTHERAPY RECORDS: NORMAL
CHEMOTHERAPY RX CANCER: NORMAL
DATE 1ST CHEMO CANCER: NORMAL
RAD ONC ARIA COURSE LAST TREATMENT DATE: NORMAL
RAD ONC ARIA COURSE TREATMENT ELAPSED DAYS: NORMAL
RAD ONC ARIA REFERENCE POINT DOSAGE GIVEN TO DATE: 12
RAD ONC ARIA REFERENCE POINT DOSAGE GIVEN TO DATE: 12.01
RAD ONC ARIA REFERENCE POINT ID: NORMAL
RAD ONC ARIA REFERENCE POINT ID: NORMAL
RAD ONC ARIA REFERENCE POINT SESSION DOSAGE GIVEN: 3
RAD ONC ARIA REFERENCE POINT SESSION DOSAGE GIVEN: 3

## 2019-10-31 PROCEDURE — 77412 RADIATION TX DELIVERY LVL 3: CPT | Performed by: RADIOLOGY

## 2019-10-31 NOTE — DOCUMENTATION QUERY
DOCUMENTATION QUERY    PROVIDERS: Please select “Cosign w/ note”to reply to query.    To better represent the severity of illness of your patient, please review the following information and exercise your independent professional judgment in responding to this query.     Acute kidney injury is documented on the screening forms for this patient, however it is not mentioned in the rehab stay.  Can this diagnosis be further clarified?     note: if an appropriate response is not listed below, please respond with a new note     -Acute kidney injury ruled in  -Acute kidney injury ruled out  -Other-please document  -Unable to determine    The medical record reflects the following:   Clinical Findings  acute kidney failure,likely prerenal   Treatments IV fluid hydration with normal saline, monitor BMP and assess response, Avoid IV contrast/nephrotoxins/NSAIDS, dose adjust meds for decreased GFR   Risk Factors  Hypertension   Location within medical record  screening forms     Thank you,   Vandana Serrano

## 2019-11-01 ENCOUNTER — HOSPITAL ENCOUNTER (OUTPATIENT)
Dept: RADIATION ONCOLOGY | Facility: MEDICAL CENTER | Age: 74
End: 2019-11-01

## 2019-11-01 ENCOUNTER — HOSPITAL ENCOUNTER (OUTPATIENT)
Dept: RADIATION ONCOLOGY | Facility: MEDICAL CENTER | Age: 74
End: 2019-11-30
Attending: RADIOLOGY
Payer: COMMERCIAL

## 2019-11-01 LAB
CHEMOTHERAPY INFUSION START DATE: NORMAL
CHEMOTHERAPY RECORDS: 3
CHEMOTHERAPY RECORDS: 3000
CHEMOTHERAPY RECORDS: NORMAL
CHEMOTHERAPY RX CANCER: NORMAL
DATE 1ST CHEMO CANCER: NORMAL
RAD ONC ARIA COURSE LAST TREATMENT DATE: NORMAL
RAD ONC ARIA COURSE TREATMENT ELAPSED DAYS: NORMAL
RAD ONC ARIA REFERENCE POINT DOSAGE GIVEN TO DATE: 15
RAD ONC ARIA REFERENCE POINT DOSAGE GIVEN TO DATE: 15.01
RAD ONC ARIA REFERENCE POINT ID: NORMAL
RAD ONC ARIA REFERENCE POINT ID: NORMAL
RAD ONC ARIA REFERENCE POINT SESSION DOSAGE GIVEN: 3
RAD ONC ARIA REFERENCE POINT SESSION DOSAGE GIVEN: 3

## 2019-11-01 PROCEDURE — 77427 RADIATION TX MANAGEMENT X5: CPT | Performed by: RADIOLOGY

## 2019-11-01 PROCEDURE — 77412 RADIATION TX DELIVERY LVL 3: CPT | Performed by: RADIOLOGY

## 2019-11-04 ENCOUNTER — HOSPITAL ENCOUNTER (OUTPATIENT)
Dept: RADIATION ONCOLOGY | Facility: MEDICAL CENTER | Age: 74
End: 2019-11-04

## 2019-11-04 LAB
CHEMOTHERAPY INFUSION START DATE: NORMAL
CHEMOTHERAPY RECORDS: 3
CHEMOTHERAPY RECORDS: 3000
CHEMOTHERAPY RECORDS: NORMAL
CHEMOTHERAPY RX CANCER: NORMAL
DATE 1ST CHEMO CANCER: NORMAL
RAD ONC ARIA COURSE LAST TREATMENT DATE: NORMAL
RAD ONC ARIA COURSE TREATMENT ELAPSED DAYS: NORMAL
RAD ONC ARIA REFERENCE POINT DOSAGE GIVEN TO DATE: 18
RAD ONC ARIA REFERENCE POINT DOSAGE GIVEN TO DATE: 18.01
RAD ONC ARIA REFERENCE POINT ID: NORMAL
RAD ONC ARIA REFERENCE POINT ID: NORMAL
RAD ONC ARIA REFERENCE POINT SESSION DOSAGE GIVEN: 3
RAD ONC ARIA REFERENCE POINT SESSION DOSAGE GIVEN: 3

## 2019-11-04 PROCEDURE — 77412 RADIATION TX DELIVERY LVL 3: CPT | Performed by: RADIOLOGY

## 2019-11-04 PROCEDURE — 77417 THER RADIOLOGY PORT IMAGE(S): CPT | Performed by: RADIOLOGY

## 2019-11-04 NOTE — DOCUMENTATION QUERY
DOCUMENTATION QUERY    PROVIDERS: Please select “Cosign w/ note”to reply to query.    To better represent the severity of illness of your patient, please review the following information and exercise your independent professional judgment in responding to this query.     Chronic kidney disease is documented on the screening forms for this patient under active comorbid factors, however Chronic kidney disease is not mentioned in rehab stay. Can this diagnosis be further clarified?  Note: If an appropriate response is not listed below please respond with a new note.    -Chronic Kidney Disease ruled in  -Chronic Kidney Disease ruled out   -Other-please document  -Unable to determine      The medical record reflects the following:   Clinical Findings  active comorbid factors: CKD, ERNST was ruled out   Treatment     Risk Factors  hypertension   Location within medical record  screening forms      Thank you,   Vandana Serrano

## 2019-11-05 ENCOUNTER — HOSPITAL ENCOUNTER (OUTPATIENT)
Dept: RADIATION ONCOLOGY | Facility: MEDICAL CENTER | Age: 74
End: 2019-11-05

## 2019-11-05 LAB
CHEMOTHERAPY INFUSION START DATE: NORMAL
CHEMOTHERAPY RECORDS: 3
CHEMOTHERAPY RECORDS: 3000
CHEMOTHERAPY RECORDS: NORMAL
CHEMOTHERAPY RX CANCER: NORMAL
DATE 1ST CHEMO CANCER: NORMAL
RAD ONC ARIA COURSE LAST TREATMENT DATE: NORMAL
RAD ONC ARIA COURSE TREATMENT ELAPSED DAYS: NORMAL
RAD ONC ARIA REFERENCE POINT DOSAGE GIVEN TO DATE: 21
RAD ONC ARIA REFERENCE POINT DOSAGE GIVEN TO DATE: 21.01
RAD ONC ARIA REFERENCE POINT ID: NORMAL
RAD ONC ARIA REFERENCE POINT ID: NORMAL
RAD ONC ARIA REFERENCE POINT SESSION DOSAGE GIVEN: 3
RAD ONC ARIA REFERENCE POINT SESSION DOSAGE GIVEN: 3

## 2019-11-05 PROCEDURE — 77412 RADIATION TX DELIVERY LVL 3: CPT | Performed by: RADIOLOGY

## 2019-11-06 ENCOUNTER — HOSPITAL ENCOUNTER (OUTPATIENT)
Dept: RADIATION ONCOLOGY | Facility: MEDICAL CENTER | Age: 74
End: 2019-11-06

## 2019-11-06 VITALS
DIASTOLIC BLOOD PRESSURE: 65 MMHG | HEART RATE: 74 BPM | OXYGEN SATURATION: 96 % | TEMPERATURE: 98.6 F | SYSTOLIC BLOOD PRESSURE: 104 MMHG | BODY MASS INDEX: 17.91 KG/M2 | WEIGHT: 128.4 LBS

## 2019-11-06 DIAGNOSIS — C79.31 BRAIN METASTASES: ICD-10-CM

## 2019-11-06 PROBLEM — C34.90 LUNG CANCER METASTATIC TO BRAIN (HCC): Status: ACTIVE | Noted: 2019-10-05

## 2019-11-06 LAB
CHEMOTHERAPY INFUSION START DATE: NORMAL
CHEMOTHERAPY RECORDS: 3
CHEMOTHERAPY RECORDS: 3000
CHEMOTHERAPY RECORDS: NORMAL
CHEMOTHERAPY RX CANCER: NORMAL
DATE 1ST CHEMO CANCER: NORMAL
RAD ONC ARIA COURSE LAST TREATMENT DATE: NORMAL
RAD ONC ARIA COURSE TREATMENT ELAPSED DAYS: NORMAL
RAD ONC ARIA REFERENCE POINT DOSAGE GIVEN TO DATE: 24
RAD ONC ARIA REFERENCE POINT DOSAGE GIVEN TO DATE: 24.02
RAD ONC ARIA REFERENCE POINT ID: NORMAL
RAD ONC ARIA REFERENCE POINT ID: NORMAL
RAD ONC ARIA REFERENCE POINT SESSION DOSAGE GIVEN: 3
RAD ONC ARIA REFERENCE POINT SESSION DOSAGE GIVEN: 3

## 2019-11-06 PROCEDURE — 77412 RADIATION TX DELIVERY LVL 3: CPT | Performed by: RADIOLOGY

## 2019-11-06 PROCEDURE — 77336 RADIATION PHYSICS CONSULT: CPT | Performed by: RADIOLOGY

## 2019-11-06 ASSESSMENT — PAIN SCALES - GENERAL: PAINLEVEL: NO PAIN

## 2019-11-06 NOTE — ON TREATMENT VISIT
ON TREATMENT  NOTE  RADIATION ONCOLOGY DEPARTMENT    Patient name:  Milan Clark    Primary Physician:  No primary care provider on file. MRN: [id number]  CSN: [id number]   Referring physician:  Keiry Stephens M.D. : 1945, 74 y.o.     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 on 2019  Histologic grade (G): G3  Histologic grading system: 4 grade system  Type of lung cancer: Locally advanced or metastatic non-small cell lung cancer      TREATMENT SUMMARY:  [facility] Treatment Information        Some values may be hidden. Unless noted otherwise, only the newest values recorded on each date are displayed.         [facility] Treatment Summary 10/30/19 10/31/19 11/1/19 11/4/19 11/5/19 11/6/19   Course First Treatment Date 10/28/2019 10/28/2019 10/28/2019 10/28/2019 10/28/2019 10/28/2019   Course Last Treatment Date 10/30/2019 10/31/2019 2019 2019 2019 2019   WholeBrain Plan from Course C1_whole brain   Fraction 3 of 10 4 of 10 5 of 10 6 of 10 7 of 10 8 of 10   Elapsed Course Days 2 @ [id number] 3 @ [id number] 4 @ [id number] 7 @ [id number] 8 @ [id number] 9 @ [id number]   Prescribed Fraction Dose 300 cGy 300 cGy 300 cGy 300 cGy 300 cGy 300 cGy   Prescribed Total Dose 3,000 cGy 3,000 cGy 3,000 cGy 3,000 cGy 3,000 cGy 3,000 cGy   WholeBrain Reference Point from Course C1_whole brain   Elapsed Course Days 2 @ [id number] 3 @ [id number] 4 @ [id number] 7 @ [id number] 8 @ [id number] 9 @ [id number]   Session Dose 300 cGy 300 cGy 300 cGy 300 cGy 300 cGy 300 cGy   Total Dose 900 cGy 1,200 cGy 1,500 cGy 1,800 cGy 2,100 cGy 2,400 cGy   WholeBrain CP Reference Point from Course C1_whole brain   Elapsed Course Days 2 @ [id number] 3 @ [id number] 4 @ [id number] 7 @ [id number] 8 @ [id number] 9 @  [id number]   Session Dose 300 cGy 300 cGy 300 cGy 300 cGy 300 cGy 300 cGy   Total Dose 901 cGy 1,201 cGy 1,501 cGy 1,801 cGy 2,101 cGy 2,402 cGy             SUBJECTIVE:  Doing well just dry mouth otherwise fine        VITAL SIGNS:  KPS: 90, Able to carry on normal activity; minor signs or symptoms of disease (ECOG equivalent 0)  Encounter Vitals  Temperature: 37 °C (98.6 °F)  Temp src: Temporal  Blood Pressure : 104/65  Pulse: 74  Pulse Oximetry: 96 %  Weight: 58.2 kg (128 lb 6.4 oz)  Pain Score: No pain  Pain Assessment 11/6/2019   Pain Assessment Denies Pain   Pain Score 0   Some recent data might be hidden          PHYSICAL EXAM:  Well-appearing alert and oriented x3 with some alopecia able to walk      Toxicity Assessment 11/6/2019   Toxicity Assessment Brain   Fatigue (lethargy, malaise, asthenia) Increased fatigue over baseline, but not altering normal activities   Radiation Dermatitis None   Nausea None   Mouth Dryness Mild   Headache None   External Auditory Canal Normal   Inner Ear/Hearing Normal   Middle Ear/Hearing Normal   Dizziness/lightheadedness None   Memory loss Normal   Neuropathy - Motor Normal   Seizure None   Vertigo None           IMPRESSION:  Cancer Staging  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 on 11/6/2019      PLAN:  No change in treatment plan, I like to see the patient back in 6 weeks after completion of therapy.  He understands then he will [facility]    Disposition:  Treatment plan reviewed. Questions answered. Continue therapy outlined.     Keiry Stephens M.D.    No orders of the defined types were placed in this encounter.

## 2019-11-07 ENCOUNTER — HOSPITAL ENCOUNTER (OUTPATIENT)
Dept: RADIATION ONCOLOGY | Facility: MEDICAL CENTER | Age: 74
End: 2019-11-07

## 2019-11-07 LAB
CHEMOTHERAPY INFUSION START DATE: NORMAL
CHEMOTHERAPY RECORDS: 3
CHEMOTHERAPY RECORDS: 3000
CHEMOTHERAPY RECORDS: NORMAL
CHEMOTHERAPY RX CANCER: NORMAL
DATE 1ST CHEMO CANCER: NORMAL
RAD ONC ARIA COURSE LAST TREATMENT DATE: NORMAL
RAD ONC ARIA COURSE TREATMENT ELAPSED DAYS: NORMAL
RAD ONC ARIA REFERENCE POINT DOSAGE GIVEN TO DATE: 27
RAD ONC ARIA REFERENCE POINT DOSAGE GIVEN TO DATE: 27.02
RAD ONC ARIA REFERENCE POINT ID: NORMAL
RAD ONC ARIA REFERENCE POINT ID: NORMAL
RAD ONC ARIA REFERENCE POINT SESSION DOSAGE GIVEN: 3
RAD ONC ARIA REFERENCE POINT SESSION DOSAGE GIVEN: 3

## 2019-11-07 PROCEDURE — 77412 RADIATION TX DELIVERY LVL 3: CPT | Performed by: RADIOLOGY

## 2019-11-08 PROCEDURE — 77427 RADIATION TX MANAGEMENT X5: CPT | Performed by: RADIOLOGY

## 2019-11-08 PROCEDURE — 77412 RADIATION TX DELIVERY LVL 3: CPT | Performed by: RADIOLOGY

## 2019-11-09 LAB
CHEMOTHERAPY INFUSION START DATE: NORMAL
CHEMOTHERAPY RECORDS: 3
CHEMOTHERAPY RECORDS: 3000
CHEMOTHERAPY RECORDS: NORMAL
CHEMOTHERAPY RX CANCER: NORMAL
DATE 1ST CHEMO CANCER: NORMAL
RAD ONC ARIA COURSE LAST TREATMENT DATE: NORMAL
RAD ONC ARIA COURSE TREATMENT ELAPSED DAYS: NORMAL
RAD ONC ARIA REFERENCE POINT DOSAGE GIVEN TO DATE: 30
RAD ONC ARIA REFERENCE POINT DOSAGE GIVEN TO DATE: 30.02
RAD ONC ARIA REFERENCE POINT ID: NORMAL
RAD ONC ARIA REFERENCE POINT ID: NORMAL
RAD ONC ARIA REFERENCE POINT SESSION DOSAGE GIVEN: 3
RAD ONC ARIA REFERENCE POINT SESSION DOSAGE GIVEN: 3

## 2019-11-12 LAB
CHEMOTHERAPY INFUSION START DATE: NORMAL
CHEMOTHERAPY INFUSION STOP DATE: NORMAL
CHEMOTHERAPY RECORDS: 3
CHEMOTHERAPY RECORDS: 3000
CHEMOTHERAPY RECORDS: NORMAL
CHEMOTHERAPY RX CANCER: NORMAL
DATE 1ST CHEMO CANCER: NORMAL
RAD ONC ARIA COURSE LAST TREATMENT DATE: NORMAL
RAD ONC ARIA COURSE TREATMENT ELAPSED DAYS: NORMAL
RAD ONC ARIA REFERENCE POINT DOSAGE GIVEN TO DATE: 30
RAD ONC ARIA REFERENCE POINT DOSAGE GIVEN TO DATE: 30.02
RAD ONC ARIA REFERENCE POINT ID: NORMAL
RAD ONC ARIA REFERENCE POINT ID: NORMAL

## 2019-12-02 PROBLEM — E78.5 DYSLIPIDEMIA: Chronic | Status: ACTIVE | Noted: 2019-10-17

## 2019-12-02 PROBLEM — E87.1 HYPONATREMIA: Chronic | Status: ACTIVE | Noted: 2019-10-13

## 2019-12-02 PROBLEM — D64.9 ANEMIA: Chronic | Status: ACTIVE | Noted: 2019-10-17

## 2019-12-02 PROBLEM — D72.829 LEUKOCYTOSIS: Status: RESOLVED | Noted: 2019-10-17 | Resolved: 2019-12-02

## 2019-12-02 PROBLEM — E87.6 HYPOKALEMIA: Status: RESOLVED | Noted: 2019-10-05 | Resolved: 2019-12-02

## 2019-12-02 PROBLEM — I10 ESSENTIAL HYPERTENSION: Chronic | Status: ACTIVE | Noted: 2019-10-06

## 2019-12-02 PROBLEM — R31.0 GROSS HEMATURIA: Status: ACTIVE | Noted: 2019-12-02

## 2019-12-03 PROBLEM — J44.9 COPD (CHRONIC OBSTRUCTIVE PULMONARY DISEASE) (HCC): Chronic | Status: ACTIVE | Noted: 2019-10-06

## 2019-12-03 PROBLEM — R31.0 GROSS HEMATURIA: Status: RESOLVED | Noted: 2019-12-02 | Resolved: 2019-12-03

## 2019-12-26 NOTE — ON TREATMENT VISIT
RADIATION ONCOLOGY FOLLOW-UP    DATE OF SERVICE: 12/26/2019    IDENTIFICATION:   A 74 y.o. male with presumed metastatic lung cancer to the  brain status post resection of 2 large lesions in the brain 1 left  cerebellar and one right frontal.  Status post whole brain radiation therapy complete 11/8/2019.    HISTORY OF PRESENT ILLNESS:   Since last seen patient has been continuing on and he is feeling fairly well he is had a lot of weight loss and is weak but otherwise doing well.    CURRENT MEDICATIONS:  Current Outpatient Medications   Medication Sig Dispense Refill   • tamsulosin (FLOMAX) 0.4 MG capsule Take 1 Cap by mouth ONE-HALF HOUR AFTER BREAKFAST for 30 days. 60 Cap 2   • PACLITAXEL IV by Intravenous route.     • NS SOLN 250 mL with CARBOplatin 450 MG/45ML SOLN 300 mg 300 mg by Intravenous route Once.     • PEMBROLIZUMAB IV by Intravenous route.     • prochlorperazine (COMPAZINE) 10 MG Tab Take 10 mg by mouth every 6 hours as needed.     • vitamin D (CHOLECALCIFEROL) 1000 UNIT Tab Take 1,000 Units by mouth every day.     • B Complex Vitamins (B COMPLEX 1 PO) Take 1 Tab by mouth every day.     • Misc Natural Products (BETA-SITOSTEROL PLANT STEROLS PO) Take 1 Tab by mouth every day.     • tramadol (ULTRAM) 50 MG Tab Take 50 mg by mouth every four hours as needed for Moderate Pain.     • bismuth subsalicylate (PEPTO-BISMOL) 262 MG/15ML Suspension Take 30 mL by mouth every 6 hours as needed (constipation).     • levETIRAcetam (KEPPRA) 500 MG Tab Take 1 Tab by mouth 2 Times a Day. 60 Tab 2   • famotidine (PEPCID) 20 MG Tab Take 1 Tab by mouth 2 Times a Day. 60 Tab 2   • simvastatin (ZOCOR) 40 MG Tab Take 1 Tab by mouth every evening. 30 Tab 11     No current facility-administered medications for this encounter.        ALLERGIES:  Patient has no known allergies.    FAMILY HISTORY:    Family History   Problem Relation Age of Onset   • Hypertension Mother    • Hyperlipidemia Mother    [unfilled]        SOCIAL  HISTORY:     reports that he quit smoking about 3 months ago. His smoking use included cigars. He has a 5.40 pack-year smoking history. He has never used smokeless tobacco. He reports current alcohol use. He reports that he does not use drugs.    PAIN: Patient has no pain      REVIEW OF SYSTEMS: Is significant for extreme weight loss decrease in appetite fatigue chills because he is lost weight.  He has difficulty swallowing taste alteration dry mouth hoarseness.  He has hearing loss vertigo blurred vision constipation urinary frequency urgency nocturia muscle pain joint pain.  He has neck pain neck stiffness in the area of the resection from Dr. Lacey.  He does have headaches and dizziness but that is less.  He has an unsteady gait requiring a cane  The rest of the review of systems has been reviewed by me and is documented in the nursing note in [facility] dated 12/26/2018    PHYSICAL EXAM:     ECOG PERFORMANCE STATUS:  2= Ambulatory and capable of all self care, but unable to carry out any work activities.  Up and about more than 50% of waking hours.       Vitals:    12/26/19 1501   BP: 132/85   Pulse: 64   Temp: 36.3 °C (97.4 °F)   SpO2: 97%   Pain Score: 6=Moderate Pain        GENERAL: Well-appearing alert and oriented x3 in no apparent distress.  2 craniotomy scars present  HEENT:  Pupils are equal, round, and reactive to light.  Extraocular muscles   are intact. Sclerae nonicteric.  Conjunctivae pink.  Oral cavity, tongue   protrudes midline.   NECK: No palpable nodes in the neck or supraclavicular fossa  LUNGS:  Clear to ascultation.  Decreased breath sounds throughout  HEART:  Regular rate and rhythm.  No murmur appreciated  ABDOMEN:  Soft. No evidence of hepatosplenomegaly.  Positive bowel sounds.  EXTREMITIES:  Without Edema.  NEUROLOGIC:  Cranial nerves II through XII were intact.  Guarded stance and gait motor and sensory grossly within normal limits        IMPRESSION:    A 74 y.o. male with presumed  metastatic lung cancer to the  brain status post resection of 2 large lesions in the brain 1 left  cerebellar and one right frontal.  Whole brain radiation therapy complete 11/8/2019    RECOMMENDATIONS:   Patient is doing well from a mental standpoint.  He is just debilitated and not eating.  I stressed the importance of continuing to eat.  He understands his [facility]  I am recommending getting an MRI scan of his whole brain in January and if that stable just continue to get scans every 3 to 4 months as indicated.    I am happy to see the patient as needed.      Thank you for the opportunity to participate in his care.  If any questions or comments, please do not hesitate in calling.      Please note that this dictation was created using voice recognition software. I have made every reasonable attempt to correct obvious errors, but I expect that there are errors of grammar and possibly content that I did not discover before finalizing the note.

## 2019-12-26 NOTE — NON-PROVIDER
Patient was seen today in clinic with Dr. Stephens for Follow up.  Vitals signs and weight were obtained and pain assessment was completed.  Allergies and medications were reviewed with the patient.  Review of systems completed.     Vitals/Pain:  Vitals:    12/26/19 1501   BP: 132/85   Pulse: 64   Temp: 36.3 °C (97.4 °F)   SpO2: 97%   Pain Score: 6=Moderate Pain        Allergies:   Patient has no known allergies.    Current Medications:  Current Outpatient Medications   Medication Sig Dispense Refill   • tamsulosin (FLOMAX) 0.4 MG capsule Take 1 Cap by mouth ONE-HALF HOUR AFTER BREAKFAST for 30 days. 60 Cap 2   • PACLITAXEL IV by Intravenous route.     • NS SOLN 250 mL with CARBOplatin 450 MG/45ML SOLN 300 mg 300 mg by Intravenous route Once.     • PEMBROLIZUMAB IV by Intravenous route.     • prochlorperazine (COMPAZINE) 10 MG Tab Take 10 mg by mouth every 6 hours as needed.     • vitamin D (CHOLECALCIFEROL) 1000 UNIT Tab Take 1,000 Units by mouth every day.     • B Complex Vitamins (B COMPLEX 1 PO) Take 1 Tab by mouth every day.     • Misc Natural Products (BETA-SITOSTEROL PLANT STEROLS PO) Take 1 Tab by mouth every day.     • tramadol (ULTRAM) 50 MG Tab Take 50 mg by mouth every four hours as needed for Moderate Pain.     • bismuth subsalicylate (PEPTO-BISMOL) 262 MG/15ML Suspension Take 30 mL by mouth every 6 hours as needed (constipation).     • levETIRAcetam (KEPPRA) 500 MG Tab Take 1 Tab by mouth 2 Times a Day. 60 Tab 2   • famotidine (PEPCID) 20 MG Tab Take 1 Tab by mouth 2 Times a Day. 60 Tab 2   • simvastatin (ZOCOR) 40 MG Tab Take 1 Tab by mouth every evening. 30 Tab 11     No current facility-administered medications for this encounter.          PCP:  No primary care provider on file.        Irasema Joseph, Med Ass't

## 2020-01-01 ENCOUNTER — HOSPITAL ENCOUNTER (OUTPATIENT)
Dept: RADIATION ONCOLOGY | Facility: MEDICAL CENTER | Age: 75
End: 2020-08-26
Payer: MEDICARE

## 2020-01-01 ENCOUNTER — HOSPITAL ENCOUNTER (OUTPATIENT)
Dept: RADIATION ONCOLOGY | Facility: MEDICAL CENTER | Age: 75
End: 2020-07-31
Attending: RADIOLOGY
Payer: COMMERCIAL

## 2020-01-01 ENCOUNTER — HOSPITAL ENCOUNTER (OUTPATIENT)
Dept: RADIATION ONCOLOGY | Facility: MEDICAL CENTER | Age: 75
End: 2020-09-04
Payer: MEDICARE

## 2020-01-01 ENCOUNTER — HOSPITAL ENCOUNTER (OUTPATIENT)
Dept: RADIOLOGY | Facility: MEDICAL CENTER | Age: 75
End: 2020-07-27
Payer: MEDICARE

## 2020-01-01 ENCOUNTER — HOSPITAL ENCOUNTER (OUTPATIENT)
Dept: RADIATION ONCOLOGY | Facility: MEDICAL CENTER | Age: 75
End: 2020-08-28
Payer: MEDICARE

## 2020-01-01 ENCOUNTER — PATIENT OUTREACH (OUTPATIENT)
Dept: OTHER | Facility: MEDICAL CENTER | Age: 75
End: 2020-01-01

## 2020-01-01 ENCOUNTER — HOSPITAL ENCOUNTER (OUTPATIENT)
Dept: RADIATION ONCOLOGY | Facility: MEDICAL CENTER | Age: 75
End: 2020-08-31
Attending: RADIOLOGY
Payer: COMMERCIAL

## 2020-01-01 ENCOUNTER — HOSPITAL ENCOUNTER (OUTPATIENT)
Dept: RADIATION ONCOLOGY | Facility: MEDICAL CENTER | Age: 75
End: 2020-08-27
Payer: MEDICARE

## 2020-01-01 ENCOUNTER — HOSPITAL ENCOUNTER (OUTPATIENT)
Dept: RADIATION ONCOLOGY | Facility: MEDICAL CENTER | Age: 75
End: 2020-08-24
Payer: MEDICARE

## 2020-01-01 ENCOUNTER — HOSPITAL ENCOUNTER (OUTPATIENT)
Dept: RADIATION ONCOLOGY | Facility: MEDICAL CENTER | Age: 75
End: 2020-09-02
Payer: MEDICARE

## 2020-01-01 ENCOUNTER — HOSPITAL ENCOUNTER (OUTPATIENT)
Dept: RADIATION ONCOLOGY | Facility: MEDICAL CENTER | Age: 75
End: 2020-09-30
Attending: RADIOLOGY
Payer: COMMERCIAL

## 2020-01-01 ENCOUNTER — HOSPITAL ENCOUNTER (OUTPATIENT)
Dept: RADIATION ONCOLOGY | Facility: MEDICAL CENTER | Age: 75
End: 2020-08-06
Payer: MEDICARE

## 2020-01-01 ENCOUNTER — HOSPITAL ENCOUNTER (OUTPATIENT)
Dept: RADIATION ONCOLOGY | Facility: MEDICAL CENTER | Age: 75
End: 2020-09-01
Payer: MEDICARE

## 2020-01-01 ENCOUNTER — HOSPITAL ENCOUNTER (OUTPATIENT)
Dept: RADIATION ONCOLOGY | Facility: MEDICAL CENTER | Age: 75
End: 2020-08-04
Payer: MEDICARE

## 2020-01-01 ENCOUNTER — HOSPITAL ENCOUNTER (INPATIENT)
Facility: MEDICAL CENTER | Age: 75
LOS: 8 days | DRG: 054 | End: 2020-11-20
Attending: HOSPITALIST | Admitting: HOSPITALIST
Payer: COMMERCIAL

## 2020-01-01 ENCOUNTER — HOSPITAL ENCOUNTER (OUTPATIENT)
Dept: RADIATION ONCOLOGY | Facility: MEDICAL CENTER | Age: 75
End: 2020-08-25
Payer: MEDICARE

## 2020-01-01 ENCOUNTER — TELEPHONE (OUTPATIENT)
Dept: RADIATION ONCOLOGY | Facility: MEDICAL CENTER | Age: 75
End: 2020-01-01

## 2020-01-01 ENCOUNTER — HOSPITAL ENCOUNTER (OUTPATIENT)
Dept: RADIATION ONCOLOGY | Facility: MEDICAL CENTER | Age: 75
End: 2020-09-03
Payer: MEDICARE

## 2020-01-01 ENCOUNTER — HOSPICE ADMISSION (OUTPATIENT)
Dept: HOSPICE | Facility: HOSPICE | Age: 75
End: 2020-01-01
Payer: MEDICARE

## 2020-01-01 ENCOUNTER — PHARMACY VISIT (OUTPATIENT)
Dept: PHARMACY | Facility: MEDICAL CENTER | Age: 75
End: 2020-01-01
Payer: COMMERCIAL

## 2020-01-01 ENCOUNTER — HOSPITAL ENCOUNTER (OUTPATIENT)
Dept: RADIATION ONCOLOGY | Facility: MEDICAL CENTER | Age: 75
End: 2020-08-03
Payer: MEDICARE

## 2020-01-01 ENCOUNTER — HOSPITAL ENCOUNTER (OUTPATIENT)
Dept: RADIATION ONCOLOGY | Facility: MEDICAL CENTER | Age: 75
End: 2020-09-08
Payer: MEDICARE

## 2020-01-01 ENCOUNTER — HOME CARE VISIT (OUTPATIENT)
Dept: HOSPICE | Facility: HOSPICE | Age: 75
End: 2020-01-01
Payer: MEDICARE

## 2020-01-01 ENCOUNTER — HOSPITAL ENCOUNTER (OUTPATIENT)
Dept: RADIATION ONCOLOGY | Facility: MEDICAL CENTER | Age: 75
End: 2020-08-10
Payer: MEDICARE

## 2020-01-01 ENCOUNTER — HOSPITAL ENCOUNTER (OUTPATIENT)
Dept: RADIATION ONCOLOGY | Facility: MEDICAL CENTER | Age: 75
End: 2020-08-12
Payer: MEDICARE

## 2020-01-01 ENCOUNTER — DOCUMENTATION (OUTPATIENT)
Dept: NUTRITION | Facility: MEDICAL CENTER | Age: 75
End: 2020-01-01

## 2020-01-01 ENCOUNTER — HOSPITAL ENCOUNTER (OUTPATIENT)
Dept: RADIATION ONCOLOGY | Facility: MEDICAL CENTER | Age: 75
End: 2020-08-31
Payer: MEDICARE

## 2020-01-01 ENCOUNTER — HOSPITAL ENCOUNTER (OUTPATIENT)
Dept: RADIATION ONCOLOGY | Facility: MEDICAL CENTER | Age: 75
End: 2020-08-05
Payer: MEDICARE

## 2020-01-01 ENCOUNTER — HOSPITAL ENCOUNTER (OUTPATIENT)
Dept: RADIATION ONCOLOGY | Facility: MEDICAL CENTER | Age: 75
End: 2020-07-27
Payer: MEDICARE

## 2020-01-01 ENCOUNTER — HOSPITAL ENCOUNTER (OUTPATIENT)
Dept: RADIATION ONCOLOGY | Facility: MEDICAL CENTER | Age: 75
End: 2020-09-10
Payer: MEDICARE

## 2020-01-01 ENCOUNTER — HOSPITAL ENCOUNTER (OUTPATIENT)
Dept: RADIATION ONCOLOGY | Facility: MEDICAL CENTER | Age: 75
End: 2020-08-11
Payer: MEDICARE

## 2020-01-01 ENCOUNTER — HOSPITAL ENCOUNTER (OUTPATIENT)
Dept: RADIATION ONCOLOGY | Facility: MEDICAL CENTER | Age: 75
End: 2020-08-17
Payer: MEDICARE

## 2020-01-01 ENCOUNTER — HOSPITAL ENCOUNTER (OUTPATIENT)
Dept: RADIATION ONCOLOGY | Facility: MEDICAL CENTER | Age: 75
End: 2020-08-07
Payer: MEDICARE

## 2020-01-01 ENCOUNTER — HOSPITAL ENCOUNTER (OUTPATIENT)
Dept: RADIATION ONCOLOGY | Facility: MEDICAL CENTER | Age: 75
End: 2020-09-15
Payer: MEDICARE

## 2020-01-01 ENCOUNTER — HOSPITAL ENCOUNTER (OUTPATIENT)
Dept: RADIATION ONCOLOGY | Facility: MEDICAL CENTER | Age: 75
End: 2020-08-20
Payer: MEDICARE

## 2020-01-01 ENCOUNTER — HOSPITAL ENCOUNTER (OUTPATIENT)
Dept: RADIATION ONCOLOGY | Facility: MEDICAL CENTER | Age: 75
End: 2020-08-13
Payer: MEDICARE

## 2020-01-01 ENCOUNTER — HOSPITAL ENCOUNTER (OUTPATIENT)
Dept: RADIATION ONCOLOGY | Facility: MEDICAL CENTER | Age: 75
End: 2020-09-11
Payer: MEDICARE

## 2020-01-01 ENCOUNTER — HOSPITAL ENCOUNTER (OUTPATIENT)
Dept: RADIATION ONCOLOGY | Facility: MEDICAL CENTER | Age: 75
End: 2020-08-14
Payer: MEDICARE

## 2020-01-01 ENCOUNTER — HOSPITAL ENCOUNTER (OUTPATIENT)
Dept: RADIATION ONCOLOGY | Facility: MEDICAL CENTER | Age: 75
End: 2020-08-18
Payer: MEDICARE

## 2020-01-01 ENCOUNTER — HOSPITAL ENCOUNTER (OUTPATIENT)
Dept: RADIATION ONCOLOGY | Facility: MEDICAL CENTER | Age: 75
End: 2020-09-09
Payer: MEDICARE

## 2020-01-01 ENCOUNTER — HOSPITAL ENCOUNTER (OUTPATIENT)
Dept: RADIATION ONCOLOGY | Facility: MEDICAL CENTER | Age: 75
End: 2020-09-16
Payer: MEDICARE

## 2020-01-01 ENCOUNTER — HOSPITAL ENCOUNTER (OUTPATIENT)
Dept: RADIATION ONCOLOGY | Facility: MEDICAL CENTER | Age: 75
End: 2020-09-14
Payer: MEDICARE

## 2020-01-01 ENCOUNTER — HOSPITAL ENCOUNTER (OUTPATIENT)
Dept: RADIATION ONCOLOGY | Facility: MEDICAL CENTER | Age: 75
End: 2020-08-19
Payer: MEDICARE

## 2020-01-01 ENCOUNTER — HOSPITAL ENCOUNTER (OUTPATIENT)
Dept: RADIOLOGY | Facility: MEDICAL CENTER | Age: 75
End: 2020-11-13
Payer: MEDICARE

## 2020-01-01 ENCOUNTER — HOSPITAL ENCOUNTER (OUTPATIENT)
Dept: RADIATION ONCOLOGY | Facility: MEDICAL CENTER | Age: 75
End: 2020-08-21
Payer: MEDICARE

## 2020-01-01 VITALS
WEIGHT: 135.36 LBS | TEMPERATURE: 97.7 F | SYSTOLIC BLOOD PRESSURE: 135 MMHG | DIASTOLIC BLOOD PRESSURE: 96 MMHG | HEART RATE: 61 BPM | OXYGEN SATURATION: 98 % | BODY MASS INDEX: 18.88 KG/M2

## 2020-01-01 VITALS
OXYGEN SATURATION: 97 % | BODY MASS INDEX: 19.01 KG/M2 | HEART RATE: 84 BPM | SYSTOLIC BLOOD PRESSURE: 129 MMHG | DIASTOLIC BLOOD PRESSURE: 89 MMHG | TEMPERATURE: 98.2 F | WEIGHT: 132.5 LBS

## 2020-01-01 VITALS
SYSTOLIC BLOOD PRESSURE: 127 MMHG | OXYGEN SATURATION: 97 % | OXYGEN SATURATION: 96 % | DIASTOLIC BLOOD PRESSURE: 95 MMHG | TEMPERATURE: 97.3 F | WEIGHT: 130.2 LBS | HEART RATE: 79 BPM | BODY MASS INDEX: 18.16 KG/M2 | SYSTOLIC BLOOD PRESSURE: 129 MMHG | TEMPERATURE: 97.8 F | HEART RATE: 56 BPM | DIASTOLIC BLOOD PRESSURE: 89 MMHG

## 2020-01-01 VITALS
TEMPERATURE: 97.7 F | WEIGHT: 135.8 LBS | DIASTOLIC BLOOD PRESSURE: 85 MMHG | BODY MASS INDEX: 19.01 KG/M2 | SYSTOLIC BLOOD PRESSURE: 134 MMHG | HEART RATE: 57 BPM | OXYGEN SATURATION: 97 % | HEIGHT: 71 IN

## 2020-01-01 VITALS
RESPIRATION RATE: 20 BRPM | DIASTOLIC BLOOD PRESSURE: 95 MMHG | TEMPERATURE: 98.3 F | HEART RATE: 58 BPM | SYSTOLIC BLOOD PRESSURE: 151 MMHG | WEIGHT: 134.26 LBS | OXYGEN SATURATION: 98 % | BODY MASS INDEX: 19.26 KG/M2

## 2020-01-01 VITALS
TEMPERATURE: 98.3 F | DIASTOLIC BLOOD PRESSURE: 80 MMHG | HEART RATE: 75 BPM | BODY MASS INDEX: 16.38 KG/M2 | SYSTOLIC BLOOD PRESSURE: 113 MMHG | OXYGEN SATURATION: 95 % | HEIGHT: 70 IN | RESPIRATION RATE: 16 BRPM | WEIGHT: 114.42 LBS

## 2020-01-01 VITALS
HEART RATE: 70 BPM | BODY MASS INDEX: 18.44 KG/M2 | TEMPERATURE: 97.5 F | SYSTOLIC BLOOD PRESSURE: 143 MMHG | DIASTOLIC BLOOD PRESSURE: 88 MMHG | OXYGEN SATURATION: 100 % | WEIGHT: 132.2 LBS

## 2020-01-01 VITALS
TEMPERATURE: 98.3 F | DIASTOLIC BLOOD PRESSURE: 100 MMHG | WEIGHT: 132.94 LBS | SYSTOLIC BLOOD PRESSURE: 155 MMHG | HEART RATE: 64 BPM | BODY MASS INDEX: 19.07 KG/M2 | RESPIRATION RATE: 18 BRPM | OXYGEN SATURATION: 98 %

## 2020-01-01 DIAGNOSIS — C34.90 LUNG CANCER METASTATIC TO BRAIN (HCC): ICD-10-CM

## 2020-01-01 DIAGNOSIS — C79.31 LUNG CANCER METASTATIC TO BRAIN (HCC): ICD-10-CM

## 2020-01-01 DIAGNOSIS — C79.31 BRAIN METASTASES: ICD-10-CM

## 2020-01-01 LAB
ANION GAP SERPL CALC-SCNC: 7 MMOL/L (ref 7–16)
APTT PPP: 26.8 SEC (ref 24.7–36)
BASOPHILS # BLD AUTO: 0 % (ref 0–1.8)
BASOPHILS # BLD: 0 K/UL (ref 0–0.12)
BUN SERPL-MCNC: 30 MG/DL (ref 8–22)
CALCIUM SERPL-MCNC: 9 MG/DL (ref 8.5–10.5)
CHEMOTHERAPY INFUSION START DATE: NORMAL
CHEMOTHERAPY INFUSION STOP DATE: NORMAL
CHEMOTHERAPY RECORDS: 2
CHEMOTHERAPY RECORDS: 6400
CHEMOTHERAPY RECORDS: NORMAL
CHEMOTHERAPY RX CANCER: NORMAL
CHLORIDE SERPL-SCNC: 103 MMOL/L (ref 96–112)
CO2 SERPL-SCNC: 22 MMOL/L (ref 20–33)
COVID ORDER STATUS COVID19: NORMAL
CREAT SERPL-MCNC: 0.86 MG/DL (ref 0.5–1.4)
DATE 1ST CHEMO CANCER: NORMAL
EKG IMPRESSION: NORMAL
EOSINOPHIL # BLD AUTO: 0 K/UL (ref 0–0.51)
EOSINOPHIL NFR BLD: 0 % (ref 0–6.9)
ERYTHROCYTE [DISTWIDTH] IN BLOOD BY AUTOMATED COUNT: 55.1 FL (ref 35.9–50)
GLUCOSE BLD-MCNC: 107 MG/DL (ref 65–99)
GLUCOSE BLD-MCNC: 110 MG/DL (ref 65–99)
GLUCOSE BLD-MCNC: 151 MG/DL (ref 65–99)
GLUCOSE BLD-MCNC: 187 MG/DL (ref 65–99)
GLUCOSE SERPL-MCNC: 123 MG/DL (ref 65–99)
HCT VFR BLD AUTO: 39.5 % (ref 42–52)
HGB BLD-MCNC: 12.9 G/DL (ref 14–18)
IMM GRANULOCYTES # BLD AUTO: 0.04 K/UL (ref 0–0.11)
IMM GRANULOCYTES NFR BLD AUTO: 0.8 % (ref 0–0.9)
INR PPP: 1.02 (ref 0.87–1.13)
LYMPHOCYTES # BLD AUTO: 0.51 K/UL (ref 1–4.8)
LYMPHOCYTES NFR BLD: 10.2 % (ref 22–41)
MAGNESIUM SERPL-MCNC: 2.1 MG/DL (ref 1.5–2.5)
MCH RBC QN AUTO: 30.9 PG (ref 27–33)
MCHC RBC AUTO-ENTMCNC: 32.7 G/DL (ref 33.7–35.3)
MCV RBC AUTO: 94.5 FL (ref 81.4–97.8)
MONOCYTES # BLD AUTO: 0.31 K/UL (ref 0–0.85)
MONOCYTES NFR BLD AUTO: 6.2 % (ref 0–13.4)
NEUTROPHILS # BLD AUTO: 4.15 K/UL (ref 1.82–7.42)
NEUTROPHILS NFR BLD: 82.8 % (ref 44–72)
NRBC # BLD AUTO: 0 K/UL
NRBC BLD-RTO: 0 /100 WBC
PLATELET # BLD AUTO: 262 K/UL (ref 164–446)
PMV BLD AUTO: 9.4 FL (ref 9–12.9)
POTASSIUM SERPL-SCNC: 3.7 MMOL/L (ref 3.6–5.5)
PROTHROMBIN TIME: 13.7 SEC (ref 12–14.6)
RAD ONC ARIA COURSE LAST TREATMENT DATE: NORMAL
RAD ONC ARIA COURSE TREATMENT ELAPSED DAYS: NORMAL
RAD ONC ARIA REFERENCE POINT DOSAGE GIVEN TO DATE: 10
RAD ONC ARIA REFERENCE POINT DOSAGE GIVEN TO DATE: 10.44
RAD ONC ARIA REFERENCE POINT DOSAGE GIVEN TO DATE: 12
RAD ONC ARIA REFERENCE POINT DOSAGE GIVEN TO DATE: 12.53
RAD ONC ARIA REFERENCE POINT DOSAGE GIVEN TO DATE: 14
RAD ONC ARIA REFERENCE POINT DOSAGE GIVEN TO DATE: 14.61
RAD ONC ARIA REFERENCE POINT DOSAGE GIVEN TO DATE: 16
RAD ONC ARIA REFERENCE POINT DOSAGE GIVEN TO DATE: 16.7
RAD ONC ARIA REFERENCE POINT DOSAGE GIVEN TO DATE: 18
RAD ONC ARIA REFERENCE POINT DOSAGE GIVEN TO DATE: 18.79
RAD ONC ARIA REFERENCE POINT DOSAGE GIVEN TO DATE: 2
RAD ONC ARIA REFERENCE POINT DOSAGE GIVEN TO DATE: 2.09
RAD ONC ARIA REFERENCE POINT DOSAGE GIVEN TO DATE: 20
RAD ONC ARIA REFERENCE POINT DOSAGE GIVEN TO DATE: 20.88
RAD ONC ARIA REFERENCE POINT DOSAGE GIVEN TO DATE: 22
RAD ONC ARIA REFERENCE POINT DOSAGE GIVEN TO DATE: 22.96
RAD ONC ARIA REFERENCE POINT DOSAGE GIVEN TO DATE: 24
RAD ONC ARIA REFERENCE POINT DOSAGE GIVEN TO DATE: 25.05
RAD ONC ARIA REFERENCE POINT DOSAGE GIVEN TO DATE: 26
RAD ONC ARIA REFERENCE POINT DOSAGE GIVEN TO DATE: 27.14
RAD ONC ARIA REFERENCE POINT DOSAGE GIVEN TO DATE: 28
RAD ONC ARIA REFERENCE POINT DOSAGE GIVEN TO DATE: 29.23
RAD ONC ARIA REFERENCE POINT DOSAGE GIVEN TO DATE: 30
RAD ONC ARIA REFERENCE POINT DOSAGE GIVEN TO DATE: 31.31
RAD ONC ARIA REFERENCE POINT DOSAGE GIVEN TO DATE: 32
RAD ONC ARIA REFERENCE POINT DOSAGE GIVEN TO DATE: 33.4
RAD ONC ARIA REFERENCE POINT DOSAGE GIVEN TO DATE: 34
RAD ONC ARIA REFERENCE POINT DOSAGE GIVEN TO DATE: 35.49
RAD ONC ARIA REFERENCE POINT DOSAGE GIVEN TO DATE: 36
RAD ONC ARIA REFERENCE POINT DOSAGE GIVEN TO DATE: 37.58
RAD ONC ARIA REFERENCE POINT DOSAGE GIVEN TO DATE: 38
RAD ONC ARIA REFERENCE POINT DOSAGE GIVEN TO DATE: 39.66
RAD ONC ARIA REFERENCE POINT DOSAGE GIVEN TO DATE: 4
RAD ONC ARIA REFERENCE POINT DOSAGE GIVEN TO DATE: 4.18
RAD ONC ARIA REFERENCE POINT DOSAGE GIVEN TO DATE: 40
RAD ONC ARIA REFERENCE POINT DOSAGE GIVEN TO DATE: 41.75
RAD ONC ARIA REFERENCE POINT DOSAGE GIVEN TO DATE: 42
RAD ONC ARIA REFERENCE POINT DOSAGE GIVEN TO DATE: 43.84
RAD ONC ARIA REFERENCE POINT DOSAGE GIVEN TO DATE: 44
RAD ONC ARIA REFERENCE POINT DOSAGE GIVEN TO DATE: 45.93
RAD ONC ARIA REFERENCE POINT DOSAGE GIVEN TO DATE: 46
RAD ONC ARIA REFERENCE POINT DOSAGE GIVEN TO DATE: 48
RAD ONC ARIA REFERENCE POINT DOSAGE GIVEN TO DATE: 48.01
RAD ONC ARIA REFERENCE POINT DOSAGE GIVEN TO DATE: 50
RAD ONC ARIA REFERENCE POINT DOSAGE GIVEN TO DATE: 50.1
RAD ONC ARIA REFERENCE POINT DOSAGE GIVEN TO DATE: 52
RAD ONC ARIA REFERENCE POINT DOSAGE GIVEN TO DATE: 52.19
RAD ONC ARIA REFERENCE POINT DOSAGE GIVEN TO DATE: 54
RAD ONC ARIA REFERENCE POINT DOSAGE GIVEN TO DATE: 54.28
RAD ONC ARIA REFERENCE POINT DOSAGE GIVEN TO DATE: 56
RAD ONC ARIA REFERENCE POINT DOSAGE GIVEN TO DATE: 56.36
RAD ONC ARIA REFERENCE POINT DOSAGE GIVEN TO DATE: 58
RAD ONC ARIA REFERENCE POINT DOSAGE GIVEN TO DATE: 58.45
RAD ONC ARIA REFERENCE POINT DOSAGE GIVEN TO DATE: 6
RAD ONC ARIA REFERENCE POINT DOSAGE GIVEN TO DATE: 6.26
RAD ONC ARIA REFERENCE POINT DOSAGE GIVEN TO DATE: 60
RAD ONC ARIA REFERENCE POINT DOSAGE GIVEN TO DATE: 60.54
RAD ONC ARIA REFERENCE POINT DOSAGE GIVEN TO DATE: 62
RAD ONC ARIA REFERENCE POINT DOSAGE GIVEN TO DATE: 62.63
RAD ONC ARIA REFERENCE POINT DOSAGE GIVEN TO DATE: 64
RAD ONC ARIA REFERENCE POINT DOSAGE GIVEN TO DATE: 64
RAD ONC ARIA REFERENCE POINT DOSAGE GIVEN TO DATE: 64.71
RAD ONC ARIA REFERENCE POINT DOSAGE GIVEN TO DATE: 66.8
RAD ONC ARIA REFERENCE POINT DOSAGE GIVEN TO DATE: 66.8
RAD ONC ARIA REFERENCE POINT DOSAGE GIVEN TO DATE: 8
RAD ONC ARIA REFERENCE POINT DOSAGE GIVEN TO DATE: 8.35
RAD ONC ARIA REFERENCE POINT ID: NORMAL
RAD ONC ARIA REFERENCE POINT SESSION DOSAGE GIVEN: 2
RAD ONC ARIA REFERENCE POINT SESSION DOSAGE GIVEN: 2.09
RBC # BLD AUTO: 4.18 M/UL (ref 4.7–6.1)
SARS-COV-2 RNA RESP QL NAA+PROBE: NOTDETECTED
SODIUM SERPL-SCNC: 132 MMOL/L (ref 135–145)
SPECIMEN SOURCE: NORMAL
WBC # BLD AUTO: 5 K/UL (ref 4.8–10.8)

## 2020-01-01 PROCEDURE — 770022 HCHG ROOM/CARE - ICU (200)

## 2020-01-01 PROCEDURE — 77336 RADIATION PHYSICS CONSULT: CPT | Performed by: RADIOLOGY

## 2020-01-01 PROCEDURE — 700101 HCHG RX REV CODE 250: Performed by: STUDENT IN AN ORGANIZED HEALTH CARE EDUCATION/TRAINING PROGRAM

## 2020-01-01 PROCEDURE — 77386 HCHG IMRT DELIVERY COMPLEX: CPT | Performed by: RADIOLOGY

## 2020-01-01 PROCEDURE — 99232 SBSQ HOSP IP/OBS MODERATE 35: CPT | Performed by: STUDENT IN AN ORGANIZED HEALTH CARE EDUCATION/TRAINING PROGRAM

## 2020-01-01 PROCEDURE — 77014 PR CT GUIDANCE PLACEMENT RAD THERAPY FIELDS: CPT | Mod: 26 | Performed by: RADIOLOGY

## 2020-01-01 PROCEDURE — 85730 THROMBOPLASTIN TIME PARTIAL: CPT

## 2020-01-01 PROCEDURE — 99215 OFFICE O/P EST HI 40 MIN: CPT | Performed by: RADIOLOGY

## 2020-01-01 PROCEDURE — 700111 HCHG RX REV CODE 636 W/ 250 OVERRIDE (IP): Performed by: STUDENT IN AN ORGANIZED HEALTH CARE EDUCATION/TRAINING PROGRAM

## 2020-01-01 PROCEDURE — A9270 NON-COVERED ITEM OR SERVICE: HCPCS | Performed by: STUDENT IN AN ORGANIZED HEALTH CARE EDUCATION/TRAINING PROGRAM

## 2020-01-01 PROCEDURE — 770004 HCHG ROOM/CARE - ONCOLOGY PRIVATE *

## 2020-01-01 PROCEDURE — 99223 1ST HOSP IP/OBS HIGH 75: CPT | Performed by: INTERNAL MEDICINE

## 2020-01-01 PROCEDURE — 77301 RADIOTHERAPY DOSE PLAN IMRT: CPT | Mod: 26 | Performed by: RADIOLOGY

## 2020-01-01 PROCEDURE — 99232 SBSQ HOSP IP/OBS MODERATE 35: CPT | Mod: GC | Performed by: HOSPITALIST

## 2020-01-01 PROCEDURE — 77280 THER RAD SIMULAJ FIELD SMPL: CPT | Performed by: RADIOLOGY

## 2020-01-01 PROCEDURE — 99239 HOSP IP/OBS DSCHRG MGMT >30: CPT | Performed by: STUDENT IN AN ORGANIZED HEALTH CARE EDUCATION/TRAINING PROGRAM

## 2020-01-01 PROCEDURE — 77293 RESPIRATOR MOTION MGMT SIMUL: CPT | Mod: 26 | Performed by: RADIOLOGY

## 2020-01-01 PROCEDURE — 77301 RADIOTHERAPY DOSE PLAN IMRT: CPT | Performed by: RADIOLOGY

## 2020-01-01 PROCEDURE — 77290 THER RAD SIMULAJ FIELD CPLX: CPT | Performed by: RADIOLOGY

## 2020-01-01 PROCEDURE — 700102 HCHG RX REV CODE 250 W/ 637 OVERRIDE(OP): Performed by: STUDENT IN AN ORGANIZED HEALTH CARE EDUCATION/TRAINING PROGRAM

## 2020-01-01 PROCEDURE — 85025 COMPLETE CBC W/AUTO DIFF WBC: CPT

## 2020-01-01 PROCEDURE — 77334 RADIATION TREATMENT AID(S): CPT | Performed by: RADIOLOGY

## 2020-01-01 PROCEDURE — 77338 DESIGN MLC DEVICE FOR IMRT: CPT | Mod: 26 | Performed by: RADIOLOGY

## 2020-01-01 PROCEDURE — 700111 HCHG RX REV CODE 636 W/ 250 OVERRIDE (IP): Performed by: HOSPITALIST

## 2020-01-01 PROCEDURE — 77427 RADIATION TX MANAGEMENT X5: CPT | Performed by: RADIOLOGY

## 2020-01-01 PROCEDURE — 700105 HCHG RX REV CODE 258: Performed by: STUDENT IN AN ORGANIZED HEALTH CARE EDUCATION/TRAINING PROGRAM

## 2020-01-01 PROCEDURE — 80048 BASIC METABOLIC PNL TOTAL CA: CPT

## 2020-01-01 PROCEDURE — 77300 RADIATION THERAPY DOSE PLAN: CPT | Performed by: RADIOLOGY

## 2020-01-01 PROCEDURE — 82962 GLUCOSE BLOOD TEST: CPT | Mod: 91

## 2020-01-01 PROCEDURE — 700102 HCHG RX REV CODE 250 W/ 637 OVERRIDE(OP): Performed by: HOSPITALIST

## 2020-01-01 PROCEDURE — 99232 SBSQ HOSP IP/OBS MODERATE 35: CPT | Performed by: INTERNAL MEDICINE

## 2020-01-01 PROCEDURE — U0003 INFECTIOUS AGENT DETECTION BY NUCLEIC ACID (DNA OR RNA); SEVERE ACUTE RESPIRATORY SYNDROME CORONAVIRUS 2 (SARS-COV-2) (CORONAVIRUS DISEASE [COVID-19]), AMPLIFIED PROBE TECHNIQUE, MAKING USE OF HIGH THROUGHPUT TECHNOLOGIES AS DESCRIBED BY CMS-2020-01-R: HCPCS

## 2020-01-01 PROCEDURE — 770001 HCHG ROOM/CARE - MED/SURG/GYN PRIV*

## 2020-01-01 PROCEDURE — A9270 NON-COVERED ITEM OR SERVICE: HCPCS | Performed by: HOSPITALIST

## 2020-01-01 PROCEDURE — RXMED WILLOW AMBULATORY MEDICATION CHARGE: Performed by: STUDENT IN AN ORGANIZED HEALTH CARE EDUCATION/TRAINING PROGRAM

## 2020-01-01 PROCEDURE — 85610 PROTHROMBIN TIME: CPT

## 2020-01-01 PROCEDURE — 93010 ELECTROCARDIOGRAM REPORT: CPT | Performed by: INTERNAL MEDICINE

## 2020-01-01 PROCEDURE — 92610 EVALUATE SWALLOWING FUNCTION: CPT

## 2020-01-01 PROCEDURE — 99291 CRITICAL CARE FIRST HOUR: CPT | Performed by: INTERNAL MEDICINE

## 2020-01-01 PROCEDURE — 77338 DESIGN MLC DEVICE FOR IMRT: CPT | Performed by: RADIOLOGY

## 2020-01-01 PROCEDURE — 99233 SBSQ HOSP IP/OBS HIGH 50: CPT | Mod: GC | Performed by: HOSPITALIST

## 2020-01-01 PROCEDURE — C9803 HOPD COVID-19 SPEC COLLECT: HCPCS | Performed by: STUDENT IN AN ORGANIZED HEALTH CARE EDUCATION/TRAINING PROGRAM

## 2020-01-01 PROCEDURE — 99214 OFFICE O/P EST MOD 30 MIN: CPT | Performed by: RADIOLOGY

## 2020-01-01 PROCEDURE — 77300 RADIATION THERAPY DOSE PLAN: CPT | Mod: 26 | Performed by: RADIOLOGY

## 2020-01-01 PROCEDURE — 77263 THER RADIOLOGY TX PLNG CPLX: CPT | Performed by: RADIOLOGY

## 2020-01-01 PROCEDURE — 83735 ASSAY OF MAGNESIUM: CPT

## 2020-01-01 PROCEDURE — 77334 RADIATION TREATMENT AID(S): CPT | Mod: 26 | Performed by: RADIOLOGY

## 2020-01-01 PROCEDURE — 77293 RESPIRATOR MOTION MGMT SIMUL: CPT | Performed by: RADIOLOGY

## 2020-01-01 PROCEDURE — 77470 SPECIAL RADIATION TREATMENT: CPT | Performed by: RADIOLOGY

## 2020-01-01 PROCEDURE — 93005 ELECTROCARDIOGRAM TRACING: CPT | Performed by: STUDENT IN AN ORGANIZED HEALTH CARE EDUCATION/TRAINING PROGRAM

## 2020-01-01 RX ORDER — DEXTROSE MONOHYDRATE 25 G/50ML
50 INJECTION, SOLUTION INTRAVENOUS
Status: DISCONTINUED | OUTPATIENT
Start: 2020-01-01 | End: 2020-01-01

## 2020-01-01 RX ORDER — MORPHINE SULFATE 10 MG/ML
5 INJECTION, SOLUTION INTRAMUSCULAR; INTRAVENOUS
Status: DISCONTINUED | OUTPATIENT
Start: 2020-01-01 | End: 2020-01-01 | Stop reason: HOSPADM

## 2020-01-01 RX ORDER — ATROPINE SULFATE 10 MG/ML
2 SOLUTION/ DROPS OPHTHALMIC EVERY 4 HOURS PRN
Status: DISCONTINUED | OUTPATIENT
Start: 2020-01-01 | End: 2020-01-01 | Stop reason: HOSPADM

## 2020-01-01 RX ORDER — DEXAMETHASONE SODIUM PHOSPHATE 4 MG/ML
4 INJECTION, SOLUTION INTRA-ARTICULAR; INTRALESIONAL; INTRAMUSCULAR; INTRAVENOUS; SOFT TISSUE EVERY 6 HOURS
Status: DISCONTINUED | OUTPATIENT
Start: 2020-01-01 | End: 2020-01-01

## 2020-01-01 RX ORDER — AMLODIPINE BESYLATE 5 MG/1
5 TABLET ORAL
Status: DISCONTINUED | OUTPATIENT
Start: 2020-01-01 | End: 2020-01-01

## 2020-01-01 RX ORDER — BISACODYL 10 MG
10 SUPPOSITORY, RECTAL RECTAL
Status: DISCONTINUED | OUTPATIENT
Start: 2020-01-01 | End: 2020-01-01

## 2020-01-01 RX ORDER — LORAZEPAM 2 MG/ML
1 INJECTION INTRAMUSCULAR
Status: DISCONTINUED | OUTPATIENT
Start: 2020-01-01 | End: 2020-01-01 | Stop reason: HOSPADM

## 2020-01-01 RX ORDER — ACETAMINOPHEN 325 MG/1
650 TABLET ORAL EVERY 6 HOURS PRN
Status: DISCONTINUED | OUTPATIENT
Start: 2020-01-01 | End: 2020-01-01

## 2020-01-01 RX ORDER — LORAZEPAM 2 MG/ML
1 CONCENTRATE ORAL
Status: DISCONTINUED | OUTPATIENT
Start: 2020-01-01 | End: 2020-01-01 | Stop reason: HOSPADM

## 2020-01-01 RX ORDER — LORAZEPAM 2 MG/ML
1 CONCENTRATE ORAL EVERY 4 HOURS PRN
Qty: 15 ML | Refills: 0 | Status: SHIPPED | OUTPATIENT
Start: 2020-01-01 | End: 2020-01-01

## 2020-01-01 RX ORDER — MORPHINE SULFATE 100 MG/5ML
10 SOLUTION ORAL EVERY 4 HOURS PRN
Qty: 15 ML | Refills: 0 | Status: SHIPPED | OUTPATIENT
Start: 2020-01-01 | End: 2020-01-01

## 2020-01-01 RX ORDER — FAMOTIDINE 20 MG/1
20 TABLET, FILM COATED ORAL 2 TIMES DAILY
Status: DISCONTINUED | OUTPATIENT
Start: 2020-01-01 | End: 2020-01-01

## 2020-01-01 RX ORDER — AMOXICILLIN 250 MG
1 CAPSULE ORAL 2 TIMES DAILY
Status: DISCONTINUED | OUTPATIENT
Start: 2020-01-01 | End: 2020-01-01 | Stop reason: HOSPADM

## 2020-01-01 RX ORDER — TAMSULOSIN HYDROCHLORIDE 0.4 MG/1
0.4 CAPSULE ORAL
Status: DISCONTINUED | OUTPATIENT
Start: 2020-01-01 | End: 2020-01-01

## 2020-01-01 RX ORDER — ACETAMINOPHEN 650 MG/1
650 SUPPOSITORY RECTAL EVERY 4 HOURS PRN
Status: DISCONTINUED | OUTPATIENT
Start: 2020-01-01 | End: 2020-01-01 | Stop reason: HOSPADM

## 2020-01-01 RX ORDER — ONDANSETRON 8 MG/1
8 TABLET, ORALLY DISINTEGRATING ORAL EVERY 8 HOURS PRN
Status: DISCONTINUED | OUTPATIENT
Start: 2020-01-01 | End: 2020-01-01 | Stop reason: HOSPADM

## 2020-01-01 RX ORDER — DEXAMETHASONE SODIUM PHOSPHATE 4 MG/ML
4 INJECTION, SOLUTION INTRA-ARTICULAR; INTRALESIONAL; INTRAMUSCULAR; INTRAVENOUS; SOFT TISSUE EVERY 6 HOURS
Status: DISCONTINUED | OUTPATIENT
Start: 2020-01-01 | End: 2020-01-01 | Stop reason: HOSPADM

## 2020-01-01 RX ORDER — ONDANSETRON 2 MG/ML
8 INJECTION INTRAMUSCULAR; INTRAVENOUS EVERY 8 HOURS PRN
Status: DISCONTINUED | OUTPATIENT
Start: 2020-01-01 | End: 2020-01-01 | Stop reason: HOSPADM

## 2020-01-01 RX ORDER — LABETALOL HYDROCHLORIDE 5 MG/ML
10 INJECTION, SOLUTION INTRAVENOUS EVERY 4 HOURS PRN
Status: DISCONTINUED | OUTPATIENT
Start: 2020-01-01 | End: 2020-01-01

## 2020-01-01 RX ORDER — POLYVINYL ALCOHOL 14 MG/ML
2 SOLUTION/ DROPS OPHTHALMIC EVERY 6 HOURS PRN
Status: DISCONTINUED | OUTPATIENT
Start: 2020-01-01 | End: 2020-01-01 | Stop reason: HOSPADM

## 2020-01-01 RX ORDER — AMOXICILLIN 250 MG
1 CAPSULE ORAL 2 TIMES DAILY
Qty: 30 TAB | Refills: 0 | Status: SHIPPED | OUTPATIENT
Start: 2020-01-01 | End: 2020-01-01

## 2020-01-01 RX ORDER — POLYETHYLENE GLYCOL 3350 17 G/17G
1 POWDER, FOR SOLUTION ORAL
Status: DISCONTINUED | OUTPATIENT
Start: 2020-01-01 | End: 2020-01-01

## 2020-01-01 RX ORDER — AMOXICILLIN 250 MG
2 CAPSULE ORAL 2 TIMES DAILY
Status: DISCONTINUED | OUTPATIENT
Start: 2020-01-01 | End: 2020-01-01

## 2020-01-01 RX ORDER — SODIUM CHLORIDE 9 MG/ML
INJECTION, SOLUTION INTRAVENOUS CONTINUOUS
Status: DISCONTINUED | OUTPATIENT
Start: 2020-01-01 | End: 2020-01-01

## 2020-01-01 RX ORDER — ACETAMINOPHEN 325 MG/1
650 TABLET ORAL EVERY 4 HOURS PRN
Status: DISCONTINUED | OUTPATIENT
Start: 2020-01-01 | End: 2020-01-01 | Stop reason: HOSPADM

## 2020-01-01 RX ORDER — MORPHINE SULFATE 10 MG/ML
10 INJECTION, SOLUTION INTRAMUSCULAR; INTRAVENOUS
Status: DISCONTINUED | OUTPATIENT
Start: 2020-01-01 | End: 2020-01-01 | Stop reason: HOSPADM

## 2020-01-01 RX ORDER — POLYETHYLENE GLYCOL 3350 17 G/17G
1 POWDER, FOR SOLUTION ORAL DAILY
Status: DISCONTINUED | OUTPATIENT
Start: 2020-01-01 | End: 2020-01-01 | Stop reason: HOSPADM

## 2020-01-01 RX ORDER — TAMSULOSIN HYDROCHLORIDE 0.4 MG/1
0.4 CAPSULE ORAL
Status: DISCONTINUED | OUTPATIENT
Start: 2020-01-01 | End: 2020-01-01 | Stop reason: HOSPADM

## 2020-01-01 RX ORDER — MORPHINE SULFATE 100 MG/5ML
10 SOLUTION ORAL
Status: DISCONTINUED | OUTPATIENT
Start: 2020-01-01 | End: 2020-01-01 | Stop reason: HOSPADM

## 2020-01-01 RX ORDER — BISACODYL 10 MG
10 SUPPOSITORY, RECTAL RECTAL
Status: DISCONTINUED | OUTPATIENT
Start: 2020-01-01 | End: 2020-01-01 | Stop reason: HOSPADM

## 2020-01-01 RX ADMIN — DOCUSATE SODIUM 50 MG AND SENNOSIDES 8.6 MG 1 TABLET: 8.6; 5 TABLET, FILM COATED ORAL at 17:25

## 2020-01-01 RX ADMIN — DEXAMETHASONE SODIUM PHOSPHATE 4 MG: 4 INJECTION, SOLUTION INTRA-ARTICULAR; INTRALESIONAL; INTRAMUSCULAR; INTRAVENOUS; SOFT TISSUE at 12:20

## 2020-01-01 RX ADMIN — LORAZEPAM 1 MG: 2 SOLUTION, CONCENTRATE ORAL at 02:10

## 2020-01-01 RX ADMIN — DOCUSATE SODIUM 50 MG AND SENNOSIDES 8.6 MG 1 TABLET: 8.6; 5 TABLET, FILM COATED ORAL at 07:33

## 2020-01-01 RX ADMIN — MORPHINE SULFATE 5 MG: 10 INJECTION INTRAVENOUS at 10:19

## 2020-01-01 RX ADMIN — DOCUSATE SODIUM 50 MG AND SENNOSIDES 8.6 MG 1 TABLET: 8.6; 5 TABLET, FILM COATED ORAL at 17:36

## 2020-01-01 RX ADMIN — DEXAMETHASONE SODIUM PHOSPHATE 4 MG: 4 INJECTION, SOLUTION INTRA-ARTICULAR; INTRALESIONAL; INTRAMUSCULAR; INTRAVENOUS; SOFT TISSUE at 16:30

## 2020-01-01 RX ADMIN — DEXAMETHASONE SODIUM PHOSPHATE 4 MG: 4 INJECTION, SOLUTION INTRA-ARTICULAR; INTRALESIONAL; INTRAMUSCULAR; INTRAVENOUS; SOFT TISSUE at 00:01

## 2020-01-01 RX ADMIN — TAMSULOSIN HYDROCHLORIDE 0.4 MG: 0.4 CAPSULE ORAL at 08:00

## 2020-01-01 RX ADMIN — DEXAMETHASONE SODIUM PHOSPHATE 4 MG: 4 INJECTION, SOLUTION INTRA-ARTICULAR; INTRALESIONAL; INTRAMUSCULAR; INTRAVENOUS; SOFT TISSUE at 11:24

## 2020-01-01 RX ADMIN — TAMSULOSIN HYDROCHLORIDE 0.4 MG: 0.4 CAPSULE ORAL at 07:33

## 2020-01-01 RX ADMIN — DEXAMETHASONE SODIUM PHOSPHATE 4 MG: 4 INJECTION, SOLUTION INTRA-ARTICULAR; INTRALESIONAL; INTRAMUSCULAR; INTRAVENOUS; SOFT TISSUE at 17:25

## 2020-01-01 RX ADMIN — DEXAMETHASONE SODIUM PHOSPHATE 4 MG: 4 INJECTION, SOLUTION INTRA-ARTICULAR; INTRALESIONAL; INTRAMUSCULAR; INTRAVENOUS; SOFT TISSUE at 06:20

## 2020-01-01 RX ADMIN — DEXAMETHASONE SODIUM PHOSPHATE 4 MG: 4 INJECTION, SOLUTION INTRA-ARTICULAR; INTRALESIONAL; INTRAMUSCULAR; INTRAVENOUS; SOFT TISSUE at 07:33

## 2020-01-01 RX ADMIN — POLYETHYLENE GLYCOL 3350 1 PACKET: 17 POWDER, FOR SOLUTION ORAL at 05:44

## 2020-01-01 RX ADMIN — DEXAMETHASONE SODIUM PHOSPHATE 4 MG: 4 INJECTION, SOLUTION INTRA-ARTICULAR; INTRALESIONAL; INTRAMUSCULAR; INTRAVENOUS; SOFT TISSUE at 23:36

## 2020-01-01 RX ADMIN — SODIUM CHLORIDE: 9 INJECTION, SOLUTION INTRAVENOUS at 22:39

## 2020-01-01 RX ADMIN — DEXAMETHASONE SODIUM PHOSPHATE 4 MG: 4 INJECTION, SOLUTION INTRA-ARTICULAR; INTRALESIONAL; INTRAMUSCULAR; INTRAVENOUS; SOFT TISSUE at 23:40

## 2020-01-01 RX ADMIN — DEXAMETHASONE SODIUM PHOSPHATE 4 MG: 4 INJECTION, SOLUTION INTRA-ARTICULAR; INTRALESIONAL; INTRAMUSCULAR; INTRAVENOUS; SOFT TISSUE at 17:47

## 2020-01-01 RX ADMIN — DEXAMETHASONE SODIUM PHOSPHATE 4 MG: 4 INJECTION, SOLUTION INTRA-ARTICULAR; INTRALESIONAL; INTRAMUSCULAR; INTRAVENOUS; SOFT TISSUE at 06:23

## 2020-01-01 RX ADMIN — POLYETHYLENE GLYCOL 3350 1 PACKET: 17 POWDER, FOR SOLUTION ORAL at 04:50

## 2020-01-01 RX ADMIN — DEXAMETHASONE SODIUM PHOSPHATE 4 MG: 4 INJECTION, SOLUTION INTRA-ARTICULAR; INTRALESIONAL; INTRAMUSCULAR; INTRAVENOUS; SOFT TISSUE at 05:05

## 2020-01-01 RX ADMIN — DEXAMETHASONE SODIUM PHOSPHATE 4 MG: 4 INJECTION, SOLUTION INTRA-ARTICULAR; INTRALESIONAL; INTRAMUSCULAR; INTRAVENOUS; SOFT TISSUE at 04:50

## 2020-01-01 RX ADMIN — TAMSULOSIN HYDROCHLORIDE 0.4 MG: 0.4 CAPSULE ORAL at 08:52

## 2020-01-01 RX ADMIN — DOCUSATE SODIUM 50 MG AND SENNOSIDES 8.6 MG 1 TABLET: 8.6; 5 TABLET, FILM COATED ORAL at 04:50

## 2020-01-01 RX ADMIN — TAMSULOSIN HYDROCHLORIDE 0.4 MG: 0.4 CAPSULE ORAL at 09:40

## 2020-01-01 RX ADMIN — DEXAMETHASONE SODIUM PHOSPHATE 4 MG: 4 INJECTION, SOLUTION INTRA-ARTICULAR; INTRALESIONAL; INTRAMUSCULAR; INTRAVENOUS; SOFT TISSUE at 17:18

## 2020-01-01 RX ADMIN — DEXAMETHASONE SODIUM PHOSPHATE 4 MG: 4 INJECTION, SOLUTION INTRA-ARTICULAR; INTRALESIONAL; INTRAMUSCULAR; INTRAVENOUS; SOFT TISSUE at 11:17

## 2020-01-01 RX ADMIN — DEXAMETHASONE SODIUM PHOSPHATE 4 MG: 4 INJECTION, SOLUTION INTRA-ARTICULAR; INTRALESIONAL; INTRAMUSCULAR; INTRAVENOUS; SOFT TISSUE at 23:41

## 2020-01-01 RX ADMIN — DEXAMETHASONE SODIUM PHOSPHATE 4 MG: 4 INJECTION, SOLUTION INTRA-ARTICULAR; INTRALESIONAL; INTRAMUSCULAR; INTRAVENOUS; SOFT TISSUE at 17:32

## 2020-01-01 RX ADMIN — TAMSULOSIN HYDROCHLORIDE 0.4 MG: 0.4 CAPSULE ORAL at 07:54

## 2020-01-01 RX ADMIN — FAMOTIDINE 20 MG: 20 TABLET, FILM COATED ORAL at 09:40

## 2020-01-01 RX ADMIN — MORPHINE SULFATE 5 MG: 10 INJECTION INTRAVENOUS at 11:31

## 2020-01-01 RX ADMIN — DEXAMETHASONE SODIUM PHOSPHATE 4 MG: 4 INJECTION, SOLUTION INTRA-ARTICULAR; INTRALESIONAL; INTRAMUSCULAR; INTRAVENOUS; SOFT TISSUE at 05:44

## 2020-01-01 RX ADMIN — INSULIN LISPRO 1 UNITS: 100 INJECTION, SOLUTION INTRAVENOUS; SUBCUTANEOUS at 14:36

## 2020-01-01 RX ADMIN — DEXAMETHASONE SODIUM PHOSPHATE 4 MG: 4 INJECTION, SOLUTION INTRA-ARTICULAR; INTRALESIONAL; INTRAMUSCULAR; INTRAVENOUS; SOFT TISSUE at 05:49

## 2020-01-01 RX ADMIN — POLYETHYLENE GLYCOL 3350 1 PACKET: 17 POWDER, FOR SOLUTION ORAL at 07:32

## 2020-01-01 RX ADMIN — DEXAMETHASONE SODIUM PHOSPHATE 4 MG: 4 INJECTION, SOLUTION INTRA-ARTICULAR; INTRALESIONAL; INTRAMUSCULAR; INTRAVENOUS; SOFT TISSUE at 11:55

## 2020-01-01 RX ADMIN — DEXAMETHASONE SODIUM PHOSPHATE 4 MG: 4 INJECTION, SOLUTION INTRA-ARTICULAR; INTRALESIONAL; INTRAMUSCULAR; INTRAVENOUS; SOFT TISSUE at 00:13

## 2020-01-01 RX ADMIN — DEXAMETHASONE SODIUM PHOSPHATE 4 MG: 4 INJECTION, SOLUTION INTRA-ARTICULAR; INTRALESIONAL; INTRAMUSCULAR; INTRAVENOUS; SOFT TISSUE at 05:58

## 2020-01-01 RX ADMIN — DEXAMETHASONE SODIUM PHOSPHATE 4 MG: 4 INJECTION, SOLUTION INTRA-ARTICULAR; INTRALESIONAL; INTRAMUSCULAR; INTRAVENOUS; SOFT TISSUE at 17:36

## 2020-01-01 RX ADMIN — TAMSULOSIN HYDROCHLORIDE 0.4 MG: 0.4 CAPSULE ORAL at 07:50

## 2020-01-01 RX ADMIN — LORAZEPAM 1 MG: 2 INJECTION INTRAMUSCULAR; INTRAVENOUS at 23:44

## 2020-01-01 RX ADMIN — DEXAMETHASONE SODIUM PHOSPHATE 4 MG: 4 INJECTION, SOLUTION INTRA-ARTICULAR; INTRALESIONAL; INTRAMUSCULAR; INTRAVENOUS; SOFT TISSUE at 23:49

## 2020-01-01 RX ADMIN — DOCUSATE SODIUM 50 MG AND SENNOSIDES 8.6 MG 1 TABLET: 8.6; 5 TABLET, FILM COATED ORAL at 05:44

## 2020-01-01 RX ADMIN — DEXAMETHASONE SODIUM PHOSPHATE 4 MG: 4 INJECTION, SOLUTION INTRA-ARTICULAR; INTRALESIONAL; INTRAMUSCULAR; INTRAVENOUS; SOFT TISSUE at 00:07

## 2020-01-01 RX ADMIN — POLYETHYLENE GLYCOL 3350 1 PACKET: 17 POWDER, FOR SOLUTION ORAL at 16:30

## 2020-01-01 RX ADMIN — DEXAMETHASONE SODIUM PHOSPHATE 4 MG: 4 INJECTION, SOLUTION INTRA-ARTICULAR; INTRALESIONAL; INTRAMUSCULAR; INTRAVENOUS; SOFT TISSUE at 11:32

## 2020-01-01 RX ADMIN — DEXAMETHASONE SODIUM PHOSPHATE 4 MG: 4 INJECTION, SOLUTION INTRA-ARTICULAR; INTRALESIONAL; INTRAMUSCULAR; INTRAVENOUS; SOFT TISSUE at 12:10

## 2020-01-01 RX ADMIN — AMLODIPINE BESYLATE 5 MG: 5 TABLET ORAL at 22:39

## 2020-01-01 RX ADMIN — TAMSULOSIN HYDROCHLORIDE 0.4 MG: 0.4 CAPSULE ORAL at 10:19

## 2020-01-01 RX ADMIN — DEXAMETHASONE SODIUM PHOSPHATE 4 MG: 4 INJECTION, SOLUTION INTRA-ARTICULAR; INTRALESIONAL; INTRAMUSCULAR; INTRAVENOUS; SOFT TISSUE at 02:09

## 2020-01-01 ASSESSMENT — LIFESTYLE VARIABLES
SUBSTANCE_ABUSE: 0

## 2020-01-01 ASSESSMENT — ENCOUNTER SYMPTOMS
SEIZURES: 0
SHORTNESS OF BREATH: 0
WHEEZING: 0
VOMITING: 0
SEIZURES: 0
EYE REDNESS: 0
FOCAL WEAKNESS: 0
PALPITATIONS: 0
COUGH: 0
FALLS: 0
WHEEZING: 0
NERVOUS/ANXIOUS: 0
SEIZURES: 0
LOSS OF CONSCIOUSNESS: 0
PHOTOPHOBIA: 0
DEPRESSION: 0
BLURRED VISION: 0
SORE THROAT: 0
HEADACHES: 0
DEPRESSION: 0
SPEECH CHANGE: 0
SPEECH CHANGE: 0
DIAPHORESIS: 0
CONSTIPATION: 0
DIAPHORESIS: 0
DIARRHEA: 0
SHORTNESS OF BREATH: 0
MYALGIAS: 0
TREMORS: 0
COUGH: 1
CONSTIPATION: 0
TREMORS: 0
HEADACHES: 1
FEVER: 0
ORTHOPNEA: 0
PALPITATIONS: 0
WEAKNESS: 1
CHILLS: 0
SENSORY CHANGE: 0
ABDOMINAL PAIN: 0
LOSS OF CONSCIOUSNESS: 0
FOCAL WEAKNESS: 0
EYE PAIN: 0
SORE THROAT: 0
DIARRHEA: 0
FALLS: 0
FEVER: 0
SPEECH CHANGE: 0
NECK PAIN: 0
EYE PAIN: 0
FEVER: 0
ABDOMINAL PAIN: 0
NAUSEA: 0
WHEEZING: 0
LOSS OF CONSCIOUSNESS: 0
VOMITING: 0
PALPITATIONS: 0
WHEEZING: 0
HEADACHES: 0
HEARTBURN: 0
FLANK PAIN: 0
CHILLS: 0
ORTHOPNEA: 0
NAUSEA: 0
PALPITATIONS: 0
DEPRESSION: 0
NAUSEA: 0
VOMITING: 0
CONSTIPATION: 0
HEARTBURN: 0
CONSTIPATION: 0
INSOMNIA: 0
ORTHOPNEA: 0
EYE PAIN: 0
NECK PAIN: 0
FEVER: 0
SORE THROAT: 0
CHILLS: 0
FLANK PAIN: 0
MYALGIAS: 0
PALPITATIONS: 0
TREMORS: 0
MYALGIAS: 0
COUGH: 0
NAUSEA: 0
NERVOUS/ANXIOUS: 0
SORE THROAT: 0
CHILLS: 0
INSOMNIA: 0
VOMITING: 0
VOMITING: 0
EYE DISCHARGE: 0
HEADACHES: 0
ABDOMINAL PAIN: 0
SENSORY CHANGE: 0
SORE THROAT: 0
SENSORY CHANGE: 0
TREMORS: 0
NERVOUS/ANXIOUS: 0
NAUSEA: 0
SEIZURES: 0
HALLUCINATIONS: 0
DEPRESSION: 0
FEVER: 0
BACK PAIN: 0
INSOMNIA: 0
HEARTBURN: 0
BACK PAIN: 0
COUGH: 0
PHOTOPHOBIA: 0
PHOTOPHOBIA: 0
HALLUCINATIONS: 0
PHOTOPHOBIA: 0
WEAKNESS: 1
DOUBLE VISION: 0
COUGH: 0
CHILLS: 0
SENSORY CHANGE: 0
FOCAL WEAKNESS: 0
DIZZINESS: 0
INSOMNIA: 0
HEADACHES: 0
LOSS OF CONSCIOUSNESS: 0
PALPITATIONS: 0
FEVER: 0
ORTHOPNEA: 0
ABDOMINAL PAIN: 0
BLURRED VISION: 0
DIARRHEA: 0
ABDOMINAL PAIN: 0
WHEEZING: 0
HEMOPTYSIS: 0
BACK PAIN: 0
MYALGIAS: 0
HEADACHES: 0
BLURRED VISION: 0
CHILLS: 0
BACK PAIN: 0
SENSORY CHANGE: 0
PALPITATIONS: 0
MYALGIAS: 0
ORTHOPNEA: 0
VOMITING: 0
WHEEZING: 0
HEARTBURN: 0
EYE DISCHARGE: 0
SPUTUM PRODUCTION: 0
WHEEZING: 0
ABDOMINAL PAIN: 0
VOMITING: 0
DOUBLE VISION: 0
SHORTNESS OF BREATH: 0
FLANK PAIN: 0
HALLUCINATIONS: 0
EYE PAIN: 0
SHORTNESS OF BREATH: 0
EYE DISCHARGE: 0
DIAPHORESIS: 0
INSOMNIA: 0
SEIZURES: 0
SORE THROAT: 0
FALLS: 0
COUGH: 1
FEVER: 0
HALLUCINATIONS: 0
DIZZINESS: 0
NAUSEA: 0
NAUSEA: 0
WHEEZING: 0
SHORTNESS OF BREATH: 0
MYALGIAS: 0
HEMOPTYSIS: 0
NAUSEA: 0
COUGH: 1
COUGH: 0
LOSS OF CONSCIOUSNESS: 0
EYE PAIN: 0
HEMOPTYSIS: 0
TREMORS: 0
FOCAL WEAKNESS: 0
PHOTOPHOBIA: 0
PALPITATIONS: 0
CHILLS: 0
MYALGIAS: 0
VOMITING: 0
NECK PAIN: 0
EYE REDNESS: 0
COUGH: 0
SHORTNESS OF BREATH: 0
DIARRHEA: 0
DIZZINESS: 0
NERVOUS/ANXIOUS: 0
CHILLS: 1
LOSS OF CONSCIOUSNESS: 0
EYE REDNESS: 0
FEVER: 0
HEARTBURN: 0
SEIZURES: 0
DIARRHEA: 0
SENSORY CHANGE: 0
FOCAL WEAKNESS: 0
SPUTUM PRODUCTION: 0
MYALGIAS: 0
FOCAL WEAKNESS: 1
HALLUCINATIONS: 0
ORTHOPNEA: 0

## 2020-01-01 ASSESSMENT — FIBROSIS 4 INDEX
FIB4 SCORE: 2.45
FIB4 SCORE: 2.45
FIB4 SCORE: 1.97
FIB4 SCORE: 2.45

## 2020-01-01 ASSESSMENT — PAIN SCALES - GENERAL
PAINLEVEL: 4=SLIGHT-MODERATE PAIN
PAINLEVEL: NO PAIN
PAINLEVEL: NO PAIN
PAINLEVEL: 4=SLIGHT-MODERATE PAIN
PAINLEVEL: NO PAIN
PAINLEVEL: 7=MODERATE-SEVERE PAIN
PAINLEVEL: 4=SLIGHT-MODERATE PAIN
PAINLEVEL: 5=MODERATE PAIN

## 2020-01-01 ASSESSMENT — PAIN DESCRIPTION - PAIN TYPE
TYPE: ACUTE PAIN

## 2020-01-01 ASSESSMENT — COPD QUESTIONNAIRES
DO YOU EVER COUGH UP ANY MUCUS OR PHLEGM?: NO/ONLY WITH OCCASIONAL COLDS OR INFECTIONS
COPD SCREENING SCORE: 3
HAVE YOU SMOKED AT LEAST 100 CIGARETTES IN YOUR ENTIRE LIFE: NO/DON'T KNOW
DURING THE PAST 4 WEEKS HOW MUCH DID YOU FEEL SHORT OF BREATH: NONE/LITTLE OF THE TIME

## 2020-07-23 NOTE — PROGRESS NOTES
Call to patient to introduce self and to see if he had any concerns getting to appointment tomorrow.  Verified check in time with patient.  He says he has transportation and denies issues for payment, he believes [facility].  Noted [facility] information that he he has zero share of cost.  Pt denies other needs at this time.  Pt to consult with Dr Stephens tomorrow.

## 2020-07-24 NOTE — CONSULTS
RADIATION ONCOLOGY CONSULT    DATE OF SERVICE: 7/24/2020    IDENTIFICATION: A 75 y.o. male with Metastatic poorly differentiated non-small cell lung cancer to the brain now with progressive disease in his lung..  He is here at the kind request of Dr. Brandon Law..      HISTORY OF PRESENT ILLNESS: Patient's history dates back to at least 5 years ago when he was diagnosed with a right lung cancer he underwent resection.  Then in October of last year he started developing some neurologic changes and MRI scan of the brain 10/4/2018 showed a left cerebellar and right frontal lobe met.  Because these were large he underwent resection of both there were found to be consistent with lung cancer.  He then received whole brain radiation therapy.  This was completed on 11/8/2019.  Since that time he has been on systemic management and a recent CT scan of the thorax abdomen and pelvis 6/24/2020 shows progressive disease in the lung.  He is seen in consultation today about the role of radiation therapy concurrently with chemotherapy to treat this area.    PAST MEDICAL HISTORY:   Past Medical History:   Diagnosis Date   • Cancer (HCC)        PAST SURGICAL HISTORY:  Past Surgical History:   Procedure Laterality Date   • CRANIOTOMY Right 10/6/2019    Procedure: Stage 2: Right frontal craniotomy and removal o right frontal tumor;  Surgeon: Gagan Lacey M.D.;  Location: SURGERY [facility];  Service: Neurosurgery   • CRANIECTOMY Left 10/6/2019    Procedure: Stage 1: Left suboccipital craniectomy and removal of left cerebellar tumor;  Surgeon: Gagan Lacey M.D.;  Location: SURGERY [facility];  Service: Neurosurgery           CURRENT MEDICATIONS:  Current Outpatient Medications   Medication Sig Dispense Refill   • PACLITAXEL IV by Intravenous route.     • NS SOLN 250 mL with CARBOplatin 450 MG/45ML SOLN 300 mg 300 mg by Intravenous route Once.     • PEMBROLIZUMAB IV by Intravenous route.     • prochlorperazine (COMPAZINE)  10 MG Tab Take 10 mg by mouth every 6 hours as needed.     • vitamin D (CHOLECALCIFEROL) 1000 UNIT Tab Take 1,000 Units by mouth every day.     • B Complex Vitamins (B COMPLEX 1 PO) Take 1 Tab by mouth every day.     • Misc Natural Products (BETA-SITOSTEROL PLANT STEROLS PO) Take 1 Tab by mouth every day.     • tramadol (ULTRAM) 50 MG Tab Take 50 mg by mouth every four hours as needed for Moderate Pain.     • bismuth subsalicylate (PEPTO-BISMOL) 262 MG/15ML Suspension Take 30 mL by mouth every 6 hours as needed (constipation).     • levETIRAcetam (KEPPRA) 500 MG Tab Take 1 Tab by mouth 2 Times a Day. (Patient not taking: Reported on 7/24/2020) 60 Tab 2   • famotidine (PEPCID) 20 MG Tab Take 1 Tab by mouth 2 Times a Day. (Patient not taking: Reported on 7/24/2020) 60 Tab 2   • simvastatin (ZOCOR) 40 MG Tab Take 1 Tab by mouth every evening. 30 Tab 11     No current facility-administered medications for this encounter.        ALLERGIES:    Patient has no known allergies.    FAMILY HISTORY:    Family History   Problem Relation Age of Onset   • Hypertension Mother    • Hyperlipidemia Mother    [unfilled]        SOCIAL HISTORY:     reports that he quit smoking about 10 months ago. His smoking use included cigars. He has a 5.40 pack-year smoking history. He has quit using smokeless tobacco. He reports previous alcohol use. He reports that he does not use drugs.      REVIEW OF SYSTEMS: Pertinent positives consist of  Weight gain slight increase in appetite still has fatigue chills dry mouth occasional hoarseness.  He has hearing loss blurred vision visual difficulties nocturia muscle pain joint pain neck pain neck stiffness but he recently fell.  He has unsteady gait neuropathy memory loss.  Family says he has blank stares and he has some depression.  The rest of the review of systems is negative and has been reviewed by me. It is in the nursing note dated 7/24/2020 in [facility]        PHYSICAL EXAM:    2= Ambulatory and  capable of all self care, but unable to carry out any work activities.  Up and about more than 50% of waking hours.  Vitals:    07/24/20 1331   BP: 129/89   BP Location: Left arm   Patient Position: Sitting   BP Cuff Size: Adult   Pulse: 84   Temp: 36.8 °C (98.2 °F)   TempSrc: Temporal   SpO2: 97%   Weight: 60.1 kg (132 lb 7.9 oz)   Pain Score: 7=Moderate-Severe Pain        GENERAL: Alert and oriented x3 slightly flat affect  HEENT:  Pupils are equal, round, and reactive to light.  Extraocular muscles   are intact. Sclerae nonicteric.  Conjunctivae pink.  Oral cavity, tongue   protrudes midline.   NECK:   No peripheral adenopathy of the neck, supraclavicular fossa or axillae   bilaterally.  LUNGS:  Clear to ascultation   HEART:  Regular rate and rhythm.  No murmur appreciated  ABDOMEN:  Soft. No evidence of hepatosplenomegaly.    EXTREMITIES:  Without Edema.  NEUROLOGIC:  Cranial nerves II through XII were intact.Letter and sensory grossly within normal limits but he requires a cane because of his low back pain which is been chronic.              IMPRESSION:    A 75 y.o. with  Had a static non-small cell lung cancer to the brain now with progressive disease in the lung..      RECOMMENDATIONS:   I am recommending concurrent chemotherapy and radiation therapy over a period of 6 weeks.Hopefully this will control the disease locally and prevent further progression locally.  I've described the details of radiation along with the side effects both acute and chronic, including but not exclusive to fatigue, skin reaction, local soreness, damage to the lung, heart, and mediastinal structures.  He may develop some esophagitis and dry cough. Ample time was allowed for questions, and patient understands.    With patient scheduled for simulation on Monday to get started soon thereafter.    Thank you for the opportunity to participate in his care.  If any questions or comments, please do not hesitate in calling.    Please note  that this dictation was created using voice recognition software. I have made every reasonable attempt to correct obvious errors, but I expect that there are errors of grammar and possibly content that I did not discover before finalizing the note.

## 2020-07-24 NOTE — NON-PROVIDER
Patient was seen today in clinic with Dr. Stephens for consult.  Vitals signs and weight were obtained and pain assessment was completed.  Allergies and medications were reviewed with the patient.  Review of systems completed.     Vitals/Pain:  Vitals:    07/24/20 1331   BP: 129/89   BP Location: Left arm   Patient Position: Sitting   BP Cuff Size: Adult   Pulse: 84   Temp: 36.8 °C (98.2 °F)   TempSrc: Temporal   SpO2: 97%   Weight: 60.1 kg (132 lb 7.9 oz)   Pain Score: 7=Moderate-Severe Pain        Allergies:   Patient has no known allergies.    Current Medications:  Current Outpatient Medications   Medication Sig Dispense Refill   • PACLITAXEL IV by Intravenous route.     • NS SOLN 250 mL with CARBOplatin 450 MG/45ML SOLN 300 mg 300 mg by Intravenous route Once.     • PEMBROLIZUMAB IV by Intravenous route.     • prochlorperazine (COMPAZINE) 10 MG Tab Take 10 mg by mouth every 6 hours as needed.     • vitamin D (CHOLECALCIFEROL) 1000 UNIT Tab Take 1,000 Units by mouth every day.     • B Complex Vitamins (B COMPLEX 1 PO) Take 1 Tab by mouth every day.     • Misc Natural Products (BETA-SITOSTEROL PLANT STEROLS PO) Take 1 Tab by mouth every day.     • tramadol (ULTRAM) 50 MG Tab Take 50 mg by mouth every four hours as needed for Moderate Pain.     • bismuth subsalicylate (PEPTO-BISMOL) 262 MG/15ML Suspension Take 30 mL by mouth every 6 hours as needed (constipation).     • levETIRAcetam (KEPPRA) 500 MG Tab Take 1 Tab by mouth 2 Times a Day. 60 Tab 2   • famotidine (PEPCID) 20 MG Tab Take 1 Tab by mouth 2 Times a Day. 60 Tab 2   • simvastatin (ZOCOR) 40 MG Tab Take 1 Tab by mouth every evening. 30 Tab 11     No current facility-administered medications for this encounter.          PCP:  Valeria Larson, Med Ass't

## 2020-08-05 NOTE — ON TREATMENT VISIT
ON TREATMENT  NOTE  RADIATION ONCOLOGY DEPARTMENT    Patient name:  Milan Clark    Primary Physician:  Elif Shaw M.D. MRN: [id number]  [facility]: [id number]   Referring physician:  JASMIN Rodriguez M.D. : 1945, 75 y.o.     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 on 2019  Histologic grade (G): G3  Histologic grading system: 4 grade system  Type of lung cancer: Locally advanced or metastatic non-small cell lung cancer      TREATMENT SUMMARY:  [facility] Treatment Information        Some values may be hidden. Unless noted otherwise, only the newest values recorded on each date are displayed.         [facility] Treatment Summary 20   Course First Treatment Date 2020   Course Last Treatment Date 2020   LUL_lung/mediastinum [L Lung] Plan from Course C2_LULLung   Fraction 3 of 32   Elapsed Course Days 2 @    Prescribed Fraction Dose 200 cGy   Prescribed Total Dose 6,400 cGy   Lung cp Reference Point from Course C2_LULLung   Elapsed Course Days 2 @    Session Dose 209 cGy   Total Dose 626 cGy   PTV_Lung Reference Point from Course C2_LULLung   Elapsed Course Days 2 @    Session Dose 200 cGy   Total Dose 600 cGy             SUBJECTIVE:  fatigue        VITAL SIGNS:  KPS: 80, Normal activity with effort; some signs or symptoms of disease (ECOG equivalent 1)  Encounter Vitals  Temperature: 36.8 °C (98.3 °F)  Blood Pressure : 155/100  Pulse: 64  Respiration: 18  Pulse Oximetry: 98 %  Weight: 60.3 kg (132 lb 15 oz)  Pain Score: No pain  Pain Assessment 2020   Pain Assessment Denies Pain -   Pain Score 0 7   Pain Loc - Back   Some recent data might be hidden     Karnofsky Score: 90    PHYSICAL EXAM:  No change      Toxicity Assessment 2020   Toxicity Assessment Chest Brain    Fatigue (lethargy, malaise, asthenia) Increased fatigue over baseline, but not altering normal activities Increased fatigue over baseline, but not altering normal activities   Radiation Dermatitis None None   Anorexia None -   Nausea - None   Dyspepsia/heartburn None -   Dysphagia, Esophagitis, odynophagoa (painful swallowing) None -   RT Dysphagia-Esophageal None -   Mouth Dryness - Mild   Headache - None   Cough Absent -   Dyspnea Normal -   External Auditory Canal - Normal   Inner Ear/Hearing - Normal   Middle Ear/Hearing - Normal   Dizziness/lightheadedness - None   Memory loss - Normal   Neuropathy - Motor - Normal   Seizure - None   Vertigo - None           IMPRESSION:  Cancer Staging  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 on 11/6/2019      PLAN:  No change in treatment plan    Disposition:  Treatment plan reviewed. Questions answered. Continue therapy outlined.     Keiry Stephens M.D.    Orders Placed This Encounter   • REFERRAL TO ONCOLOGY NURSE NAVIGATOR

## 2020-08-06 NOTE — PROGRESS NOTES
Call placed to Amber, cancer nurse navigator, with [facility] for communication on concurrent [facility] radiation for patient.  Left message.    Touched base with patient prior to radiation.  Pt reports difficulty with daily transportation to radiation and now chemo is going to be added.  He states his daughter, Iris, is trying to see if she can get him set up with [facility] to stay in town for treatment.  [facility] use [facility].  Pt reporting would like daughter to be part of discussion.  She will be brining him to treatment tomorrow.  Will meet with patient and daughter tomorrow to discuss needs and possible options.        Call placed to daughter, Iris, left message.    Return call from Iris.  She reports unsure what is available for patient and if he would have care giver.  Discussed lodging options available would require him to be independent.  She stated that patient presents as able to care for himself, but does not cook for himself or pay attention to symptoms.  She reports patient had fever after first round of chemo and they had to take him to ER.  Pt did not think he needed to go.  They have some concerns if he was to stay by himself.  [facility] may be an option, but pt would need to be independent.  She will discuss with patient and her brother jr.  [facility].  Plan to meet with patient and daughter tomorrow after radiation.  Limited options available for patient for lodging and transportation.

## 2020-08-07 NOTE — PROGRESS NOTES
Met with patient and daughter after radiation therapy.  Provided information regarding [facility] guest [facility].  Patient feels he is capable of taking care of himself.  Daughter agrees, but is just concerned for him in general.  If he is able to stay at guest house, family will assist with getting him transportation to appointments.  [facility] can use [facility] for [facility] appointments as long as within their time windows.  Encouraged patient to let radiation know if he is experiencing any symptoms or side effects during treatment.  Pt will need negative screening COVID test to be able to stay at guest [facility].  They are considering options and will let navigator know if additional assistance is needed.

## 2020-08-12 NOTE — ON TREATMENT VISIT
ON TREATMENT  NOTE  RADIATION ONCOLOGY DEPARTMENT    Patient name:  Milan Clark    Primary Physician:  Elif Shaw M.D. MRN: [id number]  [facility]: [id number]   Referring physician:  JASMIN Rodriguez M.D. : 1945, 75 y.o.     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 on 2019  Histologic grade (G): G3  Histologic grading system: 4 grade system  Type of lung cancer: Locally advanced or metastatic non-small cell lung cancer      TREATMENT SUMMARY:  [facility] Treatment Information        Some values may be hidden. Unless noted otherwise, only the newest values recorded on each date are displayed.         [facility] Treatment Summary 20   Course First Treatment Date 2020   Course Last Treatment Date 2020   LUL_lung/mediastinum [L Lung] Plan from Course C2_LULLung   Fraction 8  32   Elapsed Course Days 9 @    Prescribed Fraction Dose 200 cGy   Prescribed Total Dose 6,400 cGy   Lung cp Reference Point from Course C2_LULLung   Elapsed Course Days  @    Session Dose 209 cGy   Total Dose 1,670 cGy   PTV_Lung Reference Point from Course C2_LULLung   Elapsed Course Days  @    Session Dose 200 cGy   Total Dose 1,600 cGy             SUBJECTIVE:  Feeling good today still has some fatigue.  No swallowing difficulty.        VITAL SIGNS:  KPS: 90, Able to carry on normal activity; minor signs or symptoms of disease (ECOG equivalent 0)  Encounter Vitals  Temperature: 36.8 °C (98.3 °F)  Blood Pressure : 151/95  Pulse: (!) 58  Respiration: 20  Pulse Oximetry: 98 %  Weight: 60.9 kg (134 lb 4.2 oz)  Pain Score: 4=Slight-Moderate Pain  Pain Assessment 2020   Pain Assessment - Denies Pain -   Pain Score 4 0 7   Pain Loc Back - Back   Some recent data might be hidden     Karnofsky Score:  90    PHYSICAL EXAM:  No change      Toxicity Assessment 8/12/2020 8/5/2020 11/6/2019   Toxicity Assessment Chest Chest Brain   Fatigue (lethargy, malaise, asthenia) Increased fatigue over baseline, but not altering normal activities Increased fatigue over baseline, but not altering normal activities Increased fatigue over baseline, but not altering normal activities   Radiation Dermatitis None None None   Anorexia Loss of appetite None -   Nausea - - None   Dyspepsia/heartburn None None -   Dysphagia, Esophagitis, odynophagoa (painful swallowing) None None -   RT Dysphagia-Esophageal None None -   Mouth Dryness - - Mild   Headache - - None   Cough Mild, relieved by non-prescription medication Absent -   Dyspnea Normal Normal -   External Auditory Canal - - Normal   Inner Ear/Hearing - - Normal   Middle Ear/Hearing - - Normal   Dizziness/lightheadedness - - None   Memory loss - - Normal   Neuropathy - Motor - - Normal   Seizure - - None   Vertigo - - None           IMPRESSION:  Cancer Staging  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 on 11/6/2019      PLAN:  No change in treatment plan    Disposition:  Treatment plan reviewed. Questions answered. Continue therapy outlined.     Keiry Stephens M.D.    No orders of the defined types were placed in this encounter.

## 2020-08-19 NOTE — ON TREATMENT VISIT
"  ON TREATMENT  NOTE  RADIATION ONCOLOGY DEPARTMENT    Patient name:  Milan Clark    Primary Physician:  Elif Shaw M.D. MRN: [id number]  CSN: [id number]   Referring physician:  JASMIN Rodriguez M.D. : 1945, 75 y.o.     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 on 2019  Histologic grade (G): G3  Histologic grading system: 4 grade system  Type of lung cancer: Locally advanced or metastatic non-small cell lung cancer      TREATMENT SUMMARY:  [facility] Treatment Information        Some values may be hidden. Unless noted otherwise, only the newest values recorded on each date are displayed.         [facility] Treatment Summary 20   Course First Treatment Date 2020   Course Last Treatment Date 2020   LUL_lung/mediastinum [L Lung] Plan from Course C2_LULLung   Fraction 13 of 32   Elapsed Course Days 16 @ [id number]   Prescribed Fraction Dose 200 cGy   Prescribed Total Dose 6,400 cGy   Lung cp Reference Point from Course C2_LULLung   Elapsed Course Days  @    Session Dose 209 cGy   Total Dose 2,714 cGy   PTV_Lung Reference Point from Course C2_LULLung   Elapsed Course Days  @    Session Dose 200 cGy   Total Dose 2,600 cGy             SUBJECTIVE:  Doing well no complaints this week        VITAL SIGNS:  KPS: 90, Able to carry on normal activity; minor signs or symptoms of disease (ECOG equivalent 0)  Encounter Vitals  Temperature: 36.5 °C (97.7 °F)  Temp src: Temporal  Blood Pressure : 134/85  Pulse: (!) 57  Pulse Oximetry: 97 %  Weight: 61.6 kg (135 lb 12.9 oz)  Height: 180.3 cm (5' 11\")  BMI (Calculated): 18.94  Pain Score: 4=Slight-Moderate Pain  Pain Assessment 2020   Pain Assessment Chronic Pain - Denies Pain   Pain Score 4 4 0   Pain Loc Back Back -   Some recent data " might be hidden          PHYSICAL EXAM:  Well-appearing alert and oriented x3 no change      Toxicity Assessment 8/19/2020 8/12/2020 8/5/2020 11/6/2019   Toxicity Assessment Chest Chest Chest Brain   Fatigue (lethargy, malaise, asthenia) Increased fatigue over baseline, but not altering normal activities Increased fatigue over baseline, but not altering normal activities Increased fatigue over baseline, but not altering normal activities Increased fatigue over baseline, but not altering normal activities   Radiation Dermatitis None None None None   Anorexia Loss of appetite Loss of appetite None -   Nausea - - - None   Dyspepsia/heartburn None None None -   Dysphagia, Esophagitis, odynophagoa (painful swallowing) None None None -   RT Dysphagia-Esophageal None None None -   Mouth Dryness - - - Mild   Headache - - - None   Cough Mild, relieved by non-prescription medication Mild, relieved by non-prescription medication Absent -   Dyspnea Normal Normal Normal -   External Auditory Canal - - - Normal   Inner Ear/Hearing - - - Normal   Middle Ear/Hearing - - - Normal   Dizziness/lightheadedness - - - None   Memory loss - - - Normal   Neuropathy - Motor - - - Normal   Seizure - - - None   Vertigo - - - None           IMPRESSION:  Cancer Staging  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 on 11/6/2019      PLAN:  No change in treatment plan    Disposition:  Treatment plan reviewed. Questions answered. Continue therapy outlined.     Keiry Stephens M.D.    No orders of the defined types were placed in this encounter.

## 2020-08-26 NOTE — ON TREATMENT VISIT
ON TREATMENT  NOTE  RADIATION ONCOLOGY DEPARTMENT    Patient name:  Milan Clark    Primary Physician:  Elif Shaw M.D. MRN: [id number]  [facility]: [id number]   Referring physician:  JASMIN Rodriguez M.D. : 1945, 75 y.o.     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 on 2019  Histologic grade (G): G3  Histologic grading system: 4 grade system  Type of lung cancer: Locally advanced or metastatic non-small cell lung cancer      TREATMENT SUMMARY:  [facility] Treatment Information        Some values may be hidden. Unless noted otherwise, only the newest values recorded on each date are displayed.         [facility] Treatment Summary 20   Course First Treatment Date 2020   Course Last Treatment Date 2020   LUL_lung/mediastinum [L Lung] Plan from Course C2_LULLung   Fraction 18 of 32   Elapsed Course Days  @    Prescribed Fraction Dose 200 cGy   Prescribed Total Dose 6,400 cGy   Lung cp Reference Point from Course C2_LULLung   Elapsed Course Days  @    Session Dose 209 cGy   Total Dose 3,758 cGy   PTV_Lung Reference Point from Course C2_LULLung   Elapsed Course Days  @    Session Dose 200 cGy   Total Dose 3,600 cGy             SUBJECTIVE:  In good spirits this week feeling good but still having issues with weight loss.        VITAL SIGNS:  KPS: 90, Able to carry on normal activity; minor signs or symptoms of disease (ECOG equivalent 0)     Pain Assessment 2020   Pain Assessment Chronic Pain - Denies Pain   Pain Score 4 4 0   Pain Loc Back Back -   Some recent data might be hidden          PHYSICAL EXAM:  No change      Toxicity Assessment 2020   Toxicity Assessment Chest Chest Chest Brain   Fatigue (lethargy, malaise, asthenia)  Increased fatigue over baseline, but not altering normal activities Increased fatigue over baseline, but not altering normal activities Increased fatigue over baseline, but not altering normal activities Increased fatigue over baseline, but not altering normal activities   Radiation Dermatitis None None None None   Anorexia Loss of appetite Loss of appetite None -   Nausea - - - None   Dyspepsia/heartburn None None None -   Dysphagia, Esophagitis, odynophagoa (painful swallowing) None None None -   RT Dysphagia-Esophageal None None None -   Mouth Dryness - - - Mild   Headache - - - None   Cough Mild, relieved by non-prescription medication Mild, relieved by non-prescription medication Absent -   Dyspnea Normal Normal Normal -   External Auditory Canal - - - Normal   Inner Ear/Hearing - - - Normal   Middle Ear/Hearing - - - Normal   Dizziness/lightheadedness - - - None   Memory loss - - - Normal   Neuropathy - Motor - - - Normal   Seizure - - - None   Vertigo - - - None           IMPRESSION:  Cancer Staging  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 on 11/6/2019      PLAN:  No change in treatment plan .  I am going to have patient speak with dietitian today.    Disposition:  Treatment plan reviewed. Questions answered. Continue therapy outlined.     Keiry Stephens M.D.    No orders of the defined types were placed in this encounter.

## 2020-08-26 NOTE — ADDENDUM NOTE
Encounter addended by: Mikki Larson, Med Ass't on: 8/26/2020 9:05 AM   Actions taken: Chief Complaint modified, Vitals modified, SmartForm saved, Flowsheet accepted, Visit Navigator SmartForm Flowsheet section accepted

## 2020-08-26 NOTE — PROGRESS NOTES
"Nutrition Services: RD Consultation  75 year old male with diagnosis of lung cancer with mets to brain.       Past Medical History:   Diagnosis Date   • Cancer (HCC)        MD requested RD stop by for brief visit per pt daughter request. RD discussed limited time available today, though will provide follow-up on Friday as able. Pt presents to appointment with daughter. Daughter states pt needs more calories and protein, though does not seem to listen to her when she says it. States pt is eating about the same as he does, though smaller portions. Pt agrees with this. Mentions tries to eat small portions because he will get heartburn if he over does it. Pt sates he has always naturally been thin, though has experienced some weight loss undesirably, does not specify amount. Mentions will sometimes wake up hungry, though daughter states will have to remind pt to eat sometimes. Pt states will start of the day with cereal and whole milk, then a peanut butter and jelly sandwich, and will sometimes have snacks of string cheese/ crackers and ice cream. Mentions does [facility] the [facility], and has about 1 per day. Pt did not express any further nutrition-related questions or concerns at this time.     Assessment:  • Pertinent Labs: Na 135, BUN 25  • Pertinent Meds: pacitaxel, carboplatin, pembrolizumab, vitamin D, B complex, pepto-bismol, pepcid  • Weight: 135.3 lbs/ 61.4 kg  • Height: 5'11\"  • BMI: 18.8  • WNL BMI classification  • Pt does appear thin, states this is normal for him    Weight History from Chart:  130 lbs on 12/27/19  140 lbs on 10/4/19    Weight Change/Malnutrition Risk: Pt does appear thin, though states this is normal for him, difficult to assess for differences in muscle/ fat stores as a result. Weight appears to have remained relatively stable within the past~7 months.     Interventions:  · Introduced self and discussed role of dietitian  · Discussed importance of nutrition during treatment " in healing and preserving LBM. Discussed need for additional calories and protein during treatment process.   · Provided high calorie and protein add-ins sheet. Discussed incorporating foods with higher calories and protein without increasing volume. Encouraged mixing Ensure Plus with ice cream. Reviewed ways to add these into meals and snacks the pt is already consuming.     RD to follow-up for further education and assistance on Friday as able

## 2020-08-28 NOTE — PROGRESS NOTES
Touched base with patient after radiation therapy.  Pt reports things are going well.  Transportation has been fine so far.  He reports his son will be staying with him and providing transport next week.  Pt stating [facility].  He states did do COVID testing with [facility] for lodging purposes, but has not heard back on results.  ONN will follow up to verify patient has information when he will need it.

## 2020-08-31 NOTE — PROGRESS NOTES
Return call from [facility] navigator, Amber.  She reports they do have results for COVID test and had called patient and left message for him to call back.  They have relayed information regarding negative test result to [facility] as well.  Pt has yet to confirm wants to stay there last week of treatment.

## 2020-08-31 NOTE — PROGRESS NOTES
Nutrition Services: Brief Update  Weight: 61.4 kg/ 135 lbs, weighed on 8/26/20  Weight History:  130 lbs on 12/27/19  140 lbs on 10/4/19    Weight Change: Noted pt has maintained weight for past ~7 months per chart review     RD able to visit pt following XRT. Pt accompanied by son, Milan. Pt states is doing well, mentions list is helpful, though has not included anything from the list. States has been eating a lot of ice cream and continues Ensure. States did not have any issues ordering from the [facility]. States appetite is a bit low during mornings, but ill force himself to eat anyway. Mentions is not seeing a physical therapist, though is encouraged to be active through his daughter. Pt denies any questions or concerns at this time.     Plan/Recommend:  • Discussed including high calorie foods from the list  • Supported physical activity as tolerated to increase appetite and maintain LBM.   • Encouraged to reach out to RD as needed.     RD to remain available PRN.   Please contact [phone]

## 2020-09-02 NOTE — ON TREATMENT VISIT
"  ON TREATMENT  NOTE  RADIATION ONCOLOGY DEPARTMENT    Patient name:  Milan Clark    Primary Physician:  Elif Shaw M.D. MRN: [id number]  CSN: [id number]   Referring physician:  JASMIN Rodriguez M.D. : 1945, 75 y.o.     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 on 2019  Histologic grade (G): G3  Histologic grading system: 4 grade system  Type of lung cancer: Locally advanced or metastatic non-small cell lung cancer      TREATMENT SUMMARY:  [facility] Treatment Information        Some values may be hidden. Unless noted otherwise, only the newest values recorded on each date are displayed.         [facility] Treatment Summary 20   Course First Treatment Date 2020   Course Last Treatment Date 2020   LUL_lung/mediastinum [L Lung] Plan from Course C2_LULLung   Fraction 23 of 32   Elapsed Course Days  @    Prescribed Fraction Dose 200 cGy   Prescribed Total Dose 6,400 cGy   Lung cp Reference Point from Course C2_LULLung   Elapsed Course Days  @    Session Dose 209 cGy   Total Dose 4,801 cGy   PTV_Lung Reference Point from Course C2_LULLung   Elapsed Course Days  @    Session Dose 200 cGy   Total Dose 4,600 cGy             SUBJECTIVE:  Doing well feels pretty good.  Able to eat and drink has a little bit of a productive cough that is clear        VITAL SIGNS:  KPS: 90, Able to carry on normal activity; minor signs or symptoms of disease (ECOG equivalent 0)  Encounter Vitals  Temperature: 36.4 °C (97.5 °F)  Blood Pressure : 143/88  Pulse: 70  Pulse Oximetry: 100 %  Weight: 60 kg (132 lb 3.2 oz)  Pain Score: 5=Moderate Pain  Pain Assessment 2020   Pain Assessment Chronic Pain Denies Pain   Pain Score 5 4   Pain Loc Back Back   What increases pain? chronic low back pain \"I have " "had for ever\"  worse w/ movement -   What decreases pain? 1/2 pain pill decreases pain -   Some recent data might be hidden          PHYSICAL EXAM:  No change      Toxicity Assessment 9/2/2020 8/26/2020 8/19/2020 8/12/2020 8/5/2020 11/6/2019   Toxicity Assessment Chest Chest Chest Chest Chest Brain   Fatigue (lethargy, malaise, asthenia) Increased fatigue over baseline, but not altering normal activities Increased fatigue over baseline, but not altering normal activities Increased fatigue over baseline, but not altering normal activities Increased fatigue over baseline, but not altering normal activities Increased fatigue over baseline, but not altering normal activities Increased fatigue over baseline, but not altering normal activities   Radiation Dermatitis None None None None None None   Anorexia None Loss of appetite Loss of appetite Loss of appetite None -   Nausea - - - - - None   Dyspepsia/heartburn None None None None None -   Dysphagia, Esophagitis, odynophagoa (painful swallowing) None None None None None -   RT Dysphagia-Esophageal None None None None None -   Mouth Dryness - - - - - Mild   Headache - - - - - None   Cough Mild, relieved by non-prescription medication Mild, relieved by non-prescription medication Mild, relieved by non-prescription medication Mild, relieved by non-prescription medication Absent -   Dyspnea Normal Normal Normal Normal Normal -   External Auditory Canal - - - - - Normal   Inner Ear/Hearing - - - - - Normal   Middle Ear/Hearing - - - - - Normal   Dizziness/lightheadedness - - - - - None   Memory loss - - - - - Normal   Neuropathy - Motor - - - - - Normal   Seizure - - - - - None   Vertigo - - - - - None           IMPRESSION:  Cancer Staging  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 on 11/6/2019      PLAN:  No change in treatment " plan    Disposition:  Treatment plan reviewed. Questions answered. Continue therapy outlined.     Keiry Stephens M.D.    No orders of the defined types were placed in this encounter.

## 2020-09-08 NOTE — ON TREATMENT VISIT
"  ON TREATMENT  NOTE  RADIATION ONCOLOGY DEPARTMENT    Patient name:  Milan Clark    Primary Physician:  Elif Shaw M.D. MRN: [id number]  [facility]: [id number]   Referring physician:  JASMIN Rodriguez M.D. : 1945, 75 y.o.     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 on 2019  Histologic grade (G): G3  Histologic grading system: 4 grade system  Type of lung cancer: Locally advanced or metastatic non-small cell lung cancer      TREATMENT SUMMARY:  [facility] Treatment Information        Some values may be hidden. Unless noted otherwise, only the newest values recorded on each date are displayed.         [facility] Treatment Summary 20   Course First Treatment Date 2020   Course Last Treatment Date 2020   LUL_lung/mediastinum [L Lung] Plan from Course C2_LULLung   Fraction 26 of 32   Elapsed Course Days 36 @    Prescribed Fraction Dose 200 cGy   Prescribed Total Dose 6,400 cGy   Lung cp Reference Point from Course C2_LULLung   Elapsed Course Days 36 @    Session Dose 209 cGy   Total Dose 5,428 cGy   PTV_Lung Reference Point from Course C2_LULLung   Elapsed Course Days 36 @    Session Dose 200 cGy   Total Dose 5,200 cGy             SUBJECTIVE:  Doing pretty well this week just very tired.  Also has a slight cough.        VITAL SIGNS:  KPS: 80, Normal activity with effort; some signs or symptoms of disease (ECOG equivalent 1)  Encounter Vitals  Temperature: 36.6 °C (97.8 °F)  Temp src: Temporal  Blood Pressure : 127/95  Pulse: 79  Pulse Oximetry: 97 %  Pain Score: No pain  Pain Assessment 2020   Pain Assessment Denies Pain Chronic Pain   Pain Score 0 5   Pain Loc - Back   What increases pain? - chronic low back pain \"I have had for ever\"  worse w/ movement   What decreases pain? - / " pain pill decreases pain   Some recent data might be hidden          PHYSICAL EXAM:  No change      Toxicity Assessment 9/8/2020 9/2/2020 8/26/2020 8/19/2020 8/12/2020 8/5/2020 11/6/2019   Toxicity Assessment Chest Chest Chest Chest Chest Chest Brain   Fatigue (lethargy, malaise, asthenia) Increased fatigue over baseline, but not altering normal activities Increased fatigue over baseline, but not altering normal activities Increased fatigue over baseline, but not altering normal activities Increased fatigue over baseline, but not altering normal activities Increased fatigue over baseline, but not altering normal activities Increased fatigue over baseline, but not altering normal activities Increased fatigue over baseline, but not altering normal activities   Radiation Dermatitis None None None None None None None   Anorexia None None Loss of appetite Loss of appetite Loss of appetite None -   Nausea - - - - - - None   Dyspepsia/heartburn None None None None None None -   Dysphagia, Esophagitis, odynophagoa (painful swallowing) None None None None None None -   RT Dysphagia-Esophageal None None None None None None -   Mouth Dryness - - - - - - Mild   Headache - - - - - - None   Cough Mild, relieved by non-prescription medication Mild, relieved by non-prescription medication Mild, relieved by non-prescription medication Mild, relieved by non-prescription medication Mild, relieved by non-prescription medication Absent -   Dyspnea Normal Normal Normal Normal Normal Normal -   External Auditory Canal - - - - - - Normal   Inner Ear/Hearing - - - - - - Normal   Middle Ear/Hearing - - - - - - Normal   Dizziness/lightheadedness - - - - - - None   Memory loss - - - - - - Normal   Neuropathy - Motor - - - - - - Normal   Seizure - - - - - - None   Vertigo - - - - - - None           IMPRESSION:  Cancer Staging  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 on 11/6/2019      PLAN:  No change in treatment plan    Disposition:  Treatment plan reviewed. Questions answered. Continue therapy outlined.     Keiry Stephens M.D.    No orders of the defined types were placed in this encounter.

## 2020-09-16 NOTE — ON TREATMENT VISIT
"  ON TREATMENT NOTE  RADIATION ONCOLOGY DEPARTMENT    Patient name:  Milan Clark    Primary Physician:  Elif Shaw M.D. MRN: [id number]  CSN: [id number]   Referring physician:  JASMIN Rodriguez M.D. : 1945, 75 y.o.     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 on 2019  Histologic grade (G): G3  Histologic grading system: 4 grade system  Type of lung cancer: Locally advanced or metastatic non-small cell lung cancer      TREATMENT SUMMARY:  [facility] Treatment Information        Some values may be hidden. Unless noted otherwise, only the newest values recorded on each date are displayed.         [facility] Treatment Summary 20   Course First Treatment Date 2020   Course Last Treatment Date 2020   LUL_lung/mediastinum [L Lung] Plan from Course C2_LULLung   Fraction 32 of 32   Elapsed Course Days  @    Prescribed Fraction Dose 200 cGy   Prescribed Total Dose 6,400 cGy   Lung cp Reference Point from Course C2_LULLung   Elapsed Course Days  @    Session Dose 209 cGy   Total Dose 6,680 cGy   PTV_Lung Reference Point from Course C2_LULLung   Elapsed Course Days  @    Session Dose 200 cGy   Total Dose 6,400 cGy              SUBJECTIVE:   Patient completed his radiation today.  He does not have any new signs or symptoms he would attribute to his radiation this week.    VITAL SIGNS:    Encounter Vitals  Temperature: 36.3 °C (97.3 °F)  Blood Pressure : 129/89  Pulse: (!) 56  Pulse Oximetry: 96 %  Weight: 59.1 kg (130 lb 3.2 oz)  Pain Score: No pain  Pain Assessment 2020   Pain Assessment - Denies Pain Chronic Pain   Pain Score 0 0 5   Pain Loc - - Back   What increases pain? - - chronic low back pain \"I have had for ever\"  worse w/ movement   What decreases pain? - - /2 " pain pill decreases pain   Some recent data might be hidden          PHYSICAL EXAM:  Physical Exam  Pulmonary:      Effort: Pulmonary effort is normal.      Breath sounds: Normal breath sounds.          TOXICITY  Toxicity Assessment 9/16/2020 9/8/2020 9/2/2020 8/26/2020 8/19/2020 8/12/2020 8/5/2020   Toxicity Assessment Chest Chest Chest Chest Chest Chest Chest   Fatigue (lethargy, malaise, asthenia) Increased fatigue over baseline, but not altering normal activities Increased fatigue over baseline, but not altering normal activities Increased fatigue over baseline, but not altering normal activities Increased fatigue over baseline, but not altering normal activities Increased fatigue over baseline, but not altering normal activities Increased fatigue over baseline, but not altering normal activities Increased fatigue over baseline, but not altering normal activities   Radiation Dermatitis None None None None None None None   Anorexia None None None Loss of appetite Loss of appetite Loss of appetite None   Nausea - - - - - - -   Dyspepsia/heartburn None None None None None None None   Dysphagia, Esophagitis, odynophagoa (painful swallowing) None None None None None None None   RT Dysphagia-Esophageal None None None None None None None   Mouth Dryness - - - - - - -   Headache - - - - - - -   Cough Absent Mild, relieved by non-prescription medication Mild, relieved by non-prescription medication Mild, relieved by non-prescription medication Mild, relieved by non-prescription medication Mild, relieved by non-prescription medication Absent   Dyspnea Normal Normal Normal Normal Normal Normal Normal   External Auditory Canal - - - - - - -   Inner Ear/Hearing - - - - - - -   Middle Ear/Hearing - - - - - - -   Dizziness/lightheadedness - - - - - - -   Memory loss - - - - - - -   Neuropathy - Motor - - - - - - -   Seizure - - - - - - -   Vertigo - - - - - - -         IMPRESSION:  Cancer Staging  Lung cancer metastatic to  brain (HCC)  Staging form: Lung, AJCC 8th Edition  - Clinical stage from 11/6/2019: Stage IV (cT2a, cN0, cM1) - Signed by Keiry Stephens M.D. on 11/6/2019      PLAN:  No change in treatment plan    Disposition:  Treatment plan reviewed. Questions answered. Continue therapy outlined.     Chavez Valencia M.D.    No orders of the defined types were placed in this encounter.

## 2020-11-07 NOTE — TELEPHONE ENCOUNTER
Previously called on 11/4/2020 to confirm appt, but no answer I left detailed message    Today I contacted patient RE: No Show for today's appt. Spoke with son in law Georgi and left message to have patient contact our office to r/s appt with Dr. Stephens. I provided Georgi my direct contact number and he stated he would inform Mr. Clark and daughter.

## 2020-11-08 PROBLEM — N17.9 AKI (ACUTE KIDNEY INJURY) (HCC): Status: ACTIVE | Noted: 2020-01-01

## 2020-11-08 PROBLEM — R53.1 WEAKNESS: Status: ACTIVE | Noted: 2020-01-01
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
## 2020-11-12 NOTE — PROGRESS NOTES
Oncology nurse navigator returned the phone call of patients son Milan.  He stated that he is currently in route to taking his father back to the hospital but [facility]  He states that his father fell last night and that his father baricated himself from the fall so that Milan the son had to jump thorough the bedroom window to assist his father.  He states that his father symptoms are worse than yesterday acting like the symptoms he first experienced when the cancer spread to his brain.  He stated that [facility] suspected that his brain tumors could possibly be back and that they were discharged with instruction [facility] the [facility] and getting this [facility] scheduled.  He stated that he tried to get the MRI scheduled here at [facility] but was unable to because he was not listed as a contact or POA.  Oncology nurse navigator called [facility] and requested that a message be sent to the oncology care team Dr. Rodriguez, nurse  and the cancer nurse navigator to reach out to them to assist.  Milan the son stated that it was a special kind of MRI that needed to be done that is why it had to be done at [facility].  This Oncology nurse navigator discussed options with the son including the support of a hospice team.  The son stated that it has been in the discussion in the past.  He stated that once they got the MRI they were going to discuss this.  This oncology nurse navigator explored that thought a little further with Milan the son.  Requesting him to think about what if the MRI showed more growth of the tumors in his brain?  Milan stated that they would try to explore surgery options.  ONN stated is that what your father would want and could he endure another surgery like that?  Milan stated that the first surgery was very tough and it was a long recovery including rehab.  Milan asked about how to get into touch with hospice.  This ONN instructed that he could call them themselves  from [facility] or they could request a referral from the [facility] today if they decide that is the route they would like to explore.  This ONN also instructed the son that if the MRI is the route that they want to explore to get it done at the [facility] if possible and if not to get someone from the [facility] to assist them with an appointment before [facility] the [facility].

## 2020-11-13 PROBLEM — G93.89 BRAIN MASS: Status: ACTIVE | Noted: 2020-01-01

## 2020-11-13 NOTE — PROGRESS NOTES
[facility] resident, Dr. Portillo, notified about patient's heart rate of 48- 53, and SBP greater than 150  MD orders: continue to monitor, and I will change SBP parameters to less than 160.

## 2020-11-13 NOTE — CONSULTS
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  Patient in fact never   saw me postoperatively for followup.  He did have a radiation therapy,   apparently has a history of lung cancer, was getting chemotherapy through the   [facility]  He was admitted with failure to thrive, currently he is denying   any headache.  He has got an MRI, which shows recurrence of both tumors in his   left cerebellum and the right frontal essentially to the original size.    REVIEW OF SYSTEMS:  No history of bleeding disorder or DVT.    PHYSICAL EXAMINATION:  VITAL SIGNS:  Recorded.  NEUROLOGIC:  Patient is awake, alert and conversant, answers questions.    Speech is fluent.  He appears to be somewhat cachectic.  Generally ill.    Generates good strength in upper and lower extremities.  Pupils are reactive.    Face is symmetric.    IMPRESSION:  Recurrent metastatic disease.  I discussed with him options   basically doing what we did a year ago, taken both tumors out to prolong his   survival versus comfort care.  I think without surgery patient's life   expectancy would be a matter of weeks.  Patient is going to decide,   tentatively I have the patient on the surgery schedule for Sunday at 9:00 a.m.       ____________________________________     MD KELSEY DE DIOS / JOHN    DD:  11/13/2020 12:55:02  DT:  11/13/2020 14:05:19    D#:  [id number]  Job#:  [id number]

## 2020-11-13 NOTE — PROGRESS NOTES
Pt's son at bedside. Requested physician at bedside to talk to family. Dr. Dorota Cast made aware.

## 2020-11-13 NOTE — ASSESSMENT & PLAN NOTE
Pt admitted with CT head findings of extensive right frontal lobe edema causing 1.5 cm right to left midline shift.  Additionally questionable 22 mm mass within the deep white matter of the right frontal lobe.  Epidural fluid collection of 4 mm seen suggestive of chronic hematoma.   -Patient was initially admitted to the ICU and neurosurgery was consulted, however patient has decided not to pursue surgery  -Patient was made comfort care after having a family discussion, awaiting placement  -Discontinue neurochecks  -Continue IV decadron until family can come to see the patient  -Pending placement to home hospice vs SNF, awaiting [facility] for [facility].

## 2020-11-13 NOTE — PROGRESS NOTES
Resident, Dr. Lacey, and RN at bedside. Dr. Lacey stated that tumors have returned and that if pt wants to do surgery that he would do it on Sunday. Writer stayed after physician's left room to debrief regarding surgery versus hospice options. Both options clearly explained to pt. Pt stated that he and his family are leaning towards hospice but that he would like to meet with his family and his physician before he makes a decision. Writer encouraged patient to take the time to make a decision and to make sure it is an informed decision.

## 2020-11-13 NOTE — DISCHARGE PLANNING
[facility] Transitional [facility] Coordination     Brain mass - surgical intervention tentatively scheduled 9a Sunday.  Current documentation indicates potential for inpatient rehab for ongoing medical management and family training to decrease burden of care.  Please consider referral to Physiatry to assist with discharge planning.

## 2020-11-13 NOTE — CONSULTS
Critical Care Consultation    Date of consult: 11/12/2020    Referring Physician  Pedro Cyr, *    Reason for Consultation  Cerebral edema and brain mass    History of Presenting Illness  75 y.o. male with hx of metastatic lung cancer s/p chemo, prior smoker, HTN, BPH who had a fall 5 days ago with subsequent left sided weakness. [facility] and found to have brain mass with extensive edema and midline shift.     CT head with extensive cerebral edema in right frontal lobe mass with 1.5cm R to L midline shift. Chronic epidural hematoma. Neurosurgery was consulted  INR 1.0, plt 235K, Hgb 12, WBC 3.6    Code Status  DNAR/DNI    Review of Systems  Review of Systems   Constitutional: Negative for chills, fever and malaise/fatigue.   Respiratory: Negative for cough and shortness of breath.    Cardiovascular: Negative for chest pain, palpitations and leg swelling.   Gastrointestinal: Negative for abdominal pain, diarrhea, nausea and vomiting.   Musculoskeletal: Negative for back pain.   Skin: Negative for rash.   Neurological: Positive for headaches.        Left sided weakness   Psychiatric/Behavioral: The patient is not nervous/anxious.    All other systems reviewed and are negative.      Past Medical History   has a past medical history of Cancer (HCC).    Surgical History   has a past surgical history that includes craniotomy (Right, 10/6/2019) and craniectomy (Left, 10/6/2019).    Family History  family history includes Hyperlipidemia in his mother; Hypertension in his mother.    Social History   reports that he quit smoking about 14 months ago. His smoking use included cigars. He has a 5.40 pack-year smoking history. He has quit using smokeless tobacco. He reports previous alcohol use. He reports that he does not use drugs.    Medications  Home Medications    **Home medications have not yet been reviewed for this encounter**       Current Facility-Administered Medications   Medication Dose Route  Frequency Provider Last Rate Last Admin   • [START ON 11/13/2020] dexamethasone (DECADRON) injection 4 mg  4 mg Intravenous Q6HRS Tonia Portillo M.D.       • labetalol (NORMODYNE/TRANDATE) injection 10 mg  10 mg Intravenous Q4HRS PRN Tonia Portillo M.D.       • senna-docusate (PERICOLACE or SENOKOT S) 8.6-50 MG per tablet 2 Tab  2 Tab Oral BID Tonia Portillo M.D.        And   • polyethylene glycol/lytes (MIRALAX) PACKET 1 Packet  1 Packet Oral QDAY PRN Tonia Portillo M.D.        And   • magnesium hydroxide (MILK OF MAGNESIA) suspension 30 mL  30 mL Oral QDAY PRN Tonia Portillo M.D.        And   • bisacodyl (DULCOLAX) suppository 10 mg  10 mg Rectal QDAY PRN Tonia Portillo M.D.       • acetaminophen (Tylenol) tablet 650 mg  650 mg Oral Q6HRS PRN Tonia Portillo M.D.       • [START ON 11/13/2020] insulin lispro (HumaLOG) injection  1-6 Units Subcutaneous Q6HRS Tonia Portillo M.D.        And   • glucose 4 g chewable tablet 16 g  16 g Oral Q15 MIN PRN Tonia Portillo M.D.        And   • dextrose 50% (D50W) injection 50 mL  50 mL Intravenous Q15 MIN PRN Tonia Portillo M.D.       • amLODIPine (NORVASC) tablet 5 mg  5 mg Oral Q DAY Tonia Portillo M.D.   5 mg at 11/12/20 2239   • NS infusion   Intravenous Continuous Tonia Portillo M.D. 75 mL/hr at 11/12/20 2239 New Bag at 11/12/20 2239       Allergies  No Known Allergies    Vital Signs last 24 hours  Temp:  [36.8 °C (98.2 °F)] 36.8 °C (98.2 °F)  Pulse:  [57-69] 57  Resp:  [16-20] 16  BP: (152-169)/() 152/92  SpO2:  [88 %-97 %] 97 %    Physical Exam  Physical Exam  Vitals signs and nursing note reviewed.   Constitutional:       General: He is not in acute distress.     Appearance: He is not ill-appearing, toxic-appearing or diaphoretic.   HENT:      Head: Normocephalic and atraumatic.      Mouth/Throat:      Mouth: Mucous membranes are moist.   Neck:      Musculoskeletal: Neck supple.   Cardiovascular:      Rate and Rhythm: Normal rate and regular  rhythm.      Pulses: Normal pulses.      Heart sounds: Normal heart sounds. No murmur.   Pulmonary:      Effort: Pulmonary effort is normal. No respiratory distress.      Breath sounds: Normal breath sounds. No wheezing, rhonchi or rales.   Abdominal:      General: Bowel sounds are normal. There is no distension.      Palpations: Abdomen is soft.      Tenderness: There is no abdominal tenderness. There is no guarding.   Musculoskeletal:         General: No swelling or tenderness.   Skin:     General: Skin is warm and dry.      Coloration: Skin is not jaundiced.   Neurological:      Mental Status: He is alert and oriented to person, place, and time.      Cranial Nerves: No cranial nerve deficit.      Sensory: No sensory deficit.      Comments: Mild left upper and lower weakness   Psychiatric:         Mood and Affect: Mood normal.         Behavior: Behavior normal.         Fluids  No intake or output data in the 24 hours ending 11/12/20 2354    Laboratory  Recent Results (from the past 48 hour(s))   MAGNESIUM    Collection Time: 11/12/20 10:30 PM   Result Value Ref Range    Magnesium 2.1 1.5 - 2.5 mg/dL   Prothrombin Time    Collection Time: 11/12/20 10:30 PM   Result Value Ref Range    PT 13.7 12.0 - 14.6 sec    INR 1.02 0.87 - 1.13   APTT    Collection Time: 11/12/20 10:30 PM   Result Value Ref Range    APTT 26.8 24.7 - 36.0 sec   COVID/SARS CoV-2 PCR    Collection Time: 11/12/20 10:43 PM    Specimen: Nasopharyngeal; Respirate   Result Value Ref Range    COVID Order Status Received        Imaging  No orders to display       Assessment/Plan  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 Roberson was consulted      Discussed patient condition and risk of morbidity and/or mortality with RN, RT and Patient.    The patient remains critically ill.  Critical care time = 32 minutes in directly providing and coordinating critical care and extensive data review.  No time overlap and excludes procedures.

## 2020-11-13 NOTE — PROGRESS NOTES
Patient arrived to the unit. Hospitalist gill, Nakul Tejada . Dr. Nikki Mota  called back and she was informed that patient is here and there is no orders in place, she was also made aware of patient's high blood pressure. She ordered Nicardipine gtt.

## 2020-11-13 NOTE — RESPIRATORY CARE
COPD EDUCATION by COPD CLINICAL EDUCATOR  11/13/2020 at 6:50 AM by Miryam Pinzon, RRT     Patient reviewed by COPD education team. Patient does not qualify for the COPD program.

## 2020-11-13 NOTE — ASSESSMENT & PLAN NOTE
Right frontal mass with surrounding brain edema and 1.5cm right to left midline shift    Plan:   Neurocheck Q1H  Dexamethasone 4 q6  If sudden onset mental status, repeat CT head stat  Low threshold for 3% hypertonic saline to keep sodium 145-150  Keep SBP <160  23% hypertonic prn high ICP  High risk for worsening ICP  Neurosurgery, Dr. Roberson was consulted

## 2020-11-13 NOTE — THERAPY
Speech Language Pathology   Clinical Swallow Evaluation     Patient Name: Milan Clark  AGE:  75 y.o., SEX:  male  Medical Record #: [id number]  Today's Date: 11/13/2020          Assessment    Patient is 75 y.o. male [facility] head findings of brain mass. Past medical history of CLL s/p chemo/radiotherapy, history of brain meta stasis s/p craniotomies, hypertension, BPH, CKD, Mckeon's esophagus. Patient reportedly had a fall around 5 days ago followed by left-sided weakness. CT head remarkable for extensive right frontal lobe edema causing 1.5 cm right to left midline shift.  Additionally questionable 22 mm mass within the deep white matter of the right frontal lobe. Epidural fluid.  CXR 11/8/20: No evidence for acute cardiopulmonary disease. Seen by SLP for cognition last year at [facility]. Was not seen for dysphagia.     Patient was seen on this date for a clinical swallow evaluation. Patient sitting out of bed in a chair and AAOx4. Speech mildly dysarthric and vocal quality reduced in intensity and slightly hypernasal. Patient denied history of dysphagia or recent pneumonia. Patient had congested cough prior to PO and mostly occurred when laughing. Oral motor examination revealed reduced lingual, labial, and velar ROM. Upper denture plate already donned for session. PO trials were administered and consisted of ice chips x2, 2-3 oz MTL, 2 oz applesauce, 4 oz soft and regular solids in mixed consistency form, dry solids (1 saltine cracker), and 8 oz thin liquids from straw. Patient had congested cough x2 between trials of thins and applesauce and difficult to determine if related to PO given congested cough noted prior to session. Mastication functional with fibrous and dry solids. Last CXR (11/8/20) negative for acute cardiopulmonary disease and currently on room air. Education provided to patient regarding current status and SLP recs.     Plan    Recommend initiation of a regular/thin liquid diet.  SLP following and will consider a diagnostic swallow study with any concerns for aspiration.     Recommend Speech Therapy 5 times per week until therapy goals are met for the following treatments:  Dysphagia Training, Cognitive-Linguistic Training and Patient / Family / Caregiver Education.    Discharge Recommendations: Recommend home health for continued speech therapy services     Objective       11/13/20 0920   Vitals   O2 Delivery Device Room air w/o2 available   Prior Level Of Function   Communication Impaired  (baseline cognitive deficits s/p resection)   Swallow Within Functional Limits  (denied hx of dysphagia or recent pna)   Dentition Edentulous   Dentures Uppers  (teeth intact on bottom)   Hearing Within Functional Limits for Evaluation   Patient's Primary Language English   Oral Motor Eval    Is Patient Able to Complete Oral Motor Eval Yes but Impaired   Labial Function   Labial Structure At Rest Within Functional Limits   Labial Vowel Production / I /, / U / Minimal   Labial Sequence / I /, / U / Minimal   Lingual Function   Lingual Structure At Rest Within Functional Limits   Lingual Protrude Minimal   Lingual Retract Minimal   Elevate In Mouth Minimal   Elevate Outside Mouth Minimal   Lateralization Minimal Right;Minimal Left   Jaw   Jaw Structure At Rest Within Functional Limits   Jaw Open / Resist Within Functional Limits   Jaw Close / Resist Within Functional Limits   Velar Function   Velar Structure At Rest Within Functional Limits   / A / Prolonged Minimal   / A /  5X's Minimal   Laryngeal Function   Voice Quality Minimal  (Reduced in intensity, mildly hypernasal)   Volutional Cough Minimal  (congested )   Excursion Upon Swallow Complete   Oral Food Presentation   Ice Chips Within Functional Limits   Single Swallow Mildly Thick (2) - (Nectar Thick)  Within Functional Limits   Single Swallow Thin (0) Within Functional Limits   Serial Swallow Thin (0) Minimal   Liquidised (3) Within Functional  Limits   Soft & Bite-Sized (6) - (Dysphagia III) Within Functional Limits   Regular (7) Minimal   Self Feeding Independent   Dysphagia Strategies / Recommendations   Compensatory Strategies Monitor During Meals;Head of Bed 90 Degrees During Eating / Drinking;Single Sips / Bites   Diet / Liquid Recommendation Regular (7);Thin (0)   Medication Administration  Whole with Liquid Wash   Short Term Goals   Short Term Goal # 1 Patient will consume a regular/thin liquid diet with no overt s/sx of aspiration given min cues to swallow strategies.

## 2020-11-13 NOTE — H&P
History & Physical Note    Date of Admission: 11/13/2020  Admission Status: Inpatient  [facility] Team: [facility] Gold Team  Attending: Dr Nakul MD  Senior Resident: Dr. Portillo    Chief [facility]: [facility] findings of brain mass    History of Present Illness (HPI): Milan is a 75 y.o. male with past medical history of CLL s/p chemo/radiotherapy, history of brain meta stasis s/p craniotomies, hypertension, BPH, CKD, Mckeon's esophagus  Presented on  11/12/2020 as a transfer from [facility] with CT head findings of brain mass with edema and midline shift.  Patient reportedly had a fall around 5 days ago followed by left-sided weakness.  Denies sensory changes.  No changes in bowel/bladder habits reported.  No history of dysphagia/dysarthria/vision changes.  Has occasional cough associated with headache which is chronic for the patient.  Denies nausea/vomiting. Denies chest pain/shortness of breath/palpitations/abdominal pain.      Patient hemodynamically stable on admit.  CT head remarkable for extensive right frontal lobe edema causing 1.5 cm right to left midline shift.  Additionally questionable 22 mm mass within the deep white matter of the right frontal lobe.  Epidural fluid collection of 4 mm seen suggestive of chronic hematoma.  Neurosurgery/Dr. Roberson consulted.    Review of Systems:   Review of Systems   Constitutional: Positive for chills and malaise/fatigue. Negative for fever.   HENT: Positive for hearing loss. Negative for sore throat.    Eyes: Negative for blurred vision.   Respiratory: Positive for cough. Negative for shortness of breath and wheezing.         Reports chronic cough   Cardiovascular: Negative for chest pain, palpitations and orthopnea.   Gastrointestinal: Negative for abdominal pain, constipation, diarrhea, nausea and vomiting.   Genitourinary: Negative for dysuria.   Musculoskeletal: Negative for myalgias.   Skin: Negative for rash.   Neurological: Positive for focal  weakness. Negative for sensory change, seizures and loss of consciousness.        Reports left-sided weakness   Psychiatric/Behavioral: Negative for depression.       Past Medical History:   Past Medical History was reviewed with patient.   has a past medical history of Cancer (HCC).    Past Surgical History: Past Surgical History was reviewed with patient.   has a past surgical history that includes craniotomy (Right, 10/6/2019) and craniectomy (Left, 10/6/2019).    Medications: Medications have been reviewed with patient.  Prior to Admission Medications   Prescriptions Last Dose Informant Patient Reported? Taking?   B Complex Vitamins (B COMPLEX 1 PO)   Yes No   Sig: Take 1 Tab by mouth every day.   Misc Natural Products (BETA-SITOSTEROL PLANT STEROLS PO)   Yes No   Sig: Take 1 Tab by mouth every day.   acetaminophen (TYLENOL) 325 MG Tab   Yes No   Sig: Take 2 Tabs by mouth every 6 hours as needed.   bismuth subsalicylate (PEPTO-BISMOL) 262 MG/15ML Suspension   Yes No   Sig: Take 30 mL by mouth every 6 hours as needed (constipation).   dexamethasone (DECADRON) 4 MG Tab   No No   Sig: Take 4 mg by mouth every 6 hours for three days, then every 8 hours for three days.  Dr. Law will direct further dosing.   docusate sodium (COLACE) 100 MG Cap   Yes No   Sig: Take 240 mg by mouth every bedtime.   famotidine (PEPCID) 20 MG Tab   No No   Sig: Take 1 Tab by mouth 2 Times a Day.   tamsulosin (FLOMAX) 0.4 MG capsule   No No   Sig: Take 1 Cap by mouth ONE-HALF HOUR AFTER BREAKFAST.   vitamin D (CHOLECALCIFEROL) 1000 UNIT Tab   Yes No   Sig: Take 1,000 Units by mouth every day.      Facility-Administered Medications: None        Allergies: Allergies have been reviewed with patient.  No Known Allergies    Family History:  family history includes Hyperlipidemia in his mother; Hypertension in his mother.     Social History:   Tobacco: quit smoking 1 year ago, >50 py  Alcohol: denies drinking   Recreational drugs (illegal  and prescription):  No illicit drug use   Employment: retired  Activity Level: use walker/cane support for ambulation   Living situation:  Lives with daughter  Recent travel:  none  Primary Care Provider: reviewed Elif Shaw M.D.  Other (stressors, spirituality, exposures):  none  Physical Exam:   Vitals:  Temp:  [36.6 °C (97.8 °F)-37.1 °C (98.7 °F)] 36.6 °C (97.8 °F)  Pulse:  [57-70] 70  Resp:  [14-20] 20  BP: (139-169)/() 148/96  SpO2:  [88 %-98 %] 97 %    Physical Exam  Constitutional:       General: He is not in acute distress.  HENT:      Head: Atraumatic.      Nose: Nose normal.      Mouth/Throat:      Mouth: Mucous membranes are dry.      Pharynx: No oropharyngeal exudate.   Eyes:      General: No scleral icterus.     Extraocular Movements: Extraocular movements intact.      Conjunctiva/sclera: Conjunctivae normal.      Pupils: Pupils are equal, round, and reactive to light.   Neck:      Musculoskeletal: Normal range of motion.   Cardiovascular:      Rate and Rhythm: Normal rate.      Heart sounds: No murmur.      Comments: Loud S2  Pulmonary:      Effort: Pulmonary effort is normal.      Breath sounds: Rales present.      Comments: Minimal bibasilar rales  Abdominal:      General: There is no distension.      Palpations: Abdomen is soft.      Tenderness: There is no abdominal tenderness.   Musculoskeletal: Normal range of motion.         General: No tenderness.      Right lower leg: No edema.      Left lower leg: No edema.   Skin:     General: Skin is warm.   Neurological:      Mental Status: He is alert and oriented to person, place, and time.      Cranial Nerves: No cranial nerve deficit.      Comments: Muscle strength 5/5 right upper and lower extremity.  4+/5 left upper and lower extremity.  Sensation grossly intact   Psychiatric:         Mood and Affect: Mood normal.         Behavior: Behavior normal.         Labs:   No results for input(s): WBC, RBC, HEMOGLOBIN, HEMATOCRIT, MCV, MCH, RDW,  PLATELETCT, MPV, NEUTSPOLYS, LYMPHOCYTES, MONOCYTES, EOSINOPHILS, BASOPHILS, RBCMORPHOLO in the last 72 hours.  No results for input(s): SODIUM, POTASSIUM, CHLORIDE, CO2, GLUCOSE, BUN, CPKTOTAL in the last 72 hours.  No results for input(s): ALBUMIN, TBILIRUBIN, ALKPHOSPHAT, TOTPROTEIN, ALTSGPT, ASTSGOT, CREATININE in the last 72 hours.     EKG: pending    Imaging:   No orders to display        Previous Data Review: reviewed    Brain mass  Assessment & Plan  Pt admitted with CT head findings of extensive right frontal lobe edema causing 1.5 cm right to left midline shift.  Additionally questionable 22 mm mass within the deep white matter of the right frontal lobe.  Epidural fluid collection of 4 mm seen suggestive of chronic hematoma.   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 I have made every reasonable attempt to correct obvious errors, but there may be errors of grammar and possibly content that I did not discover before finalizing the note.       I have personally seen and examined the patient and discussed the management with the resident.   I reviewed the resident's note and agree with the documented findings and plan of care.   Additional attending comments: Patient seen at bedside with [facility] resident Dr. Portillo on arrival. Agree with above plan and case was also discussed with Dr. Roberson. He recommends steroids and will follow patient in AM. Patient also expresses that he would like to be DNR/DNI at this time.     Cyndi Mota MD

## 2020-11-13 NOTE — DIETARY
Nutrition Services: Day 1 of admit.  Milan Clark is a 75 y.o. male with admitting DX of cerebral edema, mass w/ shift, ICH (intracerebral hemorrhage)  Consult received for Low BMI     Pt eating breakfast during visit and had short answers to questions. Pt states his appetite has been okay. Pt reports prior to admit his appetite has been better. Pt doesn't think he has lost wt. Pt drinks Ensure at home. Pt doesn't like the Boost and prefers Ensure. Pt agreeable to try magic cups, added TID.     Assessment:  Ht: 177.8 cm, Wt 11/12: 51.9 kg via bed scale, BMI: 16.42 - Underweight  Diet/Intake: Regular - Per chart pt PO 50-75% 1 meal documented.      Evaluation:   1. Pt appears thin. Pt with severe muscle loss as evidenced by depression in temples and prominent clavicles.   2. Per chart review pt's wt on 8/26/20 was 61.4 kg. Wt loss percent is 15.5% in ~3 months, which is severe.   3. Labs: Na 132, Glucose 123, BUN 30, POC glucose 107, 110  4. Meds: norvasc, decadron, pepcid, humalog, pericolace, flomax  5. Fluids: NS infusion @ 75 mL/hr    Malnutrition Risk: Pt with severe malnutrition in the context of chronic disease related to lung cancer metastatic to brain as evidenced by severe wt loss of 15.5% in ~3 months and severe muscle loss as evidenced by depression in temples and prominent clavicles.     Recommendations/Plan:  1. Encourage intake of meals  2. Document intake of all meals as % taken in ADL's to provide interdisciplinary communication across all shifts.   3. Monitor weight.  4. Nutrition rep will continue to see patient for ongoing meal and snack preferences.    RD following

## 2020-11-13 NOTE — ED TRIAGE NOTES
[facility]ITAL[facility] TRIAGE OFFICER DIRECT ADMISSION REPORT  [facility]  Transferring physician: Dr CARIDAD Ward  Transferring facility/physician contact number:   Chief complaint: hemiparesis  Pertinent history & patient course: sudden onset of Sx's.  Known Hx of metastatic Lung CA already on Decadron.  CT there showing intraperynchmal hemorrhage.  They DW Dr HUMERA Roberson already.  Pt alert and protecting his airway  Pertinent imaging & lab results: CT  Code Status: full per transferring provider, I personally verified with the transferring provider patient's code status and the transferring provider has confirmed this with the patient.  Further work up or recommendations per triage officer prior to transfer: Neuro Srgry consultation  Consultants called prior to transfer and pertinent input from consultants: Dr HUMERA Roberson  Patient accepted for transfer: Yes  Consultants to be called upon arrival: Neuro Surgery, Intensivist, Hospitalist  Admission status: Inpatient.   Floor requested: RICU  If ICU transfer, name of intensivist case discussed with and pertinent input from critical care:     Please inform the triage officer upon arrival of the patient to [facility] for assignment of a hospitalist to perform admission.     For any question or concerns regarding the care of this patient, please reach out to the assigned hospitalist.

## 2020-11-13 NOTE — PALLIATIVE CARE
Palliative Care follow-up  Appreciate call from ONN regarding plan for further discussions regarding surgery vs hospice. Per Bre, pt's son is coming to [facility] to speak with MD today. PC will f/u Saturday to discuss goals/POC with son. Appreciate ONN updating son Milan on this plan.      Plan: formal consult pending    Thank you for allowing Palliative Care to support this patient and family. Contact [phone] for additional assistance, change in patient status, or with any questions/concerns.

## 2020-11-13 NOTE — ASSESSMENT & PLAN NOTE
Known h/o Lung Ca s/p chemo radiation therapy with brain mets  Patient is now comfort care, hospice awaiting placement.

## 2020-11-13 NOTE — PROGRESS NOTES
Call placed to sonMilan, to follow up after patient transfer to [facility].  He reports patient started going downhill quickly.  He states that right now patient is deciding between surgery Sunday or hospice.  He states that family would not be able to care for him at home in his current condition.  They want what is best for patient and will support his decision.  Provided active listening and answered additional questions.  Pt may benefit from palliative care consult.  Son reporting he will be in to visit patient at 3 pm today.      Call placed to [facility] physician to discuss possible palliative care referral.  She agrees and will placed order.  Navigator will also reach out to palliative care to update on information and discussion with son.  Grateful for assistance from physician on referral.  Continue to support patient and family.    Call placed to palliative care and spoke with Marlee.  She reports they did received referral.  Provided updated information.  She does not think they will be able to meet with son today but can definitely follow up with patient and family tomorrow.    Call placed to sonMilan.  Information that he was not available at the moment.  Provided contact information for daughter Iris.  Called and left message to update on palliative care referral.

## 2020-11-13 NOTE — PROGRESS NOTES
Dr. Roberson's office called and paged about patient's arrival to the unit.   Dr. Mota (hospitalist), also waiting for Dr. Roberson's phone call.

## 2020-11-13 NOTE — PROGRESS NOTES
Pt's son Milan called unit for update on pt and inquired about plan for patient. Writer explained that pt remained stable overnight and that right now a decision regarding surgery or hospice is ongoing. Son stated he and pt's daughter have POA but that they have encouraged pt to be his own decision maker if possible. Stated that he thinks his dad is leaning more towards hospice care but that he would be interested in doing a family meeting with the physician. Writer clearly explained the visitation policy to son. Son to let staff know who will be the designated visitor. Writer to figure out plan for family meeting.

## 2020-11-14 NOTE — CONSULTS
Reason for PC Consult: Advance Care Planning    Consulted by:   Dr. Nix    Assessment:  General:   75 year old male admitted for ICH on 11/12/20. 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 [facility], [facility] showed brain mass with edema and midline shift. Pt transferred to [facility] for neurosurgical eval.     Prior PC Consult:   10/07/2019    Social:   Pt lives with his daughter, Iris in her [place] home.     Dyspnea: No, 96% on RA  Last BM: (PTA)    Pain: No    Depression: No    Dementia: No      Spiritual:  Is Mosque or spirituality important for coping with this illness? No, Family declined visit from   Has a  or spiritual provider visit been requested? No    Palliative Performance Scale: 20%    Advance Directive: Advance Directive    DPOA: Yes, Iris Clark; 1st Alt Milan Eduardo  POLST: None on File     To locate the AD/POLST, please hover the cursor over the patient's code status to find all linked ACP documents.    Code Status: Comfort Measures      Outcome:  PC RN visited pt and son Milan at bedside. Introduced self and role of PC. Discussed their understanding of clinical picture, they both verbalized good understanding and insight.    Discussed hospice in detail, philosophy, goals and support provided. Discussed hospice at home vs SNF, [facility], barriers such as visiting restrictions in a SNF and addressed concerns. Milan verbalized understanding and agreement with hospice referral. He would need to speak with his sister about home vs SNF with hospice. Ideally, they want to take him home, however he and his sister would need to work out 24/hr care coverage.     Milan verbalized agreement with having a referral be sent to [facility] now and OK for  on Monday to [facility] while he and his sister talk.     Obtained hospice choice for [facility], faxed to [facility].    Provided business card, encouraged family to call with any questions or concerns.     Active listening, education, validation, normalization, therapeutic touch, and emotional support provided throughout encounter.    Updated:   PC Team, SW team    Plan:   Continue comfort focused care, [facility] to evaluate pt for services. Family discussing hospice at home vs SNF, SW team to [facility] out to [facility].       Thank you for allowing Palliative Care to participate in this patient's care. Please feel free to call [phone] with any questions or concerns.

## 2020-11-14 NOTE — THERAPY
Physical Therapy Contact Note    PT consult received, pt now comfort care but is also on surgery schedule; will hold until medical plan of care established to identify appropriate role of acute PT;     Leanne Ellis, PT,  [phone]

## 2020-11-14 NOTE — PROGRESS NOTES
Pt alert and oriented x 4, x 2 assist to bedside commode, resting comfortably in bed, able to make needs known, frequent rounding in place will continue to monitor and support

## 2020-11-14 NOTE — CARE PLAN
Problem: Safety  Goal: Will remain free from injury  Intervention:  Ask pt to call for assistance before attempting to get up out of bed.  Outcome: PROGRESSING AS EXPECTED     Problem: Pain Management  Goal: Pain level will decrease to patient's comfort goal  Intervention:  Assess and medicate as needed for complaints of acute pain.  Description: Assess and medicate as ordered for complaints of acute pain  Outcome: PROGRESSING AS EXPECTED

## 2020-11-14 NOTE — THERAPY
Missed Therapy     Patient Name: Milan Clark  Age:  75 y.o., Sex:  male  Medical Record #: [id number]  Today's Date: 11/14/2020 11/14/20 0738   Interdisciplinary Plan of Care Collaboration   IDT Collaboration with  Nursing   Collaboration Comments OT consult received pt tentatively scheduled for staged sx tomorrow. Will see post procedure as appropriate.

## 2020-11-14 NOTE — PROGRESS NOTES
[facility] ICU Progress Note      Admit Date: 11/12/2020  Resident(s): Mary Nix M.D.  Attending: DEBORAH Fernandez/ Dr. Dorota Cast    Date & Time:   11/13/2020   5:03 PM       Patient ID:    Name:             Milan Clark     YOB: 1945  Age:                 75 y.o.  male   MRN:               [id number]    Diagnosis:  Stage IV lung cancer with brain mets    ID:  This is a 75-year-old male with a history of non-small cell lung cancer stage IV with brain mets s/p chemo, radiation and prior resection of brain mets in 10/2019, [facility] with worsening left-sided weakness and was found to have new right frontal lobe mass with edema and a 1.5 cm midline shift on CT scan and was given IV Decadron and transferred to [facility] for neurosurgery consult.  Neurosurgery Dr. Lacey was consulted.  However, after discussing with his family patient decided not to pursue surgery and hence was made comfort care and transfer orders to the floor were placed. Palliative Care was consulted and Referral to hospice has been placed.      Interval Events:  -No acute events overnight  -Patient continues to complain of fatigue and chronic cough  -No new complaints  -Vital signs stable, satting well on room air  -Labs unremarkable    Consultants:  Neurosurgery    Procedures:  None    Review of Systems   Constitutional: Positive for malaise/fatigue. Negative for chills and fever.   HENT: Positive for hearing loss. Negative for congestion and sore throat.    Eyes: Negative for blurred vision and double vision.   Respiratory: Positive for cough. Negative for sputum production, shortness of breath and wheezing.    Cardiovascular: Negative for chest pain, palpitations and leg swelling.   Gastrointestinal: Negative for abdominal pain, nausea and vomiting.   Genitourinary: Negative for dysuria and urgency.   Musculoskeletal: Negative for myalgias.   Neurological: Positive  for weakness. Negative for sensory change, focal weakness and headaches.   Psychiatric/Behavioral: Negative for substance abuse.       VITALS:  Pulse: 81  Blood Pressure : 143/85       Temp  Av.7 °C (98 °F)  Min: 36.1 °C (96.9 °F)  Max: 37.1 °C (98.7 °F)         Physical Exam:  Physical Exam   Constitutional: He is oriented to person, place, and time. Vital signs are normal. He appears cachectic.   HENT:   Head: Normocephalic and atraumatic.   Mouth/Throat: No oropharyngeal exudate.   Eyes: Pupils are equal, round, and reactive to light. Conjunctivae and EOM are normal. No scleral icterus.   Neck: Normal range of motion. No JVD present.   Cardiovascular: Normal rate, regular rhythm and normal heart sounds.   No murmur heard.  Pulmonary/Chest: Effort normal. No respiratory distress. He has no wheezes. He has rales.   Abdominal: Soft. Bowel sounds are normal. He exhibits no distension. There is no abdominal tenderness.   Genitourinary:    Genitourinary Comments: Condom catheter in place     Musculoskeletal: Normal range of motion.         General: No deformity or edema.   Neurological: He is alert and oriented to person, place, and time. No cranial nerve deficit. GCS score is 15.   Right upper and lower extremities-5/5  Left upper and lower extremities-4/5   Skin: Skin is warm. No erythema.   Psychiatric: Mood, affect and judgment normal.          HemoDynamics:  Pulse: 81 Blood Pressure : 143/85      Respiratory:     Respiration: 20, Pulse Oximetry: 93 %    Neuro:    Fluids:  Intake/Output       20 0700 - 20 0659 (Not Admitted) 20 07 - 20 0659 20 0700 - 20 0659      8403-7611 4431-5435 Total 9605-9361 3544-8202 Total 9168-2920 4072-8283 Total       Intake    I.V.  --  -- --  --  551.3 551.3  450  -- 450    Volume (mL) (NS infusion) -- -- -- -- 551.3 551.3 450 -- 450    Other  --  -- --  --  140 140  --  -- --    Medications (PO/Enteral Liquids) -- -- -- -- 140 140 -- -- --     Total Intake -- -- -- -- 691.3 691.3 450 -- 450       Output    Urine  --  -- --  --  450 450  450  -- 450    Number of Times Voided -- -- -- -- 0 x 0 x -- -- --    Number of Times Incontinent of Urine -- -- -- -- 3 x 3 x -- -- --    Output (mL) (External Urinary Catheter (Condom)) -- -- -- -- 450 450 450 -- 450    Stool  --  -- --  --  -- --  --  -- --    Number of Times Stooled -- -- -- -- -- -- 0 x -- 0 x    Total Output -- -- -- -- 450 450 450 -- 450       Net I/O     -- -- -- -- 241.3 241.3 0 -- 0        Weight: 51.9 kg (114 lb 6.7 oz)  Recent Labs     20  0555   SODIUM  --  132*   POTASSIUM  --  3.7   CHLORIDE  --  103   CO2  --  22   BUN  --  30*   CREATININE  --  0.86   MAGNESIUM 2.1  --    CALCIUM  --  9.0     Body mass index is 16.42 kg/m².    GI/Nutrition:  Recent Labs     20  0555   GLUCOSE 123*       Heme:  Recent Labs     20  0555   RBC  --  4.18*   HEMOGLOBIN  --  12.9*   HEMATOCRIT  --  39.5*   PLATELETCT  --  262   PROTHROMBTM 13.7  --    APTT 26.8  --    INR 1.02  --        Infectious Disease:  Temp  Av.7 °C (98 °F)  Min: 36.1 °C (96.9 °F)  Max: 37.1 °C (98.7 °F)  Recent Labs     20  0555   WBC 5.0   NEUTSPOLYS 82.80*   LYMPHOCYTES 10.20*   MONOCYTES 6.20   EOSINOPHILS 0.00   BASOPHILS 0.00       Medications:  • MD ALERT...adult comfort care       • atropine  2 Drop     • artificial tears  2 Drop     • morphine  10 mg     • acetaminophen  650 mg      Or   • acetaminophen  650 mg     • morphine injection  10 mg     • morphine injection  5 mg     • ondansetron  8 mg      Or   • ondansetron  8 mg     • LORazepam  1 mg      Or   • LORazepam  1 mg     • tamsulosin  0.4 mg     • dexamethasone  4 mg          Imaging:  OUTSIDE IMAGES-CT HEAD   Final Result          Assessment and plan    Brain mass  Assessment & Plan  Pt admitted with CT head findings of extensive right frontal lobe edema causing 1.5 cm right to left midline shift.  Additionally  questionable 22 mm mass within the deep white matter of the right frontal lobe.  Epidural fluid collection of 4 mm seen suggestive of chronic hematoma.   -Patient was initially admitted to the ICU and neurosurgery was consulted, however patient has decided not to pursue surgery  -Patient was made comfort care after having a family discussion  -Discontinue neurochecks  -Continue IV decadron until family can come to see the patient      Lung cancer metastatic to brain (HCC)- (present on admission)  Assessment & Plan  Known h/o Lung Ca s/p chemo radiation therapy with brain mets      DISPO: To floor    Code Status: Comfort care    Quality Measures:  Feeding: As tolerated  Analgesia: Morphine per comfort care orders  Sedation: None  Thromboprophylaxis: None  Head of bed: >30 degrees  Ulcer prophylaxis: None  Glycemic control: None  Bowel care: bowel regimen in place  Indwelling lines: Peripheral IV lines  Deescalation of antibiotics: Not on any antibiotics

## 2020-11-14 NOTE — PROGRESS NOTES
[facility] ICU Progress Note      Admit Date: 11/12/2020  Resident(s): Mary Nix M.D.  Attending: DEBORAH Fernandez/ Dr. Dorota Cast    Date & Time:   11/14/2020   11:47 AM       Patient ID:    Name:             Milan Clark     YOB: 1945  Age:                 75 y.o.  male   MRN:               [id number]    Diagnosis:  Stage IV lung cancer with brain mets    ID:  This is a 75-year-old male with a history of non-small cell lung cancer stage IV with brain mets s/p chemo, radiation and prior resection of brain mets in 10/2019, [facility] with worsening left-sided weakness and was found to have new right frontal lobe mass with edema and a 1.5 cm midline shift on CT scan and was given IV Decadron and transferred to [facility] for neurosurgery consult.  Neurosurgery Dr. Lacey was consulted.  However, after discussing with his family patient decided not to pursue surgery and hence was made comfort care and transfer orders to the floor were placed. Palliative Care was consulted and Referral to hospice has been placed.      Interval Events:  -No acute events overnight  -No new complaints  -Vital signs stable, satting well on room air  -Palliative consult placed, evaluation pending     Consultants:  Neurosurgery    Procedures:  None    Review of Systems   Constitutional: Positive for malaise/fatigue. Negative for chills and fever.   HENT: Positive for hearing loss. Negative for congestion and sore throat.    Eyes: Negative for blurred vision and double vision.   Respiratory: Positive for cough. Negative for sputum production, shortness of breath and wheezing.    Cardiovascular: Negative for chest pain, palpitations and leg swelling.   Gastrointestinal: Negative for abdominal pain, nausea and vomiting.   Genitourinary: Negative for dysuria and urgency.   Musculoskeletal: Negative for myalgias.   Neurological: Positive for weakness. Negative for  sensory change, focal weakness and headaches.   Psychiatric/Behavioral: Negative for substance abuse.       VITALS:  Pulse: 65  Blood Pressure : (!) 169/103       Temp  Av.7 °C (98.1 °F)  Min: 36.5 °C (97.7 °F)  Max: 36.8 °C (98.3 °F)         Physical Exam:  Physical Exam   Constitutional: He is oriented to person, place, and time. Vital signs are normal. He appears cachectic.   HENT:   Head: Normocephalic and atraumatic.   Mouth/Throat: No oropharyngeal exudate.   Eyes: Pupils are equal, round, and reactive to light. Conjunctivae and EOM are normal. No scleral icterus.   Neck: Normal range of motion. No JVD present.   Cardiovascular: Normal rate, regular rhythm and normal heart sounds.   No murmur heard.  Pulmonary/Chest: Effort normal. No respiratory distress. He has no wheezes. He has rales.   Abdominal: Soft. Bowel sounds are normal. He exhibits no distension. There is no abdominal tenderness.   Genitourinary:    Genitourinary Comments: Condom catheter in place     Musculoskeletal: Normal range of motion.         General: No deformity or edema.   Neurological: He is alert and oriented to person, place, and time. No cranial nerve deficit. GCS score is 15.   Right upper and lower extremities-5/5  Left upper and lower extremities-4/5   Skin: Skin is warm. No erythema.   Psychiatric: Mood, affect and judgment normal.          HemoDynamics:  Pulse: 65 Blood Pressure : (!) 169/103      Respiratory:     Respiration: (!) 24, Pulse Oximetry: 96 %    Neuro:    Fluids:  Intake/Output       20 0700 - 20 0659 20 0700 - 20 0659 20 0700 - 11/15/20 0659      3059-6592 1573-5705 Total 0681-7742 4102-2230 Total 4207-7134 0556-0279 Total       Intake    P.O.  --  -- --  --  740 740  350  -- 350    P.O. -- -- -- -- 740 740 350 -- 350    I.V.  --  551.3 551.3  450  -- 450  --  -- --    Volume (mL) (NS infusion) -- 551.3 551.3 450 -- 450 -- -- --    Other  --  140 140  --  -- --  --  -- --     Medications (PO/Enteral Liquids) -- 140 140 -- -- -- -- -- --    Total Intake -- 691.3 691.3  350 -- 350       Output    Urine  --  450 450  450  -- 450  --  -- --    Number of Times Voided -- 0 x 0 x -- -- -- -- -- --    Number of Times Incontinent of Urine -- 3 x 3 x -- -- -- -- -- --    Output (mL) (External Urinary Catheter (Condom)) -- 450 450 450 -- 450 -- -- --    Stool  --  -- --  --  -- --  --  -- --    Number of Times Stooled -- -- -- 0 x -- 0 x -- -- --    Total Output -- 450 450 450 -- 450 -- -- --       Net I/O     -- 241.3 241.3 0 740 740 350 -- 350           Recent Labs     20  0555   SODIUM  --  132*   POTASSIUM  --  3.7   CHLORIDE  --  103   CO2  --  22   BUN  --  30*   CREATININE  --  0.86   MAGNESIUM 2.1  --    CALCIUM  --  9.0     Body mass index is 16.42 kg/m².    GI/Nutrition:  Recent Labs     20  0555   GLUCOSE 123*       Heme:  Recent Labs     20  0555   RBC  --  4.18*   HEMOGLOBIN  --  12.9*   HEMATOCRIT  --  39.5*   PLATELETCT  --  262   PROTHROMBTM 13.7  --    APTT 26.8  --    INR 1.02  --        Infectious Disease:  Temp  Av.7 °C (98.1 °F)  Min: 36.5 °C (97.7 °F)  Max: 36.8 °C (98.3 °F)  Recent Labs     20  0555   WBC 5.0   NEUTSPOLYS 82.80*   LYMPHOCYTES 10.20*   MONOCYTES 6.20   EOSINOPHILS 0.00   BASOPHILS 0.00       Medications:  • MD ALERT...adult comfort care       • atropine  2 Drop     • artificial tears  2 Drop     • morphine  10 mg     • acetaminophen  650 mg      Or   • acetaminophen  650 mg     • morphine injection  10 mg     • morphine injection  5 mg     • ondansetron  8 mg      Or   • ondansetron  8 mg     • LORazepam  1 mg      Or   • LORazepam  1 mg     • tamsulosin  0.4 mg     • dexamethasone  4 mg          Imaging:  OUTSIDE IMAGES-CT HEAD   Final Result          Assessment and plan    Brain mass  Assessment & Plan  Pt admitted with CT head findings of extensive right frontal lobe edema causing 1.5 cm  right to left midline shift.  Additionally questionable 22 mm mass within the deep white matter of the right frontal lobe.  Epidural fluid collection of 4 mm seen suggestive of chronic hematoma.   -Patient was initially admitted to the ICU and neurosurgery was consulted, however patient has decided not to pursue surgery  -Patient was made comfort care after having a family discussion  -Discontinue neurochecks  -Continue IV decadron until family can come to see the patient      Lung cancer metastatic to brain (HCC)- (present on admission)  Assessment & Plan  Known h/o Lung Ca s/p chemo radiation therapy with brain mets      DISPO: To floor    Code Status: Comfort care    Quality Measures:  Feeding: As tolerated  Analgesia: Morphine per comfort care orders  Sedation: None  Thromboprophylaxis: None  Head of bed: >30 degrees  Ulcer prophylaxis: None  Glycemic control: None  Bowel care: bowel regimen in place  Indwelling lines: Peripheral IV lines  Deescalation of antibiotics: Not on any antibiotics

## 2020-11-14 NOTE — PROGRESS NOTES
Patient taken via bed with all personal belongings ( eye glasses, cell phone , package of briefs and one pair of socks)  No phone charging cord was found or any other clothing was found.

## 2020-11-14 NOTE — PROGRESS NOTES
Condom catheter displaced when patient transferred to new bed.  Pt declined placement of new one and incontinence brief placed.

## 2020-11-15 NOTE — DISCHARGE PLANNING
Received Choice form at [phone]  Agency/Facility Name: [facility]  Referral sent per Choice form @ [phone]

## 2020-11-15 NOTE — PROGRESS NOTES
Hospital Medicine Daily Progress Note    Date of Service  11/15/2020    Chief Complaint  75 y.o. male admitted 11/12/2020 with with past medical history of CLL s/p chemo/radiotherapy, history of brain meta stasis s/p craniotomies. [facility] brain [facility] with edema and midline shift.    Hospital Course  Patient with history of non-small cell lung cancer stage IV with brain mets s/p chemo, radiation and prior resection of brain mets in 10/2019, [facility] with worsening left-sided weakness and was found to have new right frontal lobe mass with edema and a 1.5 cm midline shift on CT scan and was given IV Decadron and transferred to [facility] for neurosurgery consult.  Neurosurgery Dr. Lacey was consulted.  However, after discussing with his family patient decided not to pursue surgery and hence was made comfort care and transfer orders to the floor were placed. Palliative Care was consulted and Referral to hospice has been placed.    Interval Problem Update  11/15 - Patient was seen at bedside. Afebrile, currently not complaining of any pain. We are awaiting placement, SNF vs home hospice.    Consultants/Specialty  Neurosurgery  Palliative    Code Status  Comfort Care/DNR    Disposition  The patient is currently on comfort care, we are [facility]. SNF vs home hospice.    Review of Systems  Review of Systems   Constitutional: Negative for chills and fever.   HENT: Negative for congestion, ear discharge and ear pain.    Eyes: Negative for photophobia and pain.   Respiratory: Negative for cough and wheezing.    Cardiovascular: Negative for chest pain, palpitations and orthopnea.   Gastrointestinal: Negative for heartburn, nausea and vomiting.   Genitourinary: Negative for dysuria and urgency.   Musculoskeletal: Negative for myalgias.   Skin: Negative for itching.    Neurological: Negative for tremors, seizures and loss of consciousness.   Psychiatric/Behavioral: Negative for hallucinations and suicidal ideas. The patient does not have insomnia.         Physical Exam  Temp:  [36.9 °C (98.5 °F)] 36.9 °C (98.5 °F)  Pulse:  [54] 54  Resp:  [18] 18  BP: (130)/(79) 130/79  SpO2:  [89 %] 89 %    Physical Exam  Constitutional:       General: He is not in acute distress.     Comments: Appears cachectic.   HENT:      Head: Atraumatic.      Mouth/Throat:      Mouth: Mucous membranes are moist.   Eyes:      General: No scleral icterus.  Cardiovascular:      Rate and Rhythm: Normal rate and regular rhythm.      Heart sounds: No murmur.   Pulmonary:      Breath sounds: No wheezing or rales.   Abdominal:      General: There is no distension.      Tenderness: There is no abdominal tenderness. There is no guarding.   Neurological:      Mental Status: He is alert.         Fluids    Intake/Output Summary (Last 24 hours) at 11/15/2020 0953  Last data filed at 11/15/2020 0601  Gross per 24 hour   Intake --   Output 1200 ml   Net -1200 ml       Laboratory  Recent Labs     11/13/20  0555   WBC 5.0   RBC 4.18*   HEMOGLOBIN 12.9*   HEMATOCRIT 39.5*   MCV 94.5   MCH 30.9   MCHC 32.7*   RDW 55.1*   PLATELETCT 262   MPV 9.4     Recent Labs     11/13/20  0555   SODIUM 132*   POTASSIUM 3.7   CHLORIDE 103   CO2 22   GLUCOSE 123*   BUN 30*   CREATININE 0.86   CALCIUM 9.0     Recent Labs     11/12/20  2230   APTT 26.8   INR 1.02               Imaging  OUTSIDE IMAGES-CT HEAD   Final Result           Assessment/Plan  Brain mass  Assessment & Plan  Pt admitted with CT head findings of extensive right frontal lobe edema causing 1.5 cm right to left midline shift.  Additionally questionable 22 mm mass within the deep white matter of the right frontal lobe.  Epidural fluid collection of 4 mm seen suggestive of chronic hematoma.   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       VTE prophylaxis: SCDs. Patient now on comfort care.

## 2020-11-15 NOTE — CARE PLAN
Problem: Safety  Goal: Will remain free from injury  Outcome: PROGRESSING AS EXPECTED  Goal: Will remain free from falls  Description: Ask pt to call for assistance before attempting to get up out of bed.  Outcome: PROGRESSING AS EXPECTED     Problem: Pain Management  Goal: Pain level will decrease to patient's comfort goal  Description: Assess and medicate as ordered for complaints of acute pain  Outcome: PROGRESSING AS EXPECTED     Problem: Skin Integrity  Goal: Risk for impaired skin integrity will decrease  Outcome: PROGRESSING AS EXPECTED     Patient remained free from falls/injury this shift; bed alarm on, call bell within reach. X2 assist to the BSC; left sided weakness at baseline. Denies pain this AM. Burns in place for retention. Hospice referral out. Will continue to monitor.

## 2020-11-15 NOTE — PROGRESS NOTES
Received report from night RN. POC discussed with patient. Patient is A&O x 4. Bed alarm on, call light within reach, bed in lowest locked position, hourly rounding in place. Catheter placed per order to assist with urinary urgency. Will continue to monitor pt.

## 2020-11-16 PROBLEM — N40.0 BPH (BENIGN PROSTATIC HYPERPLASIA): Status: ACTIVE | Noted: 2020-01-01

## 2020-11-16 NOTE — DISCHARGE PLANNING
Anticipated Discharge Disposition: Home with hospice.     Action: LSW made aware by [facility] RN, Brett, that pt's family has decided to bring him home to his son and daughter in [place] [facility]. [facility] does not service T, so pt's family would like to send choice to [facility]. Verbal consent received to send choice, faxed to [facility]ach[facility]ERS.     Barriers to Discharge: Hospice acceptance.     Plan: LSW will continue to follow and assist in needs.

## 2020-11-16 NOTE — DISCHARGE PLANNING
Anticipated Discharge Disposition: Pending, possible hospice w/ [facility] or SNF.      Action: Pt discussed in IDT rounds. Pending placement with hospice. Pt is a [place], LOU contaced Nicky Mohamud, [phone] [facility] to [facility] and placement. No answer, LOU left  [phone] requesting callback.     Barriers to Discharge: Placement.     Plan: LSW will await callback form Nicky with the [facility] regarding placement.

## 2020-11-16 NOTE — PROGRESS NOTES
Hospital Medicine Daily Progress Note    Date of Service  11/16/2020    Chief Complaint  75 y.o. male admitted 11/12/2020 with with past medical history of CLL s/p chemo/radiotherapy, history of brain meta stasis s/p craniotomies. [facility] of brain mass with edema and midline shift.    Hospital Course  Patient with history of non-small cell lung cancer stage IV with brain mets s/p chemo, radiation and prior resection of brain mets in 10/2019, [facility] with worsening left-sided weakness and was found to have new right frontal lobe mass with edema and a 1.5 cm midline shift on CT scan and was given IV Decadron and transferred to [facility] for neurosurgery consult.  Neurosurgery Dr. Lacey was consulted.  However, after discussing with his family patient decided not to pursue surgery and hence was made comfort care and transfer orders to the floor were placed. Palliative Care was consulted and Referral to hospice has been placed.    Interval Problem Update  11/16 - Patient was seen at bedside, currently eating breakfast. The patient is in good spirits, currently not complaining of any pain. He is tolerating PO and having regular BMs. As per nursing no other over night events were reported.    Consultants/Specialty  Neurosurgery  Palliative    Code Status  Comfort Care/DNR    Disposition  The patient is currently on comfort care, we are pending [facility]. SNF vs home hospice.    Review of Systems  Review of Systems   Constitutional: Negative for chills and fever.   HENT: Negative for congestion, ear discharge and ear pain.    Eyes: Negative for photophobia and pain.   Respiratory: Negative for cough and wheezing.    Cardiovascular: Negative for chest pain, palpitations and orthopnea.   Gastrointestinal: Negative for heartburn, nausea and vomiting.   Genitourinary: Negative for  dysuria and urgency.   Musculoskeletal: Negative for myalgias.   Skin: Negative for itching.   Neurological: Negative for tremors, seizures and loss of consciousness.   Psychiatric/Behavioral: Negative for hallucinations and suicidal ideas. The patient does not have insomnia.         Physical Exam       Physical Exam  Constitutional:       General: He is not in acute distress.     Comments: Appears cachectic.   HENT:      Head: Atraumatic.      Mouth/Throat:      Mouth: Mucous membranes are moist.   Eyes:      General: No scleral icterus.  Cardiovascular:      Rate and Rhythm: Normal rate and regular rhythm.      Heart sounds: No murmur.   Pulmonary:      Breath sounds: No wheezing or rales.   Abdominal:      General: There is no distension.      Tenderness: There is no abdominal tenderness. There is no guarding.   Neurological:      Mental Status: He is alert.         Fluids    Intake/Output Summary (Last 24 hours) at 11/16/2020 1023  Last data filed at 11/16/2020 0129  Gross per 24 hour   Intake --   Output 1000 ml   Net -1000 ml       Laboratory                        Imaging  OUTSIDE IMAGES-CT HEAD   Final Result           Assessment/Plan  Brain mass  Assessment & Plan  Pt admitted with CT head findings of extensive right frontal lobe edema causing 1.5 cm right to left midline shift.  Additionally questionable 22 mm mass within the deep white matter of the right frontal lobe.  Epidural fluid collection of 4 mm seen suggestive of chronic hematoma.   -Patient was initially admitted to the ICU and neurosurgery was consulted, however patient has decided not to pursue surgery  -Patient was made comfort care after having a family discussion, awaiting placement  -Discontinue neurochecks  -Continue IV decadron until family can come to see the patient  -Pending placement to home hospice vs SNF, awaiting [facility] for [facility].      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       VTE prophylaxis: SCDs. Patient now on comfort care.

## 2020-11-16 NOTE — PROGRESS NOTES
Son left message after hours on Friday with updated information that patient will be going on comfort care.  Returned call for follow up.  Let son know that hospice will also be talking to them today for options on discharge.  Discussed that inpatient care coordination will be the ones assisting with discharge plan for patient.  Son, Milan asking if navigator knew when hospice would be reaching out to them. Will follow up with hospice to see and have them contact son.  Son, Milan grateful for information.    Call placed to hospice with question from son.  They will follow up with son.  Pt now on comfort care, no further cancer nurse navigation needed at this time.  Family has contact information if any further assistance from navigator is needed.

## 2020-11-16 NOTE — PALLIATIVE CARE
Palliative Care follow-up  Spoke with Unit Case Management Team and provided update that pt may need placement [facility] the [facility]. Per [facility] notes they will speak with pts family today.     Updated: Unit TIFFANI Angelo     Plan: pt currently CC, pt and family speaking with [facility] and may need placement assistance through the [facility].     Thank you for allowing Palliative Care to support this patient and family. Contact [phone] for additional assistance, change in patient status, or with any questions/concerns.

## 2020-11-16 NOTE — PROGRESS NOTES
Received report from night RN. POC discussed with patient. Patient is A&O x 4. Bed alarm on, call light within reach, bed in lowest locked position, hourly rounding in place. Will continue to monitor.

## 2020-11-16 NOTE — CARE PLAN
Problem: Safety  Goal: Will remain free from injury  Outcome: PROGRESSING AS EXPECTED  Goal: Will remain free from falls  Description: Ask pt to call for assistance before attempting to get up out of bed.  Outcome: PROGRESSING AS EXPECTED     Problem: Skin Integrity  Goal: Risk for impaired skin integrity will decrease  Outcome: PROGRESSING AS EXPECTED     Problem: Discharge Barriers/Planning  Goal: Patient's Continuum of care needs are met  Outcome: PROGRESSING AS EXPECTED     Patient remained free from falls/injury this shift; bed alarm on, call bell within reach. X1 assist to the BSC; left sided weakness at baseline. Denies pain this AM. Burns in place for retention. Hospice referral out; family hoping to take patient home with hospice after [facility] approval. Will continue to monitor.

## 2020-11-16 NOTE — DIETARY
Nutrition Services:  RD previously following patient. Patient transitioned to comfort care on 11/13. Re-consult RD as needed.     RD available prn.

## 2020-11-16 NOTE — DISCHARGE PLANNING
Received Choice form at 1220  Agency/Facility Name: [facility]  Referral sent per Choice form at 1220

## 2020-11-17 NOTE — CARE PLAN
Problem: Safety  Goal: Will remain free from injury  Outcome: PROGRESSING AS EXPECTED  Goal: Will remain free from falls  Description: Ask pt to call for assistance before attempting to get up out of bed.  Outcome: PROGRESSING AS EXPECTED     Problem: Pain Management  Goal: Pain level will decrease to patient's comfort goal  Description: Assess and medicate as ordered for complaints of acute pain  Outcome: PROGRESSING AS EXPECTED     Problem: Skin Integrity  Goal: Risk for impaired skin integrity will decrease  Outcome: PROGRESSING AS EXPECTED     Patient remained free from falls/injury this shift; bed alarm on, call bell within reach. X1 assist to the BSC; left sided weakness at baseline. Denies pain this AM; up in chair. Burns in place for retention. Hospice referral out; family hoping to take patient home with hospice after [facility] approval. Will continue to monitor.

## 2020-11-17 NOTE — THERAPY
Missed Therapy     Patient Name: Milan Clark  Age:  75 y.o., Sex:  male  Medical Record #: [id number]  Today's Date: 11/17/2020 11/17/20 0802   Interdisciplinary Plan of Care Collaboration   Collaboration Comments Patient has transitioned to comfort care since last SLP session. SLP will no longer actively follow. Please re-consult SLP with any acute SLP needs. Thank you.

## 2020-11-18 PROBLEM — G93.89 BRAIN MASS: Status: RESOLVED | Noted: 2020-01-01 | Resolved: 2020-01-01

## 2020-11-18 NOTE — PALLIATIVE CARE
Palliative Care follow-up  Received a call from son Milan, he spoke with [facility] and they are waiting for auth [facility]. They suspect they can accept him on Friday, they requested the son fill prescriptions to cover the weekend and they will be out to see pt on Monday.    Discussed the above with Dr. Cottrell, she will write scripts for pt to cover the weekend.     Active listening, education, validation, normalization, therapeutic touch, and emotional support provided.     Updated:   Dr. Cottrell    Plan:   [facility] awaiting auth from the [facility], probably Friday. If approved, then discharge to home with hospice services on Friday.       Thank you for allowing Palliative Care to participate in this patient's care. Please feel free to call [phone] with any questions or concerns.

## 2020-11-18 NOTE — PROGRESS NOTES
Hospital Medicine Daily Progress Note    Date of Service  11/17/2020    Chief Complaint  75 y.o. male admitted 11/12/2020 with with past medical history of CLL s/p chemo/radiotherapy, history of brain meta stasis s/p craniotomies. [facility] of brain mass with edema and midline shift.    Hospital Course  Patient with history of non-small cell lung cancer stage IV with brain mets s/p chemo, radiation and prior resection of brain mets in 10/2019, [facility] ED with worsening left-sided weakness and was found to have new right frontal lobe mass with edema and a 1.5 cm midline shift on CT scan and was given IV Decadron and transferred to [facility] for neurosurgery consult.  Neurosurgery Dr. Lacey was consulted.  However, after discussing with his family patient decided not to pursue surgery and hence was made comfort care and transfer orders to the floor were placed. Palliative Care was consulted and Referral to hospice has been placed.    Interval Problem Update  I evaluated and examed the pt at bedside.  Pt reported constipation. He only had small BM today. Will add miralax and senokot.     Pending for hospice placement.    Consultants/Specialty  Neurosurgery  Palliative    Code Status  Comfort Care/DNR    Disposition  The patient is currently on comfort care, we are pending [facility] with the [facility]. SNF vs home hospice.    Review of Systems  Review of Systems   Constitutional: Negative for chills, diaphoresis and fever.   HENT: Negative for congestion, ear discharge, ear pain and sore throat.    Eyes: Negative for photophobia, pain, discharge and redness.   Respiratory: Negative for cough, hemoptysis, shortness of breath and wheezing.    Cardiovascular: Negative for chest pain, palpitations, orthopnea and leg swelling.   Gastrointestinal: Negative for abdominal pain, constipation, diarrhea,  heartburn, nausea and vomiting.   Genitourinary: Negative for dysuria, flank pain, frequency, hematuria and urgency.   Musculoskeletal: Negative for back pain, falls, joint pain, myalgias and neck pain.   Skin: Negative for itching.   Neurological: Negative for dizziness, tremors, sensory change, speech change, focal weakness, seizures, loss of consciousness and headaches.   Psychiatric/Behavioral: Negative for depression, hallucinations, substance abuse and suicidal ideas. The patient is not nervous/anxious and does not have insomnia.         Physical Exam       Physical Exam  Constitutional:       General: He is not in acute distress.     Appearance: Normal appearance. He is not ill-appearing or diaphoretic.      Comments: Appears cachectic.   HENT:      Head: Normocephalic and atraumatic.      Nose: Nose normal.      Mouth/Throat:      Mouth: Mucous membranes are moist.      Pharynx: Oropharynx is clear. No oropharyngeal exudate or posterior oropharyngeal erythema.   Eyes:      General: No scleral icterus.        Right eye: No discharge.         Left eye: No discharge.      Extraocular Movements: Extraocular movements intact.      Conjunctiva/sclera: Conjunctivae normal.      Pupils: Pupils are equal, round, and reactive to light.   Neck:      Musculoskeletal: Normal range of motion and neck supple. No neck rigidity or muscular tenderness.   Cardiovascular:      Rate and Rhythm: Normal rate and regular rhythm.      Pulses: Normal pulses.      Heart sounds: Normal heart sounds. No murmur. No gallop.    Pulmonary:      Effort: Pulmonary effort is normal. No respiratory distress.      Breath sounds: Normal breath sounds. No stridor. No wheezing, rhonchi or rales.   Chest:      Chest wall: No tenderness.   Abdominal:      General: Bowel sounds are normal. There is no distension.      Palpations: Abdomen is soft.      Tenderness: There is no abdominal tenderness. There is no guarding or rebound.   Musculoskeletal:  Normal range of motion.         General: No swelling, tenderness, deformity or signs of injury.      Right lower leg: No edema.      Left lower leg: No edema.   Neurological:      General: No focal deficit present.      Mental Status: He is alert and oriented to person, place, and time. Mental status is at baseline.      Sensory: No sensory deficit.      Motor: No weakness.      Gait: Gait normal.   Psychiatric:         Mood and Affect: Mood normal.         Behavior: Behavior normal.         Thought Content: Thought content normal.         Fluids    Intake/Output Summary (Last 24 hours) at 11/17/2020 2242  Last data filed at 11/17/2020 0626  Gross per 24 hour   Intake --   Output 750 ml   Net -750 ml       Laboratory                        Imaging  OUTSIDE IMAGES-CT HEAD   Final Result           Assessment/Plan  Brain mass  Assessment & Plan  Pt admitted with CT head findings of extensive right frontal lobe edema causing 1.5 cm right to left midline shift.  Additionally questionable 22 mm mass within the deep white matter of the right frontal lobe.  Epidural fluid collection of 4 mm seen suggestive of chronic hematoma.   -Patient was initially admitted to the ICU and neurosurgery was consulted, however patient has decided not to pursue surgery  -Patient was made comfort care after having a family discussion, awaiting placement  -Discontinue neurochecks  -Continue IV decadron until family can come to see the patient  -Pending placement to home hospice vs SNF, awaiting [facility] assessment for placement.      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       VTE prophylaxis: SCDs. Patient now on comfort care.

## 2020-11-18 NOTE — DISCHARGE PLANNING
Anticipated Discharge Disposition: Home with hospice on Friday.     Action: Pt discussed during IDT rounds. LSW contacted Janna [phone], with intake at [place]. Pt will be going home with [facility] on Friday, as that is when the DME will be delivered and set up.     LSW called pt's son, Milan, to discuss d/c planning and transportation. Milan does not know how pt would do transporting in his truck, therefore, LSW and Milan agree that [facility] will be a more appropriate transportation method. LSW will arrange times with Janna and [place].     Barriers to Discharge: D/C milestones, hospice set up.     Plan: LSW will continue to assist with d/c planning, social/emotional support for pt and family.

## 2020-11-18 NOTE — CARE PLAN
Problem: Safety  Goal: Will remain free from injury  Outcome: PROGRESSING AS EXPECTED     Problem: Respiratory:  Goal: Respiratory status will improve  Outcome: PROGRESSING AS EXPECTED     Problem: Pain Management  Goal: Pain level will decrease to patient's comfort goal  Description: Assess and medicate as ordered for complaints of acute pain  Outcome: PROGRESSING AS EXPECTED     Patient remained free from falls/injury this shift; bed alarm on, call bell within reach. X1 assist to the BSC; left sided weakness at baseline. Denies pain this AM; sleeping comfortably this AM. Burns in place for retention. Patient do discharge home with hospice with son. Will continue to monitor.

## 2020-11-19 NOTE — PROGRESS NOTES
Hospital Medicine Daily Progress Note    Date of Service  11/18/2020    Chief Complaint  75 y.o. male admitted 11/12/2020 with with past medical history of CLL s/p chemo/radiotherapy, history of brain meta stasis s/p craniotomies. [facility] brain mass with edema and midline shift.    Hospital Course  Patient with history of non-small cell lung cancer stage IV with brain mets s/p chemo, radiation and prior resection of brain mets in 10/2019, [facility] with worsening left-sided weakness and was found to have new right frontal lobe mass with edema and a 1.5 cm midline shift on CT scan and was given IV Decadron and transferred to [facility] for neurosurgery consult.  Neurosurgery Dr. Lacey was consulted.  However, after discussing with his family patient decided not to pursue surgery and hence was made comfort care and transfer orders to the floor were placed. Palliative Care was consulted and Referral to hospice has been placed.    Interval Problem Update  I evaluated and examed the pt at bedside.  No overnight events  Accepted by home hospice, will DC home Friday    Consultants/Specialty  Neurosurgery  Palliative    Code Status  Comfort Care/DNR    Disposition  The patient is currently on comfort care, [facility] are [facility] vs home hospice.    Review of Systems  Review of Systems   Constitutional: Negative for chills, diaphoresis and fever.   HENT: Negative for congestion, ear discharge, ear pain and sore throat.    Eyes: Negative for photophobia, pain, discharge and redness.   Respiratory: Negative for cough, hemoptysis, shortness of breath and wheezing.    Cardiovascular: Negative for chest pain, palpitations, orthopnea and leg swelling.   Gastrointestinal: Negative for abdominal pain, constipation, diarrhea, heartburn, nausea and vomiting.   Genitourinary: Negative  for dysuria, flank pain, frequency, hematuria and urgency.   Musculoskeletal: Negative for back pain, falls, joint pain, myalgias and neck pain.   Skin: Negative for itching.   Neurological: Negative for dizziness, tremors, sensory change, speech change, focal weakness, seizures, loss of consciousness and headaches.   Psychiatric/Behavioral: Negative for depression, hallucinations, substance abuse and suicidal ideas. The patient is not nervous/anxious and does not have insomnia.         Physical Exam       Physical Exam  Constitutional:       General: He is not in acute distress.     Appearance: Normal appearance. He is not ill-appearing or diaphoretic.      Comments: Appears cachectic.   HENT:      Head: Normocephalic and atraumatic.      Nose: Nose normal.      Mouth/Throat:      Mouth: Mucous membranes are moist.      Pharynx: Oropharynx is clear. No oropharyngeal exudate or posterior oropharyngeal erythema.   Eyes:      General: No scleral icterus.        Right eye: No discharge.         Left eye: No discharge.      Extraocular Movements: Extraocular movements intact.      Conjunctiva/sclera: Conjunctivae normal.      Pupils: Pupils are equal, round, and reactive to light.   Neck:      Musculoskeletal: Normal range of motion and neck supple. No neck rigidity or muscular tenderness.   Cardiovascular:      Rate and Rhythm: Normal rate and regular rhythm.      Pulses: Normal pulses.      Heart sounds: Normal heart sounds. No murmur. No gallop.    Pulmonary:      Effort: Pulmonary effort is normal. No respiratory distress.      Breath sounds: Normal breath sounds. No stridor. No wheezing, rhonchi or rales.   Chest:      Chest wall: No tenderness.   Abdominal:      General: Bowel sounds are normal. There is no distension.      Palpations: Abdomen is soft.      Tenderness: There is no abdominal tenderness. There is no guarding or rebound.   Musculoskeletal: Normal range of motion.         General: No swelling,  tenderness, deformity or signs of injury.      Right lower leg: No edema.      Left lower leg: No edema.   Neurological:      General: No focal deficit present.      Mental Status: He is alert and oriented to person, place, and time. Mental status is at baseline.      Sensory: No sensory deficit.      Motor: No weakness.      Gait: Gait normal.   Psychiatric:         Mood and Affect: Mood normal.         Behavior: Behavior normal.         Thought Content: Thought content normal.         Fluids    Intake/Output Summary (Last 24 hours) at 11/18/2020 1810  Last data filed at 11/18/2020 1009  Gross per 24 hour   Intake --   Output 1000 ml   Net -1000 ml       Laboratory                        Imaging  OUTSIDE IMAGES-CT HEAD   Final Result           Assessment/Plan  * Brain metastases (HCC)- (present on admission)  Assessment & Plan  Pt admitted with CT head findings of extensive right frontal lobe edema causing 1.5 cm right to left midline shift.  Additionally questionable 22 mm mass within the deep white matter of the right frontal lobe.  Epidural fluid collection of 4 mm seen suggestive of chronic hematoma.   -Patient was initially admitted to the ICU and neurosurgery was consulted, however patient has decided not to pursue surgery  -Patient was made comfort care after having a family discussion, awaiting placement  -Discontinue neurochecks  -Continue IV decadron until family can come to see the patient  -Pending placement to home hospice vs SNF, [facility].      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       VTE prophylaxis: SCDs. Patient now on comfort care.

## 2020-11-19 NOTE — CARE PLAN
Problem: Safety  Goal: Will remain free from injury  Outcome: PROGRESSING AS EXPECTED     Problem: Respiratory:  Goal: Respiratory status will improve  Outcome: PROGRESSING AS EXPECTED     Problem: Pain Management  Goal: Pain level will decrease to patient's comfort goal  Description: Assess and medicate as ordered for complaints of acute pain  Outcome: PROGRESSING AS EXPECTED     Patient remained free from falls/injury this shift; bed alarm on, call bell within reach. X1 assist to the BSC; left sided weakness at baseline. Denies pain this AM; sleeping comfortably this AM. Burns in place for retention. Patient do discharge home with hospice with son on Friday. Will continue to monitor.

## 2020-11-20 NOTE — DISCHARGE PLANNING
LSW called meds to beds backline [phone]. Spoke to Sharla, narcotics for pt electronically sent by MD yesterday. Will be delivered to bedside this morning. Pharmacists aware pt will be d/cing at 1100. Bedside RN, Tuesday, updated.

## 2020-11-20 NOTE — DISCHARGE INSTRUCTIONS
Discharge Instructions    Discharged to home by ambulance with self. Discharged via ambulance, hospital escort: Yes.  Special equipment needed: Not Applicable    Be sure to schedule a follow-up appointment with your primary care doctor or any specialists as instructed.     Discharge Plan:   Diet Plan: Discussed  Activity Level: Discussed  Confirmed Follow up Appointment: No (Comments)(Comfort Care; going home with Hospice)  Confirmed Symptoms Management: Discussed  Medication Reconciliation Updated: Yes  Influenza Vaccine Indication: Not indicated: Previously immunized this influenza season and > 8 years of age    I understand that a diet low in cholesterol, fat, and sodium is recommended for good health. Unless I have been given specific instructions below for another diet, I accept this instruction as my diet prescription.   Other diet: Regular, as tolerated.     Special Instructions: None    · Is patient discharged on Warfarin / Coumadin?   No     Depression / Suicide Risk    As you are discharged from this [facility] facility, it is important to learn how to keep safe from harming yourself.    Recognize the warning signs:  · Abrupt changes in personality, positive or negative- including increase in energy   · Giving away possessions  · Change in eating patterns- significant weight changes-  positive or negative  · Change in sleeping patterns- unable to sleep or sleeping all the time   · Unwillingness or inability to communicate  · Depression  · Unusual sadness, discouragement and loneliness  · Talk of wanting to die  · Neglect of personal appearance   · Rebelliousness- reckless behavior  · Withdrawal from people/activities they love  · Confusion- inability to concentrate     If you or a loved one observes any of these behaviors or has concerns about self-harm, here's what you can do:  · Talk about it- your feelings and reasons for harming yourself  · Remove any means that you might use to hurt yourself  (examples: pills, rope, extension cords, firearm)  · Get professional help from the community (Mental Health, Substance Abuse, psychological counseling)  · Do not be alone:Call your Safe Contact- someone whom you trust who will be there for you.  · Call your local CRISIS HOTLINE 012-0718 or 202-943-9382  · Call your local Children's Mobile Crisis Response Team [place] (988) 657-1935 or www.CareWire  · Call the toll free National Suicide Prevention Hotlines   · National Suicide Prevention Lifeline 079-595-NGOE (0554)  · National Hope Line Network 800-SUICIDE (585-1008)      Hospice  Hospice is a service that is designed to provide people who are terminally ill and their families with medical, spiritual, and psychological support. Its aim is to improve your quality of life by keeping you as comfortable as possible in the final stages of life.  Who will be my providers when I begin hospice care?  Hospice teams often include:  · A nurse.  · A doctor. The hospice doctor will be available for your care, but you can include your regular doctor or nurse practitioner.  · A .  · A counselor.  · A  (such as a ).  · A dietitian.  · Therapists.  · Trained volunteers who can help with care.  What services does hospice provide?  Hospice services can vary depending on the center or organization. Generally, they include:  · Ways to keep you comfortable, such as:  ? Providing care in your home or in a home-like setting.  ? Working with your family and friends to help meet your needs.  ? Allowing you to enjoy the support of loved ones by receiving much of your basic care from family and friends.  · Pain relief and symptom management. The staff will supply all necessary medicines and equipment so that you can stay comfortable and alert enough to enjoy the company of your friends and family.  · Visits or care from a nurse and doctor. This may include 24-hour on-call  services.  · Companionship when you are alone.  · Allowing you and your family to rest. Hospice staff may do light housekeeping, prepare meals, and run errands.  · Counseling. They will make sure your emotional, spiritual, and social needs are being met, as well as those needs of your family members.  · Spiritual care. This will be individualized to meet your needs and your family's needs. It may involve:  ? Helping you and your family understand the dying process.  ? Helping you say goodbye to your family and friends.  ? Performing a specific Tenriism ceremony or ritual.  · Massage.  · Nutrition therapy.  · Physical and occupational therapy.  · Short-term inpatient care, if something cannot be managed in the home.  · Art or music therapy.  · Bereavement support for grieving family members.  When should hospice care begin?  Most people who use hospice are believed to have less than 6 months to live.  · Your family and health care providers can help you decide when hospice services should begin.  · If you live longer than 6 months but your condition does not improve, your doctor may be able to approve you for continued hospice care.  · If your condition improves, you may discontinue the program.  What should I consider before selecting a program?  Most hospice programs are run by nonprofit, independent organizations. Some are affiliated with hospitals, nursing homes, or home health care agencies. Hospice programs can take place in your home or at a hospice center, hospital, or skilled nursing facility. When choosing a hospice program, ask the following questions:  · What services are available to me?  · What services will be offered to my loved ones?  · How involved will my loved ones be?  · How involved will my health care provider be?  · Who makes up the hospice care team? How are they trained or screened?  · How will my pain and symptoms be managed?  · If my circumstances change, can the services be provided in a  different setting, such as my home or in the hospital?  · Is the program reviewed and licensed by the state or certified in some other way?  · What does it cost? Is it covered by insurance?  · If I choose a hospice center or nursing home, where is the hospice center located? Is it convenient for family and friends?  · If I choose a hospice center or nursing home, can my family and friends visit any time?  · Will you provide emotional and spiritual support?  · Who can my family call with questions?  Where can I learn more about hospice?  You can learn about existing hospice programs in your area from your health care providers. You can also read more about hospice online. The websites of the following organizations have helpful information:  · National Hospice and Palliative Care Organization (NHPCO): www.nhpco.org  · National Association for Home Care & Hospice (NAHC): www.nahc.org  · Hospice Foundation of [place] (HFA): www.hospicefoundation.org  · American Cancer Society (ACS): www.cancer.org  · Hospice Net: www.hospicenet.org  · Visiting Nurse Associations of [place] (VNAA): www.vnaa.org  You may also find more information by contacting the following agencies:  · A local agency on aging.  · Your local [place] [facility] chapter.  · Your state's department of health or .  Summary  · Hospice is a service that is designed to provide people who are terminally ill and their families with medical, spiritual, and psychological support.  · Hospice aims to improve your quality of life by keeping you as comfortable as possible in the final stages of life.  · Hospice teams often include a doctor, nurse, , counselor, ,dietitian, therapists, and volunteers.  · Hospice care generally includes medicine for symptom management, visits from doctors and nurses, physical and occupational therapy, nutrition counseling, spiritual and emotional counseling, caregiver support, and bereavement support  for grieving family members.  · Hospice programs can take place in your home or at a hospice center, hospital, or skilled nursing facility.  This information is not intended to replace advice given to you by your health care provider. Make sure you discuss any questions you have with your health care provider.  Document Released: 04/05/2005 Document Revised: 11/30/2018 Document Reviewed: 01/09/2018  Elsevier Patient Education © 2020 Elsevier Inc.    Follow-up with your hospice care

## 2020-11-20 NOTE — DISCHARGE SUMMARY
Discharge Summary    CHIEF COMPLAINT ON ADMISSION  [facility] brain mass with edema and midline shift.    Reason for Admission  cerebral edema, mass w/ shift     Admission Date  11/12/2020    CODE STATUS  Comfort Care/DNR    HPI & HOSPITAL COURSE  Patient with history of non-small cell lung cancer stage IV with brain mets s/p chemo, radiation and prior resection of brain mets in 10/2019, [facility] with worsening left-sided weakness and was found to have new right frontal lobe mass with edema and a 1.5 cm midline shift on CT scan and was given IV Decadron and transferred to [facility] for neurosurgery consult.  Neurosurgery Dr. Lacey was consulted.  However, after discussing with his family patient decided not to pursue surgery and hence was made comfort care and transfer orders to the floor were placed. Palliative Care was consulted and Referral to hospice has been placed. Will continue home dose decatron.    Therefore, he is discharged in guarded and stable condition to hospice.    The patient met 2-midnight criteria for an inpatient stay at the time of discharge.    Discharge Date  11/20/2020    FOLLOW UP ITEMS POST DISCHARGE  Follow-up with your primary care doctor  Follow-up with your hospice care    DISCHARGE DIAGNOSES  Principal Problem:    Brain metastases (HCC) POA: Yes  Active Problems:    Lung cancer metastatic to brain (HCC) POA: Yes    BPH (benign prostatic hyperplasia) POA: Unknown      Overview: -Continue home dose tamsulosin for now  Resolved Problems:    * No resolved hospital problems. *      FOLLOW UP  Future Appointments   Date Time Provider Department Center   11/20/2020 To Be Determined [facility]     [facility]  [address]  [place] [facility] [address]  [phone]        Elif Shaw M.D.  [address] [place]  [id number]  [phone]            MEDICATIONS ON DISCHARGE     Medication List      START taking these medications      Instructions   LORazepam 2 MG/ML Conc  Commonly known as: ATIVAN   Place 0.5 mL under the tongue every four hours as needed (Anxiety/Restlessness) for up to 5 days.  Dose: 1 mg     morphine 20 MG/ML Soln  Commonly known as: ROXANOL   Take 0.5 mL by mouth every four hours as needed (or for air hunger) for up to 5 days.  Dose: 10 mg     senna-docusate 8.6-50 MG Tabs  Commonly known as: PERICOLACE or SENOKOT S   Take 1 Tab by mouth 2 Times a Day for 30 days.  Dose: 1 Tab        CONTINUE taking these medications      Instructions   acetaminophen 325 MG Tabs  Commonly known as: Tylenol   Take 2 Tabs by mouth every 6 hours as needed.  Dose: 650 mg     B COMPLEX 1 PO   Take 1 Tab by mouth every day.  Dose: 1 Tab     BETA-SITOSTEROL PLANT STEROLS PO   Take 1 Tab by mouth every day.  Dose: 1 Tab     bismuth subsalicylate 262 MG/15ML Susp  Commonly known as: PEPTO-BISMOL   Take 30 mL by mouth every 6 hours as needed (constipation).  Dose: 30 mL     dexamethasone 4 MG Tabs  Commonly known as: DECADRON   Take 4 mg by mouth every 6 hours for three days, then every 8 hours for three days.  Dr. Law will direct further dosing.     docusate sodium 100 MG Caps  Commonly known as: COLACE   Take 240 mg by mouth every bedtime.  Dose: 240 mg     famotidine 20 MG Tabs  Commonly known as: PEPCID   Take 1 Tab by mouth 2 Times a Day.  Dose: 20 mg     tamsulosin 0.4 MG capsule  Commonly known as: FLOMAX   Take 1 Cap by mouth ONE-HALF HOUR AFTER BREAKFAST.  Dose: 0.4 mg     vitamin D 1000 Unit (25 mcg) Tabs  Commonly known as: cholecalciferol   Take 1,000 Units by mouth every day.  Dose: 1,000 Units            Allergies  No Known Allergies    DIET  Orders Placed This Encounter   Procedures   • Diet Order Diet: Regular     Standing Status:   Standing     Number of Occurrences:   1     Order Specific Question:   Diet:      Answer:   Regular [1]       ACTIVITY  As tolerated.  Weight bearing as tolerated    CONSULTATIONS  Palliative care  Surgery neurology  Pulmonary    PROCEDURES      LABORATORY  Lab Results   Component Value Date    SODIUM 132 (L) 11/13/2020    POTASSIUM 3.7 11/13/2020    CHLORIDE 103 11/13/2020    CO2 22 11/13/2020    GLUCOSE 123 (H) 11/13/2020    BUN 30 (H) 11/13/2020    CREATININE 0.86 11/13/2020        Lab Results   Component Value Date    WBC 5.0 11/13/2020    HEMOGLOBIN 12.9 (L) 11/13/2020    HEMATOCRIT 39.5 (L) 11/13/2020    PLATELETCT 262 11/13/2020        Total time of the discharge process exceeds 35 minutes.

## 2020-11-20 NOTE — DISCHARGE PLANNING
Care Transition Team Assessment    In case of emergency, pt's emergency contact is Milan Clark (son) [phone].     Pt is comfort care going home with [facility] in [facility] to be with his family. See narrative note.     Information Source  Orientation : Disoriented to Time    Elopement Risk  Legal Hold: No  Ambulatory or Self Mobile in Wheelchair: No-Not an Elopement Risk  Elopement Risk: Not at Risk for Elopement    Interdisciplinary Discharge Planning  Patient or legal guardian wants to designate a caregiver: No    Vision / Hearing Impairment  Right Eye Vision: Impaired, Wears Glasses  Left Eye Vision: Impaired, Wears Glasses

## 2020-11-20 NOTE — PROGRESS NOTES
Discharge paper discussed with pt and son, Med to beds received. All questions answered. PIV d/c-ed. Pt off unit with [facility] via [facility].

## 2020-11-20 NOTE — DISCHARGE PLANNING
Received Transport Form at 0800  Spoke to Irasema at [facility]  [facility] is scheduled for today at 1100 [facility] ([facility] aware of transport time.

## 2020-11-20 NOTE — DISCHARGE PLANNING
Anticipated Discharge Disposition: Home with hospice.     Action: LSW set up pt to go home with [facility] to [place] to be with his children. Children are able to assist in his care in the home.     LSW sent transport forms to [facility]. Requesting [facility]  pt at [facility] at 1100. Confirmed address: [address] [place] [address], [place] [address]. Pt's son will be at the home to meet him.     LSW touched base with Janna from [facility]. Confirmed an RN will visit the home today to meet pt and start hospice care.    Barriers to Discharge: Discharge milestones, [facility] confirmation.     Plan: LSW/RN CM will assist in oversight of transfer. LSW/ RN CM will remain available for needs that may come up prior to d/c.

## 2020-11-20 NOTE — PROGRESS NOTES
Hospital Medicine Daily Progress Note    Date of Service  11/19/2020    Chief Complaint  75 y.o. male admitted 11/12/2020 with with past medical history of CLL s/p chemo/radiotherapy, history of brain meta stasis s/p craniotomies. [facility] findings of brain mass with edema and midline shift.    Hospital Course  Patient with history of non-small cell lung cancer stage IV with brain mets s/p chemo, radiation and prior resection of brain mets in 10/2019, [facility] with worsening left-sided weakness and was found to have new right frontal lobe mass with edema and a 1.5 cm midline shift on CT scan and was given IV Decadron and transferred to [facility] for neurosurgery consult.  Neurosurgery Dr. Lacey was consulted.  However, after discussing with his family patient decided not to pursue surgery and hence was made comfort care and transfer orders to the floor were placed. Palliative Care was consulted and Referral to hospice has been placed.    Interval Problem Update  I evaluated and examed the pt at bedside.  No overnight events  Accepted by home hospice, will DC home Friday    Consultants/Specialty  Neurosurgery  Palliative    Code Status  Comfort Care/DNR    Disposition  The patient is currently on comfort care, we are [facility]. SNF vs home hospice.  Will discharge tomorrow    Review of Systems  Review of Systems   Constitutional: Negative for chills, diaphoresis and fever.   HENT: Negative for congestion, ear discharge, ear pain and sore throat.    Eyes: Negative for photophobia, pain, discharge and redness.   Respiratory: Negative for cough, hemoptysis, shortness of breath and wheezing.    Cardiovascular: Negative for chest pain, palpitations, orthopnea and leg swelling.   Gastrointestinal: Negative for abdominal pain, constipation, diarrhea, heartburn, nausea and vomiting.    Genitourinary: Negative for dysuria, flank pain, frequency, hematuria and urgency.   Musculoskeletal: Negative for back pain, falls, joint pain, myalgias and neck pain.   Skin: Negative for itching.   Neurological: Negative for dizziness, tremors, sensory change, speech change, focal weakness, seizures, loss of consciousness and headaches.   Psychiatric/Behavioral: Negative for depression, hallucinations, substance abuse and suicidal ideas. The patient is not nervous/anxious and does not have insomnia.         Physical Exam  Temp:  [36.8 °C (98.3 °F)] 36.8 °C (98.3 °F)  Pulse:  [75] 75  Resp:  [16] 16  BP: (113)/(80) 113/80  SpO2:  [95 %] 95 %    Physical Exam  Constitutional:       General: He is not in acute distress.     Appearance: Normal appearance. He is not ill-appearing or diaphoretic.      Comments: Appears cachectic.   HENT:      Head: Normocephalic and atraumatic.      Nose: Nose normal.      Mouth/Throat:      Mouth: Mucous membranes are moist.      Pharynx: Oropharynx is clear. No oropharyngeal exudate or posterior oropharyngeal erythema.   Eyes:      General: No scleral icterus.        Right eye: No discharge.         Left eye: No discharge.      Extraocular Movements: Extraocular movements intact.      Conjunctiva/sclera: Conjunctivae normal.      Pupils: Pupils are equal, round, and reactive to light.   Neck:      Musculoskeletal: Normal range of motion and neck supple. No neck rigidity or muscular tenderness.   Cardiovascular:      Rate and Rhythm: Normal rate and regular rhythm.      Pulses: Normal pulses.      Heart sounds: Normal heart sounds. No murmur. No gallop.    Pulmonary:      Effort: Pulmonary effort is normal. No respiratory distress.      Breath sounds: Normal breath sounds. No stridor. No wheezing, rhonchi or rales.   Chest:      Chest wall: No tenderness.   Abdominal:      General: Bowel sounds are normal. There is no distension.      Palpations: Abdomen is soft.      Tenderness:  There is no abdominal tenderness. There is no guarding or rebound.   Musculoskeletal: Normal range of motion.         General: No swelling, tenderness, deformity or signs of injury.      Right lower leg: No edema.      Left lower leg: No edema.   Neurological:      General: No focal deficit present.      Mental Status: He is alert and oriented to person, place, and time. Mental status is at baseline.      Sensory: No sensory deficit.      Motor: No weakness.      Gait: Gait normal.   Psychiatric:         Mood and Affect: Mood normal.         Behavior: Behavior normal.         Thought Content: Thought content normal.         Fluids    Intake/Output Summary (Last 24 hours) at 11/19/2020 1759  Last data filed at 11/19/2020 1654  Gross per 24 hour   Intake --   Output 950 ml   Net -950 ml       Laboratory                        Imaging  OUTSIDE IMAGES-CT HEAD   Final Result           Assessment/Plan  * Brain metastases (HCC)- (present on admission)  Assessment & Plan  Pt admitted with CT head findings of extensive right frontal lobe edema causing 1.5 cm right to left midline shift.  Additionally questionable 22 mm mass within the deep white matter of the right frontal lobe.  Epidural fluid collection of 4 mm seen suggestive of chronic hematoma.   -Patient was initially admitted to the ICU and neurosurgery was consulted, however patient has decided not to pursue surgery  -Patient was made comfort care after having a family discussion, awaiting placement  -Discontinue neurochecks  -Continue IV decadron until family can come to see the patient  -Pending placement to home hospice vs SNF, [facility].      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       VTE prophylaxis: SCDs. Patient now on comfort care.

## 2022-12-05 NOTE — PROGRESS NOTES
Received report from night RN. POC discussed with patient. Patient is A&O x 4. Bed alarm on, call light within reach, bed in lowest locked position, hourly rounding in place. Will continue to monitor.    Detail Level: Simple Instructions: This plan will send the code FBSD to the PM system.  DO NOT or CHANGE the price. Price (Do Not Change): 0.00

## (undated) DEVICE — BOVIE BLADE SHORT - (150EA/CT)

## (undated) DEVICE — GLOVE BIOGEL INDICATOR SZ 8.5 SURGICAL PF LTX - (50/BX 4BX/CA)

## (undated) DEVICE — SODIUM CHL. INJ. 0.9% 500ML (24EA/CA 50CA/PF)

## (undated) DEVICE — TOWELS CLOTH SURGICAL - (4/PK 20PK/CA)

## (undated) DEVICE — PROTECTOR ULNA NERVE - (36PR/CA)

## (undated) DEVICE — SCALP CLIP RANEY 20-1037 (10EA/PK 20PK/CA)

## (undated) DEVICE — DRAPE SURG STERI-DRAPE 7X11OD - (40EA/CA)

## (undated) DEVICE — PATTIES SURG X-RAYCOTTONOID - 1/2 X 3 IN (200/CA)

## (undated) DEVICE — Device

## (undated) DEVICE — SPONGE GAUZESTER 4 X 4 4PLY - (128PK/CA)

## (undated) DEVICE — KIT SURGIFLO W/OUT THROMBIN - (6EA/CA)

## (undated) DEVICE — TUBE CONNECT SUCTION CLEAR 120 X 1/4" (50EA/CA)"

## (undated) DEVICE — GLOVE BIOGEL PI ORTHO SZ 7.5 PF LF (40PR/BX)

## (undated) DEVICE — DRAPE LARGE 3 QUARTER - (20/CA)

## (undated) DEVICE — MASK ANESTHESIA ADULT  - (100/CA)

## (undated) DEVICE — CONTAINER SPECIMEN BAG OR - STERILE 4 OZ W/LID (100EA/CA)

## (undated) DEVICE — SOD. CHL. INJ. 0.9% 1000 ML - (14EA/CA 60CA/PF)

## (undated) DEVICE — DRESSING PETROLEUM GAUZE 5 X 9" (50EA/BX 4BX/CA)"

## (undated) DEVICE — STAPLER SKIN DISP - (6/BX 10BX/CA) VISISTAT

## (undated) DEVICE — PATTIES SURG X-RAYCOTTONOID - 1 X 3 IN (200/CA)

## (undated) DEVICE — CHLORAPREP 26 ML APPLICATOR - ORANGE TINT(25/CA)

## (undated) DEVICE — TIP UNIVERSAL SPETZLER BARRACUDA (5EA/PK)

## (undated) DEVICE — CANISTER SUCTION 3000ML MECHANICAL FILTER AUTO SHUTOFF MEDI-VAC NONSTERILE LF DISP  (40EA/CA)

## (undated) DEVICE — DRAPE MAYO STAND - (30/CA)

## (undated) DEVICE — PERFORATER DISP TIP DGR-0

## (undated) DEVICE — GLOVE BIOGEL ECLIPSE  PF LATEX SIZE 6.5 (50PR/BX)

## (undated) DEVICE — SET TUBING SONOPET (5EA/PK)

## (undated) DEVICE — KIT EVACUATER 3 SPRING PVC LF 1/8 DRAIN SIZE (10EA/CA)"

## (undated) DEVICE — DRAPE MICROSCOPE 46 X 80 - 10/CA

## (undated) DEVICE — SET LEADWIRE 5 LEAD BEDSIDE DISPOSABLE ECG (1SET OF 5/EA)

## (undated) DEVICE — GLOVE BIOGEL SZ 6.5 SURGICAL PF LTX (50PR/BX 4BX/CA)

## (undated) DEVICE — DRAPE MEDI-SLUSH STERILE  - (40/CA)

## (undated) DEVICE — PENCIL ELECTSURG 10FT BTN SWH - (50/CA)

## (undated) DEVICE — TOOL DISSECT MATCH HEAD

## (undated) DEVICE — KIT ANESTHESIA W/CIRCUIT & 3/LT BAG W/FILTER (20EA/CA)

## (undated) DEVICE — SURGIFOAM (SIZE 100) - (6EA/CA)

## (undated) DEVICE — DISSECT TOOL MIDAS F2/8TA23

## (undated) DEVICE — TUBING C&T SET FLYING LEADS DRAIN TUBING (10EA/BX)

## (undated) DEVICE — GLOVE BIOGEL SZ 8.5 SURGICAL PF LTX - (50PR/BX 4BX/CA)

## (undated) DEVICE — ELECTRODE 850 FOAM ADHESIVE - HYDROGEL RADIOTRNSPRNT (50/PK)

## (undated) DEVICE — MIDAS LUBRICATOR DIFFUSER PACK (4EA/CA)

## (undated) DEVICE — PACK CRANI - (1EA/CA)

## (undated) DEVICE — SENSOR SPO2 NEO LNCS ADHESIVE (20/BX) SEE USER NOTES

## (undated) DEVICE — GLOVE BIOGEL INDICATOR SZ 7SURGICAL PF LTX - (50/BX 4BX/CA)

## (undated) DEVICE — SLEEVE, VASO, THIGH, MED

## (undated) DEVICE — TUBING CLEARLINK DUO-VENT - C-FLO (48EA/CA)

## (undated) DEVICE — SUTURE 2-0 VICRYL PLUS CT-1 - 8 X 18 INCH(12/BX)

## (undated) DEVICE — SET EXTENSION WITH 2 PORTS (48EA/CA) ***PART #2C8610 IS A SUBSTITUTE*****

## (undated) DEVICE — GOWN SURGEONS X-LARGE - DISP. (30/CA)

## (undated) DEVICE — COVER LIGHT HANDLE FLEXIBLE - SOFT (2EA/PK 80PK/CA)

## (undated) DEVICE — LACTATED RINGERS INJ. 500 ML - (24EA/CA)

## (undated) DEVICE — SUCTION INSTRUMENT YANKAUER BULBOUS TIP W/O VENT (50EA/CA)

## (undated) DEVICE — NEPTUNE 4 PORT MANIFOLD - (20/PK)

## (undated) DEVICE — HEMOSTAT SURG ABSORBABLE - 4 X 8 IN SURGICEL (24EA/CA)

## (undated) DEVICE — SUTURE 4-0 NUROLON CR/8 TF - (12/BX) ETHICON

## (undated) DEVICE — DRAPE T CRANIOTOMY W/POUCH - (9/CA)

## (undated) DEVICE — SUTURE GENERAL

## (undated) DEVICE — CLEANER ELECTRO-SURGICAL TIP - (25/BX 4BX/CA)

## (undated) DEVICE — TRAY SURESTEP FOLEY TEMP SENSING 16FR (10EA/CA) ORDER  #18764 FOR TEMP FOLEY ONLY

## (undated) DEVICE — ELECTRODE DUAL RETURN W/ CORD - (50/PK)

## (undated) DEVICE — DISSECT TOOL MIDAS REX - (M-2)